# Patient Record
Sex: FEMALE | Race: WHITE | NOT HISPANIC OR LATINO | Employment: PART TIME | URBAN - NONMETROPOLITAN AREA
[De-identification: names, ages, dates, MRNs, and addresses within clinical notes are randomized per-mention and may not be internally consistent; named-entity substitution may affect disease eponyms.]

---

## 2017-01-09 DIAGNOSIS — F32.9 MDD (MAJOR DEPRESSIVE DISORDER): ICD-10-CM

## 2017-01-09 DIAGNOSIS — R10.32 LLQ ABDOMINAL PAIN: ICD-10-CM

## 2017-01-09 DIAGNOSIS — F41.1 GAD (GENERALIZED ANXIETY DISORDER): ICD-10-CM

## 2017-01-09 DIAGNOSIS — G47.00 INSOMNIA: Primary | ICD-10-CM

## 2017-01-10 RX ORDER — CITALOPRAM HYDROBROMIDE 40 MG/1
TABLET ORAL
Qty: 30 TABLET | Refills: 3 | Status: SHIPPED | OUTPATIENT
Start: 2017-01-10 | End: 2017-05-17

## 2017-01-10 RX ORDER — GABAPENTIN 100 MG/1
CAPSULE ORAL
Qty: 90 CAPSULE | Refills: 3 | Status: SHIPPED | OUTPATIENT
Start: 2017-01-10 | End: 2017-02-23

## 2017-01-10 RX ORDER — TRAZODONE HYDROCHLORIDE 50 MG/1
TABLET, FILM COATED ORAL
Qty: 90 TABLET | Refills: 0 | Status: SHIPPED | OUTPATIENT
Start: 2017-01-10 | End: 2017-03-27

## 2017-01-10 NOTE — TELEPHONE ENCOUNTER
Trazadone 50mg        Last Written Prescription Date: 12-7-2016  Last Fill Quantity: 90; # refills: 0  Last Office Visit with [facility], [facility] or [facility] prescribing provider:  11-   Next 5 appointments (look out 90 days)     Feb 08, 2017  9:30 AM   (Arrive by 9:15 AM)   SHORT with Sudhakar Cuellar MD   [facility] ([facility])    [address] [place] [address]   [phone]                   Last PHQ-9 score on record=   PHQ-9 SCORE 12/22/2015   Total Score -   Total Score 13       AST       13   8/12/2016  ALT       17   8/12/2016      Gabapentin 100mg cap      Last Written Prescription Date: 12-  Last Quantity: 90, # refills: 0  Last Office Visit with [facility], [facility] or [facility] prescribing provider: 11-   Next 5 appointments (look out 90 days)     Feb 08, 2017  9:30 AM   (Arrive by 9:15 AM)   SHORT with Sudhakar Cuellar MD   [facility] ([facility])    [address] [place] [address]   [phone]                   CREATININE   Date Value Ref Range Status   03/21/2015 0.74 0.52 - 1.04 mg/dL Final     AST       13   8/12/2016  ALT       17   8/12/2016  BP Readings from Last 3 Encounters:   11/10/16 122/76   09/25/16 114/76   08/12/16 126/72       Citalopram HBR 40mg tab     Last Written Prescription Date: 12-7-2016  Last Fill Quantity: 30, # refills: 0  Last Office Visit with [facility] primary care provider:  11-   Next 5 appointments (look out 90 days)     Feb 08, 2017  9:30 AM   (Arrive by 9:15 AM)   SHORT with Sudhakar Cuellar MD   [facility] ([facility])    [address] [place] [address]   [phone]                   Last PHQ-9 score on record=   PHQ-9 SCORE 12/22/2015   Total Score -   Total Score 13

## 2017-02-06 DIAGNOSIS — R10.2 PELVIC PAIN IN FEMALE: Primary | ICD-10-CM

## 2017-02-07 RX ORDER — MENTHOL 5.8 MG/1
LOZENGE ORAL
Qty: 30 CAPSULE | Refills: 9 | Status: SHIPPED | OUTPATIENT
Start: 2017-02-07 | End: 2018-02-06

## 2017-02-08 ENCOUNTER — OFFICE VISIT (OUTPATIENT)
Dept: FAMILY MEDICINE | Facility: OTHER | Age: 37
End: 2017-02-08
Attending: FAMILY MEDICINE
Payer: COMMERCIAL

## 2017-02-08 VITALS
TEMPERATURE: 96.8 F | DIASTOLIC BLOOD PRESSURE: 62 MMHG | BODY MASS INDEX: 29.62 KG/M2 | WEIGHT: 200 LBS | HEIGHT: 69 IN | SYSTOLIC BLOOD PRESSURE: 100 MMHG | HEART RATE: 78 BPM

## 2017-02-08 DIAGNOSIS — J20.8 ACUTE BRONCHITIS DUE TO OTHER SPECIFIED ORGANISMS: ICD-10-CM

## 2017-02-08 DIAGNOSIS — F98.8 ATTENTION DEFICIT DISORDER: Primary | ICD-10-CM

## 2017-02-08 PROCEDURE — 99213 OFFICE O/P EST LOW 20 MIN: CPT | Performed by: FAMILY MEDICINE

## 2017-02-08 PROCEDURE — 99212 OFFICE O/P EST SF 10 MIN: CPT

## 2017-02-08 RX ORDER — METHYLPHENIDATE HYDROCHLORIDE 54 MG/1
54 TABLET ORAL EVERY MORNING
Qty: 30 TABLET | Refills: 0 | Status: SHIPPED | OUTPATIENT
Start: 2017-03-08 | End: 2017-05-11

## 2017-02-08 RX ORDER — AZITHROMYCIN 250 MG/1
TABLET, FILM COATED ORAL
Qty: 6 TABLET | Refills: 0 | Status: SHIPPED | OUTPATIENT
Start: 2017-02-08 | End: 2017-02-17

## 2017-02-08 RX ORDER — METHYLPHENIDATE HYDROCHLORIDE 54 MG/1
54 TABLET ORAL EVERY MORNING
Qty: 30 TABLET | Refills: 0 | Status: SHIPPED | OUTPATIENT
Start: 2017-04-08 | End: 2017-05-11

## 2017-02-08 RX ORDER — METHYLPHENIDATE HYDROCHLORIDE 54 MG/1
54 TABLET ORAL DAILY
Qty: 30 TABLET | Refills: 0 | Status: SHIPPED | OUTPATIENT
Start: 2017-02-08 | End: 2017-05-11

## 2017-02-08 ASSESSMENT — PAIN SCALES - GENERAL: PAINLEVEL: NO PAIN (0)

## 2017-02-08 ASSESSMENT — ANXIETY QUESTIONNAIRES
GAD7 TOTAL SCORE: 16
2. NOT BEING ABLE TO STOP OR CONTROL WORRYING: NEARLY EVERY DAY
IF YOU CHECKED OFF ANY PROBLEMS ON THIS QUESTIONNAIRE, HOW DIFFICULT HAVE THESE PROBLEMS MADE IT FOR YOU TO DO YOUR WORK, TAKE CARE OF THINGS AT HOME, OR GET ALONG WITH OTHER PEOPLE: SOMEWHAT DIFFICULT
7. FEELING AFRAID AS IF SOMETHING AWFUL MIGHT HAPPEN: MORE THAN HALF THE DAYS
5. BEING SO RESTLESS THAT IT IS HARD TO SIT STILL: SEVERAL DAYS
1. FEELING NERVOUS, ANXIOUS, OR ON EDGE: MORE THAN HALF THE DAYS
3. WORRYING TOO MUCH ABOUT DIFFERENT THINGS: NEARLY EVERY DAY
6. BECOMING EASILY ANNOYED OR IRRITABLE: NEARLY EVERY DAY

## 2017-02-08 ASSESSMENT — PATIENT HEALTH QUESTIONNAIRE - PHQ9: 5. POOR APPETITE OR OVEREATING: MORE THAN HALF THE DAYS

## 2017-02-08 NOTE — PATIENT INSTRUCTIONS
Bronchitis, Antibiotic Treatment (Adult)    Bronchitis is an infection of the air passages (bronchial tubes) in your lungs. It often occurs when you have a cold. This illness is contagious during the first few days and is spread through the air by coughing and sneezing, or by direct contact (touching the sick person and then touching your own eyes, nose, or mouth).  Symptoms of bronchitis include cough with mucus (phlegm) and low-grade fever. Bronchitis usually lasts 7 to 14 days. Mild cases can be treated with simple home remedies. More severe infection is treated with an antibiotic.  Home care  Follow these guidelines when caring for yourself at home:    If your symptoms are severe, rest at home for the first 2 to 3 days. When you go back to your usual activities, don't let yourself get too tired.    Do not smoke. Also avoid being exposed to secondhand smoke.    You may use over-the-counter medicines to control fever or pain, unless another medicine was prescribed. (Note: If you have chronic liver or kidney disease or have ever had a stomach ulcer or gastrointestinal bleeding, talk with your healthcare provider before using these medicines. Also talk to your provider if you are taking medicine to prevent blood clots.) Aspirin should never be given to anyone younger than 18 years of age who is ill with a viral infection or fever. It may cause severe liver or brain damage.    Your appetite may be poor, so a light diet is fine. Avoid dehydration by drinking 6 to 8 glasses of fluids per day (such as water, soft drinks, sports drinks, juices, tea, or soup). Extra fluids will help loosen secretions in the nose and lungs.    Over-the-counter cough, cold, and sore-throat medicines will not shorten the length of the illness, but they may be helpful to reduce symptoms. (Note: Do not use decongestants if you have high blood pressure.)    Finish all antibiotic medicine. Do this even if you are feeling better after only a  few days.  Follow-up care  Follow up with your healthcare provider, or as advised. If you had an X-ray or ECG (electrocardiogram), a specialist will review it. You will be notified of any new findings that may affect your care.  Note: If you are age 65 or older, or if you have a chronic lung disease or condition that affects your immune system, or you smoke, talk to your healthcare provider about having pneumococcal vaccinations and a yearly influenza vaccination (flu shot).  When to seek medical advice  Call your healthcare provider right away if any of these occur:    Fever of 100.4 F (38 C) or higher    Coughing up increased amounts of colored sputum    Weakness, drowsiness, headache, facial pain, ear pain, or a stiff neck   Call 911, or get immediate medical care  Contact emergency services right away if any of these occur.    Coughing up blood    Worsening weakness, drowsiness, headache, or stiff neck    Trouble breathing, wheezing, or pain with breathing    3587-5691 [employer]. [address] [place] [address], [place] [address]. All rights reserved. This information is not intended as a substitute for professional medical care. Always follow your healthcare professional's instructions.

## 2017-02-08 NOTE — MR AVS SNAPSHOT
After Visit Summary   2/8/2017    Cristiana Myles    MRN: [id number]           Patient Information     Date Of Birth          1980        Visit Information        Provider Department      2/8/2017 9:30 AM Sudhakar Cuellar MD [facility]        Today's Diagnoses     Attention deficit disorder    -  1     Acute bronchitis due to other specified organisms           Care Instructions      Bronchitis, Antibiotic Treatment (Adult)    Bronchitis is an infection of the air passages (bronchial tubes) in your lungs. It often occurs when you have a cold. This illness is contagious during the first few days and is spread through the air by coughing and sneezing, or by direct contact (touching the sick person and then touching your own eyes, nose, or mouth).  Symptoms of bronchitis include cough with mucus (phlegm) and low-grade fever. Bronchitis usually lasts 7 to 14 days. Mild cases can be treated with simple home remedies. More severe infection is treated with an antibiotic.  Home care  Follow these guidelines when caring for yourself at home:    If your symptoms are severe, rest at home for the first 2 to 3 days. When you go back to your usual activities, don't let yourself get too tired.    Do not smoke. Also avoid being exposed to secondhand smoke.    You may use over-the-counter medicines to control fever or pain, unless another medicine was prescribed. (Note: If you have chronic liver or kidney disease or have ever had a stomach ulcer or gastrointestinal bleeding, talk with your healthcare provider before using these medicines. Also talk to your provider if you are taking medicine to prevent blood clots.) Aspirin should never be given to anyone younger than 18 years of age who is ill with a viral infection or fever. It may cause severe liver or brain damage.    Your appetite may be poor, so a light diet is fine. Avoid dehydration by drinking 6 to 8 glasses of fluids per day (such as  water, soft drinks, sports drinks, juices, tea, or soup). Extra fluids will help loosen secretions in the nose and lungs.    Over-the-counter cough, cold, and sore-throat medicines will not shorten the length of the illness, but they may be helpful to reduce symptoms. (Note: Do not use decongestants if you have high blood pressure.)    Finish all antibiotic medicine. Do this even if you are feeling better after only a few days.  Follow-up care  Follow up with your healthcare provider, or as advised. If you had an X-ray or ECG (electrocardiogram), a specialist will review it. You will be notified of any new findings that may affect your care.  Note: If you are age 65 or older, or if you have a chronic lung disease or condition that affects your immune system, or you smoke, talk to your healthcare provider about having pneumococcal vaccinations and a yearly influenza vaccination (flu shot).  When to seek medical advice  Call your healthcare provider right away if any of these occur:    Fever of 100.4 F (38 C) or higher    Coughing up increased amounts of colored sputum    Weakness, drowsiness, headache, facial pain, ear pain, or a stiff neck   Call 911, or get immediate medical care  Contact emergency services right away if any of these occur.    Coughing up blood    Worsening weakness, drowsiness, headache, or stiff neck    Trouble breathing, wheezing, or pain with breathing    [id number] [employer]. [address] [place] [address]. All rights reserved. This information is not intended as a substitute for professional medical care. Always follow your healthcare professional's instructions.              Follow-ups after your visit        Who to contact     If you have questions or need follow up information about today's clinic visit or your schedule please contact [facility] directly at [phone].  Normal or non-critical lab and imaging results will be communicated to you by  "MyChart, letter or phone within 4 business days after the clinic has received the results. If you do not hear from us within 7 days, please contact the clinic through [employer] or phone. If you have a critical or abnormal lab result, we will notify you by phone as soon as possible.  Submit refill requests through [employer] or call your pharmacy and they will forward the refill request to us. Please allow 3 business days for your refill to be completed.          Additional Information About Your Visit        [employer] Information     [employer] gives you secure access to your electronic health record. If you see a primary care provider, you can also send messages to your care team and make appointments. If you have questions, please call your primary care clinic.  If you do not have a primary care provider, please call [phone] and they will assist you.        Care EveryWhere ID     This is your Care EveryWhere ID. This could be used by other organizations to access your [facility] medical records  [id number]        Your Vitals Were     Pulse Temperature Height BMI (Body Mass Index)          78 96.8  F (36  C) 5' 9\" (1.753 m) 29.52 kg/m2         Blood Pressure from Last 3 Encounters:   02/08/17 100/62   11/10/16 122/76   09/25/16 114/76    Weight from Last 3 Encounters:   02/08/17 200 lb (90.719 kg)   11/10/16 200 lb (90.719 kg)   08/12/16 198 lb (89.812 kg)              Today, you had the following     No orders found for display         Today's Medication Changes          These changes are accurate as of: 2/8/17  9:48 AM.  If you have any questions, ask your nurse or doctor.               Start taking these medicines.        Dose/Directions    azithromycin 250 MG tablet   Commonly known as:  ZITHROMAX   Used for:  Acute bronchitis due to other specified organisms   Started by:  Sudhakar Cuellar MD        As directed   Quantity:  6 tablet   Refills:  0         These medicines have changed or have updated prescriptions.  "       Dose/Directions    * methylphenidate ER 54 MG CR tablet   Commonly known as:  CONCERTA   This may have changed:  Another medication with the same name was added. Make sure you understand how and when to take each.   Used for:  Attention deficit disorder   Changed by:  Sudhakar Cuellar MD        Dose:  54 mg   Take 1 tablet (54 mg) by mouth daily   Quantity:  30 tablet   Refills:  0       * methylphenidate ER 54 MG CR tablet   Commonly known as:  CONCERTA   This may have changed:  Another medication with the same name was added. Make sure you understand how and when to take each.   Used for:  Attention deficit disorder   Changed by:  Sudhakar Cuellar MD        Dose:  54 mg   Start taking on:  3/8/2017   Take 1 tablet (54 mg) by mouth every morning   Quantity:  30 tablet   Refills:  0       * methylphenidate ER 54 MG CR tablet   Commonly known as:  CONCERTA   This may have changed:  You were already taking a medication with the same name, and this prescription was added. Make sure you understand how and when to take each.   Used for:  Attention deficit disorder   Changed by:  Sudhakar Cuellar MD        Dose:  54 mg   Start taking on:  4/8/2017   Take 1 tablet (54 mg) by mouth every morning   Quantity:  30 tablet   Refills:  0       * Notice:  This list has 3 medication(s) that are the same as other medications prescribed for you. Read the directions carefully, and ask your doctor or other care provider to review them with you.         Where to get your medicines      These medications were sent to [facility] - [address]  [address] [place] [address]     Phone:  [phone]    - azithromycin 250 MG tablet      Some of these will need a paper prescription and others can be bought over the counter.  Ask your nurse if you have questions.     Bring a paper prescription for each of these medications    - methylphenidate ER 54 MG CR tablet  - methylphenidate ER 54 MG CR  tablet  - methylphenidate ER 54 MG CR tablet             Primary Care Provider Office Phone # Fax #    Sudhakar Cuellar [phone]       [address] [place]  [place] [address]        Thank you!     Thank you for choosing [facility]  for your care. Our goal is always to provide you with excellent care. Hearing back from our patients is one way we can continue to improve our services. Please take a few minutes to complete the written survey that you may receive in the mail after your visit with us. Thank you!             Your Updated Medication List - Protect others around you: Learn how to safely use, store and throw away your medicines at www.disposemymeds.org.          This list is accurate as of: 2/8/17  9:48 AM.  Always use your most recent med list.                   Brand Name Dispense Instructions for use    albuterol 108 (90 BASE) MCG/ACT Inhaler    albuterol    1 Inhaler    Inhale 2 puffs into the lungs every 4 hours as needed for shortness of breath / dyspnea       azithromycin 250 MG tablet    ZITHROMAX    6 tablet    As directed       citalopram 40 MG tablet    celeXA    30 tablet    TAKE 1 TABLET BY MOUTH DAILY       gabapentin 100 MG capsule    NEURONTIN    90 capsule    TAKE 1 CAPSULE BY MOUTH 3 TIMES DAILY       hydrOXYzine 25 MG capsule    VISTARIL    60 capsule    TAKE 1 OR 2 CAPSULES BY MOUTH EVERY 4-6 HOURS AS NEEDED FOR ANXIETY       ibuprofen 800 MG tablet    ADVIL/MOTRIN    40 tablet    Take 1 tablet (800 mg) by mouth every 8 hours as needed for pain       * methylphenidate ER 54 MG CR tablet    CONCERTA    30 tablet    Take 1 tablet (54 mg) by mouth daily       * methylphenidate ER 54 MG CR tablet   Start taking on:  3/8/2017    CONCERTA    30 tablet    Take 1 tablet (54 mg) by mouth every morning       * methylphenidate ER 54 MG CR tablet   Start taking on:  4/8/2017    CONCERTA    30 tablet    Take 1 tablet (54 mg) by mouth every morning        omeprazole 40 MG capsule    priLOSEC    30 capsule    TAKE 1 CAPSULE BY MOUTH DAILY. TAKE 30 TO 60 MINUTES BEFORE A MEAL       psyllium 58.6 % Powd    METAMUCIL     Take 7 teaspoonful by mouth daily       SM STOOL SOFTENER 100 MG capsule   Generic drug:  docusate sodium     30 capsule    TAKE 1 CAPSULE BY MOUTH DAILY       traZODone 50 MG tablet    DESYREL    90 tablet    TAKE 1/2 TO 3 TABLETS BY MOUTH AT BEDTIME AS NEEDED FOR INSOMNIA       TYLENOL PO          * Notice:  This list has 3 medication(s) that are the same as other medications prescribed for you. Read the directions carefully, and ask your doctor or other care provider to review them with you.

## 2017-02-08 NOTE — PROGRESS NOTES
"  SUBJECTIVE:                                                    Cristiana Myles is a 36 year old female who presents to clinic today for the following health issues:      Medication Followup of ADHD    Taking Medication as prescribed: yes    Side Effects:  None    Medication Helping Symptoms:  yes       RESPIRATORY SYMPTOMS      Duration: couple weeks    Description  nasal congestion, rhinorrhea, cough and hoarse voice    Severity: moderate    Accompanying signs and symptoms: None    History (predisposing factors):  tobacco abuse    Precipitating or alleviating factors: None    Therapies tried and outcome:  rest and fluids    Hs gone into chest. Some Productive cough    No fevers    Not getting any better/        Problem list and histories reviewed & adjusted, as indicated.  Additional history:     Problem list, Medication list, Allergies, and Medical/Social/Surgical histories reviewed in [facility] and updated as appropriate.    ROS:  C: NEGATIVE for fever, chills, change in weight  CV: NEGATIVE for chest pain, palpitations or peripheral edema    OBJECTIVE:                                                    /62 mmHg  Pulse 78  Temp(Src) 96.8  F (36  C)  Ht 5' 9\" (1.753 m)  Wt 200 lb (90.719 kg)  BMI 29.52 kg/m2  Body mass index is 29.52 kg/(m^2).   GENERAL: healthy, alert, well nourished, well hydrated, no distress  HENT: ear canals- normal; TMs- normal; Nose- normal; Mouth- no ulcers, no lesions  NECK: no tenderness, no adenopathy, no asymmetry, no masses, no stiffness; thyroid- normal to palpation  RESP: lungs not clear to auscultation - no rales, diffuse  rhonchi, no wheezes  PSYCH: Alert and oriented times 3; speech- coherent , normal rate and volume; able to articulate logical thoughts, able to abstract reason, no tangential thoughts, no hallucinations or delusions, affect- normal         ASSESSMENT/PLAN:                                                    (F98.8) Attention deficit disorder  (primary " encounter diagnosis)  Comment: stable on meds   Plan: methylphenidate ER (CONCERTA) 54 MG CR tablet,         methylphenidate ER (CONCERTA) 54 MG CR tablet,         methylphenidate ER (CONCERTA) 54 MG CR tablet  Continue current medications and behavioral changes.         Symptomatic treatment was discussed along when patient should call and/or come back into the clinic or go to ER/Urgent care. All questions answered.     (J20.8) Acute bronchitis due to other specified organisms  Comment: Will treat - needs to quit tobacco.   Plan: azithromycin (ZITHROMAX) 250 MG tablet        Symptomatic treatment was discussed along when patient should call and/or come back into the clinic or go to ER/Urgent care. All questions answered.   Symptomatic treatment was discussed along what is available for OTC medications for symptomatic relief.         See Patient Instructions    Sudhakar Cuellar MD  [facility]

## 2017-02-08 NOTE — NURSING NOTE
"Chief Complaint   Patient presents with     A.D.H.D       Initial /62 mmHg  Pulse 78  Temp(Src) 96.8  F (36  C)  Ht 5' 9\" (1.753 m)  Wt 200 lb (90.719 kg)  BMI 29.52 kg/m2 Estimated body mass index is 29.52 kg/(m^2) as calculated from the following:    Height as of this encounter: 5' 9\" (1.753 m).    Weight as of this encounter: 200 lb (90.719 kg).  Medication Reconciliation: complete     Grabiel Cantor      "

## 2017-02-09 ASSESSMENT — ANXIETY QUESTIONNAIRES: GAD7 TOTAL SCORE: 16

## 2017-02-09 ASSESSMENT — PATIENT HEALTH QUESTIONNAIRE - PHQ9: SUM OF ALL RESPONSES TO PHQ QUESTIONS 1-9: 12

## 2017-02-17 ENCOUNTER — HOSPITAL ENCOUNTER (EMERGENCY)
Facility: HOSPITAL | Age: 37
Discharge: HOME | End: 2017-02-17
Attending: NURSE PRACTITIONER | Admitting: NURSE PRACTITIONER
Payer: COMMERCIAL

## 2017-02-17 VITALS
HEART RATE: 79 BPM | OXYGEN SATURATION: 98 % | TEMPERATURE: 98.4 F | RESPIRATION RATE: 20 BRPM | SYSTOLIC BLOOD PRESSURE: 124 MMHG | DIASTOLIC BLOOD PRESSURE: 80 MMHG

## 2017-02-17 DIAGNOSIS — J20.9 ACUTE BRONCHITIS TREATED WITH ANTIBIOTICS IN THE PAST 60 DAYS: ICD-10-CM

## 2017-02-17 DIAGNOSIS — J01.01 ACUTE RECURRENT MAXILLARY SINUSITIS: ICD-10-CM

## 2017-02-17 LAB
BASOPHILS # BLD AUTO: 0.1 10E9/L (ref 0–0.2)
BASOPHILS NFR BLD AUTO: 0.8 %
DIFFERENTIAL METHOD BLD: NORMAL
EOSINOPHIL # BLD AUTO: 0.1 10E9/L (ref 0–0.7)
EOSINOPHIL NFR BLD AUTO: 1.9 %
ERYTHROCYTE [DISTWIDTH] IN BLOOD BY AUTOMATED COUNT: 12.8 % (ref 10–15)
HCT VFR BLD AUTO: 37.9 % (ref 35–47)
HGB BLD-MCNC: 13.4 G/DL (ref 11.7–15.7)
IMM GRANULOCYTES # BLD: 0 10E9/L (ref 0–0.4)
IMM GRANULOCYTES NFR BLD: 0.1 %
LYMPHOCYTES # BLD AUTO: 2.9 10E9/L (ref 0.8–5.3)
LYMPHOCYTES NFR BLD AUTO: 38.8 %
MCH RBC QN AUTO: 29.6 PG (ref 26.5–33)
MCHC RBC AUTO-ENTMCNC: 35.4 G/DL (ref 31.5–36.5)
MCV RBC AUTO: 84 FL (ref 78–100)
MONOCYTES # BLD AUTO: 0.5 10E9/L (ref 0–1.3)
MONOCYTES NFR BLD AUTO: 7.2 %
NEUTROPHILS # BLD AUTO: 3.8 10E9/L (ref 1.6–8.3)
NEUTROPHILS NFR BLD AUTO: 51.2 %
NRBC # BLD AUTO: 0 10*3/UL
NRBC BLD AUTO-RTO: 0 /100
PLATELET # BLD AUTO: 318 10E9/L (ref 150–450)
RBC # BLD AUTO: 4.53 10E12/L (ref 3.8–5.2)
WBC # BLD AUTO: 7.5 10E9/L (ref 4–11)

## 2017-02-17 PROCEDURE — 99213 OFFICE O/P EST LOW 20 MIN: CPT | Mod: 25

## 2017-02-17 PROCEDURE — 71020 ZZHC CHEST TWO VIEWS, FRONT/LAT: CPT | Mod: TC

## 2017-02-17 PROCEDURE — 36415 COLL VENOUS BLD VENIPUNCTURE: CPT | Performed by: NURSE PRACTITIONER

## 2017-02-17 PROCEDURE — 85025 COMPLETE CBC W/AUTO DIFF WBC: CPT | Performed by: NURSE PRACTITIONER

## 2017-02-17 PROCEDURE — 99213 OFFICE O/P EST LOW 20 MIN: CPT | Performed by: NURSE PRACTITIONER

## 2017-02-17 RX ORDER — BUDESONIDE AND FORMOTEROL FUMARATE DIHYDRATE 80; 4.5 UG/1; UG/1
2 AEROSOL RESPIRATORY (INHALATION) 2 TIMES DAILY
Qty: 1 INHALER | Refills: 0 | Status: SHIPPED | OUTPATIENT
Start: 2017-02-17 | End: 2017-03-06

## 2017-02-17 ASSESSMENT — ENCOUNTER SYMPTOMS
CHEST TIGHTNESS: 1
SHORTNESS OF BREATH: 1
DIARRHEA: 0
ABDOMINAL PAIN: 0
SORE THROAT: 1
MYALGIAS: 0
BACK PAIN: 1
FEVER: 0
NAUSEA: 1
CHILLS: 0
COUGH: 1
FATIGUE: 1
SINUS PRESSURE: 1
ARTHRALGIAS: 0
VOMITING: 0
APPETITE CHANGE: 1

## 2017-02-17 NOTE — ED AVS SNAPSHOT
[facility]    [address] [address]    Phone:  [phone]                                       Cristiana Myles   MRN: [id number]    Department:  [facility]   Date of Visit:  2/17/2017           Patient Information     Date Of Birth          1980        Your diagnoses for this visit were:     Acute recurrent maxillary sinusitis     Acute bronchitis treated with antibiotics in the past 60 days        You were seen by Billie Covington NP.      Follow-up Information     Follow up with [facility].    Specialty:  EMERGENCY MEDICINE    Why:  As needed, If symptoms worsen, or concerns develop    Contact information:    [address] [address] [phone]    Additional information:    From [facility]: Take [address]. Turn left at [address]. Turn left at the first stoplight on [address]. At the first stop sign, take a right onto [address]. Take a left into the parking lot and continue through until you reach the North enterance of the building.       From [place]: Take [address]. Take the [address] ramp towards [place]. Turn left onto [address]. Take a slight right onto [address] [facility]. Turn left at the first stoplight on [address]. At the first stop sign, take a right onto [address]. Take a left into the parking lot and continue through until you reach the North enterance of the building.       From [place]: Take [address]. Take a right at [address]. At the first stop sign, take a right onto [address]. Take a left into the parking lot and continue through until you reach the North enterance of the building.         Follow up with Sudhakar Cuellar MD.    Specialty:  Family Practice    Why:  As needed, if symptoms do not improve    Contact information:    [facility]  [address] [facility] [address]  [phone]           Discharge Instructions         Acute Bronchitis  Your healthcare provider has told you that you have acute bronchitis. Bronchitis is infection or inflammation of the bronchial tubes (airways in the lungs). Normally, air moves easily in and out of the airways. Bronchitis narrows the airways, making it harder for air to flow in and out of the lungs. This causes symptoms such as shortness of breath, coughing, and wheezing. Bronchitis can be  acute  or  chronic.  Acute means the condition comes on quickly and goes away in a short time. Chronic means a condition lasts a long time and often comes back. Read on to learn more about acute bronchitis.    What causes acute bronchitis?  Acute bronchitis almost always starts as a viral respiratory infection, such as a cold or the flu. Certain factors make it more likely for a cold or flu to turn into bronchitis. These include being very young or very old or having a heart or lung problem. Cigarette smoking also makes bronchitis more likely.  When bronchitis develops, the airways become swollen. The airways may also become infected with bacteria. This is known as a secondary infection.  Diagnosing acute bronchitis  Your healthcare provider will examine you and ask about your symptoms and health history. You may also have a sputum culture to test the fluid in your lungs. Chest X-rays may be done to look for infection in the lungs.  Treating acute bronchitis  Bronchitis usually clears up as the cold or flu goes away. You can help feel better faster by doing the following:    Take medicine as directed. You may be told to take ibuprofen or other over-the-counter medicines. These help relieve inflammation in your bronchial tubes. Your doctor may prescribe an inhaler to help open up the bronchial tubes. Most of the time, acute bronchitis is caused by a viral infection. Antibiotics are usually not prescribed for viral infections.    Drink plenty of fluids, such as water, juice, or  warm soup. Fluids loosen mucus so that you can cough it up. This helps you breathe more easily. Fluids also prevent dehydration.    Make sure you get plenty of rest.    Do not smoke. Do not allow anyone else to smoke in your home.  Recovery and follow-up  Follow up with your doctor as you are told. You will likely feel better in a week or two. But a dry cough can linger beyond that time. Let your doctor know if you still have symptoms (other than a dry cough) after 2 weeks. If you re prone to getting bronchial infections, let your doctor know. And take steps to protect yourself from future infections. These steps include stopping smoking and avoiding tobacco smoke, washing your hands often, and getting a yearly flu shot.  When to call the doctor  Call the doctor if you have any of the following:    Fever of 100.4 F (38.0 C) higher    Symptoms that get worse, or new symptoms    Trouble breathing    Symptoms that don t start to improve within a week, or within 3 days of taking antibiotics     7268-1244 [employer]. [address] [place] [address]. All rights reserved. This information is not intended as a substitute for professional medical care. Always follow your healthcare professional's instructions.          Discharge References/Attachments     SINUSITIS (ANTIBIOTIC TREATMENT) (ENGLISH)      Future Appointments        Provider Department Dept Phone Center    5/4/2017 9:45 AM Sudhakar Cuellar MD [facility] [phone] [place]         Review of your medicines      START taking        Dose / Directions Last dose taken    amoxicillin-clavulanate 875-125 MG per tablet   Commonly known as:  AUGMENTIN   Dose:  1 tablet   Quantity:  20 tablet        Take 1 tablet by mouth 2 times daily for 10 days   Refills:  0        budesonide-formoterol 80-4.5 MCG/ACT Inhaler   Commonly known as:  SYMBICORT   Dose:  2 puff   Quantity:  1 Inhaler        Inhale 2 puffs into the lungs 2 times  daily for 14 days   Refills:  0          Our records show that you are taking the medicines listed below. If these are incorrect, please call your family doctor or clinic.        Dose / Directions Last dose taken    albuterol 108 (90 BASE) MCG/ACT Inhaler   Commonly known as:  albuterol   Dose:  2 puff   Quantity:  1 Inhaler        Inhale 2 puffs into the lungs every 4 hours as needed for shortness of breath / dyspnea   Refills:  3        citalopram 40 MG tablet   Commonly known as:  celeXA   Quantity:  30 tablet        TAKE 1 TABLET BY MOUTH DAILY   Refills:  3        gabapentin 100 MG capsule   Commonly known as:  NEURONTIN   Quantity:  90 capsule        TAKE 1 CAPSULE BY MOUTH 3 TIMES DAILY   Refills:  3        hydrOXYzine 25 MG capsule   Commonly known as:  VISTARIL   Quantity:  60 capsule        TAKE 1 OR 2 CAPSULES BY MOUTH EVERY 4-6 HOURS AS NEEDED FOR ANXIETY   Refills:  3        ibuprofen 800 MG tablet   Commonly known as:  ADVIL/MOTRIN   Dose:  800 mg   Quantity:  40 tablet        Take 1 tablet (800 mg) by mouth every 8 hours as needed for pain   Refills:  1        * methylphenidate ER 54 MG CR tablet   Commonly known as:  CONCERTA   Dose:  54 mg   Quantity:  30 tablet        Take 1 tablet (54 mg) by mouth daily   Refills:  0        * methylphenidate ER 54 MG CR tablet   Commonly known as:  CONCERTA   Dose:  54 mg   Quantity:  30 tablet   Start taking on:  3/8/2017        Take 1 tablet (54 mg) by mouth every morning   Refills:  0        * methylphenidate ER 54 MG CR tablet   Commonly known as:  CONCERTA   Dose:  54 mg   Quantity:  30 tablet   Start taking on:  4/8/2017        Take 1 tablet (54 mg) by mouth every morning   Refills:  0        omeprazole 40 MG capsule   Commonly known as:  priLOSEC   Quantity:  30 capsule        TAKE 1 CAPSULE BY MOUTH DAILY. TAKE 30 TO 60 MINUTES BEFORE A MEAL   Refills:  5        psyllium 58.6 % Powd   Commonly known as:  METAMUCIL   Dose:  7 teaspoonful        Take 7  teaspoonful by mouth daily   Refills:  0        SM STOOL SOFTENER 100 MG capsule   Quantity:  30 capsule   Generic drug:  docusate sodium        TAKE 1 CAPSULE BY MOUTH DAILY   Refills:  9        traZODone 50 MG tablet   Commonly known as:  DESYREL   Quantity:  90 tablet        TAKE 1/2 TO 3 TABLETS BY MOUTH AT BEDTIME AS NEEDED FOR INSOMNIA   Refills:  0        TYLENOL PO        Refills:  0        * Notice:  This list has 3 medication(s) that are the same as other medications prescribed for you. Read the directions carefully, and ask your doctor or other care provider to review them with you.            Prescriptions were sent or printed at these locations (2 Prescriptions)                   [facility] - [place], [place] - [address]   [address], [place] [address]    Telephone:  [phone]   Fax:  [phone]   Hours:                  E-Prescribed (2 of 2)         amoxicillin-clavulanate (AUGMENTIN) 875-125 MG per tablet               budesonide-formoterol (SYMBICORT) 80-4.5 MCG/ACT Inhaler                Procedures and tests performed during your visit     CBC with platelets differential    Chest XR,  PA & LAT      Orders Needing Specimen Collection     None      Pending Results     Date and Time Order Name Status Description    2/17/2017 1921 Chest XR,  PA & LAT In process             Pending Culture Results     No orders found from 2/15/2017 to 2/18/2017.            Thank you for choosing [facility]       Thank you for choosing [facility] for your care. Our goal is always to provide you with excellent care. Hearing back from our patients is one way we can continue to improve our services. Please take a few minutes to complete the written survey that you may receive in the mail after you visit with us. Thank you!        [employer] Information     [employer] gives you secure access to your electronic health record. If you see a primary care provider, you can also send messages to your care team and make  appointments. If you have questions, please call your primary care clinic.  If you do not have a primary care provider, please call [phone] and they will assist you.        Care EveryWhere ID     This is your Care EveryWhere ID. This could be used by other organizations to access your [facility] medical records  [id number]        After Visit Summary       This is your record. Keep this with you and show to your community pharmacist(s) and doctor(s) at your next visit.

## 2017-02-17 NOTE — ED AVS SNAPSHOT
[facility]    [address]    [place] [address]    Phone:  [phone]                                       Cristiana Myles   MRN: [id number]    Department:  [facility]   Date of Visit:  2/17/2017           After Visit Summary Signature Page     I have received my discharge instructions, and my questions have been answered. I have discussed any challenges I see with this plan with the nurse or doctor.    ..........................................................................................................................................  Patient/Patient Representative Signature      ..........................................................................................................................................  Patient Representative Print Name and Relationship to Patient    ..................................................               ................................................  Date                                            Time    ..........................................................................................................................................  Reviewed by Signature/Title    ...................................................              ..............................................  Date                                                            Time

## 2017-02-18 NOTE — ED PROVIDER NOTES
History     Chief Complaint   Patient presents with     Cough     was dx'd with bronchitis 3 weeks ago. sx never completely resolved.     HPI  Cristiana Myles is a 36 year old female who presents with a CC of cough, sputum production, congestion, facial pain and pressure, PND, chest tightness and intermittent pain with deep breath.  No fevers or chills.  She was seen by Dr Cuellar on 2/8/17 and treated with Zpack.  She completed, did notice some mild improvement but notes that it is now worsening again.   She has been taking nyquil and dayquil with some relief.  She denies history of asthma or lung disease, no cardiac disease.      I have reviewed the Medications, Allergies, Past Medical and Surgical History, and Social History in the Epic system.    Review of Systems   Constitutional: Positive for appetite change and fatigue. Negative for chills and fever.   HENT: Positive for congestion, postnasal drip, sinus pressure and sore throat. Negative for ear pain.    Respiratory: Positive for cough, chest tightness and shortness of breath.    Gastrointestinal: Positive for nausea. Negative for abdominal pain, diarrhea and vomiting.   Musculoskeletal: Positive for back pain (flair of chronic back aches). Negative for arthralgias and myalgias.       Physical Exam   BP: 124/80  Pulse: 79  Temp: 98.4  F (36.9  C)  Resp: 20  SpO2: 98 %    Physical Exam   Constitutional: She is oriented to person, place, and time. She appears well-developed and well-nourished.   HENT:   Head: Normocephalic and atraumatic.   Right Ear: Tympanic membrane, external ear and ear canal normal.   Nose: Mucosal edema and rhinorrhea (clear) present. Right sinus exhibits maxillary sinus tenderness. Left sinus exhibits maxillary sinus tenderness.   Mouth/Throat: Uvula is midline and oropharynx is clear and moist.   Left ear cerumen occluded, unable to see TM, no drainage noted in canal   Eyes: Conjunctivae are normal.   Neck: Normal range of motion. Neck  supple.   Cardiovascular: Normal rate and regular rhythm.    Pulmonary/Chest: Effort normal and breath sounds normal. She exhibits tenderness (right anterior ribs with palpation).   Musculoskeletal: Normal range of motion.   Neurological: She is alert and oriented to person, place, and time.   Skin: Skin is warm and dry.   Psychiatric: She has a normal mood and affect. Her behavior is normal.   Nursing note and vitals reviewed.      ED Course     ED Course     Procedures    Results for orders placed or performed during the hospital encounter of 02/17/17   Chest XR,  PA & LAT    Narrative    TWO VIEWS OF CHEST    CLINICAL HISTORY:  A 36-year-old female with history of shortness of  breath and chest wall pain.    COMPARISON:  Today's study is compared to a prior examination which is  dated September 25, 2016.    FINDINGS:  Cardiac silhouette and pulmonary vasculature are within  normal limits.  The lungs are clear on both projections.  Bony  structures demonstrate no evidence of acute abnormality.  There is  mild biapical pleural thickening that is similar in appearance  compared to the prior study.    IMPRESSION:  NO EVIDENCE OF ACUTE OR ACTIVE DISEASE.  NO ACUTE  ABNORMALITY.  Exam Date: Feb 17, 2017 07:52:00 PM  Author: JEMIMA ZURITA  This report is preliminary and transcribed     CBC with platelets differential   Result Value Ref Range    WBC 7.5 4.0 - 11.0 10e9/L    RBC Count 4.53 3.8 - 5.2 10e12/L    Hemoglobin 13.4 11.7 - 15.7 g/dL    Hematocrit 37.9 35.0 - 47.0 %    MCV 84 78 - 100 fl    MCH 29.6 26.5 - 33.0 pg    MCHC 35.4 31.5 - 36.5 g/dL    RDW 12.8 10.0 - 15.0 %    Platelet Count 318 150 - 450 10e9/L    Diff Method Automated Method     % Neutrophils 51.2 %    % Lymphocytes 38.8 %    % Monocytes 7.2 %    % Eosinophils 1.9 %    % Basophils 0.8 %    % Immature Granulocytes 0.1 %    Nucleated RBCs 0 0 /100    Absolute Neutrophil 3.8 1.6 - 8.3 10e9/L    Absolute Lymphocytes 2.9 0.8 - 5.3 10e9/L    Absolute  Monocytes 0.5 0.0 - 1.3 10e9/L    Absolute Eosinophils 0.1 0.0 - 0.7 10e9/L    Absolute Basophils 0.1 0.0 - 0.2 10e9/L    Abs Immature Granulocytes 0.0 0 - 0.4 10e9/L    Absolute Nucleated RBC 0.0        Assessments & Plan (with Medical Decision Making)     I have reviewed the nursing notes.    I have reviewed the findings, diagnosis, plan and need for follow up with the patient.  ASSESSMENT / PLAN:  (J01.01) Acute recurrent maxillary sinusitis  Comment: symptomatic, continued worsening of symptoms  Plan:  Patient verbally educated and given appropriate education sheets for each of their diagnoses and has no questions.   Take OTC motrin or tylenol as directed on the bottle as needed.   Cool mist humidifier at bedside    May try safely elevating HOB or sleeping in recliner   Take OTC cold medicine as directed on bottle   Take prescription medications as directed.   Increase fluids, rest, wash hands often.   Return to ED/UC if symptoms worsen or concerns develop: shortness of breath,     chest pain, unable to control fever < 103 with medications, persistent vomiting, signs/symptoms of dehydration.   If symptoms persist follow up with PCP for re-evaluation.      (J20.9) Acute bronchitis treated with antibiotics in the past 60 days  Comment: symptomatic, continued worsening of symptoms despite treatment  Plan:  As above   Will try Symbicort, concern for possible underlying reactive airway/asthma as symptoms are returning every 2-3 months despite recurrent abx treatment   Albuterol as needed for rescue inhaler, patient has at home   Call Monday to schedule follow up with Dr Cuellar for 7-10 days, sooner if symptoms worsen   Patient verbally educated and given appropriate education sheets for their diagnoses and has no questions.   Take medications as directed.    Return to ED/UC if symptoms increase or concerns develop: red flag symptoms as discussed and per discharge instructions   Follow up with your Primary Care  provider if symptoms do not improve    Discharge Medication List as of 2/17/2017  8:31 PM      START taking these medications    Details   amoxicillin-clavulanate (AUGMENTIN) 875-125 MG per tablet Take 1 tablet by mouth 2 times daily for 10 days, Disp-20 tablet, R-0, E-Prescribe      budesonide-formoterol (SYMBICORT) 80-4.5 MCG/ACT Inhaler Inhale 2 puffs into the lungs 2 times daily for 14 days, Disp-1 Inhaler, R-0, E-Prescribe             Final diagnoses:   Acute recurrent maxillary sinusitis   Acute bronchitis treated with antibiotics in the past 60 days       2/17/2017   [facility]     Billie Covington NP  02/17/17 2097

## 2017-02-18 NOTE — DISCHARGE INSTRUCTIONS
Acute Bronchitis  Your healthcare provider has told you that you have acute bronchitis. Bronchitis is infection or inflammation of the bronchial tubes (airways in the lungs). Normally, air moves easily in and out of the airways. Bronchitis narrows the airways, making it harder for air to flow in and out of the lungs. This causes symptoms such as shortness of breath, coughing, and wheezing. Bronchitis can be  acute  or  chronic.  Acute means the condition comes on quickly and goes away in a short time. Chronic means a condition lasts a long time and often comes back. Read on to learn more about acute bronchitis.    What causes acute bronchitis?  Acute bronchitis almost always starts as a viral respiratory infection, such as a cold or the flu. Certain factors make it more likely for a cold or flu to turn into bronchitis. These include being very young or very old or having a heart or lung problem. Cigarette smoking also makes bronchitis more likely.  When bronchitis develops, the airways become swollen. The airways may also become infected with bacteria. This is known as a secondary infection.  Diagnosing acute bronchitis  Your healthcare provider will examine you and ask about your symptoms and health history. You may also have a sputum culture to test the fluid in your lungs. Chest X-rays may be done to look for infection in the lungs.  Treating acute bronchitis  Bronchitis usually clears up as the cold or flu goes away. You can help feel better faster by doing the following:    Take medicine as directed. You may be told to take ibuprofen or other over-the-counter medicines. These help relieve inflammation in your bronchial tubes. Your doctor may prescribe an inhaler to help open up the bronchial tubes. Most of the time, acute bronchitis is caused by a viral infection. Antibiotics are usually not prescribed for viral infections.    Drink plenty of fluids, such as water, juice, or warm soup. Fluids loosen mucus so  that you can cough it up. This helps you breathe more easily. Fluids also prevent dehydration.    Make sure you get plenty of rest.    Do not smoke. Do not allow anyone else to smoke in your home.  Recovery and follow-up  Follow up with your doctor as you are told. You will likely feel better in a week or two. But a dry cough can linger beyond that time. Let your doctor know if you still have symptoms (other than a dry cough) after 2 weeks. If you re prone to getting bronchial infections, let your doctor know. And take steps to protect yourself from future infections. These steps include stopping smoking and avoiding tobacco smoke, washing your hands often, and getting a yearly flu shot.  When to call the doctor  Call the doctor if you have any of the following:    Fever of 100.4 F (38.0 C) higher    Symptoms that get worse, or new symptoms    Trouble breathing    Symptoms that don t start to improve within a week, or within 3 days of taking antibiotics     [id number] [employer]. [address] [place] [address], [place] [address]. All rights reserved. This information is not intended as a substitute for professional medical care. Always follow your healthcare professional's instructions.

## 2017-02-18 NOTE — ED NOTES
Onset of cough and chest feels heavy.  Cough with yellow/ green drainage.  No sure about fevers.  Little bit of back pain.  Ear pain off and on.

## 2017-03-06 ENCOUNTER — HOSPITAL ENCOUNTER (EMERGENCY)
Facility: HOSPITAL | Age: 37
Discharge: HOME | End: 2017-03-06
Attending: NURSE PRACTITIONER | Admitting: NURSE PRACTITIONER
Payer: COMMERCIAL

## 2017-03-06 VITALS
TEMPERATURE: 97 F | SYSTOLIC BLOOD PRESSURE: 137 MMHG | DIASTOLIC BLOOD PRESSURE: 95 MMHG | OXYGEN SATURATION: 100 % | RESPIRATION RATE: 16 BRPM

## 2017-03-06 DIAGNOSIS — H04.123 DRY EYES: ICD-10-CM

## 2017-03-06 DIAGNOSIS — H18.892 CORNEAL IRRITATION OF LEFT EYE: ICD-10-CM

## 2017-03-06 PROCEDURE — 25000132 ZZH RX MED GY IP 250 OP 250 PS 637: Performed by: NURSE PRACTITIONER

## 2017-03-06 PROCEDURE — 99213 OFFICE O/P EST LOW 20 MIN: CPT | Performed by: NURSE PRACTITIONER

## 2017-03-06 PROCEDURE — 99213 OFFICE O/P EST LOW 20 MIN: CPT

## 2017-03-06 RX ORDER — TETRACAINE HYDROCHLORIDE 5 MG/ML
1-2 SOLUTION OPHTHALMIC ONCE
Status: COMPLETED | OUTPATIENT
Start: 2017-03-06 | End: 2017-03-06

## 2017-03-06 RX ORDER — TETRACAINE HYDROCHLORIDE 5 MG/ML
2 SOLUTION OPHTHALMIC ONCE
Status: DISCONTINUED | OUTPATIENT
Start: 2017-03-06 | End: 2017-03-06 | Stop reason: HOSPADM

## 2017-03-06 RX ORDER — ERYTHROMYCIN 5 MG/G
1 OINTMENT OPHTHALMIC AT BEDTIME
Qty: 1 TUBE | Refills: 0 | Status: SHIPPED | OUTPATIENT
Start: 2017-03-06 | End: 2017-05-11

## 2017-03-06 RX ADMIN — TETRACAINE HYDROCHLORIDE 2 DROP: 5 SOLUTION OPHTHALMIC at 18:23

## 2017-03-06 ASSESSMENT — ENCOUNTER SYMPTOMS
ADENOPATHY: 0
CONSTITUTIONAL NEGATIVE: 1
GASTROINTESTINAL NEGATIVE: 1
CARDIOVASCULAR NEGATIVE: 1
MUSCULOSKELETAL NEGATIVE: 1
EYE DISCHARGE: 0
EYE ITCHING: 0
EYE REDNESS: 0
RESPIRATORY NEGATIVE: 1

## 2017-03-06 ASSESSMENT — VISUAL ACUITY
OS: 20/40
OD: 20/20

## 2017-03-06 NOTE — ED AVS SNAPSHOT
[facility]    [address]    [facility] [address]    Phone:  [phone]                                       Cristiana Myles   MRN: [id number]    Department:  [facility]   Date of Visit:  3/6/2017           Patient Information     Date Of Birth          1980        Your diagnoses for this visit were:     Dry eyes     Corneal irritation of left eye        You were seen by Maria Elena Carrington NP.      Follow-up Information     Follow up with Max Berry MD In 1 day.    Specialty:  Ophthalmology    Why:  If symptoms worsen    Contact information:    [facility]  [address]  [facility] [address] [phone]          Follow up with [facility].    Specialty:  EMERGENCY MEDICINE    Why:  As needed, If symptoms worsen    Contact information:    [address] [address] [phone]    Additional information:    From [place] Area: Take [address]. Turn left at [address]. Turn left at the first stoplight on [address]. At the first stop sign, take a right onto [address]. Take a left into the parking lot and continue through until you reach the North enterance of the building.       From [place]: Take [address]. Take the [address] ramp towards [place]. Turn left onto [address]. Take a slight right onto [address]. Turn left at the first stoplight on [address]. At the first stop sign, take a right onto [address]. Take a left into the parking lot and continue through until you reach the North enterance of the building.       From [place]: Take [address]. Take a right at [address]. At the first stop sign, take a right onto [address]. Take a left into the parking lot and continue through until you reach the North enterance of the building.         Follow up with Sudhakar Cuellar MD.    Specialty:  Family Practice    Why:  If symptoms  worsen, As needed    Contact information:    [facility]  [address] [place] [address]  [phone]          Discharge Instructions         Treating Dry Eyes    Artificial tears are the most common treatment for dry eyes. If they don t relieve your symptoms, your eye doctor may put in plugs. Or you may have surgery to stop the draining and increase the tear film.  Artificial tears  Artificial tears, or lubricating eye drops, replace your natural lubricating tears. You can buy most lubricating eye drops without a prescription. And you can use them as often as needed. Lubricating eye drops are not the same as eye drops used to relieve redness or itching. Check with your eye doctor or pharmacist to be sure you buy the right drops.  Some lubricating eye drops have chemicals called preservatives. This makes them last longer. Your eyes may be sensitive to these drops. Or you may need to use them often. If so, you may want to buy lubricating eye drops made without preservatives. Your eye doctor may also suggest using a lubricating eye ointment at night.  Medicine  Your doctor may prescribe medicine such as cyclosporine to treat your eye condition. It can help increase your eyes' ability to make tears.  Plugs    Closing the puncta with plugs can help keep the tear film on your eye. The plug acts like a stopper in a sink. It allows only a small amount of tears to drain out of your eye. Your eye doctor may first try short-term (temporary) plugs that dissolve in a few days. If these help, he or she may then put in long-term plugs. Your eyes will be numbed with drops when the plugs are inserted. You shouldn t feel any pain. And you shouldn t feel the plugs once they re in.   Surgery  If artificial tears or plugs don t relieve your dry eyes, surgery may be an option. Your eye doctor may do minor outpatient surgery to narrow or block the openings to the drainage canals. If your dry eyes are caused by eyelid  problems, your eye doctor may recommend other kinds of surgery.    6021-0290 [employer]. [address] [place] [address], [place] [address]. All rights reserved. This information is not intended as a substitute for professional medical care. Always follow your healthcare professional's instructions.          Discharge References/Attachments     [employer] (ENGLISH)      Future Appointments        Provider Department Dept Phone Center    5/4/2017 9:45 AM Sudhakar Cuellar MD [facility] [phone] [facility]         Review of your medicines      START taking        Dose / Directions Last dose taken    erythromycin ophthalmic ointment   Commonly known as:  ROMYCIN   Dose:  1 Application   Quantity:  1 Tube        Place 1 Application Into the left eye At Bedtime   Refills:  0          Our records show that you are taking the medicines listed below. If these are incorrect, please call your family doctor or clinic.        Dose / Directions Last dose taken    albuterol 108 (90 BASE) MCG/ACT Inhaler   Commonly known as:  albuterol   Dose:  2 puff   Quantity:  1 Inhaler        Inhale 2 puffs into the lungs every 4 hours as needed for shortness of breath / dyspnea   Refills:  3        citalopram 40 MG tablet   Commonly known as:  celeXA   Quantity:  30 tablet        TAKE 1 TABLET BY MOUTH DAILY   Refills:  3        gabapentin 100 MG capsule   Commonly known as:  NEURONTIN   Quantity:  90 capsule        TAKE 1 CAPSULE BY MOUTH 3 TIMES DAILY   Refills:  0        hydrOXYzine 25 MG capsule   Commonly known as:  VISTARIL   Quantity:  60 capsule        TAKE 1 OR 2 CAPSULES BY MOUTH EVERY 4-6 HOURS AS NEEDED FOR ANXIETY   Refills:  3        ibuprofen 800 MG tablet   Commonly known as:  ADVIL/MOTRIN   Dose:  800 mg   Quantity:  40 tablet        Take 1 tablet (800 mg) by mouth every 8 hours as needed for pain   Refills:  1        * methylphenidate ER 54 MG CR tablet   Commonly known as:  CONCERTA   Dose:   54 mg   Quantity:  30 tablet        Take 1 tablet (54 mg) by mouth daily   Refills:  0        * methylphenidate ER 54 MG CR tablet   Commonly known as:  CONCERTA   Dose:  54 mg   Quantity:  30 tablet   Start taking on:  3/8/2017        Take 1 tablet (54 mg) by mouth every morning   Refills:  0        * methylphenidate ER 54 MG CR tablet   Commonly known as:  CONCERTA   Dose:  54 mg   Quantity:  30 tablet   Start taking on:  4/8/2017        Take 1 tablet (54 mg) by mouth every morning   Refills:  0        omeprazole 40 MG capsule   Commonly known as:  priLOSEC   Quantity:  30 capsule        TAKE 1 CAPSULE BY MOUTH DAILY. TAKE 30 TO 60 MINUTES BEFORE A MEAL   Refills:  5        psyllium 58.6 % Powd   Commonly known as:  METAMUCIL   Dose:  7 teaspoonful        Take 7 teaspoonful by mouth daily   Refills:  0        SM STOOL SOFTENER 100 MG capsule   Quantity:  30 capsule   Generic drug:  docusate sodium        TAKE 1 CAPSULE BY MOUTH DAILY   Refills:  9        traZODone 50 MG tablet   Commonly known as:  DESYREL   Quantity:  90 tablet        TAKE 1/2 TO 3 TABLETS BY MOUTH AT BEDTIME AS NEEDED FOR INSOMNIA   Refills:  0        TYLENOL PO        Refills:  0        * Notice:  This list has 3 medication(s) that are the same as other medications prescribed for you. Read the directions carefully, and ask your doctor or other care provider to review them with you.            Prescriptions were sent or printed at these locations (1 Prescription)                   [facility] [address] [place] [address], [place] [address] [address] [facility] [address]   [address] [facility] [address], [facility] [address]    Telephone:  [phone]   Fax:  [phone]   Hours:                  E-Prescribed (1 of 1)         erythromycin (ROMYCIN) ophthalmic ointment                Orders Needing Specimen Collection     None      Pending Results     No orders found from 3/4/2017 to 3/7/2017.            Pending Culture Results     No orders found from 3/4/2017 to 3/7/2017.            Thank you for  choosing [facility]       Thank you for choosing [facility] for your care. Our goal is always to provide you with excellent care. Hearing back from our patients is one way we can continue to improve our services. Please take a few minutes to complete the written survey that you may receive in the mail after you visit with us. Thank you!        [employer] Information     [employer] gives you secure access to your electronic health record. If you see a primary care provider, you can also send messages to your care team and make appointments. If you have questions, please call your primary care clinic.  If you do not have a primary care provider, please call [phone] and they will assist you.        Care EveryWhere ID     This is your Care EveryWhere ID. This could be used by other organizations to access your [facility] medical records  [id number]        After Visit Summary       This is your record. Keep this with you and show to your community pharmacist(s) and doctor(s) at your next visit.

## 2017-03-06 NOTE — ED AVS SNAPSHOT
[facility]    [address]    [place] [address]    Phone:  [phone]                                       Cristiana Myles   MRN: [id number]    Department:  [facility]   Date of Visit:  3/6/2017           After Visit Summary Signature Page     I have received my discharge instructions, and my questions have been answered. I have discussed any challenges I see with this plan with the nurse or doctor.    ..........................................................................................................................................  Patient/Patient Representative Signature      ..........................................................................................................................................  Patient Representative Print Name and Relationship to Patient    ..................................................               ................................................  Date                                            Time    ..........................................................................................................................................  Reviewed by Signature/Title    ...................................................              ..............................................  Date                                                            Time

## 2017-03-07 NOTE — ED PROVIDER NOTES
History     Chief Complaint   Patient presents with     Eye Problem     left eye redness, itching started yesterday, feel like something in eye painful, intermittent blurred vision     The history is provided by the patient and the spouse. No  was used.     Cristiana Myles is a 36 year old female who presents with a few hours of eye irritation and intermittent visual change.  Her eye feel irritated and possible FB.  She has been well otherwise    I have reviewed the Medications, Allergies, Past Medical and Surgical History, and Social History in the Epic system.    Review of Systems   Constitutional: Negative.    HENT: Negative.    Eyes: Positive for visual disturbance. Negative for discharge, redness and itching.        Gritty and scratchy feeling as though there may be a FB.  No known time when that would have occurred. She does not wear contacts and has some dry reddened patch on her eyelid that is typical for her and comes and goes and sometime on the other eyelid as well   Respiratory: Negative.    Cardiovascular: Negative.    Gastrointestinal: Negative.    Genitourinary: Negative.    Musculoskeletal: Negative.    Hematological: Negative for adenopathy.       Physical Exam   BP: 137/95  Heart Rate: 88  Temp: 97  F (36.1  C)  Resp: 16  SpO2: 100 %  Physical Exam   Constitutional: She is oriented to person, place, and time. She appears well-developed and well-nourished. No distress.   HENT:   Head: Normocephalic and atraumatic.   Mouth/Throat: Oropharynx is clear and moist.   Eyes: Pupils are equal, round, and reactive to light.    pterygium noted bilaterally. Scleras are clear conjunctiva non injected, there is no drainage, she has a small patch of erythematous scaly irritation on the left eyelid along the midline.  No FB noted .  See procedure note for fluorescein staining below.  Possible corneal abrasion   Neck: Normal range of motion.   Cardiovascular: Normal rate.    Pulmonary/Chest:  Effort normal.   Musculoskeletal: Normal range of motion.   Neurological: She is alert and oriented to person, place, and time.   Skin: Skin is warm and dry. No rash noted. She is not diaphoretic.   Nursing note and vitals reviewed.      ED Course     ED Course     Procedures            No FB was seen  Tetracaine was applied to left eye and fluorescein stained , examined  With the black light , there is slightly increased up take in the midline bilaterally on either side of the iris  Labs Ordered and Resulted from Time of ED Arrival Up to the Time of Departure from the ED - No data to display    Assessments & Plan (with Medical Decision Making)     I have reviewed the nursing notes.    I have reviewed the findings, diagnosis, plan and need for follow up with the patient.    Pathophysiology, possible etiology and treatment with potential outcomes, risks, benefits, and alternatives discussed to the best of my ability    Erythromycin opthalmic ointment at HS until clear for 48 hours.  If sx have not improved by the am  F/u with opthamology  Pt verbalizes understanding and agreement with plan.  Follow up for worsening symptoms    Discharge Medication List as of 3/6/2017  6:34 PM      START taking these medications    Details   erythromycin (ROMYCIN) ophthalmic ointment Place 1 Application Into the left eye At BedtimeDisp-1 Tube, [id number]             Final diagnoses:   Dry eyes   Corneal irritation of left eye       3/6/2017   [facility]     Maria Elena Carrington NP  03/06/17 3217

## 2017-03-07 NOTE — ED NOTES
Pt presents with itches, burning with sensation that there is something in it and watering to left eye since yesterday afternoon.

## 2017-03-07 NOTE — DISCHARGE INSTRUCTIONS
Treating Dry Eyes    Artificial tears are the most common treatment for dry eyes. If they don t relieve your symptoms, your eye doctor may put in plugs. Or you may have surgery to stop the draining and increase the tear film.  Artificial tears  Artificial tears, or lubricating eye drops, replace your natural lubricating tears. You can buy most lubricating eye drops without a prescription. And you can use them as often as needed. Lubricating eye drops are not the same as eye drops used to relieve redness or itching. Check with your eye doctor or pharmacist to be sure you buy the right drops.  Some lubricating eye drops have chemicals called preservatives. This makes them last longer. Your eyes may be sensitive to these drops. Or you may need to use them often. If so, you may want to buy lubricating eye drops made without preservatives. Your eye doctor may also suggest using a lubricating eye ointment at night.  Medicine  Your doctor may prescribe medicine such as cyclosporine to treat your eye condition. It can help increase your eyes' ability to make tears.  Plugs    Closing the puncta with plugs can help keep the tear film on your eye. The plug acts like a stopper in a sink. It allows only a small amount of tears to drain out of your eye. Your eye doctor may first try short-term (temporary) plugs that dissolve in a few days. If these help, he or she may then put in long-term plugs. Your eyes will be numbed with drops when the plugs are inserted. You shouldn t feel any pain. And you shouldn t feel the plugs once they re in.   Surgery  If artificial tears or plugs don t relieve your dry eyes, surgery may be an option. Your eye doctor may do minor outpatient surgery to narrow or block the openings to the drainage canals. If your dry eyes are caused by eyelid problems, your eye doctor may recommend other kinds of surgery.    3141-7420 [employer]. [address] [place] [address], [place] [address]. All rights  reserved. This information is not intended as a substitute for professional medical care. Always follow your healthcare professional's instructions.

## 2017-03-27 DIAGNOSIS — G47.00 INSOMNIA: ICD-10-CM

## 2017-03-27 RX ORDER — TRAZODONE HYDROCHLORIDE 50 MG/1
TABLET, FILM COATED ORAL
Qty: 90 TABLET | Refills: 0 | Status: SHIPPED | OUTPATIENT
Start: 2017-03-27 | End: 2017-04-26

## 2017-04-15 ENCOUNTER — HOSPITAL ENCOUNTER (EMERGENCY)
Facility: HOSPITAL | Age: 37
Discharge: HOME | End: 2017-04-15
Attending: PHYSICIAN ASSISTANT | Admitting: PHYSICIAN ASSISTANT
Payer: COMMERCIAL

## 2017-04-15 VITALS
RESPIRATION RATE: 16 BRPM | SYSTOLIC BLOOD PRESSURE: 116 MMHG | DIASTOLIC BLOOD PRESSURE: 72 MMHG | TEMPERATURE: 96.8 F | OXYGEN SATURATION: 97 %

## 2017-04-15 DIAGNOSIS — H60.542 DERMATITIS OF LEFT EAR CANAL: ICD-10-CM

## 2017-04-15 DIAGNOSIS — J20.9 ACUTE BRONCHITIS, UNSPECIFIED ORGANISM: ICD-10-CM

## 2017-04-15 DIAGNOSIS — H61.22 IMPACTED CERUMEN OF LEFT EAR: ICD-10-CM

## 2017-04-15 LAB
FLUAV+FLUBV AG SPEC QL: NEGATIVE
FLUAV+FLUBV AG SPEC QL: NORMAL
SPECIMEN SOURCE: NORMAL

## 2017-04-15 PROCEDURE — 99213 OFFICE O/P EST LOW 20 MIN: CPT

## 2017-04-15 PROCEDURE — 99214 OFFICE O/P EST MOD 30 MIN: CPT | Performed by: PHYSICIAN ASSISTANT

## 2017-04-15 PROCEDURE — 87804 INFLUENZA ASSAY W/OPTIC: CPT | Performed by: FAMILY MEDICINE

## 2017-04-15 RX ORDER — OFLOXACIN 3 MG/ML
10 SOLUTION AURICULAR (OTIC) DAILY
Qty: 4 ML | Refills: 0 | Status: SHIPPED | OUTPATIENT
Start: 2017-04-15 | End: 2017-04-22

## 2017-04-15 RX ORDER — PREDNISONE 20 MG/1
TABLET ORAL
Qty: 10 TABLET | Refills: 0 | Status: SHIPPED | OUTPATIENT
Start: 2017-04-15 | End: 2017-04-21

## 2017-04-15 RX ORDER — BENZONATATE 200 MG/1
200 CAPSULE ORAL 3 TIMES DAILY PRN
Qty: 21 CAPSULE | Refills: 0 | Status: SHIPPED | OUTPATIENT
Start: 2017-04-15 | End: 2017-05-11

## 2017-04-15 ASSESSMENT — ENCOUNTER SYMPTOMS
SINUS PRESSURE: 0
COUGH: 1
NEUROLOGICAL NEGATIVE: 1
CARDIOVASCULAR NEGATIVE: 1
FEVER: 1
EYES NEGATIVE: 1
CHILLS: 0
PSYCHIATRIC NEGATIVE: 1
SORE THROAT: 0

## 2017-04-15 NOTE — ED AVS SNAPSHOT
[facility]    [address]    [place] [address]    Phone:  [phone]                                       Cristiana Myles   MRN: [id number]    Department:  [facility]   Date of Visit:  4/15/2017           After Visit Summary Signature Page     I have received my discharge instructions, and my questions have been answered. I have discussed any challenges I see with this plan with the nurse or doctor.    ..........................................................................................................................................  Patient/Patient Representative Signature      ..........................................................................................................................................  Patient Representative Print Name and Relationship to Patient    ..................................................               ................................................  Date                                            Time    ..........................................................................................................................................  Reviewed by Signature/Title    ...................................................              ..............................................  Date                                                            Time

## 2017-04-15 NOTE — DISCHARGE INSTRUCTIONS
1. Drops once daily for left ear    2. For cough:  - Albuterol opens the small tubes in the lungs to ensure they stay open. Take as directed for 5-7 days.   - 5 days Prednisone to help with tightness and cough.  - Tessalon as directed to help suppress cough.     - Deep intentional breaths and coughing helps to ensure fresh air to lung bases, so suppress cough only to sleep or if it is causing significant discomfort.   - Plenty of fluids/broth as water is the best expectorant.  - REST to help support immune function.    * Recheck with primary care early next week with poor improvement. To emergency department with worsening.

## 2017-04-15 NOTE — ED PROVIDER NOTES
History     Chief Complaint   Patient presents with     Cough     Fever     Otalgia     The history is provided by the patient. No  was used.     Cristiana Myles is a 36 year old female who presents with 3rd episode bronchitis/sinusitis since February. Pt reports cough, left ear pain, facial/nasal congestion and chest wall pain with coughing. She reports cough is hacking and associated with wheezing. She has albuterol and this helps some, but cough is still intense at night. She does have seasonal allergies and notes eyes are mostly affected by this. She has not taken anything today for symptoms and afebrile.     I have reviewed the Medications, Allergies, Past Medical and Surgical History, and Social History in the Epic system.    Review of Systems   Constitutional: Positive for fever. Negative for chills.   HENT: Positive for ear pain and postnasal drip. Negative for congestion, sinus pressure and sore throat.    Eyes: Negative.    Respiratory: Positive for cough.    Cardiovascular: Negative.    Skin: Negative.    Neurological: Negative.    Psychiatric/Behavioral: Negative.        Physical Exam   BP: 116/72  Heart Rate: 86  Temp: 96.8  F (36  C)  Resp: 16  SpO2: 97 %  Physical Exam   Constitutional: She is oriented to person, place, and time. She appears well-developed and well-nourished. No distress.   HENT:   Head: Normocephalic and atraumatic.   Right Ear: There is swelling (dry, mildly erythematous distal canal with profuse impacted cerumen occluding TM).   Left Ear: External ear normal.   Nose: Mucosal edema present.   Mouth/Throat: No oropharyngeal exudate.   Eyes: Conjunctivae are normal.   Cardiovascular: Normal rate and normal heart sounds.    Pulmonary/Chest: Effort normal. She has wheezes (expiratory, intermittent). She has no rales.   Neurological: She is alert and oriented to person, place, and time.   Skin: Skin is warm and dry.   Psychiatric: She has a normal mood and affect.    Nursing note and vitals reviewed.      ED Course     ED Course     Procedures    Labs Ordered and Resulted from Time of ED Arrival Up to the Time of Departure from the ED   INFLUENZA A/B ANTIGEN       Assessments & Plan (with Medical Decision Making)     I have reviewed the nursing notes.    I have reviewed the findings, diagnosis, plan and need for follow up with the patient.    New Prescriptions    BENZONATATE (TESSALON) 200 MG CAPSULE    Take 1 capsule (200 mg) by mouth 3 times daily as needed for cough    OFLOXACIN (FLOXIN) 0.3 % OTIC SOLUTION    Place 10 drops Into the left ear daily for 7 days    PREDNISONE (DELTASONE) 20 MG TABLET    Take two tablets (= 40mg) each day for 5 (five) days       Final diagnoses:   Impacted cerumen of left ear   Acute bronchitis, unspecified organism   Dermatitis of left ear canal   Will treat left ear as above. Pt will start prednisone and use her albuterol for current bronchitis as she is 5-6 days into symptoms and afebrile. SHe is to f/u with PCP early next week with poor progression and will seek attention here sooner with worsening despite treatment. Patient verbally educated and given appropriate education sheets for each of the diagnoses and has no questions.    Eddie Amado PA-C   4/15/2017   12:10 PM    4/15/2017   [facility]     Eddie Amado PA  04/15/17 1210       Eddie Amado PA  04/15/17 1212

## 2017-04-15 NOTE — ED AVS SNAPSHOT
[facility]    [address] [address]    Phone:  [phone]                                       Cristiana Myles   MRN: [id number]    Department:  [facility]   Date of Visit:  4/15/2017           Patient Information     Date Of Birth          1980        Your diagnoses for this visit were:     Impacted cerumen of left ear     Acute bronchitis, unspecified organism     Dermatitis of left ear canal        You were seen by Eddie Amado PA.      Follow-up Information     Follow up with Sudhakar Cuellar MD.    Specialty:  Family Practice    Why:  early next week, for sure within 2 weeks, to recheck lungs/allergies(?)    Contact information:    [facility]  [address] [facility] [address]  [phone]          Discharge Instructions       1. Drops once daily for left ear    2. For cough:  - Albuterol opens the small tubes in the lungs to ensure they stay open. Take as directed for 5-7 days.   - 5 days Prednisone to help with tightness and cough.  - Tessalon as directed to help suppress cough.     - Deep intentional breaths and coughing helps to ensure fresh air to lung bases, so suppress cough only to sleep or if it is causing significant discomfort.   - Plenty of fluids/broth as water is the best expectorant.  - REST to help support immune function.    * Recheck with primary care early next week with poor improvement. To emergency department with worsening.      Discharge References/Attachments     IMPACTED EARWAX (ENGLISH)    BRONCHITIS, ANTIOBIOTIC TREATMENT (ADULT) (ENGLISH)      Future Appointments        Provider Department Dept Phone Center    5/4/2017 9:45 AM Sudhakar Cuellar MD [facility] [phone] Idalia Cheung         Review of your medicines      START taking        Dose / Directions Last dose taken    benzonatate 200 MG capsule   Commonly known as:  TESSALON   Dose:  200 mg   Quantity:  21 capsule        Take 1 capsule  (200 mg) by mouth 3 times daily as needed for cough   Refills:  0        ofloxacin 0.3 % otic solution   Commonly known as:  FLOXIN   Dose:  10 drop   Quantity:  4 mL        Place 10 drops Into the left ear daily for 7 days   Refills:  0        predniSONE 20 MG tablet   Commonly known as:  DELTASONE   Quantity:  10 tablet        Take two tablets (= 40mg) each day for 5 (five) days   Refills:  0          Our records show that you are taking the medicines listed below. If these are incorrect, please call your family doctor or clinic.        Dose / Directions Last dose taken    albuterol 108 (90 BASE) MCG/ACT Inhaler   Commonly known as:  albuterol   Dose:  2 puff   Quantity:  1 Inhaler        Inhale 2 puffs into the lungs every 4 hours as needed for shortness of breath / dyspnea   Refills:  3        citalopram 40 MG tablet   Commonly known as:  celeXA   Quantity:  30 tablet        TAKE 1 TABLET BY MOUTH DAILY   Refills:  3        erythromycin ophthalmic ointment   Commonly known as:  ROMYCIN   Dose:  1 Application   Quantity:  1 Tube        Place 1 Application Into the left eye At Bedtime   Refills:  0        gabapentin 100 MG capsule   Commonly known as:  NEURONTIN   Quantity:  90 capsule        TAKE 1 CAPSULE BY MOUTH 3 TIMES DAILY   Refills:  0        hydrOXYzine 25 MG capsule   Commonly known as:  VISTARIL   Quantity:  60 capsule        TAKE 1 OR 2 CAPSULES BY MOUTH EVERY 4-6 HOURS AS NEEDED FOR ANXIETY   Refills:  3        ibuprofen 800 MG tablet   Commonly known as:  ADVIL/MOTRIN   Dose:  800 mg   Quantity:  40 tablet        Take 1 tablet (800 mg) by mouth every 8 hours as needed for pain   Refills:  1        * methylphenidate ER 54 MG CR tablet   Commonly known as:  CONCERTA   Dose:  54 mg   Quantity:  30 tablet        Take 1 tablet (54 mg) by mouth daily   Refills:  0        * methylphenidate ER 54 MG CR tablet   Commonly known as:  CONCERTA   Dose:  54 mg   Quantity:  30 tablet        Take 1 tablet (54 mg) by  mouth every morning   Refills:  0        * methylphenidate ER 54 MG CR tablet   Commonly known as:  CONCERTA   Dose:  54 mg   Quantity:  30 tablet        Take 1 tablet (54 mg) by mouth every morning   Refills:  0        omeprazole 40 MG capsule   Commonly known as:  priLOSEC   Quantity:  30 capsule        TAKE 1 CAPSULE BY MOUTH DAILY. TAKE 30 TO 60 MINUTES BEFORE A MEAL   Refills:  5        psyllium 58.6 % Powd   Commonly known as:  METAMUCIL   Dose:  7 teaspoonful        Take 7 teaspoonful by mouth daily   Refills:  0        SM STOOL SOFTENER 100 MG capsule   Quantity:  30 capsule   Generic drug:  docusate sodium        TAKE 1 CAPSULE BY MOUTH DAILY   Refills:  9        traZODone 50 MG tablet   Commonly known as:  DESYREL   Quantity:  90 tablet        TAKE 1/2 TO 3 TABLETS BY MOUTH AT BEDTIME AS NEEDED FOR INSOMNIA   Refills:  0        TYLENOL PO        Refills:  0        * Notice:  This list has 3 medication(s) that are the same as other medications prescribed for you. Read the directions carefully, and ask your doctor or other care provider to review them with you.            Prescriptions were sent or printed at these locations (3 Prescriptions)                   [facility]  [address]   [address] [facility] [place] [address]    Telephone:  [phone]   Fax:  [phone]   Hours:                  E-Prescribed (3 of 3)         predniSONE (DELTASONE) 20 MG tablet               ofloxacin (FLOXIN) 0.3 % otic solution               benzonatate (TESSALON) 200 MG capsule                Procedures and tests performed during your visit     Influenza A/B antigen      Orders Needing Specimen Collection     None      Pending Results     No orders found from 4/13/2017 to 4/16/2017.            Pending Culture Results     No orders found from 4/13/2017 to 4/16/2017.            Thank you for choosing [facility]       Thank you for choosing [facility] for your care. Our goal is always to provide you  with excellent care. Hearing back from our patients is one way we can continue to improve our services. Please take a few minutes to complete the written survey that you may receive in the mail after you visit with us. Thank you!        [employer] Information     [employer] gives you secure access to your electronic health record. If you see a primary care provider, you can also send messages to your care team and make appointments. If you have questions, please call your primary care clinic.  If you do not have a primary care provider, please call [phone] and they will assist you.        Care EveryWhere ID     This is your Care EveryWhere ID. This could be used by other organizations to access your [facility] medical records  [id number]        After Visit Summary       This is your record. Keep this with you and show to your community pharmacist(s) and doctor(s) at your next visit.

## 2017-04-15 NOTE — ED NOTES
Pt presents with fullness and pain left ear, left chest heaviness, cough, fever off and on in the evenings X 1 week.

## 2017-04-21 ENCOUNTER — HOSPITAL ENCOUNTER (EMERGENCY)
Facility: HOSPITAL | Age: 37
Discharge: HOME | End: 2017-04-21
Attending: NURSE PRACTITIONER | Admitting: NURSE PRACTITIONER
Payer: COMMERCIAL

## 2017-04-21 VITALS
SYSTOLIC BLOOD PRESSURE: 138 MMHG | TEMPERATURE: 98.1 F | WEIGHT: 210 LBS | RESPIRATION RATE: 18 BRPM | OXYGEN SATURATION: 100 % | DIASTOLIC BLOOD PRESSURE: 72 MMHG | BODY MASS INDEX: 31.01 KG/M2 | HEART RATE: 100 BPM

## 2017-04-21 DIAGNOSIS — H60.502 ACUTE OTITIS EXTERNA OF LEFT EAR, UNSPECIFIED TYPE: ICD-10-CM

## 2017-04-21 DIAGNOSIS — H69.92 DYSFUNCTION OF EUSTACHIAN TUBE, LEFT: ICD-10-CM

## 2017-04-21 PROCEDURE — 99213 OFFICE O/P EST LOW 20 MIN: CPT

## 2017-04-21 PROCEDURE — 99213 OFFICE O/P EST LOW 20 MIN: CPT | Performed by: NURSE PRACTITIONER

## 2017-04-21 RX ORDER — AMOXICILLIN 500 MG/1
500 CAPSULE ORAL 3 TIMES DAILY
Qty: 21 CAPSULE | Refills: 0 | Status: SHIPPED | OUTPATIENT
Start: 2017-04-21 | End: 2017-04-28

## 2017-04-21 RX ORDER — NEOMYCIN SULFATE, POLYMYXIN B SULFATE AND HYDROCORTISONE 10; 3.5; 1 MG/ML; MG/ML; [USP'U]/ML
4 SUSPENSION/ DROPS AURICULAR (OTIC) 4 TIMES DAILY
Qty: 10 ML | Refills: 0 | Status: SHIPPED | OUTPATIENT
Start: 2017-04-21 | End: 2017-05-11

## 2017-04-21 RX ORDER — FLUTICASONE PROPIONATE 50 MCG
1 SPRAY, SUSPENSION (ML) NASAL DAILY
Qty: 1 BOTTLE | Refills: 0 | Status: SHIPPED | OUTPATIENT
Start: 2017-04-21 | End: 2017-05-11

## 2017-04-21 RX ORDER — CIPROFLOXACIN AND DEXAMETHASONE 3; 1 MG/ML; MG/ML
4 SUSPENSION/ DROPS AURICULAR (OTIC) 2 TIMES DAILY
Qty: 1 BOTTLE | Refills: 0 | Status: SHIPPED | OUTPATIENT
Start: 2017-04-21 | End: 2017-04-21

## 2017-04-21 ASSESSMENT — ENCOUNTER SYMPTOMS
SINUS PRESSURE: 1
SORE THROAT: 0
FEVER: 0
COUGH: 0

## 2017-04-21 NOTE — ED AVS SNAPSHOT
[facility]    [address]    [place] [address]    Phone:  [phone]                                       Cristiana Myles   MRN: [id number]    Department:  [facility]   Date of Visit:  4/21/2017           After Visit Summary Signature Page     I have received my discharge instructions, and my questions have been answered. I have discussed any challenges I see with this plan with the nurse or doctor.    ..........................................................................................................................................  Patient/Patient Representative Signature      ..........................................................................................................................................  Patient Representative Print Name and Relationship to Patient    ..................................................               ................................................  Date                                            Time    ..........................................................................................................................................  Reviewed by Signature/Title    ...................................................              ..............................................  Date                                                            Time

## 2017-04-21 NOTE — ED AVS SNAPSHOT
[facility]    [address]    [place] [address]    Phone:  [phone]                                       Cristiana Myles   MRN: [id number]    Department:  [facility]   Date of Visit:  4/21/2017           Patient Information     Date Of Birth          1980        Your diagnoses for this visit were:     Acute otitis externa of left ear, unspecified type     Dysfunction of eustachian tube, left        You were seen by Natalya Parr NP.      Follow-up Information     Follow up with Sudhakar Cuellar MD In 3 days.    Specialty:  Family Practice    Why:  for re-evaluation    Contact information:    [place] [address] [facility] [place] [address]  [phone]          Discharge Instructions         See Dr. Cuellar on Monday at 0930 for re-evaluation    External Ear Infection (Adult)    External otitis (also called  swimmer s ear ) is an infection in the ear canal. It is often caused by bacteria or fungus. It can occur a few days after water gets trapped in the ear canal (from swimming or bathing). It can also occur after cleaning too deeply in the ear canal with a cotton swab or other object. Sometimes, hair care products get into the ear canal and cause this problem.  Symptoms can include pain, fever, itching, redness, drainage, or swelling of the ear canal. Temporary hearing loss may also occur.  Home care    Do not try to clean the ear canal. This can push pus and bacteria deeper into the canal.    Use prescribed ear drops as directed. These help reduce swelling and fight the infection. If an ear wick was placed in the ear canal, apply drops right onto the end of the wick. The wick will draw the medication into the ear canal even if it is swollen closed.    A cotton ball may be loosely placed in the outer ear to absorb any drainage.    You may use acetaminophen or ibuprofen to control pain, unless another medication was prescribed. Note: If you have  chronic liver or kidney disease or ever had a stomach ulcer or GI bleeding, talk to your health care provider before taking any of these medications.    Do not allow water to get into your ear when bathing. Also, avoid swimming until the infection has cleared.  Prevention    Keep your ears dry. This helps lower the risk of infection. Dry your ears with a towel or hair dryer after getting wet. Also, use ear plugs when swimming.    Do not stick any objects in the ear to remove wax.    If you feel water trapped in your ear, use ear drops right away. You can get these drops over the counter at most drugstores.  They work by removing water from the ear canal.  Follow-up care  Follow up with your health care provider in one week, or as advised.   When to seek medical advice  Call your health care provider right away if any of these occur:    Ear pain becomes worse or doesn t improve after 3 days of treatment    Redness or swelling of the outer ear occurs or gets worse    Headache    Painful or stiff neck    Drowsiness or confusion    Fever of 100.4 F (38 C) or higher, or as directed by your health care provider    Seiz[employer]    5930-2582 [employer]. [address] [place] [address]. All rights reserved. This information is not intended as a substitute for professional medical care. Always follow your healthcare professional's instructions.          Future Appointments        Provider Department Dept Phone Center    4/24/2017 9:45 AM Sudhakar Cuellar MD [facility] [phone] Idalia Cheung    5/4/2017 9:45 AM Sudhakar Cuellar MD [facility] [phone] Idalia Cheung         Review of your medicines      START taking        Dose / Directions Last dose taken    amoxicillin 500 MG capsule   Commonly known as:  AMOXIL   Dose:  500 mg   Quantity:  21 capsule        Take 1 capsule (500 mg) by mouth 3 times daily for 7 days   Refills:  0        fluticasone 50 MCG/ACT spray    Commonly known as:  FLONASE   Dose:  1 spray   Quantity:  1 Bottle        Spray 1 spray into both nostrils daily   Refills:  0        neomycin-polymyxin-hydrocortisone 3.5-55877-7 otic suspension   Commonly known as:  CORTISPORIN   Dose:  4 drop   Quantity:  10 mL        Place 4 drops in ear(s) 4 times daily   Refills:  0          Our records show that you are taking the medicines listed below. If these are incorrect, please call your family doctor or clinic.        Dose / Directions Last dose taken    albuterol 108 (90 BASE) MCG/ACT Inhaler   Commonly known as:  albuterol   Dose:  2 puff   Quantity:  1 Inhaler        Inhale 2 puffs into the lungs every 4 hours as needed for shortness of breath / dyspnea   Refills:  3        benzonatate 200 MG capsule   Commonly known as:  TESSALON   Dose:  200 mg   Quantity:  21 capsule        Take 1 capsule (200 mg) by mouth 3 times daily as needed for cough   Refills:  0        citalopram 40 MG tablet   Commonly known as:  celeXA   Quantity:  30 tablet        TAKE 1 TABLET BY MOUTH DAILY   Refills:  3        erythromycin ophthalmic ointment   Commonly known as:  ROMYCIN   Dose:  1 Application   Quantity:  1 Tube        Place 1 Application Into the left eye At Bedtime   Refills:  0        gabapentin 100 MG capsule   Commonly known as:  NEURONTIN   Quantity:  90 capsule        TAKE 1 CAPSULE BY MOUTH 3 TIMES DAILY   Refills:  0        hydrOXYzine 25 MG capsule   Commonly known as:  VISTARIL   Quantity:  60 capsule        TAKE 1 OR 2 CAPSULES BY MOUTH EVERY 4-6 HOURS AS NEEDED FOR ANXIETY   Refills:  3        ibuprofen 800 MG tablet   Commonly known as:  ADVIL/MOTRIN   Dose:  800 mg   Quantity:  40 tablet        Take 1 tablet (800 mg) by mouth every 8 hours as needed for pain   Refills:  1        * methylphenidate ER 54 MG CR tablet   Commonly known as:  CONCERTA   Dose:  54 mg   Quantity:  30 tablet        Take 1 tablet (54 mg) by mouth daily   Refills:  0        *  methylphenidate ER 54 MG CR tablet   Commonly known as:  CONCERTA   Dose:  54 mg   Quantity:  30 tablet        Take 1 tablet (54 mg) by mouth every morning   Refills:  0        * methylphenidate ER 54 MG CR tablet   Commonly known as:  CONCERTA   Dose:  54 mg   Quantity:  30 tablet        Take 1 tablet (54 mg) by mouth every morning   Refills:  0        ofloxacin 0.3 % otic solution   Commonly known as:  FLOXIN   Dose:  10 drop   Quantity:  4 mL        Place 10 drops Into the left ear daily for 7 days   Refills:  0        omeprazole 40 MG capsule   Commonly known as:  priLOSEC   Quantity:  30 capsule        TAKE 1 CAPSULE BY MOUTH DAILY. TAKE 30 TO 60 MINUTES BEFORE A MEAL   Refills:  5        psyllium 58.6 % Powd   Commonly known as:  METAMUCIL   Dose:  7 teaspoonful        Take 7 teaspoonful by mouth daily   Refills:  0        SM STOOL SOFTENER 100 MG capsule   Quantity:  30 capsule   Generic drug:  docusate sodium        TAKE 1 CAPSULE BY MOUTH DAILY   Refills:  9        traZODone 50 MG tablet   Commonly known as:  DESYREL   Quantity:  90 tablet        TAKE 1/2 TO 3 TABLETS BY MOUTH AT BEDTIME AS NEEDED FOR INSOMNIA   Refills:  0        TYLENOL PO        Refills:  0        * Notice:  This list has 3 medication(s) that are the same as other medications prescribed for you. Read the directions carefully, and ask your doctor or other care provider to review them with you.            Prescriptions were sent or printed at these locations (3 Prescriptions)                   [facility]  [address]   [address] [place] [address]    Telephone:  [phone]   Fax:  [phone]   Hours:                  E-Prescribed (3 of 3)         amoxicillin (AMOXIL) 500 MG capsule               fluticasone (FLONASE) 50 MCG/ACT spray               neomycin-polymyxin-hydrocortisone (CORTISPORIN) 3.5-91000-6 otic suspension                Orders Needing Specimen Collection     None      Pending  Results     No orders found from 4/19/2017 to 4/22/2017.            Pending Culture Results     No orders found from 4/19/2017 to 4/22/2017.            Thank you for choosing [facility]       Thank you for choosing [facility] for your care. Our goal is always to provide you with excellent care. Hearing back from our patients is one way we can continue to improve our services. Please take a few minutes to complete the written survey that you may receive in the mail after you visit with us. Thank you!        [employer] Information     [employer] gives you secure access to your electronic health record. If you see a primary care provider, you can also send messages to your care team and make appointments. If you have questions, please call your primary care clinic.  If you do not have a primary care provider, please call [phone] and they will assist you.        Care EveryWhere ID     This is your Care EveryWhere ID. This could be used by other organizations to access your [facility] medical records  [id number]        After Visit Summary       This is your record. Keep this with you and show to your community pharmacist(s) and doctor(s) at your next visit.

## 2017-04-21 NOTE — ED NOTES
"Left ear fullness, c/o head \"stuffiness\" also. States was seen on Saturday and ear is not getting any better.   "

## 2017-04-21 NOTE — DISCHARGE INSTRUCTIONS
See Dr. Cuellar on Monday at 0930 for re-evaluation    External Ear Infection (Adult)    External otitis (also called  swimmer s ear ) is an infection in the ear canal. It is often caused by bacteria or fungus. It can occur a few days after water gets trapped in the ear canal (from swimming or bathing). It can also occur after cleaning too deeply in the ear canal with a cotton swab or other object. Sometimes, hair care products get into the ear canal and cause this problem.  Symptoms can include pain, fever, itching, redness, drainage, or swelling of the ear canal. Temporary hearing loss may also occur.  Home care    Do not try to clean the ear canal. This can push pus and bacteria deeper into the canal.    Use prescribed ear drops as directed. These help reduce swelling and fight the infection. If an ear wick was placed in the ear canal, apply drops right onto the end of the wick. The wick will draw the medication into the ear canal even if it is swollen closed.    A cotton ball may be loosely placed in the outer ear to absorb any drainage.    You may use acetaminophen or ibuprofen to control pain, unless another medication was prescribed. Note: If you have chronic liver or kidney disease or ever had a stomach ulcer or GI bleeding, talk to your health care provider before taking any of these medications.    Do not allow water to get into your ear when bathing. Also, avoid swimming until the infection has cleared.  Prevention    Keep your ears dry. This helps lower the risk of infection. Dry your ears with a towel or hair dryer after getting wet. Also, use ear plugs when swimming.    Do not stick any objects in the ear to remove wax.    If you feel water trapped in your ear, use ear drops right away. You can get these drops over the counter at most drugstores.  They work by removing water from the ear canal.  Follow-up care  Follow up with your health care provider in one week, or as advised.   When to seek  medical advice  Call your health care provider right away if any of these occur:    Ear pain becomes worse or doesn t improve after 3 days of treatment    Redness or swelling of the outer ear occurs or gets worse    Headache    Painful or stiff neck    Drowsiness or confusion    Fever of 100.4 F (38 C) or higher, or as directed by your health care provider    Seizure    7996-3436 [employer]. [address] [place] [address]. All rights reserved. This information is not intended as a substitute for professional medical care. Always follow your healthcare professional's instructions.

## 2017-04-21 NOTE — ED PROVIDER NOTES
"  History     Chief Complaint   Patient presents with     Ear Fullness     left ear     The history is provided by the patient. No  was used.     Cristiana Myles is a 36 year old female who is increasingly anxious d/t her L ear symptoms. States she has Left ear fullness, head \"stuffiness\" and decreased hearing. States was seen on Saturday and ear is not getting any better.  Using ear drops and took the prednisone with no improvement.     I have reviewed the Medications, Allergies, Past Medical and Surgical History, and Social History in the Epic system.    Review of Systems   Constitutional: Negative for fever.   HENT: Positive for ear discharge, ear pain, hearing loss and sinus pressure. Negative for sore throat.    Respiratory: Negative for cough.        Physical Exam   BP: 138/72  Pulse: 100  Temp: 98.1  F (36.7  C)  Resp: 18  Weight: 95.3 kg (210 lb)  SpO2: 100 %  Physical Exam   Constitutional: She appears well-developed and well-nourished. No distress.   HENT:   Head: Normocephalic and atraumatic.   Right Ear: Tympanic membrane and external ear normal.   Left Ear: There is drainage (Dark brown and yellow wax, bloody drainage in canals as well), swelling and tenderness. Decreased hearing is noted.   Nose: Nose normal.   Unable to visualized L TM.    Eyes: Lids are normal.   Neck: Normal range of motion. Neck supple.   Skin: She is not diaphoretic.   Nursing note and vitals reviewed.      ED Course     ED Course     Cerumen impaction removal  Performed by: ANNIA STEPHENS  Authorized by: ANNIA STEPHENS   Consent: Verbal consent obtained.  Risks and benefits: risks, benefits and alternatives were discussed  Consent given by: patient  Patient identity confirmed: verbally with patient and arm band  Body area: ear  Patient cooperative: yes  Localization method: ENT speculum  Removal mechanism: curette  Objects recovered: Small amounts of dark wax  Post-procedure assessment: Residual " wax remained.  Comments: Unable to remove wax fully to visualized TM.       Assessments & Plan (with Medical Decision Making)     I have reviewed the nursing notes.  I have reviewed the findings, diagnosis, plan and need for follow up with the patient.  Has been using Cipro drops and oral prednisone with no improvement of symptoms.   She has been using q-tips to the ear, which she reports likely caused the bleeding in her ear.   I did not want to have the ear flushed d/t the bloody discharge.   Her symptoms are fairly bothersome for her, she appears to have quiet of bit of anxiety and frustration with this.    With her sinus congestion, fullness in her ear and pain she is having, will treat with oral abx and topically along with flonase.  Set her up to see PCP on Monday for re-evaluation.     Discharge Medication List as of 4/21/2017 11:44 AM      START taking these medications    Details   amoxicillin (AMOXIL) 500 MG capsule Take 1 capsule (500 mg) by mouth 3 times daily for 7 days, Disp-21 capsule, R-0, E-Prescribe      fluticasone (FLONASE) 50 MCG/ACT spray Spray 1 spray into both nostrils daily, Disp-1 Bottle, R-0, E-Prescribe      neomycin-polymyxin-hydrocortisone (CORTISPORIN) 3.5-80024-9 otic suspension Place 4 drops in ear(s) 4 times daily, Disp-10 mL, R-0, E-Prescribe             Final diagnoses:   Acute otitis externa of left ear, unspecified type   Dysfunction of eustachian tube, left       4/21/2017   [facility]     Natalya Parr NP  04/21/17 [id number]

## 2017-04-24 ENCOUNTER — OFFICE VISIT (OUTPATIENT)
Dept: FAMILY MEDICINE | Facility: OTHER | Age: 37
End: 2017-04-24
Attending: FAMILY MEDICINE
Payer: COMMERCIAL

## 2017-04-24 VITALS
RESPIRATION RATE: 17 BRPM | SYSTOLIC BLOOD PRESSURE: 121 MMHG | BODY MASS INDEX: 29.24 KG/M2 | WEIGHT: 198 LBS | TEMPERATURE: 97.1 F | DIASTOLIC BLOOD PRESSURE: 70 MMHG | OXYGEN SATURATION: 100 % | HEART RATE: 78 BPM

## 2017-04-24 DIAGNOSIS — H61.22 IMPACTED CERUMEN OF LEFT EAR: Primary | ICD-10-CM

## 2017-04-24 DIAGNOSIS — R19.7 DIARRHEA, UNSPECIFIED TYPE: ICD-10-CM

## 2017-04-24 PROCEDURE — 99213 OFFICE O/P EST LOW 20 MIN: CPT | Performed by: FAMILY MEDICINE

## 2017-04-24 PROCEDURE — 99212 OFFICE O/P EST SF 10 MIN: CPT

## 2017-04-24 ASSESSMENT — PAIN SCALES - GENERAL: PAINLEVEL: NO PAIN (0)

## 2017-04-24 NOTE — NURSING NOTE
"Chief Complaint   Patient presents with     ER F/U       Initial /70  Pulse 78  Temp 97.1  F (36.2  C)  Resp 17  Wt 198 lb (89.8 kg)  SpO2 100%  BMI 29.24 kg/m2 Estimated body mass index is 29.24 kg/(m^2) as calculated from the following:    Height as of 2/8/17: 5' 9\" (1.753 m).    Weight as of this encounter: 198 lb (89.8 kg).  Medication Reconciliation: complete  "

## 2017-04-24 NOTE — MR AVS SNAPSHOT
After Visit Summary   4/24/2017    Cristiana Myles    MRN: [id number]           Patient Information     Date Of Birth          1980        Visit Information        Provider Department      4/24/2017 9:45 AM Sudhakar Cuellar MD [facility]        Today's Diagnoses     Impacted cerumen of left ear    -  1    Diarrhea, unspecified type          Care Instructions    Call if diarrhea not better or resolved in next 3-4 days          Follow-ups after your visit        Your next 10 appointments already scheduled     May 04, 2017  9:45 AM CDT   (Arrive by 9:30 AM)   SHORT with MD [facility] ([facility])    [address]  [facility] [address]   [phone]              Who to contact     If you have questions or need follow up information about today's clinic visit or your schedule please contact [facility] directly at [phone].  Normal or non-critical lab and imaging results will be communicated to you by MyChart, letter or phone within 4 business days after the clinic has received the results. If you do not hear from us within 7 days, please contact the clinic through [employer] or phone. If you have a critical or abnormal lab result, we will notify you by phone as soon as possible.  Submit refill requests through [employer] or call your pharmacy and they will forward the refill request to us. Please allow 3 business days for your refill to be completed.          Additional Information About Your Visit        MyChart Information     ShareThe gives you secure access to your electronic health record. If you see a primary care provider, you can also send messages to your care team and make appointments. If you have questions, please call your primary care clinic.  If you do not have a primary care provider, please call [phone] and they will assist you.        Care EveryWhere ID     This is your Care EveryWhere ID. This could be used by other  organizations to access your [place] medical records  [id number]        Your Vitals Were     Pulse Temperature Respirations Pulse Oximetry BMI (Body Mass Index)       78 97.1  F (36.2  C) 17 100% 29.24 kg/m2        Blood Pressure from Last 3 Encounters:   04/24/17 121/70   04/21/17 138/72   04/15/17 116/72    Weight from Last 3 Encounters:   04/24/17 198 lb (89.8 kg)   04/21/17 210 lb (95.3 kg)   02/08/17 200 lb (90.7 kg)              Today, you had the following     No orders found for display       Primary Care Provider Office Phone # Fax #    Sudhakar Cuellar [phone]       [facility] [address] [facility] [place] [address]        Thank you!     Thank you for choosing [facility]  for your care. Our goal is always to provide you with excellent care. Hearing back from our patients is one way we can continue to improve our services. Please take a few minutes to complete the written survey that you may receive in the mail after your visit with us. Thank you!             Your Updated Medication List - Protect others around you: Learn how to safely use, store and throw away your medicines at www.disposemymeds.org.          This list is accurate as of: 4/24/17 11:36 AM.  Always use your most recent med list.                   Brand Name Dispense Instructions for use    albuterol 108 (90 BASE) MCG/ACT Inhaler    albuterol    1 Inhaler    Inhale 2 puffs into the lungs every 4 hours as needed for shortness of breath / dyspnea       amoxicillin 500 MG capsule    AMOXIL    21 capsule    Take 1 capsule (500 mg) by mouth 3 times daily for 7 days       benzonatate 200 MG capsule    TESSALON    21 capsule    Take 1 capsule (200 mg) by mouth 3 times daily as needed for cough       citalopram 40 MG tablet    celeXA    30 tablet    TAKE 1 TABLET BY MOUTH DAILY       erythromycin ophthalmic ointment    ROMYCIN    1 Tube    Place 1 Application Into the left eye At Bedtime       fluticasone  50 MCG/ACT spray    FLONASE    1 Bottle    Spray 1 spray into both nostrils daily       gabapentin 100 MG capsule    NEURONTIN    90 capsule    TAKE 1 CAPSULE BY MOUTH 3 TIMES DAILY       hydrOXYzine 25 MG capsule    VISTARIL    60 capsule    TAKE 1 OR 2 CAPSULES BY MOUTH EVERY 4-6 HOURS AS NEEDED FOR ANXIETY       ibuprofen 800 MG tablet    ADVIL/MOTRIN    40 tablet    Take 1 tablet (800 mg) by mouth every 8 hours as needed for pain       * methylphenidate ER 54 MG CR tablet    CONCERTA    30 tablet    Take 1 tablet (54 mg) by mouth daily       * methylphenidate ER 54 MG CR tablet    CONCERTA    30 tablet    Take 1 tablet (54 mg) by mouth every morning       * methylphenidate ER 54 MG CR tablet    CONCERTA    30 tablet    Take 1 tablet (54 mg) by mouth every morning       neomycin-polymyxin-hydrocortisone 3.5-76796-1 otic suspension    CORTISPORIN    10 mL    Place 4 drops in ear(s) 4 times daily       omeprazole 40 MG capsule    priLOSEC    30 capsule    TAKE 1 CAPSULE BY MOUTH DAILY. TAKE 30 TO 60 MINUTES BEFORE A MEAL       psyllium 58.6 % Powd    METAMUCIL     Take 7 teaspoonful by mouth daily       SM STOOL SOFTENER 100 MG capsule   Generic drug:  docusate sodium     30 capsule    TAKE 1 CAPSULE BY MOUTH DAILY       traZODone 50 MG tablet    DESYREL    90 tablet    TAKE 1/2 TO 3 TABLETS BY MOUTH AT BEDTIME AS NEEDED FOR INSOMNIA       TYLENOL PO          * Notice:  This list has 3 medication(s) that are the same as other medications prescribed for you. Read the directions carefully, and ask your doctor or other care provider to review them with you.

## 2017-04-24 NOTE — NURSING NOTE
Cerumenosis is noted.  Wax is removed by syringing and manual debridement. Instructions for home care to prevent wax buildup are given.  .Magali Carrington LPN

## 2017-04-24 NOTE — PROGRESS NOTES
SUBJECTIVE:                                                    Cristiana Myles is a 36 year old female who presents to clinic today for the following health issues:        ED/UC Followup:    Facility:  [facility]  Date of visit: 4/21/17  Reason for visit: ear infection   Current Status: left ear still unable to hear, has diarrhea from antibiotics   On amoxil  Diarrhea - 7-8 /day   Diarrhea started after 21st.   Yellow - no mucus or blood    Ear has no pain has decrease hearing- has large amt of wax per pt  Pt is using drops-           Problem list and histories reviewed & adjusted, as indicated.  Additional history: as documented        ROS:  C: NEGATIVE for fever, chills, change in weight  R: NEGATIVE for significant cough or SOB    OBJECTIVE:                                                    /70  Pulse 78  Temp 97.1  F (36.2  C)  Resp 17  Wt 198 lb (89.8 kg)  SpO2 100%  BMI 29.24 kg/m2  Body mass index is 29.24 kg/(m^2).   GENERAL: healthy, alert, well nourished, well hydrated, no distress  HENT: ear canals- normal; TMs- normal on right and left full of wax - cleening done with good results and normal TM except mild irritation Nose- normal; Mouth- no ulcers, no lesions  NECK: no tenderness, no adenopathy, no asymmetry, no masses, no stiffness; thyroid- normal to palpation  RESP: lungs clear to auscultation - no rales, no rhonchi, no wheezes  CV: regular rates and rhythm, normal S1 S2, no S3 or S4 and no murmur, no click or rub -  ABDOMEN: soft, mild tenderness, no  hepatosplenomegaly, no masses, normal bowel sounds         ASSESSMENT/PLAN:                                                      (H61.22) Impacted cerumen of left ear  (primary encounter diagnosis)  Comment: Ear wash done with good results   Plan: Continue 2 more days of drops then stop due to some irritation.  STOP amoxil      (R19.7) Diarrhea, unspecified type  Comment: ?? Due to amoxil- discussed possible c.diff  Plan: Stop amoxil - push  probiotic. If diarrhea not better in 3-4 days - call and will test for c.diff. Symptomatic treatment was discussed along when patient should call and/or come back into the clinic or go to ER/Urgent care. All questions answered.       See Patient Instructions    Sudhakar Cuellar MD  [facility]

## 2017-04-26 DIAGNOSIS — G47.00 INSOMNIA: ICD-10-CM

## 2017-04-27 RX ORDER — TRAZODONE HYDROCHLORIDE 50 MG/1
TABLET, FILM COATED ORAL
Qty: 90 TABLET | Refills: 1 | Status: SHIPPED | OUTPATIENT
Start: 2017-04-27 | End: 2017-07-05

## 2017-04-27 NOTE — TELEPHONE ENCOUNTER
trazadone      Last Written Prescription Date: 3/27/17  Last Fill Quantity: 90,  # refills: 0   Last Office Visit with [facility], [facility] or [facility] prescribing provider: 4/24/17                                         Next 5 appointments (look out 90 days)     May 04, 2017  9:45 AM CDT   (Arrive by 9:30 AM)   SHORT with Sudhakar Cuellar MD   [facility] ([facility])    [facility] [facility] [place] [address]   [phone]

## 2017-05-11 ENCOUNTER — TELEPHONE (OUTPATIENT)
Dept: FAMILY MEDICINE | Facility: OTHER | Age: 37
End: 2017-05-11

## 2017-05-11 ENCOUNTER — OFFICE VISIT (OUTPATIENT)
Dept: FAMILY MEDICINE | Facility: OTHER | Age: 37
End: 2017-05-11
Attending: FAMILY MEDICINE
Payer: COMMERCIAL

## 2017-05-11 VITALS
DIASTOLIC BLOOD PRESSURE: 72 MMHG | HEIGHT: 69 IN | BODY MASS INDEX: 29.18 KG/M2 | SYSTOLIC BLOOD PRESSURE: 110 MMHG | TEMPERATURE: 97.3 F | HEART RATE: 64 BPM | WEIGHT: 197 LBS

## 2017-05-11 DIAGNOSIS — F98.8 ATTENTION DEFICIT DISORDER: ICD-10-CM

## 2017-05-11 DIAGNOSIS — J06.9 VIRAL UPPER RESPIRATORY TRACT INFECTION: Primary | ICD-10-CM

## 2017-05-11 DIAGNOSIS — J45.20 REACTIVE AIRWAY DISEASE WITH WHEEZING, MILD INTERMITTENT, UNCOMPLICATED: ICD-10-CM

## 2017-05-11 DIAGNOSIS — R19.7 DIARRHEA, UNSPECIFIED TYPE: ICD-10-CM

## 2017-05-11 DIAGNOSIS — J98.9 REACTIVE AIRWAY DISEASE THAT IS NOT ASTHMA: ICD-10-CM

## 2017-05-11 PROCEDURE — 99212 OFFICE O/P EST SF 10 MIN: CPT

## 2017-05-11 PROCEDURE — 99214 OFFICE O/P EST MOD 30 MIN: CPT | Performed by: FAMILY MEDICINE

## 2017-05-11 RX ORDER — METHYLPHENIDATE HYDROCHLORIDE 54 MG/1
54 TABLET ORAL DAILY
Qty: 30 TABLET | Refills: 0 | Status: SHIPPED | OUTPATIENT
Start: 2017-05-11 | End: 2017-08-02

## 2017-05-11 RX ORDER — METHYLPHENIDATE HYDROCHLORIDE 54 MG/1
54 TABLET ORAL EVERY MORNING
Qty: 30 TABLET | Refills: 0 | Status: SHIPPED | OUTPATIENT
Start: 2017-06-11 | End: 2017-08-02

## 2017-05-11 RX ORDER — METHYLPHENIDATE HYDROCHLORIDE 54 MG/1
54 TABLET ORAL EVERY MORNING
Qty: 30 TABLET | Refills: 0 | Status: SHIPPED | OUTPATIENT
Start: 2017-07-11 | End: 2017-08-02

## 2017-05-11 ASSESSMENT — ANXIETY QUESTIONNAIRES
7. FEELING AFRAID AS IF SOMETHING AWFUL MIGHT HAPPEN: NEARLY EVERY DAY
6. BECOMING EASILY ANNOYED OR IRRITABLE: NEARLY EVERY DAY
GAD7 TOTAL SCORE: 17
5. BEING SO RESTLESS THAT IT IS HARD TO SIT STILL: SEVERAL DAYS
1. FEELING NERVOUS, ANXIOUS, OR ON EDGE: SEVERAL DAYS
2. NOT BEING ABLE TO STOP OR CONTROL WORRYING: NEARLY EVERY DAY
3. WORRYING TOO MUCH ABOUT DIFFERENT THINGS: NEARLY EVERY DAY
IF YOU CHECKED OFF ANY PROBLEMS ON THIS QUESTIONNAIRE, HOW DIFFICULT HAVE THESE PROBLEMS MADE IT FOR YOU TO DO YOUR WORK, TAKE CARE OF THINGS AT HOME, OR GET ALONG WITH OTHER PEOPLE: EXTREMELY DIFFICULT

## 2017-05-11 ASSESSMENT — PAIN SCALES - GENERAL: PAINLEVEL: NO PAIN (0)

## 2017-05-11 ASSESSMENT — PATIENT HEALTH QUESTIONNAIRE - PHQ9: 5. POOR APPETITE OR OVEREATING: NEARLY EVERY DAY

## 2017-05-11 NOTE — PATIENT INSTRUCTIONS
Uncertain Causes of Diarrhea (Adult)    Diarrhea is when stools are loose and watery. This can be caused by:    Viral infections    Bacterial infections    Food poisoning    Parasites    Irritable bowel syndrome (IBS)    Inflammatory bowel diseases such as ulcerative colitis, Crohn's disease, and celiac disease    Food intolerance, such as to lactose, the sugar found in milk and milk products    Reaction to medicines like antibiotics, laxatives, cancer drugs, and antacids  Along with diarrhea, you may also have:    Abdominal pain and cramping    Nausea and vomiting    Loss of bowel control    Fever and chills    Bloody stools  In some cases, antibiotics may help to treat diarrhea. You may have a stool sample test. This is done to see what is causing your diarrhea, and if antibiotics will help treat it. The results of a stool sample test may take up to 2 days. The healthcare provider may not give you antibiotics until he or she has the stool test results.  Diarrhea can cause dehydration. This is the loss of too much water and other fluids from the body. When this occurs, body fluid must be replaced. This can be done with oral rehydration solutions. Oral rehydration solutions are available at drugstores and grocery stores without a prescription.  Home care  Follow all instructions given by your healthcare provider. Rest at home for the next 24 hours, or until you feel better. Avoid caffeine, tobacco, and alcohol. These can make diarrhea, cramping, and pain worse.  If taking medicines:    Don t take over-the-counter diarrhea or nausea medicines unless your healthcare provider tells you to.    You may use acetaminophen or NSAID medicines like ibuprofen or naproxen to reduce pain and fever. Don t use these if you have chronic liver or kidney disease, or ever had a stomach ulcer or gastrointestinal bleeding. Don't use NSAID medicines if you are already taking one for another condition (like arthritis) or are on daily  aspirin therapy (such as for heart disease or after a stroke). Talk with your healthcare provider first.    If antibiotics were prescribed, be sure you take them until they are finished. Don t stop taking them even when you feel better. Antibiotics must be taken as a full course.  To prevent the spread of illness:    Remember that washing with soap and water and using alcohol-based  is the best way to prevent the spread of infection.    Clean the toilet after each use.    Wash your hands before eating.    Wash your hands before and after preparing food. Keep in mind that people with diarrhea or vomiting should not prepare food for others.    Wash your hands after using cutting boards, countertops, and knives that have been in contact with raw foods.    Wash and then peel fruits and vegetables.    Keep uncooked meats away from cooked and ready-to-eat foods.    Use a food thermometer when cooking. Cook poultry to at least 165 F (74 C). Cook ground meat (beef, veal, pork, lamb) to at least 160 F (71 C). Cook fresh beef, veal, lamb, and pork to at least 145 F (63 C).    Don t eat raw or undercooked eggs (poached or kajal side up), poultry, meat, or unpasteurized milk and juices.  Food and drinks  The main goal while treating vomiting or diarrhea is to prevent dehydration. This is done by taking small amounts of liquids often.    Keep in mind that liquids are more important than food right now.    Drink only small amounts of liquids at a time.    Don t force yourself to eat, especially if you are having cramping, vomiting, or diarrhea. Don t eat large amounts at a time, even if you are hungry.    If you eat, avoid fatty, greasy, spicy, or fried foods.    Don t eat dairy foods or drink milk if you have diarrhea. These can make diarrhea worse.  During the first 24 hours you can try:    Oral rehydration solutions. Do not use sports drinks. They have too much sugar and not enough electrolytes.    Soft drinks without  caffeine    Ginger ale    Water (plain or flavored)    Decaf tea or coffee    Clear broth, consommé, or bouillon    Gelatin, popsicles, or frozen fruit juice bars  The second 24 hours, if you are feeling better, you can add:    Hot cereal, plain toast, bread, rolls, or crackers    Plain noodles, rice, mashed potatoes, chicken noodle soup, or rice soup    Unsweetened canned fruit (no pineapple)    Bananas  As you recover:    Limit fat intake to less than 15 grams per day. Don t eat margarine, butter, oils, mayonnaise, sauces, gravies, fried foods, peanut butter, meat, poultry, or fish.    Limit fiber. Don t eat raw or cooked vegetables, fresh fruits except bananas, or bran cereals.    Limit caffeine and chocolate.    Limit dairy.    Don t use spices or seasonings except salt.    Go back to your normal diet over time, as you feel better and your symptoms improve.    If the symptoms come back, go back to a simple diet or clear liquids.  Follow-up care  Follow up with your healthcare provider, or as advised. If a stool sample was taken or cultures were done, call the healthcare provider for the results as instructed.  Call 911  Call 911 if you have any of these symptoms:    Trouble breathing    Confusion    Extreme drowsiness or trouble walking    Loss of consciousness    Rapid heart rate    Chest pain    Stiff neck    Seizure  When to seek medical advice  Call your healthcare provider right away if any of these occur:    Abdominal pain that gets worse    Constant lower right abdominal pain    Continued vomiting and inability to keep liquids down    Diarrhea more than 5 times a day    Blood in vomit or stool    Dark urine or no urine for 8 hours, dry mouth and tongue, tiredness, weakness, or dizziness    Drowsiness    New rash    You don t get better in 2 to 3 days    Fever of 100.4 F (38 C) or higher that doesn t get lower with medicine    2424-5898 [employer]. [address] [place] [address], [place] [address].  All rights reserved. This information is not intended as a substitute for professional medical care. Always follow your healthcare professional's instructions.        Treating Diarrhea  Diarrhea occurs when you have loose, watery, or frequent bowel movements. It is a common problem with many causes. Most cases of diarrhea clear up on their own. But certain cases may need treatment. Be sure to see your health care provider if your symptoms do not improve within a few days.    Getting Relief  Treatment of diarrhea depends on its cause. Diarrhea caused by bacterial or parasite infection is often treated with antibiotics. Diarrhea caused by other factors, such as a stomach virus, often improves with simple home treatment. The tips below may also help relieve your symptoms.    Drink plenty of fluids. This helps prevent too much fluid loss (dehydration). Water, clear soups, and electrolyte solutions are good choices. Avoid alcohol, coffee, tea, and milk. These can make symptoms worse.    Suck on ice chips if drinking makes you queasy.    Return to your normal diet slowly. You may want to eat bland foods at first, such as rice and toast. Also, you may need to avoid certain foods for a while, such as dairy products. These can make symptoms worse. Ask your health care provider if there are any other foods you should avoid.    If you were prescribed antibiotics, take them as directed.    Do not take anti-diarrhea medications without asking your health care provider first.  Call Your Health Care Provider If You Have:    Fever of 102 F (38.0 C) or higher    Severe pain    Worsening diarrhea or diarrhea for more than 2 days    Bloody vomit or stool    Signs of dehydration (dizziness, dry mouth and tongue, rapid pulse, dark urine)    7953-0606 [employer]. [address] [place] [address], [place] [address]. All rights reserved. This information is not intended as a substitute for professional medical care. Always follow your  healthcare professional's instructions.

## 2017-05-11 NOTE — PROGRESS NOTES
SUBJECTIVE:                                                    Cristiana Myles is a 37 year old female who presents to clinic today for the following health issues:      Medication Followup of Methylphenidate    Taking Medication as prescribed: yes    Side Effects:  None    Medication Helping Symptoms:  yes     RESPIRATORY SYMPTOMS      Duration: 3 days    Description  cough, wheezing, chills and  Chronic diarrhea    Severity: mild    Accompanying signs and symptoms: None    History (predisposing factors):  tobacco abuse    Precipitating or alleviating factors: None    Therapies tried and outcome:  rest and fluids    No fever     Wet to dry cough     Some nasal congestion      Diarrhea      Duration:  Weeks- since Amoxil given on 4/21    Description:       Consistency of stool: watery and loose       Blood in stool: no        Number of loose stools past 24 hours: 6-7    Intensity:  moderate    Accompanying signs and symptoms:       Fever: no        Nausea/vomitting: YES- nausea       Abdominal pain: YES       Weight loss: no     History (recent antibiotics or travel/ill contacts/med changes/testing done): antibiotics    Precipitating or alleviating factors: None    Therapies tried and outcome: metamucil    Has had several rounds of abx - some better since off abx and eating yogurt.     No blood or dark tarry stools    H/o constipation with pelvic floor dsfxn- never got f/u done on that.              Problem list and histories reviewed & adjusted, as indicated.  Additional history: as documented    Labs reviewed in EPIC    Reviewed and updated as needed this visit by clinical staff  Tobacco  Allergies  Meds  Med Hx  Surg Hx  Fam Hx  Soc Hx      Reviewed and updated as needed this visit by Provider         ROS:  C: NEGATIVE for fever, chills, change in weight  CV: NEGATIVE for chest pain, palpitations or peripheral edema    OBJECTIVE:                                                    /72  Pulse 64  Temp  "97.3  F (36.3  C)  Ht 5' 9\" (1.753 m)  Wt 197 lb (89.4 kg)  BMI 29.09 kg/m2  Body mass index is 29.09 kg/(m^2).   GENERAL: healthy, alert, well nourished, well hydrated, no distress  HENT: ear canals- normal; TMs- normal; Nose- normal; Mouth- no ulcers, no lesions  NECK: no tenderness, no adenopathy, no asymmetry, no masses, no stiffness; thyroid- normal to palpation  RESP: lungs clear to auscultation - no rales, no rhonchi, no wheezes  CV: regular rates and rhythm, normal S1 S2, no S3 or S4 and no murmur, no click or rub -  ABDOMEN: soft, no tenderness, no  hepatosplenomegaly, no masses, normal bowel sounds  PSYCH: Alert and oriented times 3; speech- coherent , normal rate and volume; able to articulate logical thoughts, able to abstract reason, no tangential thoughts, no hallucinations or delusions, affect- normal         ASSESSMENT/PLAN:                                                    (J06.9,  B97.89) Viral upper respiratory tract infection  (primary encounter diagnosis)  Comment: mild sx   Plan: STOP tobacco. Use MDI prn. Symptomatic treatment was discussed along what is available for OTC medications for symptomatic relief.     (F98.8) Attention deficit disorder  Comment: stable doing well. Has been on for years.  Plan: methylphenidate ER (CONCERTA) 54 MG CR tablet,         methylphenidate ER (CONCERTA) 54 MG CR tablet,         methylphenidate ER (CONCERTA) 54 MG CR tablet        3 month RF done.     (R19.7) Diarrhea, unspecified type  Comment: worrisome for C.Diff. Labs ordered and will go from there.   Plan: Clostridium difficile toxin B PCR        If positive- treat.  If negative. Add probiotic pills and may be some lomotil  No diary.  Symptomatic treatment was discussed along when patient should call and/or come back into the clinic or go to ER/Urgent care. All questions answered.         See Patient Instructions    Sudhakar Cuellar MD  [facility] [place]  "

## 2017-05-11 NOTE — NURSING NOTE
"Chief Complaint   Patient presents with     A.D.H.D       Initial /72  Pulse 64  Temp 97.3  F (36.3  C)  Ht 5' 9\" (1.753 m)  Wt 197 lb (89.4 kg)  BMI 29.09 kg/m2 Estimated body mass index is 29.09 kg/(m^2) as calculated from the following:    Height as of this encounter: 5' 9\" (1.753 m).    Weight as of this encounter: 197 lb (89.4 kg).  Medication Reconciliation: complete     Grabiel Cantor      "

## 2017-05-11 NOTE — MR AVS SNAPSHOT
After Visit Summary   5/11/2017    Cristiana Myles    MRN: [id number]           Patient Information     Date Of Birth          1980        Visit Information        Provider Department      5/11/2017 9:45 AM Sudhakar Cuellar MD [facility]        Today's Diagnoses     Viral upper respiratory tract infection    -  1    Attention deficit disorder        Diarrhea, unspecified type          Care Instructions      Uncertain Causes of Diarrhea (Adult)    Diarrhea is when stools are loose and watery. This can be caused by:    Viral infections    Bacterial infections    Food poisoning    Parasites    Irritable bowel syndrome (IBS)    Inflammatory bowel diseases such as ulcerative colitis, Crohn's disease, and celiac disease    Food intolerance, such as to lactose, the sugar found in milk and milk products    Reaction to medicines like antibiotics, laxatives, cancer drugs, and antacids  Along with diarrhea, you may also have:    Abdominal pain and cramping    Nausea and vomiting    Loss of bowel control    Fever and chills    Bloody stools  In some cases, antibiotics may help to treat diarrhea. You may have a stool sample test. This is done to see what is causing your diarrhea, and if antibiotics will help treat it. The results of a stool sample test may take up to 2 days. The healthcare provider may not give you antibiotics until he or she has the stool test results.  Diarrhea can cause dehydration. This is the loss of too much water and other fluids from the body. When this occurs, body fluid must be replaced. This can be done with oral rehydration solutions. Oral rehydration solutions are available at drugstores and grocery stores without a prescription.  Home care  Follow all instructions given by your healthcare provider. Rest at home for the next 24 hours, or until you feel better. Avoid caffeine, tobacco, and alcohol. These can make diarrhea, cramping, and pain worse.  If taking  medicines:    Don t take over-the-counter diarrhea or nausea medicines unless your healthcare provider tells you to.    You may use acetaminophen or NSAID medicines like ibuprofen or naproxen to reduce pain and fever. Don t use these if you have chronic liver or kidney disease, or ever had a stomach ulcer or gastrointestinal bleeding. Don't use NSAID medicines if you are already taking one for another condition (like arthritis) or are on daily aspirin therapy (such as for heart disease or after a stroke). Talk with your healthcare provider first.    If antibiotics were prescribed, be sure you take them until they are finished. Don t stop taking them even when you feel better. Antibiotics must be taken as a full course.  To prevent the spread of illness:    Remember that washing with soap and water and using alcohol-based  is the best way to prevent the spread of infection.    Clean the toilet after each use.    Wash your hands before eating.    Wash your hands before and after preparing food. Keep in mind that people with diarrhea or vomiting should not prepare food for others.    Wash your hands after using cutting boards, countertops, and knives that have been in contact with raw foods.    Wash and then peel fruits and vegetables.    Keep uncooked meats away from cooked and ready-to-eat foods.    Use a food thermometer when cooking. Cook poultry to at least 165 F (74 C). Cook ground meat (beef, veal, pork, lamb) to at least 160 F (71 C). Cook fresh beef, veal, lamb, and pork to at least 145 F (63 C).    Don t eat raw or undercooked eggs (poached or kajal side up), poultry, meat, or unpasteurized milk and juices.  Food and drinks  The main goal while treating vomiting or diarrhea is to prevent dehydration. This is done by taking small amounts of liquids often.    Keep in mind that liquids are more important than food right now.    Drink only small amounts of liquids at a time.    Don t force yourself to  eat, especially if you are having cramping, vomiting, or diarrhea. Don t eat large amounts at a time, even if you are hungry.    If you eat, avoid fatty, greasy, spicy, or fried foods.    Don t eat dairy foods or drink milk if you have diarrhea. These can make diarrhea worse.  During the first 24 hours you can try:    Oral rehydration solutions. Do not use sports drinks. They have too much sugar and not enough electrolytes.    Soft drinks without caffeine    Ginger ale    Water (plain or flavored)    Decaf tea or coffee    Clear broth, consommé, or bouillon    Gelatin, popsicles, or frozen fruit juice bars  The second 24 hours, if you are feeling better, you can add:    Hot cereal, plain toast, bread, rolls, or crackers    Plain noodles, rice, mashed potatoes, chicken noodle soup, or rice soup    Unsweetened canned fruit (no pineapple)    Bananas  As you recover:    Limit fat intake to less than 15 grams per day. Don t eat margarine, butter, oils, mayonnaise, sauces, gravies, fried foods, peanut butter, meat, poultry, or fish.    Limit fiber. Don t eat raw or cooked vegetables, fresh fruits except bananas, or bran cereals.    Limit caffeine and chocolate.    Limit dairy.    Don t use spices or seasonings except salt.    Go back to your normal diet over time, as you feel better and your symptoms improve.    If the symptoms come back, go back to a simple diet or clear liquids.  Follow-up care  Follow up with your healthcare provider, or as advised. If a stool sample was taken or cultures were done, call the healthcare provider for the results as instructed.  Call 911  Call 911 if you have any of these symptoms:    Trouble breathing    Confusion    Extreme drowsiness or trouble walking    Loss of consciousness    Rapid heart rate    Chest pain    Stiff neck    Seizure  When to seek medical advice  Call your healthcare provider right away if any of these occur:    Abdominal pain that gets worse    Constant lower right  abdominal pain    Continued vomiting and inability to keep liquids down    Diarrhea more than 5 times a day    Blood in vomit or stool    Dark urine or no urine for 8 hours, dry mouth and tongue, tiredness, weakness, or dizziness    Drowsiness    New rash    You don t get better in 2 to 3 days    Fever of 100.4 F (38 C) or higher that doesn t get lower with medicine    8900-7718 [employer]. [address] [place] [address], [place] [address]. All rights reserved. This information is not intended as a substitute for professional medical care. Always follow your healthcare professional's instructions.        Treating Diarrhea  Diarrhea occurs when you have loose, watery, or frequent bowel movements. It is a common problem with many causes. Most cases of diarrhea clear up on their own. But certain cases may need treatment. Be sure to see your health care provider if your symptoms do not improve within a few days.    Getting Relief  Treatment of diarrhea depends on its cause. Diarrhea caused by bacterial or parasite infection is often treated with antibiotics. Diarrhea caused by other factors, such as a stomach virus, often improves with simple home treatment. The tips below may also help relieve your symptoms.    Drink plenty of fluids. This helps prevent too much fluid loss (dehydration). Water, clear soups, and electrolyte solutions are good choices. Avoid alcohol, coffee, tea, and milk. These can make symptoms worse.    Suck on ice chips if drinking makes you queasy.    Return to your normal diet slowly. You may want to eat bland foods at first, such as rice and toast. Also, you may need to avoid certain foods for a while, such as dairy products. These can make symptoms worse. Ask your health care provider if there are any other foods you should avoid.    If you were prescribed antibiotics, take them as directed.    Do not take anti-diarrhea medications without asking your health care provider first.  Call  Your Health Care Provider If You Have:    Fever of 102 F (38.0 C) or higher    Severe pain    Worsening diarrhea or diarrhea for more than 2 days    Bloody vomit or stool    Signs of dehydration (dizziness, dry mouth and tongue, rapid pulse, dark urine)    9167-6403 [employer]. [address] [place] [address]. All rights reserved. This information is not intended as a substitute for professional medical care. Always follow your healthcare professional's instructions.              Follow-ups after your visit        Future tests that were ordered for you today     Open Future Orders        Priority Expected Expires Ordered    Clostridium difficile toxin B PCR Routine  5/26/2017 5/11/2017            Who to contact     If you have questions or need follow up information about today's clinic visit or your schedule please contact [facility] directly at [phone].  Normal or non-critical lab and imaging results will be communicated to you by [facility], letter or phone within 4 business days after the clinic has received the results. If you do not hear from us within 7 days, please contact the clinic through [facility] [employer] [facility] or phone. If you have a critical or abnormal lab result, we will notify you by phone as soon as possible.  Submit refill requests through [employer] or call your pharmacy and they will forward the refill request to us. Please allow 3 business days for your refill to be completed.          Additional Information About Your Visit        [facility] Information     [employer] gives you secure access to your electronic health record. If you see a primary care provider, you can also send messages to your care team and make appointments. If you have questions, please call your primary care clinic.  If you do not have a primary care provider, please call [phone] and they will assist you.        Care EveryWhere ID     This is your Care EveryWhere ID. This could be used by other  "organizations to access your [facility] medical records  [id number]        Your Vitals Were     Pulse Temperature Height BMI (Body Mass Index)          64 97.3  F (36.3  C) 5' 9\" (1.753 m) 29.09 kg/m2         Blood Pressure from Last 3 Encounters:   05/11/17 110/72   04/24/17 121/70   04/21/17 138/72    Weight from Last 3 Encounters:   05/11/17 197 lb (89.4 kg)   04/24/17 198 lb (89.8 kg)   04/21/17 210 lb (95.3 kg)                 Today's Medication Changes          These changes are accurate as of: 5/11/17 10:24 AM.  If you have any questions, ask your nurse or doctor.               Stop taking these medicines if you haven't already. Please contact your care team if you have questions.     benzonatate 200 MG capsule   Commonly known as:  TESSALON   Stopped by:  Sudhakar Cuellar MD           erythromycin ophthalmic ointment   Commonly known as:  ROMYCIN   Stopped by:  Sudhakar Cuellar MD           fluticasone 50 MCG/ACT spray   Commonly known as:  FLONASE   Stopped by:  Sudhakar Cuellar MD           gabapentin 100 MG capsule   Commonly known as:  NEURONTIN   Stopped by:  Sudhakar Cuellar MD           neomycin-polymyxin-hydrocortisone 3.5-14948-2 otic suspension   Commonly known as:  CORTISPORIN   Stopped by:  Sudhakar Cuellar MD           psyllium 58.6 % Powd   Commonly known as:  METAMUCIL   Stopped by:  Sudhakar Cuellar MD                Where to get your medicines      Some of these will need a paper prescription and others can be bought over the counter.  Ask your nurse if you have questions.     Bring a paper prescription for each of these medications     methylphenidate ER 54 MG CR tablet    methylphenidate ER 54 MG CR tablet    methylphenidate ER 54 MG CR tablet                Primary Care Provider Office Phone # Fax #    Sudhakar Cuellar [phone]       [facility] [address] [place] [address]        Thank you!     Thank you for choosing [facility]  for your " care. Our goal is always to provide you with excellent care. Hearing back from our patients is one way we can continue to improve our services. Please take a few minutes to complete the written survey that you may receive in the mail after your visit with us. Thank you!             Your Updated Medication List - Protect others around you: Learn how to safely use, store and throw away your medicines at www.disposemymeds.org.          This list is accurate as of: 5/11/17 10:24 AM.  Always use your most recent med list.                   Brand Name Dispense Instructions for use    albuterol 108 (90 BASE) MCG/ACT Inhaler    albuterol    1 Inhaler    Inhale 2 puffs into the lungs every 4 hours as needed for shortness of breath / dyspnea       citalopram 40 MG tablet    celeXA    30 tablet    TAKE 1 TABLET BY MOUTH DAILY       hydrOXYzine 25 MG capsule    VISTARIL    60 capsule    TAKE 1 OR 2 CAPSULES BY MOUTH EVERY 4-6 HOURS AS NEEDED FOR ANXIETY       ibuprofen 800 MG tablet    ADVIL/MOTRIN    40 tablet    Take 1 tablet (800 mg) by mouth every 8 hours as needed for pain       * methylphenidate ER 54 MG CR tablet    CONCERTA    30 tablet    Take 1 tablet (54 mg) by mouth daily       * methylphenidate ER 54 MG CR tablet   Start taking on:  6/11/2017    CONCERTA    30 tablet    Take 1 tablet (54 mg) by mouth every morning       * methylphenidate ER 54 MG CR tablet   Start taking on:  7/11/2017    CONCERTA    30 tablet    Take 1 tablet (54 mg) by mouth every morning       omeprazole 40 MG capsule    priLOSEC    30 capsule    TAKE 1 CAPSULE BY MOUTH DAILY. TAKE 30 TO 60 MINUTES BEFORE A MEAL       SM STOOL SOFTENER 100 MG capsule   Generic drug:  docusate sodium     30 capsule    TAKE 1 CAPSULE BY MOUTH DAILY       traZODone 50 MG tablet    DESYREL    90 tablet    TAKE 1/2 TO 3 TABLETS BY MOUTH AT BEDTIME AS NEEDED FOR INSOMNIA       TYLENOL PO          * Notice:  This list has 3 medication(s) that are the same as other  medications prescribed for you. Read the directions carefully, and ask your doctor or other care provider to review them with you.

## 2017-05-12 RX ORDER — ALBUTEROL SULFATE 90 UG/1
AEROSOL, METERED RESPIRATORY (INHALATION)
Qty: 18 G | Refills: 3 | Status: SHIPPED | OUTPATIENT
Start: 2017-05-12 | End: 2018-03-25

## 2017-05-12 ASSESSMENT — PATIENT HEALTH QUESTIONNAIRE - PHQ9: SUM OF ALL RESPONSES TO PHQ QUESTIONS 1-9: 14

## 2017-05-12 ASSESSMENT — ANXIETY QUESTIONNAIRES: GAD7 TOTAL SCORE: 17

## 2017-05-17 DIAGNOSIS — F32.9 MDD (MAJOR DEPRESSIVE DISORDER): ICD-10-CM

## 2017-05-17 DIAGNOSIS — R10.32 LLQ ABDOMINAL PAIN: ICD-10-CM

## 2017-05-17 DIAGNOSIS — F41.1 GAD (GENERALIZED ANXIETY DISORDER): ICD-10-CM

## 2017-05-17 DIAGNOSIS — K21.9 GASTROESOPHAGEAL REFLUX DISEASE WITHOUT ESOPHAGITIS: ICD-10-CM

## 2017-05-17 RX ORDER — OMEPRAZOLE 40 MG/1
CAPSULE, DELAYED RELEASE ORAL
Qty: 30 CAPSULE | Refills: 11 | Status: SHIPPED | OUTPATIENT
Start: 2017-05-17 | End: 2017-11-08

## 2017-05-17 RX ORDER — CITALOPRAM HYDROBROMIDE 40 MG/1
TABLET ORAL
Qty: 30 TABLET | Refills: 5 | Status: SHIPPED | OUTPATIENT
Start: 2017-05-17 | End: 2017-11-22

## 2017-05-17 RX ORDER — GABAPENTIN 100 MG/1
CAPSULE ORAL
Qty: 90 CAPSULE | Refills: 0 | Status: SHIPPED | OUTPATIENT
Start: 2017-05-17 | End: 2017-07-24

## 2017-05-24 ENCOUNTER — HOSPITAL ENCOUNTER (EMERGENCY)
Facility: HOSPITAL | Age: 37
Discharge: HOME | End: 2017-05-24
Attending: NURSE PRACTITIONER | Admitting: NURSE PRACTITIONER
Payer: COMMERCIAL

## 2017-05-24 VITALS
SYSTOLIC BLOOD PRESSURE: 119 MMHG | HEART RATE: 70 BPM | OXYGEN SATURATION: 97 % | DIASTOLIC BLOOD PRESSURE: 75 MMHG | RESPIRATION RATE: 16 BRPM | TEMPERATURE: 98.5 F

## 2017-05-24 DIAGNOSIS — J03.90 TONSILLITIS: ICD-10-CM

## 2017-05-24 LAB
DEPRECATED S PYO AG THROAT QL EIA: NORMAL
MICRO REPORT STATUS: NORMAL
SPECIMEN SOURCE: NORMAL

## 2017-05-24 PROCEDURE — 87880 STREP A ASSAY W/OPTIC: CPT | Performed by: FAMILY MEDICINE

## 2017-05-24 PROCEDURE — 99213 OFFICE O/P EST LOW 20 MIN: CPT

## 2017-05-24 PROCEDURE — 87081 CULTURE SCREEN ONLY: CPT | Performed by: FAMILY MEDICINE

## 2017-05-24 PROCEDURE — 99213 OFFICE O/P EST LOW 20 MIN: CPT | Performed by: NURSE PRACTITIONER

## 2017-05-24 RX ORDER — CEFDINIR 300 MG/1
300 CAPSULE ORAL 2 TIMES DAILY
Qty: 20 CAPSULE | Refills: 0 | Status: SHIPPED | OUTPATIENT
Start: 2017-05-24 | End: 2017-06-29

## 2017-05-24 ASSESSMENT — ENCOUNTER SYMPTOMS
PSYCHIATRIC NEGATIVE: 1
CHILLS: 0
SORE THROAT: 1
DIARRHEA: 0
FEVER: 0
TROUBLE SWALLOWING: 0
VOMITING: 1
RHINORRHEA: 1
ABDOMINAL PAIN: 0
STRIDOR: 0
NAUSEA: 0
ACTIVITY CHANGE: 0
SHORTNESS OF BREATH: 0
COUGH: 1
SINUS PRESSURE: 1
WHEEZING: 0
DYSURIA: 0
APPETITE CHANGE: 1

## 2017-05-24 NOTE — ED AVS SNAPSHOT
[facility]    [address]    [place] [address]    Phone:  [phone]                                       Cristiana Myles   MRN: [id number]    Department:  [facility]   Date of Visit:  5/24/2017           After Visit Summary Signature Page     I have received my discharge instructions, and my questions have been answered. I have discussed any challenges I see with this plan with the nurse or doctor.    ..........................................................................................................................................  Patient/Patient Representative Signature      ..........................................................................................................................................  Patient Representative Print Name and Relationship to Patient    ..................................................               ................................................  Date                                            Time    ..........................................................................................................................................  Reviewed by Signature/Title    ...................................................              ..............................................  Date                                                            Time

## 2017-05-24 NOTE — ED AVS SNAPSHOT
[facility]    [address]    [place] [address]    Phone:  [phone]                                       Cristiana Myles   MRN: [id number]    Department:  [facility]   Date of Visit:  5/24/2017           Patient Information     Date Of Birth          1980        Your diagnoses for this visit were:     Tonsillitis        You were seen by Ivette Polk NP.      Follow-up Information     Follow up with Sudhakar Cuellar MD.    Specialty:  Family Practice    Why:  As needed, If symptoms worsen    Contact information:     [facility]  [address]  [place] [address] [phone]          Follow up with [facility].    Specialty:  EMERGENCY MEDICINE    Why:  As needed, If symptoms worsen    Contact information:    [address]  [place] [address] [phone]    Additional information:    From [facility]: Take [address]. Turn left at [address]. Turn left at the first stoplight on [address]. At the first stop sign, take a right onto [address]. Take a left into the parking lot and continue through until you reach the North enterance of the building.       From [place]: Take [address]. Take the [address] ramp towards [place]. Turn left onto [address]. Take a slight right onto [address]. Turn left at the first stoplight on [address]. At the first stop sign, take a right onto [address]. Take a left into the parking lot and continue through until you reach the North enterance of the building.       From [place]: Take [address]. Take a right at [address]. At the first stop sign, take a right onto [address]. Take a left into the parking lot and continue through until you reach the North enterance of the building.         Discharge Instructions       Take antibiotics as ordered.   Eat a yogurt a day while taking antibiotics.   Take  tylenol and or ibuprofen for pain or fever.   Gargle salt water.   Increase fluid intake.   Use Flonase 1 spray to each nostril daily.   Take Claritin daily to help with sinuses. Follow directions on package.   Follow up with PCP with any increase in symptoms or concerns.   Return to urgent care or emergency department with any increase in symptoms or concerns.     Future Appointments        Provider Department Dept Phone Center    8/2/2017 9:45 AM Sudhakar Cuellar MD [facility] [phone] [facility]         Review of your medicines      START taking        Dose / Directions Last dose taken    cefdinir 300 MG capsule   Commonly known as:  OMNICEF   Dose:  300 mg   Quantity:  20 capsule        Take 1 capsule (300 mg) by mouth 2 times daily   Refills:  0          Our records show that you are taking the medicines listed below. If these are incorrect, please call your family doctor or clinic.        Dose / Directions Last dose taken    citalopram 40 MG tablet   Commonly known as:  celeXA   Quantity:  30 tablet        TAKE 1 TABLET BY MOUTH DAILY   Refills:  5        gabapentin 100 MG capsule   Commonly known as:  NEURONTIN   Quantity:  90 capsule        TAKE 1 CAPSULE BY MOUTH 3 TIMES DAILY   Refills:  0        hydrOXYzine 25 MG capsule   Commonly known as:  VISTARIL   Quantity:  60 capsule        TAKE 1 OR 2 CAPSULES BY MOUTH EVERY 4-6 HOURS AS NEEDED FOR ANXIETY   Refills:  3        ibuprofen 800 MG tablet   Commonly known as:  ADVIL/MOTRIN   Dose:  800 mg   Quantity:  40 tablet        Take 1 tablet (800 mg) by mouth every 8 hours as needed for pain   Refills:  1        * methylphenidate ER 54 MG CR tablet   Commonly known as:  CONCERTA   Dose:  54 mg   Quantity:  30 tablet        Take 1 tablet (54 mg) by mouth daily   Refills:  0        * methylphenidate ER 54 MG CR tablet   Commonly known as:  CONCERTA   Dose:  54 mg   Quantity:  30 tablet   Start taking on:  6/11/2017        Take 1 tablet (54  mg) by mouth every morning   Refills:  0        * methylphenidate ER 54 MG CR tablet   Commonly known as:  CONCERTA   Dose:  54 mg   Quantity:  30 tablet   Start taking on:  7/11/2017        Take 1 tablet (54 mg) by mouth every morning   Refills:  0        omeprazole 40 MG capsule   Commonly known as:  priLOSEC   Quantity:  30 capsule        TAKE 1 CAPSULE BY MOUTH DAILY. TAKE 30 TO 60 MINUTES BEFORE A MEAL   Refills:  11        SM STOOL SOFTENER 100 MG capsule   Quantity:  30 capsule   Generic drug:  docusate sodium        TAKE 1 CAPSULE BY MOUTH DAILY   Refills:  9        traZODone 50 MG tablet   Commonly known as:  DESYREL   Quantity:  90 tablet        TAKE 1/2 TO 3 TABLETS BY MOUTH AT BEDTIME AS NEEDED FOR INSOMNIA   Refills:  1        TYLENOL PO        Refills:  0        VENTOLIN  (90 BASE) MCG/ACT Inhaler   Quantity:  18 g   Generic drug:  albuterol        INHALE 2 PUFFS INTO THE LUNGS EVERY 4 HOURS AS NEEDED FOR SHORTNESS OF BREATH/DYSPNEA   Refills:  3        * Notice:  This list has 3 medication(s) that are the same as other medications prescribed for you. Read the directions carefully, and ask your doctor or other care provider to review them with you.            Prescriptions were sent or printed at these locations (1 Prescription)                   [facility] [address] [place] [address] - [address] AT [facility] [address]   [address] [address]    Telephone:  [phone]   Fax:  [phone]   Hours:                  E-Prescribed (1 of 1)         cefdinir (OMNICEF) 300 MG capsule                Procedures and tests performed during your visit     Beta strep group A culture    Rapid strep screen      Orders Needing Specimen Collection     None      Pending Results     Date and Time Order Name Status Description    5/24/2017 1820 Beta strep group A culture In process             Pending Culture Results     Date and Time Order Name Status Description    5/24/2017 1820 Beta  strep group A culture In process             Thank you for choosing [facility]       Thank you for choosing [facility] for your care. Our goal is always to provide you with excellent care. Hearing back from our patients is one way we can continue to improve our services. Please take a few minutes to complete the written survey that you may receive in the mail after you visit with us. Thank you!        [employer] Information     [employer] gives you secure access to your electronic health record. If you see a primary care provider, you can also send messages to your care team and make appointments. If you have questions, please call your primary care clinic.  If you do not have a primary care provider, please call [phone] and they will assist you.        Care EveryWhere ID     This is your Care EveryWhere ID. This could be used by other organizations to access your [facility] medical records  [id number]        After Visit Summary       This is your record. Keep this with you and show to your community pharmacist(s) and doctor(s) at your next visit.

## 2017-05-24 NOTE — ED PROVIDER NOTES
History     Chief Complaint   Patient presents with     URI     The history is provided by the patient. No  was used.     Cristiana Myles is a 37 year old female who presents with a non productive cough, sore throat, nasal congestion, and sinus pressure that started 4 days ago. She's taken Dayquil, Nyquil, and benadryl. Denies fever and chills. Positive for night sweats. Decreased appetite. Bowel and bladder are working well. No antibiotic use in the past 30 days.     I have reviewed the Medications, Allergies, Past Medical and Surgical History, and Social History in the Epic system.    Review of Systems   Constitutional: Positive for appetite change. Negative for activity change, chills and fever.   HENT: Positive for congestion, ear pain, postnasal drip, rhinorrhea, sinus pressure and sore throat. Negative for ear discharge and trouble swallowing.    Respiratory: Positive for cough. Negative for shortness of breath, wheezing and stridor.    Cardiovascular: Negative for chest pain.   Gastrointestinal: Positive for vomiting. Negative for abdominal pain, diarrhea and nausea.        Vomited while trying to inhale cigarette.    Genitourinary: Negative for dysuria.   Skin: Negative for rash.   Psychiatric/Behavioral: Negative.        Physical Exam   BP: 119/75  Pulse: 70  Temp: 98.5  F (36.9  C)  Resp: 16  SpO2: 97 %  Physical Exam   Constitutional: She is oriented to person, place, and time. She appears well-developed and well-nourished. No distress.   HENT:   Head: Normocephalic.   Right Ear: External ear normal.   Left Ear: External ear normal.   Mouth/Throat: No oropharyngeal exudate.   Oropharynx with erythema without exudate. Tonsils enlarged.    Neck: Normal range of motion. Neck supple.   Cardiovascular: Normal rate, regular rhythm, normal heart sounds and intact distal pulses.    No murmur heard.  Pulmonary/Chest: Effort normal. No respiratory distress. She has no wheezes. She has no rales.    Abdominal: Soft. She exhibits no distension. There is no tenderness. There is no rebound and no guarding.   Musculoskeletal: Normal range of motion.   Lymphadenopathy:     She has cervical adenopathy.   Neurological: She is alert and oriented to person, place, and time.   Skin: Skin is warm and dry. No rash noted. She is not diaphoretic.   Psychiatric: She has a normal mood and affect. Her behavior is normal.   Nursing note and vitals reviewed.      ED Course     ED Course     Procedures    Labs Ordered and Resulted from Time of ED Arrival Up to the Time of Departure from the ED   RAPID STREP SCREEN     Results for orders placed or performed during the hospital encounter of 05/24/17   Rapid strep screen   Result Value Ref Range    Specimen Description Throat     Rapid Strep A Screen       NEGATIVE: No Group A streptococcal antigen detected by immunoassay, await   culture report.  Internal QC OK      Micro Report Status FINAL 05/24/2017    Beta strep group A culture   Result Value Ref Range    Specimen Description Throat     Culture Micro No beta hemolytic Streptococcus Group A isolated     Micro Report Status FINAL 05/26/2017          Assessments & Plan (with Medical Decision Making)     Discussed plan of care. She verbalized understanding. All questions answered.     I have reviewed the nursing notes.    I have reviewed the findings, diagnosis, plan and need for follow up with the patient.  Discharged in stable condition.     Discharge Medication List as of 5/24/2017  7:14 PM      START taking these medications    Details   cefdinir (OMNICEF) 300 MG capsule Take 1 capsule (300 mg) by mouth 2 times daily, Disp-20 capsule, R-0, E-Prescribe             Final diagnoses:   Tonsillitis     Take antibiotics as ordered.   Eat a yogurt a day while taking antibiotics.   Take tylenol and or ibuprofen for pain or fever.   Gargle salt water.   Increase fluid intake.   Use Flonase 1 spray to each nostril daily.   Take Claritin  daily to help with sinuses. Follow directions on package.   Follow up with PCP with any increase in symptoms or concerns.   Return to urgent care or emergency department with any increase in symptoms or concerns.     LIZANDRO Aranda  5/24/2017  6:43 PM  [facility]       Ivette Polk NP  05/28/17 6828

## 2017-05-24 NOTE — ED NOTES
Patient presents with sinus infection X 4 days.  C/o headache and cough and sinus drainage.  Drainage is greenish in color.  NON productive cough.

## 2017-05-25 NOTE — DISCHARGE INSTRUCTIONS
Take antibiotics as ordered.   Eat a yogurt a day while taking antibiotics.   Take tylenol and or ibuprofen for pain or fever.   Gargle salt water.   Increase fluid intake.   Use Flonase 1 spray to each nostril daily.   Take Claritin daily to help with sinuses. Follow directions on package.   Follow up with PCP with any increase in symptoms or concerns.   Return to urgent care or emergency department with any increase in symptoms or concerns.

## 2017-05-26 LAB
BACTERIA SPEC CULT: NORMAL
MICRO REPORT STATUS: NORMAL
SPECIMEN SOURCE: NORMAL

## 2017-06-29 ENCOUNTER — HOSPITAL ENCOUNTER (EMERGENCY)
Facility: HOSPITAL | Age: 37
Discharge: HOME | End: 2017-06-29
Attending: NURSE PRACTITIONER | Admitting: NURSE PRACTITIONER
Payer: COMMERCIAL

## 2017-06-29 VITALS
DIASTOLIC BLOOD PRESSURE: 97 MMHG | TEMPERATURE: 98.3 F | HEART RATE: 88 BPM | SYSTOLIC BLOOD PRESSURE: 131 MMHG | RESPIRATION RATE: 18 BRPM | OXYGEN SATURATION: 94 %

## 2017-06-29 DIAGNOSIS — H66.002 ACUTE SUPPURATIVE OTITIS MEDIA OF LEFT EAR WITHOUT SPONTANEOUS RUPTURE OF TYMPANIC MEMBRANE, RECURRENCE NOT SPECIFIED: ICD-10-CM

## 2017-06-29 PROCEDURE — 99213 OFFICE O/P EST LOW 20 MIN: CPT | Performed by: NURSE PRACTITIONER

## 2017-06-29 PROCEDURE — 99213 OFFICE O/P EST LOW 20 MIN: CPT

## 2017-06-29 RX ORDER — AZITHROMYCIN 250 MG/1
TABLET, FILM COATED ORAL
Qty: 6 TABLET | Refills: 0 | Status: SHIPPED | OUTPATIENT
Start: 2017-06-29 | End: 2017-07-04

## 2017-06-29 ASSESSMENT — ENCOUNTER SYMPTOMS
CHILLS: 1
COUGH: 1
DIARRHEA: 0
VOMITING: 0
NAUSEA: 0
ABDOMINAL PAIN: 0
APPETITE CHANGE: 0
FATIGUE: 1
FEVER: 0

## 2017-06-29 NOTE — ED AVS SNAPSHOT
[facility]    [address]    [place] [address]    Phone:  [phone]                                       Cristiana Myles   MRN: [id number]    Department:  [facility]   Date of Visit:  6/29/2017           After Visit Summary Signature Page     I have received my discharge instructions, and my questions have been answered. I have discussed any challenges I see with this plan with the nurse or doctor.    ..........................................................................................................................................  Patient/Patient Representative Signature      ..........................................................................................................................................  Patient Representative Print Name and Relationship to Patient    ..................................................               ................................................  Date                                            Time    ..........................................................................................................................................  Reviewed by Signature/Title    ...................................................              ..............................................  Date                                                            Time

## 2017-06-29 NOTE — ED AVS SNAPSHOT
[facility]    [address] [address]    Phone:  [phone]                                       Cristiana Myles   MRN: [id number]    Department:  [facility]   Date of Visit:  6/29/2017           Patient Information     Date Of Birth          1980        Your diagnoses for this visit were:     Acute suppurative otitis media of left ear without spontaneous rupture of tympanic membrane, recurrence not specified        You were seen by Billie Covington NP.      Follow-up Information     Follow up with [facility].    Specialty:  EMERGENCY MEDICINE    Why:  As needed, If symptoms worsen, or concerns develop    Contact information:    [address] [address] [phone]    Additional information:    From [facility]: Take [address]. Turn left at [address]. Turn left at the first stoplight on [address] [place] [address]. At the first stop sign, take a right onto [address]. Take a left into the parking lot and continue through until you reach the North enterance of the building.       From [place]: Take [address]. Take the [address] ramp towards [place]. Turn left onto [address]. Take a slight right onto [address]. Turn left at the first stoplight on [address]. At the first stop sign, take a right onto [address]. Take a left into the parking lot and continue through until you reach the North enterance of the building.       From [place]: Take [address]. Take a right at [address]. At the first stop sign, take a right onto [address]. Take a left into the parking lot and continue through until you reach the North enterance of the building.         Follow up with Sudhakar Cuellar MD.    Specialty:  Family Practice    Why:  As needed, if symptoms do not improve    Contact information:     [facility]  [address] [facility]  [address]  [phone]          Discharge Instructions         Middle Ear Infection (Adult)  You have an infection of the middle ear, the space behind the eardrum. This is also called acute otitis media (AOM). Sometimes it is caused by the common cold. This is because congestion can block the internal passage (eustachian tube) that drains fluid from the middle ear. When the middle ear fills with fluid, bacteria can grow there and cause an infection. Oral antibiotics are used to treat this illness, not ear drops. Symptoms usually start to improve within 1 to 2 days of treatment.    Home care  The following are general care guidelines:    Finish all of the antibiotic medicine given, even though you may feel better after the first few days.    You may use over-the-counter medicine, such as acetaminophen or ibuprofen, to control pain and fever, unless something else was prescribed. If you have chronic liver or kidney disease or have ever had a stomach ulcer or gastrointestinal bleeding, talk with your healthcare provider before using these medicines. Do not give aspirin to anyone under 18 years of age who has a fever. It may cause severe illness or death.  Follow-up care  Follow up with your healthcare provider, or as advised, in 2 weeks if all symptoms have not gotten better, or if hearing doesn't go back to normal within 1 month.  When to seek medical advice  Call your healthcare provider right away if any of these occur:    Ear pain gets worse or does not improve after 3 days of treatment    Unusual drowsiness or confusion    Neck pain, stiff neck, or headache    Fluid or blood draining from the ear canal    Fever of 100.4 F (38 C) or as advised     Seizure  Date Last Reviewed: 6/1/2016 2000-2017 [employer]. [address] [place] [address], [place] [address]. All rights reserved. This information is not intended as a substitute for professional medical care. Always follow your healthcare professional's  instructions.          Future Appointments        Provider Department Dept Phone Center    8/2/2017 9:45 AM Sudhakar Cuellar MD [facility] [phone] [facility]         Review of your medicines      START taking        Dose / Directions Last dose taken    azithromycin 250 MG tablet   Commonly known as:  ZITHROMAX Z-PAULO   Quantity:  6 tablet        Two tablets on the first day, then one tablet daily for the next 4 days   Refills:  0          Our records show that you are taking the medicines listed below. If these are incorrect, please call your family doctor or clinic.        Dose / Directions Last dose taken    citalopram 40 MG tablet   Commonly known as:  celeXA   Quantity:  30 tablet        TAKE 1 TABLET BY MOUTH DAILY   Refills:  5        gabapentin 100 MG capsule   Commonly known as:  NEURONTIN   Quantity:  90 capsule        TAKE 1 CAPSULE BY MOUTH 3 TIMES DAILY   Refills:  0        hydrOXYzine 25 MG capsule   Commonly known as:  VISTARIL   Quantity:  60 capsule        TAKE 1 OR 2 CAPSULES BY MOUTH EVERY 4-6 HOURS AS NEEDED FOR ANXIETY   Refills:  3        ibuprofen 800 MG tablet   Commonly known as:  ADVIL/MOTRIN   Dose:  800 mg   Quantity:  40 tablet        Take 1 tablet (800 mg) by mouth every 8 hours as needed for pain   Refills:  1        * methylphenidate ER 54 MG CR tablet   Commonly known as:  CONCERTA   Dose:  54 mg   Quantity:  30 tablet        Take 1 tablet (54 mg) by mouth daily   Refills:  0        * methylphenidate ER 54 MG CR tablet   Commonly known as:  CONCERTA   Dose:  54 mg   Quantity:  30 tablet        Take 1 tablet (54 mg) by mouth every morning   Refills:  0        * methylphenidate ER 54 MG CR tablet   Commonly known as:  CONCERTA   Dose:  54 mg   Quantity:  30 tablet   Start taking on:  7/11/2017        Take 1 tablet (54 mg) by mouth every morning   Refills:  0        omeprazole 40 MG capsule   Commonly known as:  priLOSEC   Quantity:  30 capsule        TAKE 1 CAPSULE  BY MOUTH DAILY. TAKE 30 TO 60 MINUTES BEFORE A MEAL   Refills:  11        SM STOOL SOFTENER 100 MG capsule   Quantity:  30 capsule   Generic drug:  docusate sodium        TAKE 1 CAPSULE BY MOUTH DAILY   Refills:  9        traZODone 50 MG tablet   Commonly known as:  DESYREL   Quantity:  90 tablet        TAKE 1/2 TO 3 TABLETS BY MOUTH AT BEDTIME AS NEEDED FOR INSOMNIA   Refills:  1        TYLENOL PO        Refills:  0        VENTOLIN  (90 BASE) MCG/ACT Inhaler   Quantity:  18 g   Generic drug:  albuterol        INHALE 2 PUFFS INTO THE LUNGS EVERY 4 HOURS AS NEEDED FOR SHORTNESS OF BREATH/DYSPNEA   Refills:  3        * Notice:  This list has 3 medication(s) that are the same as other medications prescribed for you. Read the directions carefully, and ask your doctor or other care provider to review them with you.            Prescriptions were sent or printed at these locations (1 Prescription)                   [facility] [place] - [address]   [address] [place] [address]    Telephone:  [phone]   Fax:  [phone]   Hours:                  E-Prescribed (1 of 1)         azithromycin (ZITHROMAX Z-PAULO) 250 MG tablet                Orders Needing Specimen Collection     None      Pending Results     No orders found from 6/27/2017 to 6/30/2017.            Pending Culture Results     No orders found from 6/27/2017 to 6/30/2017.            Thank you for choosing [facility]       Thank you for choosing [facility] for your care. Our goal is always to provide you with excellent care. Hearing back from our patients is one way we can continue to improve our services. Please take a few minutes to complete the written survey that you may receive in the mail after you visit with us. Thank you!        [facility] Information     [facility] gives you secure access to your electronic health record. If you see a primary care provider, you can also send messages to your care team and make appointments. If  you have questions, please call your primary care clinic.  If you do not have a primary care provider, please call [phone] and they will assist you.        Care EveryWhere ID     This is your Care EveryWhere ID. This could be used by other organizations to access your [place] medical records  [id number]        Equal Access to Services     CALE BURTON : Nicholas Isidro, juvenal hua, can childress. So [facility] [phone].    ATENCIÓN: Si habla español, tiene a singh disposición servicios gratuitos de asistencia lingüística. Llame al [phone].    We comply with applicable federal civil rights laws and [place] laws. We do not discriminate on the basis of race, color, national origin, age, disability sex, sexual orientation or gender identity.            After Visit Summary       This is your record. Keep this with you and show to your community pharmacist(s) and doctor(s) at your next visit.

## 2017-06-30 NOTE — ED NOTES
Ambulated into . C/O symptoms have started a couple of weeks. She has been coughing to the point that she is throwing up at night. Her right ear also has pain. Pain-4 out of 10. Medications taken prior to visit, Ibuprofen and inhaler at 1700

## 2017-06-30 NOTE — ED PROVIDER NOTES
"  History     Chief Complaint   Patient presents with     URI     \"for the last 3 weeks\"      The history is provided by the patient. No  was used.     Cristiana Myles is a 37 year old female who presents tonight with a CC of cough, post tussive vomiting, chest congestion, right ear congestion x 3 weeks.  She has been taking OTC cough medicine with some improvement.  She has been using her inhaler mild improvement.  She reports shortness of breath with exertion.  No chest pain.     I have reviewed the Medications, Allergies, Past Medical and Surgical History, and Social History in the Epic system.      Review of Systems   Constitutional: Positive for chills and fatigue. Negative for appetite change and fever.   HENT: Positive for congestion and ear pain (right ear).    Respiratory: Positive for cough.    Gastrointestinal: Negative for abdominal pain, diarrhea, nausea and vomiting.       Physical Exam   BP: 131/97  Pulse: 88  Temp: 98.3  F (36.8  C)  Resp: 18  SpO2: 94 %    Physical Exam   Constitutional: She is oriented to person, place, and time. She appears well-developed and well-nourished. She is cooperative.  Non-toxic appearance. She does not appear ill.   HENT:   Head: Normocephalic and atraumatic.   Right Ear: Tympanic membrane is erythematous (bulging, loss of bony landmarks).   Left Ear: Tympanic membrane, external ear and ear canal normal.   Mouth/Throat: Uvula is midline and oropharynx is clear and moist.   Eyes: Conjunctivae are normal.   Neck: Normal range of motion. Neck supple.   Cardiovascular: Normal rate and regular rhythm.    Pulmonary/Chest: Effort normal and breath sounds normal.   Abdominal: Soft. Bowel sounds are normal.   Musculoskeletal: Normal range of motion.   Neurological: She is alert and oriented to person, place, and time.   Skin: Skin is warm and dry.   Psychiatric: She has a normal mood and affect. Her behavior is normal.   Nursing note reviewed.      ED Course "     ED Course     Procedures    Assessments & Plan (with Medical Decision Making)     I have reviewed the nursing notes.    I have reviewed the findings, diagnosis, plan and need for follow up with the patient.  ASSESSMENT / PLAN:  (H66.002) Acute suppurative otitis media of left ear without spontaneous rupture of tympanic membrane, recurrence not specified  Comment: symptomatic  Plan:  Zithromax as prescribed   Return to ED/UC with fever not controlled < 102-103 with the use of ibuprofen     and tylenol, shortness of breath, persistent vomiting (unable to keep anything down)   Follow up with PCP if symptoms do not improve in 3-4 days of treatment   Patient verbally educated and given appropriate education sheets for each of their diagnoses and has no questions.   Take OTC motrin or tylenol as directed on the bottle as needed.   Increase fluids, rest, wash hands often.    Discharge Medication List as of 6/29/2017 10:30 PM      START taking these medications    Details   azithromycin (ZITHROMAX Z-PAULO) 250 MG tablet Two tablets on the first day, then one tablet daily for the next 4 days, Disp-6 tablet, R-0, E-Prescribe             Final diagnoses:   Acute suppurative otitis media of left ear without spontaneous rupture of tympanic membrane, recurrence not specified       6/29/2017   [facility]     Billie Covington NP  06/30/17 1043

## 2017-06-30 NOTE — DISCHARGE INSTRUCTIONS
Middle Ear Infection (Adult)  You have an infection of the middle ear, the space behind the eardrum. This is also called acute otitis media (AOM). Sometimes it is caused by the common cold. This is because congestion can block the internal passage (eustachian tube) that drains fluid from the middle ear. When the middle ear fills with fluid, bacteria can grow there and cause an infection. Oral antibiotics are used to treat this illness, not ear drops. Symptoms usually start to improve within 1 to 2 days of treatment.    Home care  The following are general care guidelines:    Finish all of the antibiotic medicine given, even though you may feel better after the first few days.    You may use over-the-counter medicine, such as acetaminophen or ibuprofen, to control pain and fever, unless something else was prescribed. If you have chronic liver or kidney disease or have ever had a stomach ulcer or gastrointestinal bleeding, talk with your healthcare provider before using these medicines. Do not give aspirin to anyone under 18 years of age who has a fever. It may cause severe illness or death.  Follow-up care  Follow up with your healthcare provider, or as advised, in 2 weeks if all symptoms have not gotten better, or if hearing doesn't go back to normal within 1 month.  When to seek medical advice  Call your healthcare provider right away if any of these occur:    Ear pain gets worse or does not improve after 3 days of treatment    Unusual drowsiness or confusion    Neck pain, stiff neck, or headache    Fluid or blood draining from the ear canal    Fever of 100.4 F (38 C) or as advised     Seizure  Date Last Reviewed: 6/1/2016 2000-2017 [employer]. [address] [place] [address], [place] [address]. All rights reserved. This information is not intended as a substitute for professional medical care. Always follow your healthcare professional's instructions.

## 2017-07-05 DIAGNOSIS — G47.00 INSOMNIA: ICD-10-CM

## 2017-07-06 RX ORDER — TRAZODONE HYDROCHLORIDE 50 MG/1
TABLET, FILM COATED ORAL
Qty: 90 TABLET | Refills: 9 | Status: SHIPPED | OUTPATIENT
Start: 2017-07-06 | End: 2018-04-12

## 2017-07-15 ENCOUNTER — HOSPITAL ENCOUNTER (EMERGENCY)
Facility: HOSPITAL | Age: 37
Discharge: HOME | End: 2017-07-15
Attending: NURSE PRACTITIONER | Admitting: NURSE PRACTITIONER
Payer: COMMERCIAL

## 2017-07-15 VITALS
RESPIRATION RATE: 18 BRPM | DIASTOLIC BLOOD PRESSURE: 81 MMHG | OXYGEN SATURATION: 98 % | SYSTOLIC BLOOD PRESSURE: 127 MMHG | HEART RATE: 81 BPM | TEMPERATURE: 99.2 F

## 2017-07-15 DIAGNOSIS — D22.9 NEVUS: ICD-10-CM

## 2017-07-15 DIAGNOSIS — R20.9 SKIN SENSATION DISTURBANCE: ICD-10-CM

## 2017-07-15 PROCEDURE — 99211 OFF/OP EST MAY X REQ PHY/QHP: CPT

## 2017-07-15 PROCEDURE — 99212 OFFICE O/P EST SF 10 MIN: CPT | Performed by: NURSE PRACTITIONER

## 2017-07-15 ASSESSMENT — ENCOUNTER SYMPTOMS: FEVER: 0

## 2017-07-15 NOTE — ED AVS SNAPSHOT
[facility]    [address]    [place] [address]    Phone:  [phone]                                       Cristiana Myles   MRN: [id number]    Department[facility]  [facility]   Date of Visit:  7/15/2017           After Visit Summary Signature Page     I have received my discharge instructions, and my questions have been answered. I have discussed any challenges I see with this plan with the nurse or doctor.    ..........................................................................................................................................  Patient/Patient Representative Signature      ..........................................................................................................................................  Patient Representative Print Name and Relationship to Patient    ..................................................               ................................................  Date                                            Time    ..........................................................................................................................................  Reviewed by Signature/Title    ...................................................              ..............................................  Date                                                            Time

## 2017-07-15 NOTE — ED NOTES
Ambulated into UC with her fiance. C/O Left ear is red and swollen. There is an area on the bead of the inside of the ear appears to have a tiny black spot. Pain rating 5 out of 10. No medications prior to coming.

## 2017-07-15 NOTE — DISCHARGE INSTRUCTIONS
Avoid touching the area as this will cause more irritation.   Call for an appointment to see PCP on Friday, if area is still irritation follow up.   Cancel appointment if sx resolve.         Monitoring Moles   Moles, also called nevi, are small, colored (pigmented) marks on the skin. They have no known purpose. Many moles appear before age 30, but they also increase frequently as people age. Moles most often are not cancer (benign) and are harmless.    What are moles?  Moles are a type of pigmented james. Freckles are another type of pigmented james. They are often sprinkled across the bridge of the nose, the cheeks, and the arms. Moles can appear on any part of the body. There are many types, sizes, and shapes of moles. Most moles are solid brown. In most cases they are flat or dome-shaped, smooth, and have well-defined edges.  Most moles are benign and don t require treatment. You can have moles removed if you don t like the way they look or feel.   Checking your moles  You can check many of your moles each month. You can do this right after you shower and before you get dressed. Check your body from head to toe. Then, make a list of your moles. If you find any new moles or changes in your moles, call your healthcare provider. To check your moles, you ll need:    A full-length mirror    A stool or chair to sit on while you check your feet  If you have a lot of moles, take digital photos of them. Make sure to take photos both up close and from a distance. These can help you see if any moles change over time.  When to seek medical treatment    See your regular doctor if your moles hurt, ooze, bleed, thicken, become crusty.

## 2017-07-15 NOTE — ED AVS SNAPSHOT
[facility]    [address] [place] [address]    [place] [address]    Phone:  [phone]                                       Cristiana Myles   MRN: [id number]    Department:  [facility]   Date of Visit:  7/15/2017           Patient Information     Date Of Birth          1980        Your diagnoses for this visit were:     Skin sensation disturbance     Nevus        You were seen by Natalya Parr NP.      Follow-up Information     Follow up with James Cuellar MD In 1 week.    Specialty:  Family Practice    Why:  for re-evaluation if needed    Contact information:    [facility]  [address] [place] [address] [phone]          Discharge Instructions       Avoid touching the area as this will cause more irritation.   Call for an appointment to see PCP on Friday, if area is still irritation follow up.   Cancel appointment if sx resolve.         Monitoring Moles   Moles, also called nevi, are small, colored (pigmented) marks on the skin. They have no known purpose. Many moles appear before age 30, but they also increase frequently as people age. Moles most often are not cancer (benign) and are harmless.    What are moles?  Moles are a type of pigmented james. Freckles are another type of pigmented james. They are often sprinkled across the bridge of the nose, the cheeks, and the arms. Moles can appear on any part of the body. There are many types, sizes, and shapes of moles. Most moles are solid brown. In most cases they are flat or dome-shaped, smooth, and have well-defined edges.  Most moles are benign and don t require treatment. You can have moles removed if you don t like the way they look or feel.   Checking your moles  You can check many of your moles each month. You can do this right after you shower and before you get dressed. Check your body from head to toe. Then, make a list of your moles. If you find any new moles or changes in your moles, call your  healthcare provider. To check your moles, you ll need:    A full-length mirror    A stool or chair to sit on while you check your feet  If you have a lot of moles, take digital photos of them. Make sure to take photos both up close and from a distance. These can help you see if any moles change over time.  When to seek medical treatment    See your regular doctor if your moles hurt, ooze, bleed, thicken, become crusty.               Future Appointments        Provider Department Dept Phone Center    8/2/2017 9:45 AM Sudhakar Cuellar MD [facility] [phone] [place]         Review of your medicines      Our records show that you are taking the medicines listed below. If these are incorrect, please call your family doctor or clinic.        Dose / Directions Last dose taken    citalopram 40 MG tablet   Commonly known as:  celeXA   Quantity:  30 tablet        TAKE 1 TABLET BY MOUTH DAILY   Refills:  5        gabapentin 100 MG capsule   Commonly known as:  NEURONTIN   Quantity:  90 capsule        TAKE 1 CAPSULE BY MOUTH 3 TIMES DAILY   Refills:  0        hydrOXYzine 25 MG capsule   Commonly known as:  VISTARIL   Quantity:  60 capsule        TAKE 1 OR 2 CAPSULES BY MOUTH EVERY 4-6 HOURS AS NEEDED FOR ANXIETY   Refills:  3        ibuprofen 800 MG tablet   Commonly known as:  ADVIL/MOTRIN   Dose:  800 mg   Quantity:  40 tablet        Take 1 tablet (800 mg) by mouth every 8 hours as needed for pain   Refills:  1        * methylphenidate ER 54 MG CR tablet   Commonly known as:  CONCERTA   Dose:  54 mg   Quantity:  30 tablet        Take 1 tablet (54 mg) by mouth daily   Refills:  0        * methylphenidate ER 54 MG CR tablet   Commonly known as:  CONCERTA   Dose:  54 mg   Quantity:  30 tablet        Take 1 tablet (54 mg) by mouth every morning   Refills:  0        * methylphenidate ER 54 MG CR tablet   Commonly known as:  CONCERTA   Dose:  54 mg   Quantity:  30 tablet        Take 1 tablet (54 mg) by mouth  every morning   Refills:  0        omeprazole 40 MG capsule   Commonly known as:  priLOSEC   Quantity:  30 capsule        TAKE 1 CAPSULE BY MOUTH DAILY. TAKE 30 TO 60 MINUTES BEFORE A MEAL   Refills:  11        SM STOOL SOFTENER 100 MG capsule   Quantity:  30 capsule   Generic drug:  docusate sodium        TAKE 1 CAPSULE BY MOUTH DAILY   Refills:  9        traZODone 50 MG tablet   Commonly known as:  DESYREL   Quantity:  90 tablet        TAKE 1/2 TO 3 TABLETS BY MOUTH AT BEDTIME AS NEEDED FOR INSOMNIA   Refills:  9        TYLENOL PO        Refills:  0        VENTOLIN  (90 BASE) MCG/ACT Inhaler   Quantity:  18 g   Generic drug:  albuterol        INHALE 2 PUFFS INTO THE LUNGS EVERY 4 HOURS AS NEEDED FOR SHORTNESS OF BREATH/DYSPNEA   Refills:  3        * Notice:  This list has 3 medication(s) that are the same as other medications prescribed for you. Read the directions carefully, and ask your doctor or other care provider to review them with you.            Orders Needing Specimen Collection     None      Pending Results     No orders found from 7/13/2017 to 7/16/2017.            Pending Culture Results     No orders found from 7/13/2017 to 7/16/2017.            Thank you for choosing [facility]       Thank you for choosing [facility] for your care. Our goal is always to provide you with excellent care. Hearing back from our patients is one way we can continue to improve our services. Please take a few minutes to complete the written survey that you may receive in the mail after you visit with us. Thank you!        [employer] Information     [employer] gives you secure access to your electronic health record. If you see a primary care provider, you can also send messages to your care team and make appointments. If you have questions, please call your primary care clinic.  If you do not have a primary care provider, please call [phone] and they will assist you.        Care EveryWhere ID     This is your Care  EveryWhere ID. This could be used by other organizations to access your [facility] medical records  [id number]        Equal Access to Services     CALE BURTON : Nicholas Isidro, juvenal hua, radha cardenas, can padron. So [facility] [phone].    ATENCIÓN: Si habla español, tiene a singh disposición servicios gratuitos de asistencia lingüística. Llame al [phone].    We comply with applicable federal civil rights laws and [place] laws. We do not discriminate on the basis of race, color, national origin, age, disability sex, sexual orientation or gender identity.            After Visit Summary       This is your record. Keep this with you and show to your community pharmacist(s) and doctor(s) at your next visit.

## 2017-07-15 NOTE — ED PROVIDER NOTES
History     Chief Complaint   Patient presents with     Otalgia     Left ear, external reddened, painful area X 1 day     The history is provided by the patient and the spouse. No  was used.     Cristiana Myles is a 37 year old female who presents with concerns of a painful area to her left ear that has gotten worse this afternoon. Has a mole on her ear that feels like it's on fire. Hasn't done anything to the ear, no injury. No topicals at home. Concerned because she has a mole in the area.     I have reviewed the Medications, Allergies, Past Medical and Surgical History, and Social History in the Epic system.    Allergies:   Allergies   Allergen Reactions     Amoxicillin Diarrhea     Bee Venom      Bee venom (honey bee)       Latex      Sulfa Drugs      Sulfa (sulfonamide antibiotics)            No current facility-administered medications on file prior to encounter.   Current Outpatient Prescriptions on File Prior to Encounter:  traZODone (DESYREL) 50 MG tablet TAKE 1/2 TO 3 TABLETS BY MOUTH AT BEDTIME AS NEEDED FOR INSOMNIA   omeprazole (PRILOSEC) 40 MG capsule TAKE 1 CAPSULE BY MOUTH DAILY. TAKE 30 TO 60 MINUTES BEFORE A MEAL   citalopram (CELEXA) 40 MG tablet TAKE 1 TABLET BY MOUTH DAILY   gabapentin (NEURONTIN) 100 MG capsule TAKE 1 CAPSULE BY MOUTH 3 TIMES DAILY   VENTOLIN  (90 BASE) MCG/ACT Inhaler INHALE 2 PUFFS INTO THE LUNGS EVERY 4 HOURS AS NEEDED FOR SHORTNESS OF BREATH/DYSPNEA   methylphenidate ER (CONCERTA) 54 MG CR tablet Take 1 tablet (54 mg) by mouth daily   methylphenidate ER (CONCERTA) 54 MG CR tablet Take 1 tablet (54 mg) by mouth every morning   methylphenidate ER (CONCERTA) 54 MG CR tablet Take 1 tablet (54 mg) by mouth every morning   SM STOOL SOFTENER 100 MG capsule TAKE 1 CAPSULE BY MOUTH DAILY   ibuprofen (ADVIL,MOTRIN) 800 MG tablet Take 1 tablet (800 mg) by mouth every 8 hours as needed for pain   Acetaminophen (TYLENOL PO)    hydrOXYzine (VISTARIL) 25 MG  "capsule TAKE 1 OR 2 CAPSULES BY MOUTH EVERY 4-6 HOURS AS NEEDED FOR ANXIETY       Patient Active Problem List   Diagnosis     Generalized anxiety disorder     Attention deficit disorder     MDD (major depressive disorder)     FH: breast cancer     Tobacco abuse, in remission     Pelvic pain in female     ACP (advance care planning)       Past Surgical History:   Procedure Laterality Date     ENDOSCOPY UPPER, COLONOSCOPY, COMBINED N/A 9/4/2015    Procedure: COMBINED ENDOSCOPY UPPER, COLONOSCOPY;  Surgeon: Griffin Barrientos DO;  Location: [facility]     LAPAROSCOPIC CYSTECTOMY OVARIAN (BENIGN) Right 1/27/2016    Procedure: LAPAROSCOPIC CYSTECTOMY OVARIAN (BENIGN);  Surgeon: Kuldip España MD;  Location: [facility]     LAPAROSCOPIC SALPINGO-OOPHORECTOMY Left 1/27/2016    Procedure: LAPAROSCOPIC SALPINGO-OOPHORECTOMY;  Surgeon: Kuldip España MD;  Location: [facility]     LAPAROSCOPY DIAGNOSTIC (GYN) Left 1/27/2016    Procedure: LAPAROSCOPY DIAGNOSTIC (GYN);  Surgeon: Kuldip España MD;  Location: [facility]     TUBAL LIGATION  2006     wisdom teeth         Social History   Substance Use Topics     Smoking status: Former Smoker     Packs/day: 1.00     Years: 13.00     Types: Cigarettes     Quit date: 1/1/2015     Smokeless tobacco: Never Used     Alcohol use 0.0 oz/week      Comment: frequency: rarely       Most Recent Immunizations   Administered Date(s) Administered     DTAP (<7y) 06/11/1993     Influenza (IIV3) 09/26/2009     MMR 04/30/1990     TD (ADULT, 7+) 06/11/1993     TDAP Vaccine (Boostrix) 05/19/2015       BMI: Estimated body mass index is 29.09 kg/(m^2) as calculated from the following:    Height as of 5/11/17: 1.753 m (5' 9\").    Weight as of 5/11/17: 89.4 kg (197 lb).      Review of Systems   Constitutional: Negative for fever.   HENT: Positive for ear pain (Left tragus pain).        Physical Exam   BP: 127/81  Pulse: 81  Temp: 99.2  F (37.3  C)  Resp: 18  SpO2: 98 %  Physical Exam   Constitutional: She appears " well-developed and well-nourished. No distress.   HENT:   Head: Normocephalic and atraumatic.   Right Ear: External ear normal.   Left Ear: Hearing, tympanic membrane and ear canal normal. No foreign bodies. Tympanic membrane is not bulging.   Ears:    Nose: Nose normal.   Noted area on left ear is more swollen, no erythema, no edema, no drainage. Non-tender to touch.   Eyes: Conjunctivae are normal. Right eye exhibits no discharge. Left eye exhibits no discharge. No scleral icterus.   Neck: Normal range of motion.   Skin: She is not diaphoretic.   Nursing note and vitals reviewed.      ED Course     ED Course     Procedures          Labs Ordered and Resulted from Time of ED Arrival Up to the Time of Departure from the ED - No data to display    Assessments & Plan (with Medical Decision Making)     I have reviewed the nursing notes.  I have reviewed the findings, diagnosis, plan and need for follow up with the patient.  Advised patient:   Avoid touching the area as this will cause more irritation.   Call for an appointment to see PCP on Friday, if area is still irritation follow up.   Cancel appointment if sx resolve.       Final diagnoses:   Skin sensation disturbance   Nevus       7/15/2017   [facility]     Natalya Parr NP  07/15/17 [id number]

## 2017-07-24 DIAGNOSIS — R10.32 LLQ ABDOMINAL PAIN: ICD-10-CM

## 2017-07-27 RX ORDER — GABAPENTIN 100 MG/1
CAPSULE ORAL
Qty: 90 CAPSULE | Refills: 3 | Status: SHIPPED | OUTPATIENT
Start: 2017-07-27 | End: 2017-12-04

## 2017-08-02 ENCOUNTER — OFFICE VISIT (OUTPATIENT)
Dept: FAMILY MEDICINE | Facility: OTHER | Age: 37
End: 2017-08-02
Attending: FAMILY MEDICINE
Payer: COMMERCIAL

## 2017-08-02 VITALS
HEIGHT: 69 IN | HEART RATE: 64 BPM | BODY MASS INDEX: 29.47 KG/M2 | SYSTOLIC BLOOD PRESSURE: 110 MMHG | TEMPERATURE: 97.9 F | DIASTOLIC BLOOD PRESSURE: 70 MMHG | WEIGHT: 199 LBS

## 2017-08-02 DIAGNOSIS — F33.41 RECURRENT MAJOR DEPRESSIVE DISORDER, IN PARTIAL REMISSION (H): ICD-10-CM

## 2017-08-02 DIAGNOSIS — F90.0 ATTENTION DEFICIT HYPERACTIVITY DISORDER (ADHD), PREDOMINANTLY INATTENTIVE TYPE: ICD-10-CM

## 2017-08-02 DIAGNOSIS — K58.2 IRRITABLE BOWEL SYNDROME WITH BOTH CONSTIPATION AND DIARRHEA: ICD-10-CM

## 2017-08-02 DIAGNOSIS — L72.3 SEBACEOUS CYST: Primary | ICD-10-CM

## 2017-08-02 DIAGNOSIS — Z63.8 STRESS DUE TO FAMILY TENSION: ICD-10-CM

## 2017-08-02 DIAGNOSIS — L55.9 SUNBURN: ICD-10-CM

## 2017-08-02 PROCEDURE — 99000 SPECIMEN HANDLING OFFICE-LAB: CPT | Mod: ZL | Performed by: FAMILY MEDICINE

## 2017-08-02 PROCEDURE — 80307 DRUG TEST PRSMV CHEM ANLYZR: CPT | Mod: ZL | Performed by: FAMILY MEDICINE

## 2017-08-02 PROCEDURE — 99212 OFFICE O/P EST SF 10 MIN: CPT

## 2017-08-02 PROCEDURE — 99214 OFFICE O/P EST MOD 30 MIN: CPT | Performed by: FAMILY MEDICINE

## 2017-08-02 RX ORDER — METHYLPHENIDATE HYDROCHLORIDE 54 MG/1
54 TABLET ORAL EVERY MORNING
Qty: 30 TABLET | Refills: 0 | Status: SHIPPED | OUTPATIENT
Start: 2017-09-09 | End: 2017-11-08

## 2017-08-02 RX ORDER — METHYLPHENIDATE HYDROCHLORIDE 54 MG/1
54 TABLET ORAL DAILY
Qty: 30 TABLET | Refills: 0 | Status: SHIPPED | OUTPATIENT
Start: 2017-08-09 | End: 2017-11-08

## 2017-08-02 RX ORDER — METHYLPHENIDATE HYDROCHLORIDE 54 MG/1
54 TABLET ORAL EVERY MORNING
Qty: 30 TABLET | Refills: 0 | Status: SHIPPED | OUTPATIENT
Start: 2017-10-09 | End: 2017-11-08

## 2017-08-02 SDOH — SOCIAL STABILITY - SOCIAL INSECURITY: OTHER SPECIFIED PROBLEMS RELATED TO PRIMARY SUPPORT GROUP: Z63.8

## 2017-08-02 ASSESSMENT — ANXIETY QUESTIONNAIRES
7. FEELING AFRAID AS IF SOMETHING AWFUL MIGHT HAPPEN: SEVERAL DAYS
3. WORRYING TOO MUCH ABOUT DIFFERENT THINGS: MORE THAN HALF THE DAYS
IF YOU CHECKED OFF ANY PROBLEMS ON THIS QUESTIONNAIRE, HOW DIFFICULT HAVE THESE PROBLEMS MADE IT FOR YOU TO DO YOUR WORK, TAKE CARE OF THINGS AT HOME, OR GET ALONG WITH OTHER PEOPLE: VERY DIFFICULT
GAD7 TOTAL SCORE: 14
1. FEELING NERVOUS, ANXIOUS, OR ON EDGE: MORE THAN HALF THE DAYS
6. BECOMING EASILY ANNOYED OR IRRITABLE: NEARLY EVERY DAY
4. TROUBLE RELAXING: MORE THAN HALF THE DAYS
5. BEING SO RESTLESS THAT IT IS HARD TO SIT STILL: MORE THAN HALF THE DAYS
2. NOT BEING ABLE TO STOP OR CONTROL WORRYING: MORE THAN HALF THE DAYS

## 2017-08-02 ASSESSMENT — PAIN SCALES - GENERAL: PAINLEVEL: MILD PAIN (2)

## 2017-08-02 NOTE — PROGRESS NOTES
SUBJECTIVE:                                                    Cristiana Myles is a 37 year old female who presents to clinic today for the following health issues:      Medication Followup of concerta    Taking Medication as prescribed: yes    Side Effects:  None    Medication Helping Symptoms:  Yes    Took last dose yest.  morning      Abdominal Pain      Duration: couple months    Description (location/character/radiation): right sided abdominal pain       Associated flank pain: None    Intensity:  mild, moderate    Accompanying signs and symptoms: abdominal cramping       Fever/Chills: YES       Gas/Bloating: YES       Nausea/vomitting: YES       Diarrhea: YES       Dysuria or Hematuria: no     History (previous similar pain/trauma/previous testing): same pain she had when had to have left ovary removed    Precipitating or alleviating factors:       Pain worse with eating/BM/urination: no       Pain relieved by BM: no     Therapies tried and outcome: None    LMP:  7/16/17    No fever  Some chills    Has had EGD/Colonoscopy    Most likely IBS with predominant diarrhea    Has tried metamucil when on constipation side      Abnormal Mood Symptoms      Duration: month or more    Description:  Depression: YES  Anxiety: YES  Panic attacks: no      Accompanying signs and symptoms: see PHQ-9 and DAFNE scores    History (similar episodes/previous evaluation): fatigue, lack of sex drive, feels trapped, smothered-  20 Yo lazy son and pregnant 18 yO GF living with them     Precipitating or alleviating factors: son and gf living with them    Therapies tried and outcome: Celexa (Citalopram)      PHQ-9 SCORE 2/8/2017 5/11/2017 8/2/2017   Total Score - - -   Total Score 12 14 19     DAFNE-7 SCORE 2/8/2017 5/11/2017 8/2/2017   Total Score 16 17 14            Rash      Duration: July 4    Description  Location: right shoulder  Itching: moderate    Intensity:  moderate    Accompanying signs and symptoms: bad sunburn     History  "(similar episodes/previous evaluation): no sun block    Precipitating or alleviating factors:  New exposures:  None  Recent travel: no      Therapies tried and outcome: none            lesion      Duration: 1 month    Description (location/character/radiation): left ear    Intensity:  mild, moderate    Accompanying signs and symptoms: was a mole    History (similar episodes/previous evaluation): ED    Precipitating or alleviating factors: None    Therapies tried and outcome: popped and green puss came out    Came on suddenly     Still there squeeze it some                          Problem list and histories reviewed & adjusted, as indicated.  Additional history: as documented    Labs reviewed in EPIC    Reviewed and updated as needed this visit by clinical staffTobacco  Allergies  Meds  Med Hx  Surg Hx  Fam Hx  Soc Hx      Reviewed and updated as needed this visit by Provider         ROS:  C: NEGATIVE for fever, chills, change in weight  E/M: NEGATIVE for ear, mouth and throat problems  R: NEGATIVE for significant cough or SOB  CV: NEGATIVE for chest pain, palpitations or peripheral edema    OBJECTIVE:                                                    /70  Pulse 64  Temp 97.9  F (36.6  C)  Ht 5' 8.5\" (1.74 m)  Wt 199 lb (90.3 kg)  BMI 29.82 kg/m2  Body mass index is 29.82 kg/(m^2).   GENERAL: healthy, alert, well nourished, well hydrated, no distress  HENT: ear canals- normal; TMs- normal; Nose- normal; Mouth- no ulcers, no lesions small inclusion cyst of 3-4 mm on preauricular flap  On left   NECK: no tenderness, no adenopathy, no asymmetry, no masses, no stiffness; thyroid- normal to palpation  RESP: lungs clear to auscultation - no rales, no rhonchi, no wheezes  CV: regular rates and rhythm, normal S1 S2, no S3 or S4 and no murmur, no click or rub -  ABDOMEN: soft, no tenderness, no  hepatosplenomegaly, no masses, normal bowel sounds  MS: extremities- no gross deformities noted, no edema  SKIN: " no suspicious lesions, no rashes- no moles or lesions on area burnt - still sensitive to touch   PSYCH: Alert and oriented times 3; speech- coherent , normal rate and volume; able to articulate logical thoughts, able to abstract reason, no tangential thoughts, no hallucinations or delusions, affect- stressed          ASSESSMENT/PLAN:                                                    (L72.3) Sebaceous cyst  (primary encounter diagnosis)  Comment: left ear   Plan: hands off /heat /time.  If not better - ?? Remove     (F90.0) Attention deficit hyperactivity disorder (ADHD), predominantly inattentive type  Comment: stable   Plan: methylphenidate ER (CONCERTA) 54 MG CR tablet,         methylphenidate ER (CONCERTA) 54 MG CR tablet,         methylphenidate ER (CONCERTA) 54 MG CR tablet,         Pain Drug Scr UR W Rptd Meds        UDS and 3 month RF given     (L55.9) Sunburn  Comment: improving   Plan: Skin still sensative.  Symptomatic treatment was discussed along when patient should call and/or come back into the clinic or go to ER/Urgent care. All questions answered.       (K58.2) Irritable bowel syndrome with both constipation and diarrhea  Comment: discussed   Plan: Symptomatic treatment was discussed along when patient should call and/or come back into the clinic or go to ER/Urgent care. All questions answered.   Daily metamucil     (Z63.8) Stress due to family tension  Comment: long discussion on helpful hints what to do--  Has to draw line in sand// contract// son needs to work on independency vs living off parents   Plan: No meds-- needs to have tough love. SO very supportive.     (F33.41) Recurrent major depressive disorder, in partial remission (H)  Comment: discussed.   Plan: no med change- Continue current medications and behavioral changes.         See Patient Instructions    Sudhakar Cuellar MD  [facility]

## 2017-08-02 NOTE — PATIENT INSTRUCTIONS
Irritable Bowel Syndrome    Irritable bowel syndrome (IBS) is a disorder of the intestines. It is not a disease, but a group of symptoms caused by changes in the way the intestines work. It is fairly common, but the cause is not well understood.  Symptoms of IBS include:    Abdominal pain, discomfort, and cramping    Diarrhea    Constipation or dry, hard stools    Mucous stool    Bloating    Feeling of incomplete bowel movements  It usually results in one of 3 patterns of symptoms:    Chronic abdominal pain and constipation    Recurring episodes of diarrhea, with or without pain    Alternating diarrhea and constipation  Home care  The goal of treatment is to control and relieve your symptoms, so you can lead a full and active life. There is no cure for IBS. But it can be managed.  Diet  Your diet did not cause your IBS, but it can affect it. No one diet works for everyone. Finding the best foods for you may take trial and error. Keep a food log to help find what foods made your symptoms worse. Below are some tips that may help you.    Eat more slowly. Eat smaller amounts at a time, but more often. Remember, you can always eat more, but cannot eat less once you ve eaten too much.    High-fiber foods are complicated. While they may help relieve constipation, they can make your bloating, cramping, gas, and diarrhea worse.    Eat less sugar.    Try cutting out dairy products. Sometimes this helps.    Try cutting out foods that are high in fat and fatty meats.    You can control bloating or passing excess gas. Be careful with  gassy  vegetables and fruits like beans, cabbage, broccoli, and cauliflower.    Be careful with carbonated beverages and fruit juices. They can make your bloating and diarrhea worse.    Caffeine, alcohol, and stimulants can make symptoms worse. These include coffee, tea, sodas, energy drinks, and chocolate.  Lifestyle    Look for factors that seem to worsen your symptoms. These include stress and  emotions.     Although stress does not cause IBS, it may trigger flare-ups. Counseling can help you learn to handle stress. So can self-help measures like exercise, yoga, and meditation.    Depression can occur along with IBS. Your healthcare provider may prescribe antidepressant medicine. This may help with diarrhea, constipation, and cramping, as well as with symptoms of depression.    Smoking doesn't cause IBS, but can make the symptoms worse.  Medicines  Your healthcare provider may prescribe medicines. Take them as directed. For acute flare-ups of your illness, your provider may give you prescription medicines.    Check with your healthcare provider before taking any medicines for diarrhea.    Avoid anti-inflammatory medicines like ibuprofen or naproxen.    Consider nutritional supplements. This is especially true if your diarrhea is prolonged, or you aren't eating or are losing weight  Follow-up care  Follow up with your healthcare provider, or as advised. If a stool sample was taken or cultures were done, you will be told if your treatment needs to change. You can call as directed for the results.  When to seek medical advice  Call your healthcare provider right away if any of these occur:    Abdominal pain gets worse    Constant abdominal pain moves to the right-lower abdomen    You can't keep liquids down because of vomiting    You have severe diarrhea    You have blood (red or black color) or mucus in your stool    You feel very weak or dizzy, faint, or have extreme thirst    You have a fever of 100.4 F (38.0 C) or higher, or as directed by your healthcare provider  Date Last Reviewed: 8/31/2015 2000-2017 [employer]. [address] [place] [address], [place] [address]. All rights reserved. This information is not intended as a substitute for professional medical care. Always follow your healthcare professional's instructions.        Diet and Lifestyle Tips for Irritable Bowel Syndrome (IBS)    Your  healthcare professional may suggest some lifestyle changes to help control your IBS. Changing your diet and managing stress are two of the most important. Follow your healthcare provider s instructions and try some of the suggestions below.  Change your diet  Your diet may be an important cause of IBS symptoms. You may want to try the following:    Pay attention to what foods bother you, and avoid them. For example, dairy products are hard for some people to digest.    Drink 6 to 8 glasses of water a day.    Avoid caffeine and tobacco. These are muscle stimulants and can affect the working of your digestive tract.    Avoid alcohol, which can irritate your digestive tract and make your symptoms worse.    Eat more fiber if constipation is a problem. Fiber makes the stool softer and easier to pass through the colon.  Reduce stress  If stress or anxiety makes your IBS symptoms worse, learning how to manage stress may help you feel better. Try these tips:    Identify the causes of stress in your life.    Learn new ways to cope with them.    Regular exercise is a great way to relieve stress. It can also help ease constipation.  Date Last Reviewed: 7/1/2016 2000-2017 [employer]. [address] [place] [address], [place] [address]. All rights reserved. This information is not intended as a substitute for professional medical care. Always follow your healthcare professional's instructions.

## 2017-08-02 NOTE — NURSING NOTE
"Chief Complaint   Patient presents with     A.D.H.D       Initial /70  Pulse 64  Temp 97.9  F (36.6  C)  Ht 5' 8.5\" (1.74 m)  Wt 199 lb (90.3 kg)  BMI 29.82 kg/m2 Estimated body mass index is 29.82 kg/(m^2) as calculated from the following:    Height as of this encounter: 5' 8.5\" (1.74 m).    Weight as of this encounter: 199 lb (90.3 kg).  Medication Reconciliation: complete     Grabiel Cantor      "

## 2017-08-02 NOTE — MR AVS SNAPSHOT
After Visit Summary   8/2/2017    Cristiana Myles    MRN: [id number]           Patient Information     Date Of Birth          1980        Visit Information        Provider Department      8/2/2017 9:45 AM Sudhakar Cuellar MD [facility] [place]        Today's Diagnoses     Sebaceous cyst    -  1    Attention deficit hyperactivity disorder (ADHD), predominantly inattentive type        Sunburn        Irritable bowel syndrome with both constipation and diarrhea        Stress due to family tension        Recurrent major depressive disorder, in partial remission (H)          Care Instructions      Irritable Bowel Syndrome    Irritable bowel syndrome (IBS) is a disorder of the intestines. It is not a disease, but a group of symptoms caused by changes in the way the intestines work. It is fairly common, but the cause is not well understood.  Symptoms of IBS include:    Abdominal pain, discomfort, and cramping    Diarrhea    Constipation or dry, hard stools    Mucous stool    Bloating    Feeling of incomplete bowel movements  It usually results in one of 3 patterns of symptoms:    Chronic abdominal pain and constipation    Recurring episodes of diarrhea, with or without pain    Alternating diarrhea and constipation  Home care  The goal of treatment is to control and relieve your symptoms, so you can lead a full and active life. There is no cure for IBS. But it can be managed.  Diet  Your diet did not cause your IBS, but it can affect it. No one diet works for everyone. Finding the best foods for you may take trial and error. Keep a food log to help find what foods made your symptoms worse. Below are some tips that may help you.    Eat more slowly. Eat smaller amounts at a time, but more often. Remember, you can always eat more, but cannot eat less once you ve eaten too much.    High-fiber foods are complicated. While they may help relieve constipation, they can make your bloating, cramping, gas, and  diarrhea worse.    Eat less sugar.    Try cutting out dairy products. Sometimes this helps.    Try cutting out foods that are high in fat and fatty meats.    You can control bloating or passing excess gas. Be careful with  gassy  vegetables and fruits like beans, cabbage, broccoli, and cauliflower.    Be careful with carbonated beverages and fruit juices. They can make your bloating and diarrhea worse.    Caffeine, alcohol, and stimulants can make symptoms worse. These include coffee, tea, sodas, energy drinks, and chocolate.  Lifestyle    Look for factors that seem to worsen your symptoms. These include stress and emotions.     Although stress does not cause IBS, it may trigger flare-ups. Counseling can help you learn to handle stress. So can self-help measures like exercise, yoga, and meditation.    Depression can occur along with IBS. Your healthcare provider may prescribe antidepressant medicine. This may help with diarrhea, constipation, and cramping, as well as with symptoms of depression.    Smoking doesn't cause IBS, but can make the symptoms worse.  Medicines  Your healthcare provider may prescribe medicines. Take them as directed. For acute flare-ups of your illness, your provider may give you prescription medicines.    Check with your healthcare provider before taking any medicines for diarrhea.    Avoid anti-inflammatory medicines like ibuprofen or naproxen.    Consider nutritional supplements. This is especially true if your diarrhea is prolonged, or you aren't eating or are losing weight  Follow-up care  Follow up with your healthcare provider, or as advised. If a stool sample was taken or cultures were done, you will be told if your treatment needs to change. You can call as directed for the results.  When to seek medical advice  Call your healthcare provider right away if any of these occur:    Abdominal pain gets worse    Constant abdominal pain moves to the right-lower abdomen    You can't keep  liquids down because of vomiting    You have severe diarrhea    You have blood (red or black color) or mucus in your stool    You feel very weak or dizzy, faint, or have extreme thirst    You have a fever of 100.4 F (38.0 C) or higher, or as directed by your healthcare provider  Date Last Reviewed: 8/31/2015 2000-2017 [employer]. [address] [place] [address]. All rights reserved. This information is not intended as a substitute for professional medical care. Always follow your healthcare professional's instructions.        Diet and Lifestyle Tips for Irritable Bowel Syndrome (IBS)    Your healthcare professional may suggest some lifestyle changes to help control your IBS. Changing your diet and managing stress are two of the most important. Follow your healthcare provider s instructions and try some of the suggestions below.  Change your diet  Your diet may be an important cause of IBS symptoms. You may want to try the following:    Pay attention to what foods bother you, and avoid them. For example, dairy products are hard for some people to digest.    Drink 6 to 8 glasses of water a day.    Avoid caffeine and tobacco. These are muscle stimulants and can affect the working of your digestive tract.    Avoid alcohol, which can irritate your digestive tract and make your symptoms worse.    Eat more fiber if constipation is a problem. Fiber makes the stool softer and easier to pass through the colon.  Reduce stress  If stress or anxiety makes your IBS symptoms worse, learning how to manage stress may help you feel better. Try these tips:    Identify the causes of stress in your life.    Learn new ways to cope with them.    Regular exercise is a great way to relieve stress. It can also help ease constipation.  Date Last Reviewed: 7/1/2016 2000-2017 [employer]. [address] [place] [address]. All rights reserved. This information is not intended as a substitute  "for professional medical care. Always follow your healthcare professional's instructions.                Follow-ups after your visit        Your next 10 appointments already scheduled     Nov 08, 2017  9:45 AM CST   (Arrive by 9:30 AM)   SHORT with Sudhakar Cuellar MD   [facility] [place] ([facility] )    [place] [address]   [phone]              Who to contact     If you have questions or need follow up information about today's clinic visit or your schedule please contact [facility] [place] directly at [phone].  Normal or non-critical lab and imaging results will be communicated to you by Vuduhart, letter or phone within 4 business days after the clinic has received the results. If you do not hear from us within 7 days, please contact the clinic through Vuduhart or phone. If you have a critical or abnormal lab result, we will notify you by phone as soon as possible.  Submit refill requests through EPS or call your pharmacy and they will forward the refill request to us. Please allow 3 business days for your refill to be completed.          Additional Information About Your Visit        MyChart Information     EPS gives you secure access to your electronic health record. If you see a primary care provider, you can also send messages to your care team and make appointments. If you have questions, please call your primary care clinic.  If you do not have a primary care provider, please call [phone] and they will assist you.        Care EveryWhere ID     This is your Care EveryWhere ID. This could be used by other organizations to access your [facility] medical records  [id number]        Your Vitals Were     Pulse Temperature Height BMI (Body Mass Index)          64 97.9  F (36.6  C) 5' 8.5\" (1.74 m) 29.82 kg/m2         Blood Pressure from Last 3 Encounters:   08/02/17 110/70   07/15/17 127/81   06/29/17 131/97    Weight from Last 3 Encounters: "   08/02/17 199 lb (90.3 kg)   05/11/17 197 lb (89.4 kg)   04/24/17 198 lb (89.8 kg)              We Performed the Following     Pain Drug Scr UR W Rptd Meds          Where to get your medicines      Some of these will need a paper prescription and others can be bought over the counter.  Ask your nurse if you have questions.     Bring a paper prescription for each of these medications     methylphenidate ER 54 MG CR tablet    methylphenidate ER 54 MG CR tablet    methylphenidate ER 54 MG CR tablet          Primary Care Provider Office Phone # Fax #    Sudhakar Cuellar [phone]       [facility] [facility] [address]        Equal Access to Services     ROSE BURTON : Nicholas Isidro, waphilipp hua, qaybta kaalmada theresa, can padron. So [facility] [phone].    ATENCIÓN: Si habla español, tiene a singh disposición servicios gratuitos de asistencia lingüística. Llame al [phone].    We comply with applicable federal civil rights laws and [place] laws. We do not discriminate on the basis of race, color, national origin, age, disability sex, sexual orientation or gender identity.            Thank you!     Thank you for choosing [facility]  for your care. Our goal is always to provide you with excellent care. Hearing back from our patients is one way we can continue to improve our services. Please take a few minutes to complete the written survey that you may receive in the mail after your visit with us. Thank you!             Your Updated Medication List - Protect others around you: Learn how to safely use, store and throw away your medicines at www.disposemymeds.org.          This list is accurate as of: 8/2/17 12:16 PM.  Always use your most recent med list.                   Brand Name Dispense Instructions for use Diagnosis    citalopram 40 MG tablet    celeXA    30 tablet    TAKE 1 TABLET BY MOUTH DAILY    DAFNE  (generalized anxiety disorder), MDD (major depressive disorder)       gabapentin 100 MG capsule    NEURONTIN    90 capsule    TAKE 1 CAPSULE BY MOUTH 3 TIMES DAILY    LLQ abdominal pain       hydrOXYzine 25 MG capsule    VISTARIL    60 capsule    TAKE 1 OR 2 CAPSULES BY MOUTH EVERY 4-6 HOURS AS NEEDED FOR ANXIETY    Anxiety, Attention deficit disorder       ibuprofen 800 MG tablet    ADVIL/MOTRIN    40 tablet    Take 1 tablet (800 mg) by mouth every 8 hours as needed for pain    Pelvic pain in female       * methylphenidate ER 54 MG CR tablet   Start taking on:  8/9/2017    CONCERTA    30 tablet    Take 1 tablet (54 mg) by mouth daily    Attention deficit hyperactivity disorder (ADHD), predominantly inattentive type       * methylphenidate ER 54 MG CR tablet   Start taking on:  9/9/2017    CONCERTA    30 tablet    Take 1 tablet (54 mg) by mouth every morning    Attention deficit hyperactivity disorder (ADHD), predominantly inattentive type       * methylphenidate ER 54 MG CR tablet   Start taking on:  10/9/2017    CONCERTA    30 tablet    Take 1 tablet (54 mg) by mouth every morning    Attention deficit hyperactivity disorder (ADHD), predominantly inattentive type       omeprazole 40 MG capsule    priLOSEC    30 capsule    TAKE 1 CAPSULE BY MOUTH DAILY. TAKE 30 TO 60 MINUTES BEFORE A MEAL    Gastroesophageal reflux disease without esophagitis       SM STOOL SOFTENER 100 MG capsule   Generic drug:  docusate sodium     30 capsule    TAKE 1 CAPSULE BY MOUTH DAILY    Pelvic pain in female       traZODone 50 MG tablet    DESYREL    90 tablet    TAKE 1/2 TO 3 TABLETS BY MOUTH AT BEDTIME AS NEEDED FOR INSOMNIA    Insomnia       TYLENOL PO           VENTOLIN  (90 BASE) MCG/ACT Inhaler   Generic drug:  albuterol     18 g    INHALE 2 PUFFS INTO THE LUNGS EVERY 4 HOURS AS NEEDED FOR SHORTNESS OF BREATH/DYSPNEA    Attention deficit disorder, Reactive airway disease with wheezing, mild intermittent, uncomplicated       *  Notice:  This list has 3 medication(s) that are the same as other medications prescribed for you. Read the directions carefully, and ask your doctor or other care provider to review them with you.

## 2017-08-03 ASSESSMENT — PATIENT HEALTH QUESTIONNAIRE - PHQ9: SUM OF ALL RESPONSES TO PHQ QUESTIONS 1-9: 19

## 2017-08-03 ASSESSMENT — ANXIETY QUESTIONNAIRES: GAD7 TOTAL SCORE: 14

## 2017-08-08 LAB — PAIN DRUG SCR UR W RPTD MEDS: NORMAL

## 2017-09-26 ENCOUNTER — HOSPITAL ENCOUNTER (EMERGENCY)
Facility: HOSPITAL | Age: 37
Discharge: HOME | End: 2017-09-26
Attending: NURSE PRACTITIONER | Admitting: NURSE PRACTITIONER
Payer: COMMERCIAL

## 2017-09-26 VITALS
DIASTOLIC BLOOD PRESSURE: 90 MMHG | TEMPERATURE: 98.2 F | RESPIRATION RATE: 20 BRPM | HEART RATE: 88 BPM | SYSTOLIC BLOOD PRESSURE: 131 MMHG | OXYGEN SATURATION: 99 %

## 2017-09-26 DIAGNOSIS — J20.9 ACUTE BRONCHITIS, UNSPECIFIED ORGANISM: ICD-10-CM

## 2017-09-26 PROCEDURE — 99214 OFFICE O/P EST MOD 30 MIN: CPT | Performed by: NURSE PRACTITIONER

## 2017-09-26 PROCEDURE — 99212 OFFICE O/P EST SF 10 MIN: CPT

## 2017-09-26 RX ORDER — AZITHROMYCIN 250 MG/1
TABLET, FILM COATED ORAL
Qty: 6 TABLET | Refills: 0 | Status: SHIPPED | OUTPATIENT
Start: 2017-09-26 | End: 2017-10-01

## 2017-09-26 ASSESSMENT — ENCOUNTER SYMPTOMS
ACTIVITY CHANGE: 0
CHILLS: 1
SINUS PRESSURE: 0
APPETITE CHANGE: 0
CHEST TIGHTNESS: 0
SORE THROAT: 0
COUGH: 1
DIARRHEA: 0
VOMITING: 0
SHORTNESS OF BREATH: 1
DYSURIA: 0
RHINORRHEA: 1
FACIAL SWELLING: 0
FEVER: 0
NAUSEA: 0
WHEEZING: 0
SINUS PAIN: 0
TROUBLE SWALLOWING: 0
ABDOMINAL PAIN: 0
PSYCHIATRIC NEGATIVE: 1
STRIDOR: 0

## 2017-09-26 NOTE — DISCHARGE INSTRUCTIONS
Take antibiotics as ordered.   Eat a yogurt daily while taking antibiotics.   Take tylenol and or ibuprofen for pain or fever.   Continue Albuterol inhaler.   Try to stop smoking.   Follow up with PCP with any increase in symptoms or concerns.   Return to urgent care or emergency department with any increase in symptoms or concerns.

## 2017-09-26 NOTE — ED PROVIDER NOTES
History     Chief Complaint   Patient presents with     URI     Otalgia     left ear     The history is provided by the patient. No  was used.     Cristiana Myles is a 37 year old female who presents with a productive cough and left ear pressure. Cough started 1 month ago and left ear pressure started 3 days ago. She's taken Dayquil, Nyquil, and used her Albuterol inhaler with mild effectiveness. Denies fever. Positive for chills and night sweats. Drinking well. Decreased appetite. Bowel and bladder are working well. No antibiotic use in the past 30 days. Tobacco user of 0.5 pack every 2 days.     I have reviewed the Medications, Allergies, Past Medical and Surgical History, and Social History in the Epic system.      Review of Systems   Constitutional: Positive for chills. Negative for activity change, appetite change and fever.   HENT: Positive for congestion, ear pain, postnasal drip and rhinorrhea. Negative for facial swelling, hearing loss, sinus pain, sinus pressure, sore throat and trouble swallowing.    Respiratory: Positive for cough and shortness of breath. Negative for chest tightness, wheezing and stridor.         SOB with cough.    Cardiovascular: Negative for chest pain.   Gastrointestinal: Negative for abdominal pain, diarrhea, nausea and vomiting.   Genitourinary: Negative for dysuria.   Skin: Negative for rash.   Psychiatric/Behavioral: Negative.        Physical Exam   BP: 131/90  Pulse: 88  Temp: 98.2  F (36.8  C)  Resp: 20  SpO2: 99 %  Physical Exam   Constitutional: She is oriented to person, place, and time. She appears well-developed and well-nourished. No distress.   HENT:   Head: Normocephalic.   Right Ear: External ear normal.   Left Ear: External ear normal.   Mouth/Throat: Oropharynx is clear and moist. No oropharyngeal exudate.   Neck: Normal range of motion. Neck supple.   Cardiovascular: Normal rate, regular rhythm and normal heart sounds.    No murmur  heard.  Pulmonary/Chest: Effort normal. No respiratory distress. She has no wheezes. She has rales.   Abdominal: Soft. She exhibits no distension.   Musculoskeletal: Normal range of motion.   Lymphadenopathy:     She has no cervical adenopathy.   Neurological: She is alert and oriented to person, place, and time.   Skin: Skin is warm and dry. No rash noted. She is not diaphoretic.   Psychiatric: She has a normal mood and affect. Her behavior is normal.   Nursing note and vitals reviewed.      ED Course     ED Course     Procedures      Assessments & Plan (with Medical Decision Making)     Discussed plan of care. She verbalized understanding. All questions answered.     I have reviewed the nursing notes.    I have reviewed the findings, diagnosis, plan and need for follow up with the patient.  Discharged in stable condition.     New Prescriptions    AZITHROMYCIN (ZITHROMAX Z-PAULO) 250 MG TABLET    Two tablets on the first day, then one tablet daily for the next 4 days       Final diagnoses:   Acute bronchitis, unspecified organism     Take antibiotics as ordered.   Eat a yogurt daily while taking antibiotics.   Take tylenol and or ibuprofen for pain or fever.   Take Claritin or Zyrtec daily. Over the counter. Follow directions on package.   Continue Albuterol inhaler.   Try to stop smoking.   Follow up with PCP with any increase in symptoms or concerns.   Return to urgent care or emergency department with any increase in symptoms or concerns.     LIZANDRO Aranda  9/26/2017  10:03 AM  [facility]       Ivette Polk NP  09/26/17 1128

## 2017-09-26 NOTE — ED AVS SNAPSHOT
[facility]    [address]    [place] [address]    Phone:  [phone]                                       Cristiana Myles   MRN: [id number]    Department:  [facility]   Date of Visit:  9/26/2017           After Visit Summary Signature Page     I have received my discharge instructions, and my questions have been answered. I have discussed any challenges I see with this plan with the nurse or doctor.    ..........................................................................................................................................  Patient/Patient Representative Signature      ..........................................................................................................................................  Patient Representative Print Name and Relationship to Patient    ..................................................               ................................................  Date                                            Time    ..........................................................................................................................................  Reviewed by Signature/Title    ...................................................              ..............................................  Date                                                            Time

## 2017-09-26 NOTE — ED AVS SNAPSHOT
[facility]    [address]    [place] [address]    Phone:  [phone]                                       Cristiana Myles   MRN: [id number]    Department:  [facility]   Date of Visit:  9/26/2017           Patient Information     Date Of Birth          1980        Your diagnoses for this visit were:     Acute bronchitis, unspecified organism        You were seen by Ivette Polk NP.      Follow-up Information     Follow up with Sudhakar Cuellar MD.    Specialty:  Family Practice    Why:  As needed, If symptoms worsen    Contact information:    [facility]  [address]  [place] [address] [phone]          Follow up with [facility].    Specialty:  EMERGENCY MEDICINE    Why:  As needed, If symptoms worsen    Contact information:    [address]  [place] [address] [phone]    Additional information:    From [place]: Take [address]. Turn left at [address]. Turn left at the first stoplight on [address]. At the first stop sign, take a right onto [address]. Take a left into the parking lot and continue through until you reach the North enterance of the building.       From [place]: Take [address]. Take the [address] ramp towards [place]. Turn left onto [address]. Take a slight right onto [address]. Turn left at the first stoplight on [address]. At the first stop sign, take a right onto [address]. Take a left into the parking lot and continue through until you reach the North enterance of the building.       From [place]: Take [address]. Take a right at [address]. At the first stop sign, take a right onto [address]. Take a left into the parking lot and continue through until you reach the North enterance of the building.         Discharge Instructions       Take antibiotics as ordered.   Eat a yogurt daily while  taking antibiotics.   Take tylenol and or ibuprofen for pain or fever.   Continue Albuterol inhaler.   Try to stop smoking.   Follow up with PCP with any increase in symptoms or concerns.   Return to urgent care or emergency department with any increase in symptoms or concerns.     Discharge References/Attachments     BRONCHITIS, ANTIOBIOTIC TREATMENT (ADULT) (ENGLISH)      Future Appointments        Provider Department Dept Phone Center    11/8/2017 9:40 AM Sudhakar Cuellar MD [facility] [phone] [facility]         Review of your medicines      START taking        Dose / Directions Last dose taken    azithromycin 250 MG tablet   Commonly known as:  ZITHROMAX Z-PAULO   Quantity:  6 tablet        Two tablets on the first day, then one tablet daily for the next 4 days   Refills:  0          Our records show that you are taking the medicines listed below. If these are incorrect, please call your family doctor or clinic.        Dose / Directions Last dose taken    citalopram 40 MG tablet   Commonly known as:  celeXA   Quantity:  30 tablet        TAKE 1 TABLET BY MOUTH DAILY   Refills:  5        gabapentin 100 MG capsule   Commonly known as:  NEURONTIN   Quantity:  90 capsule        TAKE 1 CAPSULE BY MOUTH 3 TIMES DAILY   Refills:  3        hydrOXYzine 25 MG capsule   Commonly known as:  VISTARIL   Quantity:  60 capsule        TAKE 1 OR 2 CAPSULES BY MOUTH EVERY 4-6 HOURS AS NEEDED FOR ANXIETY   Refills:  3        ibuprofen 800 MG tablet   Commonly known as:  ADVIL/MOTRIN   Dose:  800 mg   Quantity:  40 tablet        Take 1 tablet (800 mg) by mouth every 8 hours as needed for pain   Refills:  1        * methylphenidate ER 54 MG CR tablet   Commonly known as:  CONCERTA   Dose:  54 mg   Quantity:  30 tablet        Take 1 tablet (54 mg) by mouth daily   Refills:  0        * methylphenidate ER 54 MG CR tablet   Commonly known as:  CONCERTA   Dose:  54 mg   Quantity:  30 tablet        Take 1 tablet (54 mg)  by mouth every morning   Refills:  0        * methylphenidate ER 54 MG CR tablet   Commonly known as:  CONCERTA   Dose:  54 mg   Quantity:  30 tablet   Start taking on:  10/9/2017        Take 1 tablet (54 mg) by mouth every morning   Refills:  0        omeprazole 40 MG capsule   Commonly known as:  priLOSEC   Quantity:  30 capsule        TAKE 1 CAPSULE BY MOUTH DAILY. TAKE 30 TO 60 MINUTES BEFORE A MEAL   Refills:  11        SM STOOL SOFTENER 100 MG capsule   Quantity:  30 capsule   Generic drug:  docusate sodium        TAKE 1 CAPSULE BY MOUTH DAILY   Refills:  9        traZODone 50 MG tablet   Commonly known as:  DESYREL   Quantity:  90 tablet        TAKE 1/2 TO 3 TABLETS BY MOUTH AT BEDTIME AS NEEDED FOR INSOMNIA   Refills:  9        TYLENOL PO        Refills:  0        VENTOLIN  (90 BASE) MCG/ACT Inhaler   Quantity:  18 g   Generic drug:  albuterol        INHALE 2 PUFFS INTO THE LUNGS EVERY 4 HOURS AS NEEDED FOR SHORTNESS OF BREATH/DYSPNEA   Refills:  3        * Notice:  This list has 3 medication(s) that are the same as other medications prescribed for you. Read the directions carefully, and ask your doctor or other care provider to review them with you.            Prescriptions were sent or printed at these locations (1 Prescription)                   [facility] [place] [facility] - [address]   [address] [place] [address]    Telephone:  [phone]   Fax:  [phone]   Hours:                  E-Prescribed (1 of 1)         azithromycin (ZITHROMAX Z-PAULO) 250 MG tablet                Orders Needing Specimen Collection     None      Pending Results     No orders found from 9/24/2017 to 9/27/2017.            Pending Culture Results     No orders found from 9/24/2017 to 9/27/2017.            Thank you for choosing [facility]       Thank you for choosing [facility] for your care. Our goal is always to provide you with excellent care. Hearing back from our patients is one way we can continue  to improve our services. Please take a few minutes to complete the written survey that you may receive in the mail after you visit with us. Thank you!        [employer] Information     [employer] gives you secure access to your electronic health record. If you see a primary care provider, you can also send messages to your care team and make appointments. If you have questions, please call your primary care clinic.  If you do not have a primary care provider, please call [phone] and they will assist you.        Care EveryWhere ID     This is your Care EveryWhere ID. This could be used by other organizations to access your [facility] medical records  [id number]        Equal Access to Services     CALE BURTON : Nicholas Isidro, juvenal hua, radha cardenas, can padron. So [facility] [phone].    ATENCIÓN: Si habla español, tiene a singh disposición servicios gratuitos de asistencia lingüística. Jareth al [phone].    We comply with applicable federal civil rights laws and [place] laws. We do not discriminate on the basis of race, color, national origin, age, disability sex, sexual orientation or gender identity.            After Visit Summary       This is your record. Keep this with you and show to your community pharmacist(s) and doctor(s) at your next visit.

## 2017-09-26 NOTE — ED NOTES
Patient presents with cold sx X 1 month.  Chest heaviness X 2 weeks, LT ear plugged X 3/4 days, cough X 1 month.  Patient does use inhaler.

## 2017-11-08 ENCOUNTER — OFFICE VISIT (OUTPATIENT)
Dept: FAMILY MEDICINE | Facility: OTHER | Age: 37
End: 2017-11-08
Attending: FAMILY MEDICINE
Payer: COMMERCIAL

## 2017-11-08 VITALS
DIASTOLIC BLOOD PRESSURE: 62 MMHG | SYSTOLIC BLOOD PRESSURE: 104 MMHG | WEIGHT: 194 LBS | BODY MASS INDEX: 28.73 KG/M2 | TEMPERATURE: 96.6 F | HEIGHT: 69 IN | HEART RATE: 66 BPM

## 2017-11-08 DIAGNOSIS — K21.9 GASTROESOPHAGEAL REFLUX DISEASE WITHOUT ESOPHAGITIS: Primary | ICD-10-CM

## 2017-11-08 DIAGNOSIS — Z71.6 ENCOUNTER FOR TOBACCO USE CESSATION COUNSELING: ICD-10-CM

## 2017-11-08 DIAGNOSIS — F90.0 ATTENTION DEFICIT HYPERACTIVITY DISORDER (ADHD), PREDOMINANTLY INATTENTIVE TYPE: ICD-10-CM

## 2017-11-08 PROCEDURE — 99213 OFFICE O/P EST LOW 20 MIN: CPT | Performed by: FAMILY MEDICINE

## 2017-11-08 PROCEDURE — 99212 OFFICE O/P EST SF 10 MIN: CPT

## 2017-11-08 RX ORDER — METHYLPHENIDATE HYDROCHLORIDE 54 MG/1
54 TABLET ORAL EVERY MORNING
Qty: 30 TABLET | Refills: 0 | Status: SHIPPED | OUTPATIENT
Start: 2017-12-08 | End: 2017-12-06

## 2017-11-08 RX ORDER — METHYLPHENIDATE HYDROCHLORIDE 54 MG/1
54 TABLET ORAL EVERY MORNING
Qty: 30 TABLET | Refills: 0 | Status: SHIPPED | OUTPATIENT
Start: 2018-01-08 | End: 2018-02-02

## 2017-11-08 RX ORDER — NICOTINE 21 MG/24HR
1 PATCH, TRANSDERMAL 24 HOURS TRANSDERMAL EVERY 24 HOURS
Qty: 30 PATCH | Refills: 0 | Status: SHIPPED | OUTPATIENT
Start: 2017-11-08 | End: 2018-04-19

## 2017-11-08 RX ORDER — METHYLPHENIDATE HYDROCHLORIDE 54 MG/1
54 TABLET ORAL DAILY
Qty: 30 TABLET | Refills: 0 | Status: SHIPPED | OUTPATIENT
Start: 2017-11-08 | End: 2018-02-02

## 2017-11-08 ASSESSMENT — ANXIETY QUESTIONNAIRES
4. TROUBLE RELAXING: MORE THAN HALF THE DAYS
7. FEELING AFRAID AS IF SOMETHING AWFUL MIGHT HAPPEN: SEVERAL DAYS
GAD7 TOTAL SCORE: 14
3. WORRYING TOO MUCH ABOUT DIFFERENT THINGS: MORE THAN HALF THE DAYS
IF YOU CHECKED OFF ANY PROBLEMS ON THIS QUESTIONNAIRE, HOW DIFFICULT HAVE THESE PROBLEMS MADE IT FOR YOU TO DO YOUR WORK, TAKE CARE OF THINGS AT HOME, OR GET ALONG WITH OTHER PEOPLE: SOMEWHAT DIFFICULT
5. BEING SO RESTLESS THAT IT IS HARD TO SIT STILL: MORE THAN HALF THE DAYS
2. NOT BEING ABLE TO STOP OR CONTROL WORRYING: MORE THAN HALF THE DAYS
1. FEELING NERVOUS, ANXIOUS, OR ON EDGE: MORE THAN HALF THE DAYS
6. BECOMING EASILY ANNOYED OR IRRITABLE: NEARLY EVERY DAY

## 2017-11-08 ASSESSMENT — PAIN SCALES - GENERAL: PAINLEVEL: NO PAIN (0)

## 2017-11-08 ASSESSMENT — PATIENT HEALTH QUESTIONNAIRE - PHQ9: SUM OF ALL RESPONSES TO PHQ QUESTIONS 1-9: 16

## 2017-11-08 NOTE — PATIENT INSTRUCTIONS
Lifestyle Changes for Controlling GERD  When you have GERD, stomach acid feels as if it s backing up toward your mouth. Whether or not you take medicine to control your GERD, your symptoms can often be improved with lifestyle changes. Talk to your healthcare provider about the following suggestions. These suggestions may help you get relief from your symptoms.      Raise your head  Reflux is more likely to strike when you re lying down flat, because stomach fluid can flow backward more easily. Raising the head of your bed 4 to 6 inches can help. To do this:    Slide blocks or books under the legs at the head of your bed. Or, place a wedge under the mattress. Many [employer] can make a suitable wedge for you. The wedge should run from your waist to the top of your head.    Don t just prop your head on several pillows. This increases pressure on your stomach. It can make GERD worse.  Watch your eating habits  Certain foods may increase the acid in your stomach or relax the lower esophageal sphincter. This makes GERD more likely. It s best to avoid the following if they cause you symptoms:    Coffee, tea, and carbonated drinks (with and without caffeine)    Fatty, fried, or spicy food    Mint, chocolate, onions, and tomatoes    Peppermint    Any other foods that seem to irritate your stomach or cause you pain  Relieve the pressure  Tips include the following:    Eat smaller meals, even if you have to eat more often.    Don t lie down right after you eat. Wait a few hours for your stomach to empty.    Avoid tight belts and tight-fitting clothes.    Lose excess weight.  Tobacco and alcohol  Avoid smoking tobacco and drinking alcohol. They can make GERD symptoms worse.  Date Last Reviewed: 7/1/2016 2000-2017 [employer]. [address] [place] [address]. All rights reserved. This information is not intended as a substitute for professional medical care. Always follow your healthcare  professional's instructions.        Discharge Instructions for Gastroesophageal Reflux Disease (GERD)  Gastroesophageal reflux disease (GERD) is a backflow of acid from the stomach into the swallowing tube (esophagus).  Home care  These home care steps can help you manage GERD:    Maintain a healthy weight. Get help to lose any extra pounds.    Avoid lying down after meals.    Avoid eating late at night.    Elevate the head of your bed by 6 inches. You can do this by placing wooden blocks or bed risers under the head of your bed.    Avoid wearing tight-fitting clothes.    Avoid foods that might irritate your stomach, such as the following:    Alcohol    Fat    Chocolate    Caffeine    Spearmint or peppermint    Talk to your healthcare provider if you are taking any of the following medicines. These medicines can make GERD symptoms worse:    Calcium channel blockers    Theophylline    Anticholinergic medicines, such as oxybutynin and benzatropine    Begin an exercise program. Ask your healthcare provider how to get started. You can benefit from simple activities, such as walking or gardening.    Break the smoking habit. Enroll in a stop-smoking program to improve your chances of success.    Limit alcohol intake to no more than 2 drinks a day.    Take your medicines exactly as directed. Don t skip doses.    Avoid over-the-counter nonsteroidal anti-inflammatory medicines, such as aspirin and ibuprofen, unless recommended by your healthcare provider for certain conditions.     If possible, avoid nitrates (heart medicines, such as nitroglycerin and isosorbide dinitrate ).  Follow-up care  Make a follow-up appointment as directed by our staff.     When to call the healthcare provider  Call your healthcare provider immediately if you have any of the following:    Trouble swallowing    Pain when swallowing    Feeling of food caught in your chest or throat    Pain in the neck, chest, or back    Heartburn that causes you to  vomit    Vomiting blood    Black or tarry stools (from digested blood)    More saliva (watering of the mouth) than usual    Weight loss of more than 3% to 5% of your total body weight in a month    Hoarseness or sore throat that won t go away    Choking, coughing, or wheezing   Date Last Reviewed: 7/1/2016 2000-2017 [employer]. [address] [place] [address]. All rights reserved. This information is not intended as a substitute for professional medical care. Always follow your healthcare professional's instructions.

## 2017-11-08 NOTE — MR AVS SNAPSHOT
After Visit Summary   11/8/2017    Cristiana Myles    MRN: [id number]           Patient Information     Date Of Birth          1980        Visit Information        Provider Department      11/8/2017 9:40 AM Sudhakar Cuelalr MD [facility]        Today's Diagnoses     Gastroesophageal reflux disease without esophagitis    -  1    Attention deficit hyperactivity disorder (ADHD), predominantly inattentive type          Care Instructions      Lifestyle Changes for Controlling GERD  When you have GERD, stomach acid feels as if it s backing up toward your mouth. Whether or not you take medicine to control your GERD, your symptoms can often be improved with lifestyle changes. Talk to your healthcare provider about the following suggestions. These suggestions may help you get relief from your symptoms.      Raise your head  Reflux is more likely to strike when you re lying down flat, because stomach fluid can flow backward more easily. Raising the head of your bed 4 to 6 inches can help. To do this:    Slide blocks or books under the legs at the head of your bed. Or, place a wedge under the mattress. Many [employer] can make a suitable wedge for you. The wedge should run from your waist to the top of your head.    Don t just prop your head on several pillows. This increases pressure on your stomach. It can make GERD worse.  Watch your eating habits  Certain foods may increase the acid in your stomach or relax the lower esophageal sphincter. This makes GERD more likely. It s best to avoid the following if they cause you symptoms:    Coffee, tea, and carbonated drinks (with and without caffeine)    Fatty, fried, or spicy food    Mint, chocolate, onions, and tomatoes    Peppermint    Any other foods that seem to irritate your stomach or cause you pain  Relieve the pressure  Tips include the following:    Eat smaller meals, even if you have to eat more often.    Don t lie down right after you eat.  Wait a few hours for your stomach to empty.    Avoid tight belts and tight-fitting clothes.    Lose excess weight.  Tobacco and alcohol  Avoid smoking tobacco and drinking alcohol. They can make GERD symptoms worse.  Date Last Reviewed: 7/1/2016 2000-2017 [employer]. [address] [place] [address]. All rights reserved. This information is not intended as a substitute for professional medical care. Always follow your healthcare professional's instructions.        Discharge Instructions for Gastroesophageal Reflux Disease (GERD)  Gastroesophageal reflux disease (GERD) is a backflow of acid from the stomach into the swallowing tube (esophagus).  Home care  These home care steps can help you manage GERD:    Maintain a healthy weight. Get help to lose any extra pounds.    Avoid lying down after meals.    Avoid eating late at night.    Elevate the head of your bed by 6 inches. You can do this by placing wooden blocks or bed risers under the head of your bed.    Avoid wearing tight-fitting clothes.    Avoid foods that might irritate your stomach, such as the following:    Alcohol    Fat    Chocolate    Caffeine    Spearmint or peppermint    Talk to your healthcare provider if you are taking any of the following medicines. These medicines can make GERD symptoms worse:    Calcium channel blockers    Theophylline    Anticholinergic medicines, such as oxybutynin and benzatropine    Begin an exercise program. Ask your healthcare provider how to get started. You can benefit from simple activities, such as walking or gardening.    Break the smoking habit. Enroll in a stop-smoking program to improve your chances of success.    Limit alcohol intake to no more than 2 drinks a day.    Take your medicines exactly as directed. Don t skip doses.    Avoid over-the-counter nonsteroidal anti-inflammatory medicines, such as aspirin and ibuprofen, unless recommended by your healthcare provider for certain  conditions.     If possible, avoid nitrates (heart medicines, such as nitroglycerin and isosorbide dinitrate ).  Follow-up care  Make a follow-up appointment as directed by our staff.     When to call the healthcare provider  Call your healthcare provider immediately if you have any of the following:    Trouble swallowing    Pain when swallowing    Feeling of food caught in your chest or throat    Pain in the neck, chest, or back    Heartburn that causes you to vomit    Vomiting blood    Black or tarry stools (from digested blood)    More saliva (watering of the mouth) than usual    Weight loss of more than 3% to 5% of your total body weight in a month    Hoarseness or sore throat that won t go away    Choking, coughing, or wheezing   Date Last Reviewed: 7/1/2016 2000-2017 [employer]. [address] [place] [address]. All rights reserved. This information is not intended as a substitute for professional medical care. Always follow your healthcare professional's instructions.                Follow-ups after your visit        Who to contact     If you have questions or need follow up information about today's clinic visit or your schedule please contact [facility] directly at [phone].  Normal or non-critical lab and imaging results will be communicated to you by [employer], letter or phone within 4 business days after the clinic has received the results. If you do not hear from us within 7 days, please contact the clinic through [employer] or phone. If you have a critical or abnormal lab result, we will notify you by phone as soon as possible.  Submit refill requests through [employer] or call your pharmacy and they will forward the refill request to us. Please allow 3 business days for your refill to be completed.          Additional Information About Your Visit        [employer] Information     [employer] gives you secure access to your electronic health record. If you see a primary  "care provider, you can also send messages to your care team and make appointments. If you have questions, please call your primary care clinic.  If you do not have a primary care provider, please call [phone] and they will assist you.        Care EveryWhere ID     This is your Care EveryWhere ID. This could be used by other organizations to access your [facility] medical records  [id number]        Your Vitals Were     Pulse Temperature Height BMI (Body Mass Index)          66 96.6  F (35.9  C) 5' 8.5\" (1.74 m) 29.07 kg/m2         Blood Pressure from Last 3 Encounters:   11/08/17 104/62   09/26/17 131/90   08/02/17 110/70    Weight from Last 3 Encounters:   11/08/17 194 lb (88 kg)   08/02/17 199 lb (90.3 kg)   05/11/17 197 lb (89.4 kg)              Today, you had the following     No orders found for display         Today's Medication Changes          These changes are accurate as of: 11/8/17 10:24 AM.  If you have any questions, ask your nurse or doctor.               Start taking these medicines.        Dose/Directions    ranitidine 150 MG tablet   Commonly known as:  ZANTAC   Used for:  Gastroesophageal reflux disease without esophagitis   Started by:  Sudhakar Cuellar MD        Dose:  150 mg   Take 1 tablet (150 mg) by mouth 2 times daily   Quantity:  60 tablet   Refills:  11         Stop taking these medicines if you haven't already. Please contact your care team if you have questions.     omeprazole 40 MG capsule   Commonly known as:  priLOSEC   Stopped by:  Sudhakar Cuellar MD                Where to get your medicines      These medications were sent to [facility] - [place] [facility] - [address]  [address] [address]     Phone:  [phone]     ranitidine 150 MG tablet         Some of these will need a paper prescription and others can be bought over the counter.  Ask your nurse if you have questions.     Bring a paper prescription for each of these medications     " methylphenidate ER 54 MG CR tablet    methylphenidate ER 54 MG CR tablet    methylphenidate ER 54 MG CR tablet                Primary Care Provider Office Phone # Fax #    Sudhakar Cuellar [phone]       [facility] [address] [facility] [address] [address]        Equal Access to Services     [facility] JOSEPHINE : Hadii aad ku hadasho Soomaali, waaxda luqadaha, qaybta kaalmada adeegyada, waxay marquisein hayanjanan adeedwin gonzalezharshiljoanie padron. So [facility] [phone].    ATENCIÓN: Si habla español, tiene a singh disposición servicios gratuitos de asistencia lingüística. Jareth al [phone].    We comply with applicable federal civil rights laws and [place] laws. We do not discriminate on the basis of race, color, national origin, age, disability, sex, sexual orientation, or gender identity.            Thank you!     Thank you for choosing [facility]  for your care. Our goal is always to provide you with excellent care. Hearing back from our patients is one way we can continue to improve our services. Please take a few minutes to complete the written survey that you may receive in the mail after your visit with us. Thank you!             Your Updated Medication List - Protect others around you: Learn how to safely use, store and throw away your medicines at www.disposemymeds.org.          This list is accurate as of: 11/8/17 10:24 AM.  Always use your most recent med list.                   Brand Name Dispense Instructions for use Diagnosis    citalopram 40 MG tablet    celeXA    30 tablet    TAKE 1 TABLET BY MOUTH DAILY    ADFNE (generalized anxiety disorder), MDD (major depressive disorder)       gabapentin 100 MG capsule    NEURONTIN    90 capsule    TAKE 1 CAPSULE BY MOUTH 3 TIMES DAILY    LLQ abdominal pain       hydrOXYzine 25 MG capsule    VISTARIL    60 capsule    TAKE 1 OR 2 CAPSULES BY MOUTH EVERY 4-6 HOURS AS NEEDED FOR ANXIETY    Anxiety, Attention deficit disorder       ibuprofen 800 MG  tablet    ADVIL/MOTRIN    40 tablet    Take 1 tablet (800 mg) by mouth every 8 hours as needed for pain    Pelvic pain in female       * methylphenidate ER 54 MG CR tablet    CONCERTA    30 tablet    Take 1 tablet (54 mg) by mouth daily    Attention deficit hyperactivity disorder (ADHD), predominantly inattentive type       * methylphenidate ER 54 MG CR tablet   Start taking on:  12/8/2017    CONCERTA    30 tablet    Take 1 tablet (54 mg) by mouth every morning    Attention deficit hyperactivity disorder (ADHD), predominantly inattentive type       * methylphenidate ER 54 MG CR tablet   Start taking on:  1/8/2018    CONCERTA    30 tablet    Take 1 tablet (54 mg) by mouth every morning    Attention deficit hyperactivity disorder (ADHD), predominantly inattentive type       ranitidine 150 MG tablet    ZANTAC    60 tablet    Take 1 tablet (150 mg) by mouth 2 times daily    Gastroesophageal reflux disease without esophagitis       SM STOOL SOFTENER 100 MG capsule   Generic drug:  docusate sodium     30 capsule    TAKE 1 CAPSULE BY MOUTH DAILY    Pelvic pain in female       traZODone 50 MG tablet    DESYREL    90 tablet    TAKE 1/2 TO 3 TABLETS BY MOUTH AT BEDTIME AS NEEDED FOR INSOMNIA    Insomnia       TYLENOL PO           VENTOLIN  (90 BASE) MCG/ACT Inhaler   Generic drug:  albuterol     18 g    INHALE 2 PUFFS INTO THE LUNGS EVERY 4 HOURS AS NEEDED FOR SHORTNESS OF BREATH/DYSPNEA    Attention deficit disorder, Reactive airway disease with wheezing, mild intermittent, uncomplicated       * Notice:  This list has 3 medication(s) that are the same as other medications prescribed for you. Read the directions carefully, and ask your doctor or other care provider to review them with you.

## 2017-11-08 NOTE — NURSING NOTE
"Chief Complaint   Patient presents with     A.D.H.D     Gastrophageal Reflux       Initial /62  Pulse 66  Temp 96.6  F (35.9  C)  Ht 5' 8.5\" (1.74 m)  Wt 194 lb (88 kg)  BMI 29.07 kg/m2 Estimated body mass index is 29.07 kg/(m^2) as calculated from the following:    Height as of this encounter: 5' 8.5\" (1.74 m).    Weight as of this encounter: 194 lb (88 kg).  Medication Reconciliation: complete     Grabiel Cantor      "

## 2017-11-09 ASSESSMENT — ANXIETY QUESTIONNAIRES: GAD7 TOTAL SCORE: 14

## 2017-11-20 ENCOUNTER — HOSPITAL ENCOUNTER (EMERGENCY)
Facility: HOSPITAL | Age: 37
Discharge: HOME | End: 2017-11-20
Attending: PHYSICIAN ASSISTANT | Admitting: PHYSICIAN ASSISTANT
Payer: COMMERCIAL

## 2017-11-20 VITALS
HEIGHT: 69 IN | TEMPERATURE: 98.1 F | DIASTOLIC BLOOD PRESSURE: 60 MMHG | WEIGHT: 194 LBS | SYSTOLIC BLOOD PRESSURE: 111 MMHG | RESPIRATION RATE: 18 BRPM | OXYGEN SATURATION: 99 % | BODY MASS INDEX: 28.73 KG/M2

## 2017-11-20 DIAGNOSIS — R42 DIZZINESS: ICD-10-CM

## 2017-11-20 PROCEDURE — 99284 EMERGENCY DEPT VISIT MOD MDM: CPT | Performed by: PHYSICIAN ASSISTANT

## 2017-11-20 PROCEDURE — 25000125 ZZHC RX 250: Performed by: PHYSICIAN ASSISTANT

## 2017-11-20 PROCEDURE — 99283 EMERGENCY DEPT VISIT LOW MDM: CPT

## 2017-11-20 PROCEDURE — 25000132 ZZH RX MED GY IP 250 OP 250 PS 637: Performed by: PHYSICIAN ASSISTANT

## 2017-11-20 RX ORDER — ONDANSETRON 4 MG/1
4 TABLET, ORALLY DISINTEGRATING ORAL ONCE
Status: COMPLETED | OUTPATIENT
Start: 2017-11-20 | End: 2017-11-20

## 2017-11-20 RX ORDER — NICOTINE 21 MG/24HR
1 PATCH, TRANSDERMAL 24 HOURS TRANSDERMAL EVERY 24 HOURS
COMMUNITY
End: 2018-04-19

## 2017-11-20 RX ORDER — SCOLOPAMINE TRANSDERMAL SYSTEM 1 MG/1
1 PATCH, EXTENDED RELEASE TRANSDERMAL ONCE
Status: COMPLETED | OUTPATIENT
Start: 2017-11-20 | End: 2017-11-20

## 2017-11-20 RX ORDER — SCOLOPAMINE TRANSDERMAL SYSTEM 1 MG/1
1 PATCH, EXTENDED RELEASE TRANSDERMAL
Qty: 10 PATCH | Refills: 0 | Status: SHIPPED | OUTPATIENT
Start: 2017-11-20 | End: 2017-12-06

## 2017-11-20 RX ORDER — DIAZEPAM 2 MG
2 TABLET ORAL ONCE
Status: COMPLETED | OUTPATIENT
Start: 2017-11-20 | End: 2017-11-20

## 2017-11-20 RX ORDER — DIAZEPAM 2 MG
2 TABLET ORAL EVERY 12 HOURS PRN
Qty: 20 TABLET | Refills: 0 | Status: SHIPPED | OUTPATIENT
Start: 2017-11-20 | End: 2017-12-06

## 2017-11-20 RX ADMIN — ONDANSETRON 4 MG: 4 TABLET, ORALLY DISINTEGRATING ORAL at 14:11

## 2017-11-20 RX ADMIN — SCOPALAMINE 1 PATCH: 1 PATCH, EXTENDED RELEASE TRANSDERMAL at 14:11

## 2017-11-20 RX ADMIN — DIAZEPAM 2 MG: 2 TABLET ORAL at 14:09

## 2017-11-20 ASSESSMENT — ENCOUNTER SYMPTOMS
CHILLS: 0
SHORTNESS OF BREATH: 0
ABDOMINAL PAIN: 0
PHOTOPHOBIA: 0
VOMITING: 0
WEAKNESS: 0
LIGHT-HEADEDNESS: 0
EYE REDNESS: 0
NAUSEA: 1
HEADACHES: 0
FEVER: 0
PALPITATIONS: 0
DIZZINESS: 1
CHEST TIGHTNESS: 0

## 2017-11-20 NOTE — ED AVS SNAPSHOT
[facility]    [address] [address]    Phone:  [phone]                                       Cristiana Myles   MRN: [id number]    Department:  [facility]   Date of Visit:  11/20/2017           Patient Information     Date Of Birth          1980        Your diagnoses for this visit were:     Dizziness        You were seen by Matthew Guallpa PA-C.      Follow-up Information     Follow up with Sudhakar Cuellar MD.    Specialty:  Family Practice    Why:  As needed    Contact information:    [facility]  [address]  [facility] [address]  [phone]          Follow up with [facility].    Specialty:  EMERGENCY MEDICINE    Why:  If symptoms worsen    Contact information:    [address] [address] [phone]    Additional information:    From [facility]: Take [address]. Turn left at [address]. Turn left at the first stoplight on [address]. At the first stop sign, take a right onto [address]. Take a left into the parking lot and continue through until you reach the North enterance of the building.       From [place]: Take [address]. Take the [address] ramp towards [place]. Turn left onto [address]. Take a slight right onto [address]. Turn left at the first stoplight on [address]. At the first stop sign, take a right onto [address]. Take a left into the parking lot and continue through until you reach the North enterance of the building.       From [place]: Take [address]. Take a right at [address]. At the first stop sign, take a right onto [address]. Take a left into the parking lot and continue through until you reach the North enterance of the building.         Discharge Instructions         Managing Dizziness (Vertigo) with Medicines    Although medicines can't cure your problem, they can help control  symptoms. Your doctor may prescribe medicines for a few weeks and then taper them off. Always take your medicine as prescribed. Never share your medicine with others.  Contact your healthcare provider right away if you have side effects from your medicines.   How medicines can help    Treat infection or inflammation. If you have an infection caused by bacteria, your doctor can prescribe antibiotics.    Limit conflicting balance signals. These medicines are often in pill form.    Ease nausea. Suppositories, pills, or shots can reduce vomiting.    Reduce pressure in the canals. Diuretics can be used to treat Meniere's disease. These medicines help your body get rid of extra fluid.    Ease other symptoms. Other medicines can help ease depression and anxiety caused by living with dizziness or fainting.  Date Last Reviewed: 11/1/2016 2000-2017 [employer]. [address] [place] [address], [place] [address]. All rights reserved. This information is not intended as a substitute for professional medical care. Always follow your healthcare professional's instructions.      Rest and stay hydrated.     Follow-up in the clinic.    Return here for any other concerns or questions.     Future Appointments        Provider Department Dept Phone Center    2/2/2018 9:45 AM Sudhakar Cuellar MD [facility] [phone] [facility]         Review of your medicines      START taking        Dose / Directions Last dose taken    diazepam 2 MG tablet   Commonly known as:  VALIUM   Dose:  2 mg   Quantity:  20 tablet        Take 1 tablet (2 mg) by mouth every 12 hours as needed for other (dizziness)   Refills:  0        scopolamine 72 hr patch   Commonly known as:  TRANSDERM   Dose:  1 patch   Quantity:  10 patch        Place 1 patch onto the skin every 72 hours   Refills:  0          Our records show that you are taking the medicines listed below. If these are incorrect, please call your family doctor or clinic.         Dose / Directions Last dose taken    citalopram 40 MG tablet   Commonly known as:  celeXA   Quantity:  30 tablet        TAKE 1 TABLET BY MOUTH DAILY   Refills:  5        gabapentin 100 MG capsule   Commonly known as:  NEURONTIN   Quantity:  90 capsule        TAKE 1 CAPSULE BY MOUTH 3 TIMES DAILY   Refills:  3        hydrOXYzine 25 MG capsule   Commonly known as:  VISTARIL   Quantity:  60 capsule        TAKE 1 OR 2 CAPSULES BY MOUTH EVERY 4-6 HOURS AS NEEDED FOR ANXIETY   Refills:  3        ibuprofen 800 MG tablet   Commonly known as:  ADVIL/MOTRIN   Dose:  800 mg   Quantity:  40 tablet        Take 1 tablet (800 mg) by mouth every 8 hours as needed for pain   Refills:  1        * methylphenidate ER 54 MG CR tablet   Commonly known as:  CONCERTA   Dose:  54 mg   Quantity:  30 tablet        Take 1 tablet (54 mg) by mouth daily   Refills:  0        * methylphenidate ER 54 MG CR tablet   Commonly known as:  CONCERTA   Dose:  54 mg   Quantity:  30 tablet   Start taking on:  12/8/2017        Take 1 tablet (54 mg) by mouth every morning   Refills:  0        * methylphenidate ER 54 MG CR tablet   Commonly known as:  CONCERTA   Dose:  54 mg   Quantity:  30 tablet   Start taking on:  1/8/2018        Take 1 tablet (54 mg) by mouth every morning   Refills:  0        * nicotine 21 MG/24HR 24 hr patch   Commonly known as:  NICODERM CQ   Dose:  1 patch        Place 1 patch onto the skin every 24 hours   Refills:  0        * nicotine 14 MG/24HR 24 hr patch   Commonly known as:  NICODERM CQ   Dose:  1 patch   Quantity:  30 patch        Place 1 patch onto the skin every 24 hours   Refills:  0        * nicotine 7 MG/24HR 24 hr patch   Commonly known as:  NICODERM CQ   Dose:  1 patch   Quantity:  30 patch        Place 1 patch onto the skin every 24 hours   Refills:  0        ranitidine 150 MG tablet   Commonly known as:  ZANTAC   Dose:  150 mg   Quantity:  60 tablet        Take 1 tablet (150 mg) by mouth 2 times daily   Refills:  11         SM STOOL SOFTENER 100 MG capsule   Quantity:  30 capsule   Generic drug:  docusate sodium        TAKE 1 CAPSULE BY MOUTH DAILY   Refills:  9        traZODone 50 MG tablet   Commonly known as:  DESYREL   Quantity:  90 tablet        TAKE 1/2 TO 3 TABLETS BY MOUTH AT BEDTIME AS NEEDED FOR INSOMNIA   Refills:  9        TYLENOL PO        Refills:  0        VENTOLIN  (90 BASE) MCG/ACT Inhaler   Quantity:  18 g   Generic drug:  albuterol        INHALE 2 PUFFS INTO THE LUNGS EVERY 4 HOURS AS NEEDED FOR SHORTNESS OF BREATH/DYSPNEA   Refills:  3        * Notice:  This list has 6 medication(s) that are the same as other medications prescribed for you. Read the directions carefully, and ask your doctor or other care provider to review them with you.            Prescriptions were sent or printed at these locations (2 Prescriptions)                   [facility] - [place] - [address]   [address] [address]    Telephone:  [phone]   Fax:  [phone]   Hours:                  E-Prescribed (1 of 2)         scopolamine (TRANSDERM) 72 hr patch                 Printed at Department/Unit printer (1 of 2)         diazepam (VALIUM) 2 MG tablet                Orders Needing Specimen Collection     None      Pending Results     No orders found from 11/18/2017 to 11/21/2017.            Pending Culture Results     No orders found from 11/18/2017 to 11/21/2017.            Thank you for choosing [facility]       Thank you for choosing [facility] for your care. Our goal is always to provide you with excellent care. Hearing back from our patients is one way we can continue to improve our services. Please take a few minutes to complete the written survey that you may receive in the mail after you visit with us. Thank you!        [employer] Information     [employer] gives you secure access to your electronic health record. If you see a primary care provider, you can also send messages to your care team  and make appointments. If you have questions, please call your primary care clinic.  If you do not have a primary care provider, please call [phone] and they will assist you.        Care EveryWhere ID     This is your Care EveryWhere ID. This could be used by other organizations to access your [place] medical records  [id number]        Equal Access to Services     CALE BURTON : Nicholas Isidro, juvneal hua, can childress. So [facility] [phone].    ATENCIÓN: Si habla español, tiene a singh disposición servicios gratuitos de asistencia lingüística. Llame al [phone].    We comply with applicable federal civil rights laws and [place] laws. We do not discriminate on the basis of race, color, national origin, age, disability, sex, sexual orientation, or gender identity.            After Visit Summary       This is your record. Keep this with you and show to your community pharmacist(s) and doctor(s) at your next visit.

## 2017-11-20 NOTE — DISCHARGE INSTRUCTIONS
Managing Dizziness (Vertigo) with Medicines    Although medicines can't cure your problem, they can help control symptoms. Your doctor may prescribe medicines for a few weeks and then taper them off. Always take your medicine as prescribed. Never share your medicine with others.  Contact your healthcare provider right away if you have side effects from your medicines.   How medicines can help    Treat infection or inflammation. If you have an infection caused by bacteria, your doctor can prescribe antibiotics.    Limit conflicting balance signals. These medicines are often in pill form.    Ease nausea. Suppositories, pills, or shots can reduce vomiting.    Reduce pressure in the canals. Diuretics can be used to treat Meniere's disease. These medicines help your body get rid of extra fluid.    Ease other symptoms. Other medicines can help ease depression and anxiety caused by living with dizziness or fainting.  Date Last Reviewed: 11/1/2016 2000-2017 [employer]. [address] [place] [address], [place] [address]. All rights reserved. This information is not intended as a substitute for professional medical care. Always follow your healthcare professional's instructions.      Rest and stay hydrated.     Follow-up in the clinic.    Return here for any other concerns or questions.

## 2017-11-20 NOTE — ED AVS SNAPSHOT
[facility]    [address]    [place] [address]    Phone:  [phone]                                       Cristiana Myles   MRN: [id number]    Department:  [facility]   Date of Visit:  11/20/2017           After Visit Summary Signature Page     I have received my discharge instructions, and my questions have been answered. I have discussed any challenges I see with this plan with the nurse or doctor.    ..........................................................................................................................................  Patient/Patient Representative Signature      ..........................................................................................................................................  Patient Representative Print Name and Relationship to Patient    ..................................................               ................................................  Date                                            Time    ..........................................................................................................................................  Reviewed by Signature/Title    ...................................................              ..............................................  Date                                                            Time

## 2017-11-20 NOTE — ED PROVIDER NOTES
History     Chief Complaint   Patient presents with     Dizziness     with nausea, sudden onset this am     The history is provided by the patient.     Cristiana Myles is a 37 year old female who presented to the ED for evaluation of dizziness and left ear discomfort.  Dizziness was present on waking up this morning.  No headache.  No falls.  No blood thinners.  Dizziness is only present when moving her head.  Nausea without vomiting.  Similar symptoms in the past.     Problem List:    Patient Active Problem List    Diagnosis Date Noted     Irritable bowel syndrome with both constipation and diarrhea 08/02/2017     Priority: Medium     Recurrent major depressive disorder, in partial remission (H) 08/02/2017     Priority: Medium     ACP (advance care planning) 03/02/2016     Priority: Medium     Advance Care Planning 3/2/2016: Receipt of ACP document:  Received: Health Care Directive which was witnessed or notarized on 1-25-16.  Document previously scanned on 2-5-16.  Validation form completed and sent to be scanned.  Code Status needs to be updated to reflect choices in most recent ACP document.  Confirmed/documented designated decision maker(s).  Added by Rachel Barber RN Advance Care Planning Liaison with [employer]             Pelvic pain in female 01/14/2016     Priority: Medium     FH: breast cancer      Priority: Medium     Tobacco abuse, in remission      Priority: Medium     Generalized anxiety disorder      Priority: Medium     Diagnosis updated by automated process. Provider to review and confirm.       Attention deficit disorder 05/31/2005     Priority: Medium     Problem list name updated by automated process. 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     FH: breast cancer      DAFNE (generalised anxiety disorder)      Irritable  bowel syndrome with both constipation and diarrhea 8/2/2017     MDD (major depressive disorder)      Migraine, unspecified, without mention of intractable migraine without mention of status migrainosus 3/13/2000     Tobacco abuse, in remission      Unspecified sinusitis (chronic) 3/8/2001       Past Surgical History:    Past Surgical History:   Procedure Laterality Date     ENDOSCOPY UPPER, COLONOSCOPY, COMBINED N/A 9/4/2015    Procedure: COMBINED ENDOSCOPY UPPER, COLONOSCOPY;  Surgeon: Griffin Barrientos DO;  Location: [facility]     LAPAROSCOPIC CYSTECTOMY OVARIAN (BENIGN) Right 1/27/2016    Procedure: LAPAROSCOPIC CYSTECTOMY OVARIAN (BENIGN);  Surgeon: Kuldip España MD;  Location: [facility]     LAPAROSCOPIC SALPINGO-OOPHORECTOMY Left 1/27/2016    Procedure: LAPAROSCOPIC SALPINGO-OOPHORECTOMY;  Surgeon: Kuldip España MD;  Location: [facility]     LAPAROSCOPY DIAGNOSTIC (GYN) Left 1/27/2016    Procedure: LAPAROSCOPY DIAGNOSTIC (GYN);  Surgeon: Kuldip España MD;  Location: [facility]     TUBAL LIGATION  2006     wisdom teeth         Family History:    Family History   Problem Relation Age of Onset     Breast Cancer Maternal Grandmother      Hypertension Maternal Grandfather      Hyperlipidemia Maternal Grandfather      DIABETES Paternal Grandmother      Asthma No family hx of        Social History:  Marital Status:   [4]  Social History   Substance Use Topics     Smoking status: Former Smoker     Packs/day: 0.50     Years: 13.00     Types: Cigarettes     Quit date: 1/1/2015     Smokeless tobacco: Never Used     Alcohol use 0.0 oz/week      Comment: frequency: rarely        Medications:      nicotine (NICODERM CQ) 21 MG/24HR 24 hr patch   scopolamine (TRANSDERM) 72 hr patch   diazepam (VALIUM) 2 MG tablet   methylphenidate ER (CONCERTA) 54 MG CR tablet   [START ON 12/8/2017] methylphenidate ER (CONCERTA) 54 MG CR tablet   [START ON 1/8/2018] methylphenidate ER (CONCERTA) 54 MG CR tablet   ranitidine (ZANTAC) 150 MG tablet  "  gabapentin (NEURONTIN) 100 MG capsule   traZODone (DESYREL) 50 MG tablet   citalopram (CELEXA) 40 MG tablet   VENTOLIN  (90 BASE) MCG/ACT Inhaler   SM STOOL SOFTENER 100 MG capsule   ibuprofen (ADVIL,MOTRIN) 800 MG tablet   Acetaminophen (TYLENOL PO)   nicotine (NICODERM CQ) 14 MG/24HR 24 hr patch   nicotine (NICODERM CQ) 7 MG/24HR 24 hr patch   hydrOXYzine (VISTARIL) 25 MG capsule         Review of Systems   Constitutional: Negative for chills and fever.   HENT: Positive for ear pain. Negative for ear discharge.    Eyes: Negative for photophobia and redness.   Respiratory: Negative for chest tightness and shortness of breath.    Cardiovascular: Negative for chest pain and palpitations.   Gastrointestinal: Positive for nausea. Negative for abdominal pain and vomiting.   Genitourinary: Negative.    Skin: Negative.    Neurological: Positive for dizziness. Negative for weakness, light-headedness and headaches.       Physical Exam   BP: 106/73  Heart Rate: 67  Temp: 97.8  F (36.6  C)  Resp: 20  Height: 175.3 cm (5' 9\")  Weight: 88 kg (194 lb)  SpO2: 100 %      Physical Exam   Constitutional: She is oriented to person, place, and time. She appears well-developed and well-nourished. No distress.   HENT:   Head: Normocephalic and atraumatic.   Right Ear: External ear normal.   Left Ear: External ear normal.   Mouth/Throat: Oropharynx is clear and moist.   TMs are normal    Eyes: Conjunctivae and EOM are normal. Pupils are equal, round, and reactive to light.   Mild horizontal nystagmus.   No rotational or vertical    Cardiovascular: Normal rate and regular rhythm.    Pulmonary/Chest: Effort normal.   Neurological: She is alert and oriented to person, place, and time.   No focal concerns    Skin: Skin is warm and dry.   Psychiatric: She has a normal mood and affect.   Nursing note and vitals reviewed.      ED Course     ED Course     Procedures          Medications   ondansetron (ZOFRAN-ODT) ODT tab 4 mg (4 mg Oral " Given 11/20/17 1411)   diazepam (VALIUM) tablet 2 mg (2 mg Oral Given 11/20/17 1409)   scopolamine (TRANSDERM) 72 hr patch 1 patch (1 patch Transdermal Given 11/20/17 1411)        Critical Care time:  none               Labs Ordered and Resulted from Time of ED Arrival Up to the Time of Departure from the ED - No data to display    Assessments & Plan (with Medical Decision Making)   Above medications.  Recurrent symptoms.  No red flags for central cause.  Feels much better and would like to go home.  This is certainly reasonable.  Return here for ANY other concerns or questions. Follow-up in the clinic.      I have reviewed the nursing notes.    I have reviewed the findings, diagnosis, plan and need for follow up with the patient.       New Prescriptions    DIAZEPAM (VALIUM) 2 MG TABLET    Take 1 tablet (2 mg) by mouth every 12 hours as needed for other (dizziness)    SCOPOLAMINE (TRANSDERM) 72 HR PATCH    Place 1 patch onto the skin every 72 hours       Final diagnoses:   Dizziness       11/20/2017   [facility]     Matthew Guallpa PA-C  11/20/17 [id number]

## 2017-11-20 NOTE — ED NOTES
Pt states having dizziness, feels like room is moving if she moves at all, also c/o left ear problems. Nausea but no emesis.

## 2017-11-20 NOTE — ED NOTES
DC instructions reviewed with patient.  Patient given one printed RX for Valium; patient is aware that she will need to bring this RX into the pharmacy.  No further questions at this time.  Home with sig other.

## 2017-11-20 NOTE — ED NOTES
Pt states she is feeling better after meds, no nausea now, and dizziness is better, son and fiance in room with pt.

## 2017-11-22 DIAGNOSIS — F32.9 MDD (MAJOR DEPRESSIVE DISORDER): ICD-10-CM

## 2017-11-22 DIAGNOSIS — F41.1 GAD (GENERALIZED ANXIETY DISORDER): ICD-10-CM

## 2017-11-22 RX ORDER — CITALOPRAM HYDROBROMIDE 40 MG/1
TABLET ORAL
Qty: 30 TABLET | Refills: 1 | Status: SHIPPED | OUTPATIENT
Start: 2017-11-22 | End: 2018-01-23

## 2017-11-24 ENCOUNTER — OFFICE VISIT (OUTPATIENT)
Dept: FAMILY MEDICINE | Facility: OTHER | Age: 37
End: 2017-11-24
Attending: FAMILY MEDICINE
Payer: COMMERCIAL

## 2017-11-24 VITALS
DIASTOLIC BLOOD PRESSURE: 60 MMHG | OXYGEN SATURATION: 100 % | SYSTOLIC BLOOD PRESSURE: 115 MMHG | WEIGHT: 203 LBS | TEMPERATURE: 97.4 F | BODY MASS INDEX: 30.07 KG/M2 | HEIGHT: 69 IN | HEART RATE: 73 BPM

## 2017-11-24 DIAGNOSIS — J01.01 ACUTE RECURRENT MAXILLARY SINUSITIS: Primary | ICD-10-CM

## 2017-11-24 DIAGNOSIS — R42 VERTIGO: ICD-10-CM

## 2017-11-24 PROCEDURE — 99212 OFFICE O/P EST SF 10 MIN: CPT

## 2017-11-24 PROCEDURE — 99213 OFFICE O/P EST LOW 20 MIN: CPT | Performed by: FAMILY MEDICINE

## 2017-11-24 RX ORDER — CEFPROZIL 500 MG/1
500 TABLET, FILM COATED ORAL 2 TIMES DAILY
Qty: 20 TABLET | Refills: 0 | Status: SHIPPED | OUTPATIENT
Start: 2017-11-24 | End: 2017-12-06

## 2017-11-24 RX ORDER — METHYLPREDNISOLONE 4 MG
TABLET, DOSE PACK ORAL
Qty: 21 TABLET | Refills: 0 | Status: SHIPPED | OUTPATIENT
Start: 2017-11-24 | End: 2017-12-06

## 2017-11-24 ASSESSMENT — PAIN SCALES - GENERAL: PAINLEVEL: MODERATE PAIN (5)

## 2017-11-24 NOTE — MR AVS SNAPSHOT
After Visit Summary   11/24/2017    Cristiana Myles    MRN: [id number]           Patient Information     Date Of Birth          1980        Visit Information        Provider Department      11/24/2017 3:45 PM Sudhakar Cuellar MD [facility]        Today's Diagnoses     Acute recurrent maxillary sinusitis    -  1    Vertigo           Follow-ups after your visit        Your next 10 appointments already scheduled     Feb 02, 2018  9:45 AM CST   (Arrive by 9:30 AM)   SHORT with Sudhakar Cuellar MD   [facility] ([facility] )    [address] [place] [address]   [phone]              Who to contact     If you have questions or need follow up information about today's clinic visit or your schedule please contact [facility] directly at [phone].  Normal or non-critical lab and imaging results will be communicated to you by MyChart, letter or phone within 4 business days after the clinic has received the results. If you do not hear from us within 7 days, please contact the clinic through MyChart or phone. If you have a critical or abnormal lab result, we will notify you by phone as soon as possible.  Submit refill requests through [employer] or call your pharmacy and they will forward the refill request to us. Please allow 3 business days for your refill to be completed.          Additional Information About Your Visit        MyChart Information     [employer] gives you secure access to your electronic health record. If you see a primary care provider, you can also send messages to your care team and make appointments. If you have questions, please call your primary care clinic.  If you do not have a primary care provider, please call [phone] and they will assist you.        Care EveryWhere ID     This is your Care EveryWhere ID. This could be used by other organizations to access your [place] medical records  [id number]        Your  "Vitals Were     Pulse Temperature Height Pulse Oximetry BMI (Body Mass Index)       73 97.4  F (36.3  C) (Tympanic) 5' 9\" (1.753 m) 100% 29.98 kg/m2        Blood Pressure from Last 3 Encounters:   11/24/17 115/60   11/20/17 111/60   11/08/17 104/62    Weight from Last 3 Encounters:   11/24/17 203 lb (92.1 kg)   11/20/17 194 lb (88 kg)   11/08/17 194 lb (88 kg)              Today, you had the following     No orders found for display         Today's Medication Changes          These changes are accurate as of: 11/24/17  4:42 PM.  If you have any questions, ask your nurse or doctor.               Start taking these medicines.        Dose/Directions    cefPROZIL 500 MG tablet   Commonly known as:  CEFZIL   Used for:  Acute recurrent maxillary sinusitis, Vertigo   Started by:  Sudhakar Cuellar MD        Dose:  500 mg   Take 1 tablet (500 mg) by mouth 2 times daily   Quantity:  20 tablet   Refills:  0       methylPREDNISolone 4 MG tablet   Commonly known as:  MEDROL DOSEPAK   Used for:  Acute recurrent maxillary sinusitis, Vertigo   Started by:  Sudhakar Cuellar MD        Follow package instructions   Quantity:  21 tablet   Refills:  0            Where to get your medicines      These medications were sent to [facility]  [address]  [address] [address]     Phone:  [phone]     cefPROZIL 500 MG tablet    methylPREDNISolone 4 MG tablet                Primary Care Provider Office Phone # Fax #    Sudhakar Cuellar [phone]       [facility] [address] [facility] [place] [place] [address]        Equal Access to Services     [facility] : Hadlisa bender Sofavian, waaxda luqadaha, qaybta kaalcan andres. So [facility] [phone].    ATENCIÓN: Si habla español, tiene a singh disposición servicios gratuitos de asistencia lingüística. Llame al [phone].    We comply with applicable federal civil rights laws and " [place] laws. We do not discriminate on the basis of race, color, national origin, age, disability, sex, sexual orientation, or gender identity.            Thank you!     [facility] you for [facility]  [facility]. Our goal is always to provide you with excellent care. Hearing back from our patients is one way we can continue to improve our services. Please take a few minutes to complete the written survey that you may receive in the mail after your visit with us. Thank you!             Your Updated Medication List - Protect others around you: Learn how to safely use, store and throw away your medicines at www.disposemymeds.org.          This list is accurate as of: 11/24/17  4:42 PM.  Always use your most recent med list.                   Brand Name Dispense Instructions for use Diagnosis    cefPROZIL 500 MG tablet    CEFZIL    20 tablet    Take 1 tablet (500 mg) by mouth 2 times daily    Acute recurrent maxillary sinusitis, Vertigo       citalopram 40 MG tablet    celeXA    30 tablet    TAKE 1 TABLET BY MOUTH DAILY    DAFNE (generalized anxiety disorder), MDD (major depressive disorder)       diazepam 2 MG tablet    VALIUM    20 tablet    Take 1 tablet (2 mg) by mouth every 12 hours as needed for other (dizziness)        gabapentin 100 MG capsule    NEURONTIN    90 capsule    TAKE 1 CAPSULE BY MOUTH 3 TIMES DAILY    LLQ abdominal pain       hydrOXYzine 25 MG capsule    VISTARIL    60 capsule    TAKE 1 OR 2 CAPSULES BY MOUTH EVERY 4-6 HOURS AS NEEDED FOR ANXIETY    Anxiety, Attention deficit disorder       ibuprofen 800 MG tablet    ADVIL/MOTRIN    40 tablet    Take 1 tablet (800 mg) by mouth every 8 hours as needed for pain    Pelvic pain in female       * methylphenidate ER 54 MG CR tablet    CONCERTA    30 tablet    Take 1 tablet (54 mg) by mouth daily    Attention deficit hyperactivity disorder (ADHD), predominantly inattentive type       * methylphenidate ER 54 MG CR tablet   Start taking  on:  12/8/2017    CONCERTA    30 tablet    Take 1 tablet (54 mg) by mouth every morning    Attention deficit hyperactivity disorder (ADHD), predominantly inattentive type       * methylphenidate ER 54 MG CR tablet   Start taking on:  1/8/2018    CONCERTA    30 tablet    Take 1 tablet (54 mg) by mouth every morning    Attention deficit hyperactivity disorder (ADHD), predominantly inattentive type       methylPREDNISolone 4 MG tablet    MEDROL DOSEPAK    21 tablet    Follow package instructions    Acute recurrent maxillary sinusitis, Vertigo       * nicotine 21 MG/24HR 24 hr patch    NICODERM CQ     Place 1 patch onto the skin every 24 hours        * nicotine 14 MG/24HR 24 hr patch    NICODERM CQ    30 patch    Place 1 patch onto the skin every 24 hours    Encounter for tobacco use cessation counseling       * nicotine 7 MG/24HR 24 hr patch    NICODERM CQ    30 patch    Place 1 patch onto the skin every 24 hours    Encounter for tobacco use cessation counseling       ranitidine 150 MG tablet    ZANTAC    60 tablet    Take 1 tablet (150 mg) by mouth 2 times daily    Gastroesophageal reflux disease without esophagitis       scopolamine 72 hr patch    TRANSDERM    10 patch    Place 1 patch onto the skin every 72 hours        SM STOOL SOFTENER 100 MG capsule   Generic drug:  docusate sodium     30 capsule    TAKE 1 CAPSULE BY MOUTH DAILY    Pelvic pain in female       traZODone 50 MG tablet    DESYREL    90 tablet    TAKE 1/2 TO 3 TABLETS BY MOUTH AT BEDTIME AS NEEDED FOR INSOMNIA    Insomnia       TYLENOL PO           VENTOLIN  (90 BASE) MCG/ACT Inhaler   Generic drug:  albuterol     18 g    INHALE 2 PUFFS INTO THE LUNGS EVERY 4 HOURS AS NEEDED FOR SHORTNESS OF BREATH/DYSPNEA    Attention deficit disorder, Reactive airway disease with wheezing, mild intermittent, uncomplicated       * Notice:  This list has 6 medication(s) that are the same as other medications prescribed for you. Read the directions carefully,  and ask your doctor or other care provider to review them with you.

## 2017-11-24 NOTE — NURSING NOTE
"Chief Complaint   Patient presents with     Sinus Problem     Otitis Media       Initial /60  Pulse 73  Temp 97.4  F (36.3  C) (Tympanic)  Ht 5' 9\" (1.753 m)  Wt 203 lb (92.1 kg)  SpO2 100%  BMI 29.98 kg/m2 Estimated body mass index is 29.98 kg/(m^2) as calculated from the following:    Height as of this encounter: 5' 9\" (1.753 m).    Weight as of this encounter: 203 lb (92.1 kg).  Medication Reconciliation: complete   Magali Pulliam    "

## 2017-11-24 NOTE — PROGRESS NOTES
"  SUBJECTIVE:   Cristiana Myles is a 37 year old female who presents to clinic today for the following health issues:      ENT Symptoms             Symptoms: cc Present Absent Comment   Fever/Chills   No     Fatigue  yes     Muscle Aches    no    Eye Irritation   No     Sneezing  Yes      Nasal Colt/Drg  yes     Sinus Pressure/Pain  *yes  Bridge of nose-radiates to forehead   Loss of smell  yes     Dental pain   No     Sore Throat  yes     Swollen Glands   No     Ear Pain/Fullness  yes  fullness   Cough  yes  At night   Wheeze   No     Chest Pain   No     Shortness of breath   No     Rash   No     Other  yes  Headache & vertigo      Symptom duration:  5 days ago   Symptom severity:  moderate    Treatments tried:  valium and transcop--but those not covered    Contacts:  none      Seen in ER and notes reviewed   No head trauma   Some sinus sx   nasal congestion and sinus pressure.         Problem list and histories reviewed & adjusted, as indicated.  Additional history: as documented    Labs reviewed in EPIC    Reviewed and updated as needed this visit by clinical staffTobacco  Allergies  Meds  Med Hx  Surg Hx  Fam Hx  Soc Hx      Reviewed and updated as needed this visit by Provider         ROS:  C: NEGATIVE for fever, chills, change in weight  R: NEGATIVE for significant cough or SOB  CV: NEGATIVE for chest pain, palpitations or peripheral edema    OBJECTIVE:                                                    /60  Pulse 73  Temp 97.4  F (36.3  C) (Tympanic)  Ht 5' 9\" (1.753 m)  Wt 203 lb (92.1 kg)  SpO2 100%  BMI 29.98 kg/m2  Body mass index is 29.98 kg/(m^2).   GENERAL: healthy, alert, well nourished, well hydrated, no distress  HENT: ear canals- normal; TMs- normal; Nose- normal; Mouth- no ulcers, no lesions  Very tender maxillary sinuses   NECK: no tenderness, no adenopathy, no asymmetry, no masses, no stiffness; thyroid- normal to palpation  RESP: lungs clear to auscultation - no rales, no " rhonchi, no wheezes         ASSESSMENT/PLAN:                                                        ICD-10-CM    1. Acute recurrent maxillary sinusitis J01.01 cefPROZIL (CEFZIL) 500 MG tablet     methylPREDNISolone (MEDROL DOSEPAK) 4 MG tablet   2. Vertigo R42 cefPROZIL (CEFZIL) 500 MG tablet     methylPREDNISolone (MEDROL DOSEPAK) 4 MG tablet     Will treat with above. Risk and benefits of steroids and abx  was discussed and verbal consent to proceed was given.   Symptomatic treatment was discussed along when patient should call and/or come back into the clinic or go to ER/Urgent care. All questions answered.   Has otc meclizine. Has few valium left         Sudhakar Cuellar MD  [facility]

## 2017-11-26 ENCOUNTER — HEALTH MAINTENANCE LETTER (OUTPATIENT)
Age: 37
End: 2017-11-26

## 2017-12-04 DIAGNOSIS — R10.32 LLQ ABDOMINAL PAIN: ICD-10-CM

## 2017-12-05 ENCOUNTER — TELEPHONE (OUTPATIENT)
Dept: FAMILY MEDICINE | Facility: OTHER | Age: 37
End: 2017-12-05

## 2017-12-05 NOTE — TELEPHONE ENCOUNTER
9:38 AM    Reason for Call: OVERBOOK    Patient is having the following symptoms: dizziness for 10  days.    The patient is requesting an appointment for 12-6-17 with Dr Cuellar.    Was an appointment offered for this call? Yes, patient cannot come in today, as she cannot drive  If yes : Appointment type              Date    Preferred method for responding to this message: Telephone Call  What is your phone number ? [phone]    If we cannot reach you directly, may we leave a detailed response at the number you provided? Yes    Can this message wait until your PCP/provider returns, if unavailable today? YES    Gloria Cantor

## 2017-12-06 ENCOUNTER — OFFICE VISIT (OUTPATIENT)
Dept: FAMILY MEDICINE | Facility: OTHER | Age: 37
End: 2017-12-06
Attending: NURSE PRACTITIONER
Payer: COMMERCIAL

## 2017-12-06 ENCOUNTER — TELEPHONE (OUTPATIENT)
Dept: FAMILY MEDICINE | Facility: OTHER | Age: 37
End: 2017-12-06

## 2017-12-06 VITALS
HEART RATE: 77 BPM | RESPIRATION RATE: 18 BRPM | OXYGEN SATURATION: 98 % | WEIGHT: 199 LBS | SYSTOLIC BLOOD PRESSURE: 110 MMHG | BODY MASS INDEX: 29.47 KG/M2 | HEIGHT: 69 IN | TEMPERATURE: 97.1 F | DIASTOLIC BLOOD PRESSURE: 70 MMHG

## 2017-12-06 DIAGNOSIS — H81.10 BENIGN PAROXYSMAL POSITIONAL VERTIGO, UNSPECIFIED LATERALITY: ICD-10-CM

## 2017-12-06 DIAGNOSIS — R42 DIZZINESS: Primary | ICD-10-CM

## 2017-12-06 PROCEDURE — 99212 OFFICE O/P EST SF 10 MIN: CPT

## 2017-12-06 PROCEDURE — 99213 OFFICE O/P EST LOW 20 MIN: CPT | Performed by: NURSE PRACTITIONER

## 2017-12-06 RX ORDER — GABAPENTIN 100 MG/1
CAPSULE ORAL
Qty: 90 CAPSULE | Refills: 1 | Status: SHIPPED | OUTPATIENT
Start: 2017-12-06 | End: 2018-01-30

## 2017-12-06 ASSESSMENT — PATIENT HEALTH QUESTIONNAIRE - PHQ9: SUM OF ALL RESPONSES TO PHQ QUESTIONS 1-9: 10

## 2017-12-06 ASSESSMENT — ANXIETY QUESTIONNAIRES
3. WORRYING TOO MUCH ABOUT DIFFERENT THINGS: MORE THAN HALF THE DAYS
GAD7 TOTAL SCORE: 15
6. BECOMING EASILY ANNOYED OR IRRITABLE: NEARLY EVERY DAY
7. FEELING AFRAID AS IF SOMETHING AWFUL MIGHT HAPPEN: MORE THAN HALF THE DAYS
4. TROUBLE RELAXING: MORE THAN HALF THE DAYS
IF YOU CHECKED OFF ANY PROBLEMS ON THIS QUESTIONNAIRE, HOW DIFFICULT HAVE THESE PROBLEMS MADE IT FOR YOU TO DO YOUR WORK, TAKE CARE OF THINGS AT HOME, OR GET ALONG WITH OTHER PEOPLE: VERY DIFFICULT
1. FEELING NERVOUS, ANXIOUS, OR ON EDGE: MORE THAN HALF THE DAYS
5. BEING SO RESTLESS THAT IT IS HARD TO SIT STILL: MORE THAN HALF THE DAYS
2. NOT BEING ABLE TO STOP OR CONTROL WORRYING: MORE THAN HALF THE DAYS

## 2017-12-06 ASSESSMENT — PAIN SCALES - GENERAL: PAINLEVEL: NO PAIN (0)

## 2017-12-06 NOTE — PATIENT INSTRUCTIONS
Benign Paroxysmal Positional Vertigo    Benign paroxysmal positional vertigo is a common condition. You feel as if the room is spinning after changing position, moving your head quickly, or even just rolling over in bed.  Vertigo is a false feeling of motion plus disorientation that makes it seem as though the room is spinning. A vertigo attack may cause sudden nausea, vomiting, and heavy sweating. Severe vertigo causes a loss of balance. You may even fall down.  Vertigo is caused by a problem with the inner ear. The inner ear is located behind the middle ear. It is a part of the balance center of the body. It contains small calcium particles within fluid-filled canals (semi-circular canals). These particles can move out of position as a result of aging, head trauma, or disease of the inner ear. Once that happens, moving your head in certain ways may cause the particles to stimulate the inner ear. This creates the feeling of vertigo.  An episode of vertigo may last seconds, minutes, or hours. Once you are over the first episode of vertigo, it may never return. Sometimes symptoms return off and on over several weeks or longer.  Home care  Follow these guidelines when caring for yourself at home:    Rest quietly in bed if your symptoms are severe. Change position slowly. There is usually one position that will feel best. This might be lying on one side or lying on your back with your head slightly raised on pillows.    Do not drive or work with dangerous machinery for one week after symptoms disappear. This is in case symptoms return suddenly.    Take medicine as prescribed to relieve your symptoms. Unless another medicine was prescribed for nausea, vomiting, and vertigo, you may use over-the-counter motion sickness medicine, such as meclizine or dimenhydrinate.  Follow-up care  Follow up with your healthcare provider, or as directed. Tell your provider about any ringing in the ear or hearing loss.  If you had a CT  or MRI scan, a specialist will review it. You will be told of any new findings that may affect your care.  When to seek medical advice  Call your healthcare provider right away if any of these occur:    Vertigo gets worse even after taking prescribed medicine    Repeated vomiting even after taking prescribed medicine    Increased weakness or fainting    Severe headache or unusual drowsiness or confusion    Weakness of an arm or leg or one side of the face    Difficulty walking    Difficulty with speech or vision    Seizure    Trouble hearing    Fever of 100.4 F (38 C) or higher, or as directed by your healthcare provider    Fast heart rate    Chest pain   Date Last Reviewed: 7/10/2015    [id number] [employer]. [address] [place] [address], [place] [address]. All rights reserved. This information is not intended as a substitute for professional medical care. Always follow your healthcare professional's instructions.

## 2017-12-06 NOTE — MR AVS SNAPSHOT
After Visit Summary   12/6/2017    Cristiana Myles    MRN: [id number]           Patient Information     Date Of Birth          1980        Visit Information        Provider Department      12/6/2017 2:00 PM Marianela Bae APRN [facility]        Today's Diagnoses     Dizziness    -  1    Benign paroxysmal positional vertigo, unspecified laterality          Care Instructions      Benign Paroxysmal Positional Vertigo    Benign paroxysmal positional vertigo is a common condition. You feel as if the room is spinning after changing position, moving your head quickly, or even just rolling over in bed.  Vertigo is a false feeling of motion plus disorientation that makes it seem as though the room is spinning. A vertigo attack may cause sudden nausea, vomiting, and heavy sweating. Severe vertigo causes a loss of balance. You may even fall down.  Vertigo is caused by a problem with the inner ear. The inner ear is located behind the middle ear. It is a part of the balance center of the body. It contains small calcium particles within fluid-filled canals (semi-circular canals). These particles can move out of position as a result of aging, head trauma, or disease of the inner ear. Once that happens, moving your head in certain ways may cause the particles to stimulate the inner ear. This creates the feeling of vertigo.  An episode of vertigo may last seconds, minutes, or hours. Once you are over the first episode of vertigo, it may never return. Sometimes symptoms return off and on over several weeks or longer.  Home care  Follow these guidelines when caring for yourself at home:    Rest quietly in bed if your symptoms are severe. Change position slowly. There is usually one position that will feel best. This might be lying on one side or lying on your back with your head slightly raised on pillows.    Do not drive or work with dangerous machinery for one week after symptoms  disappear. This is in case symptoms return suddenly.    Take medicine as prescribed to relieve your symptoms. Unless another medicine was prescribed for nausea, vomiting, and vertigo, you may use over-the-counter motion sickness medicine, such as meclizine or dimenhydrinate.  Follow-up care  Follow up with your healthcare provider, or as directed. Tell your provider about any ringing in the ear or hearing loss.  If you had a CT or MRI scan, a specialist will review it. You will be told of any new findings that may affect your care.  When to seek medical advice  Call your healthcare provider right away if any of these occur:    Vertigo gets worse even after taking prescribed medicine    Repeated vomiting even after taking prescribed medicine    Increased weakness or fainting    Severe headache or unusual drowsiness or confusion    Weakness of an arm or leg or one side of the face    Difficulty walking    Difficulty with speech or vision    Seizure    Trouble hearing    Fever of 100.4 F (38 C) or higher, or as directed by your healthcare provider    Fast heart rate    Chest pain   Date Last Reviewed: 7/10/2015    6660-5030 The [employer] and love and you. [address] [place] [address]. All rights reserved. This information is not intended as a substitute for professional medical care. Always follow your healthcare professional's instructions.                Follow-ups after your visit        Additional Services     PHYSICAL THERAPY REFERRAL       *This therapy referral will be filtered to a centralized scheduling office at [facility] and the patient will receive a call to schedule an appointment at a [place] location most convenient for them. *     [facility] provides Physical Therapy evaluation and treatment and many specialty services across the [place] system.  If requesting a specialty program, please choose from the list below.    If you have not heard from the  "scheduling office within 2 business days, please call [phone] for all locations, with the exception of [place], please call [phone].  Treatment: Evaluation & Treatment  Special Instructions/Modalities: dizziness with head movement down and while turning in bed  Special Programs: none      Please be aware that coverage of these services is subject to the terms and limitations of your health insurance plan.  Call member services at your health plan with any benefit or coverage questions.      **Note to Provider:  If you are referring outside of [place] for the therapy appointment, please list the name of the location in the \"special instructions\" above, print the referral and give to the patient to schedule the appointment.                  Your next 10 appointments already scheduled     Feb 02, 2018  9:45 AM CST   (Arrive by 9:30 AM)   SHORT with Sudhakar Cuellar MD   [facility] [place] ([facility] )    [address]  [place] [address]   [phone]              Who to contact     If you have questions or need follow up information about today's clinic visit or your schedule please contact [facility] directly at [phone].  Normal or non-critical lab and imaging results will be communicated to you by MyChart, letter or phone within 4 business days after the clinic has received the results. If you do not hear from us within 7 days, please contact the clinic through Wipithart or phone. If you have a critical or abnormal lab result, we will notify you by phone as soon as possible.  Submit refill requests through wutabout or call your pharmacy and they will forward the refill request to us. Please allow 3 business days for your refill to be completed.          Additional Information About Your Visit        Wipithart Information     wutabout gives you secure access to your electronic health record. If you see a primary care provider, you can also send messages to your " "care team and make appointments. If you have questions, please call your primary care clinic.  If you do not have a primary care provider, please call [phone] and they will assist you.        Care EveryWhere ID     This is your Care EveryWhere ID. This could be used by other organizations to access your [place] medical records  [id number]        Your Vitals Were     Pulse Temperature Respirations Height Pulse Oximetry BMI (Body Mass Index)    77 97.1  F (36.2  C) (Tympanic) 18 5' 9\" (1.753 m) 98% 29.39 kg/m2       Blood Pressure from Last 3 Encounters:   12/06/17 110/70   11/24/17 115/60   11/20/17 111/60    Weight from Last 3 Encounters:   12/06/17 199 lb (90.3 kg)   11/24/17 203 lb (92.1 kg)   11/20/17 194 lb (88 kg)              We Performed the Following     PHYSICAL THERAPY REFERRAL          Today's Medication Changes          These changes are accurate as of: 12/6/17  2:34 PM.  If you have any questions, ask your nurse or doctor.               Stop taking these medicines if you haven't already. Please contact your care team if you have questions.     diazepam 2 MG tablet   Commonly known as:  VALIUM   Stopped by:  Marianela Bae APRN CNP           methylPREDNISolone 4 MG tablet   Commonly known as:  MEDROL DOSEPAK   Stopped by:  Marianela Bae APRN CNP           scopolamine 72 hr patch   Commonly known as:  TRANSDERM   Stopped by:  Marianela Bae APRN CNP                    Primary Care Provider Office Phone # Fax #    Sudhakar Cuellar [phone]       [facility] [address]  [place] [address]        Equal Access to Services     [facility]: Hadii luis bender Sofavian, waaxda luqadaha, qaybta kaalmada [place], can padron. So [facility] [phone].    ATENCIÓN: Si habla español, tiene a singh disposición servicios gratuitos de asistencia lingüística. Llame al [phone].    We comply with applicable federal " civil rights laws and [place] laws. We do not discriminate on the basis of race, color, national origin, age, disability, sex, sexual orientation, or gender identity.            Thank you!     Thank you for choosing [facility]  for your care. Our goal is always to provide you with excellent care. Hearing back from our patients is one way we can continue to improve our services. Please take a few minutes to complete the written survey that you may receive in the mail after your visit with us. Thank you!             Your Updated Medication List - Protect others around you: Learn how to safely use, store and throw away your medicines at www.disposemymeds.org.          This list is accurate as of: 12/6/17  2:34 PM.  Always use your most recent med list.                   Brand Name Dispense Instructions for use Diagnosis    citalopram 40 MG tablet    celeXA    30 tablet    TAKE 1 TABLET BY MOUTH DAILY    DAFNE (generalized anxiety disorder), MDD (major depressive disorder)       gabapentin 100 MG capsule    NEURONTIN    90 capsule    TAKE 1 CAPSULE BY MOUTH 3 TIMES DAILY    LLQ abdominal pain       hydrOXYzine 25 MG capsule    VISTARIL    60 capsule    TAKE 1 OR 2 CAPSULES BY MOUTH EVERY 4-6 HOURS AS NEEDED FOR ANXIETY    Anxiety, Attention deficit disorder       ibuprofen 800 MG tablet    ADVIL/MOTRIN    40 tablet    Take 1 tablet (800 mg) by mouth every 8 hours as needed for pain    Pelvic pain in female       * methylphenidate ER 54 MG CR tablet    CONCERTA    30 tablet    Take 1 tablet (54 mg) by mouth daily    Attention deficit hyperactivity disorder (ADHD), predominantly inattentive type       * methylphenidate ER 54 MG CR tablet   Start taking on:  1/8/2018    CONCERTA    30 tablet    Take 1 tablet (54 mg) by mouth every morning    Attention deficit hyperactivity disorder (ADHD), predominantly inattentive type       * nicotine 21 MG/24HR 24 hr patch    NICODERM CQ     Place 1 patch onto the skin  every 24 hours        * nicotine 14 MG/24HR 24 hr patch    NICODERM CQ    30 patch    Place 1 patch onto the skin every 24 hours    Encounter for tobacco use cessation counseling       * nicotine 7 MG/24HR 24 hr patch    NICODERM CQ    30 patch    Place 1 patch onto the skin every 24 hours    Encounter for tobacco use cessation counseling       ranitidine 150 MG tablet    ZANTAC    60 tablet    Take 1 tablet (150 mg) by mouth 2 times daily    Gastroesophageal reflux disease without esophagitis       SM STOOL SOFTENER 100 MG capsule   Generic drug:  docusate sodium     30 capsule    TAKE 1 CAPSULE BY MOUTH DAILY    Pelvic pain in female       traZODone 50 MG tablet    DESYREL    90 tablet    TAKE 1/2 TO 3 TABLETS BY MOUTH AT BEDTIME AS NEEDED FOR INSOMNIA    Insomnia       TYLENOL PO           VENTOLIN  (90 BASE) MCG/ACT Inhaler   Generic drug:  albuterol     18 g    INHALE 2 PUFFS INTO THE LUNGS EVERY 4 HOURS AS NEEDED FOR SHORTNESS OF BREATH/DYSPNEA    Attention deficit disorder, Reactive airway disease with wheezing, mild intermittent, uncomplicated       * Notice:  This list has 5 medication(s) that are the same as other medications prescribed for you. Read the directions carefully, and ask your doctor or other care provider to review them with you.

## 2017-12-06 NOTE — TELEPHONE ENCOUNTER
Gabapentin       Last Written Prescription Date: 7/27/2017  Last Fill Quantity: 90,  # refills: 3   Last Office Visit with [facility] P or [facility] prescribing provider: 11/24/2017                                         Next 5 appointments (look out 90 days)     Dec 06, 2017  2:00 PM CST   (Arrive by 1:45 PM)   SHORT with WINNIE Foster CNP   [facility] [place] ([facility] )    [address] [place] [address]   [phone]            Feb 02, 2018  9:45 AM CST   (Arrive by 9:30 AM)   SHORT with Sudhakar Cuellar MD   [facility] [place] ([facility] )    [address] [place] [address]   [phone]

## 2017-12-06 NOTE — NURSING NOTE
"Chief Complaint   Patient presents with     Dizziness       Initial /70 (BP Location: Right arm, Patient Position: Sitting, Cuff Size: Adult Large)  Pulse 77  Temp 97.1  F (36.2  C) (Tympanic)  Resp 18  Ht 5' 9\" (1.753 m)  Wt 199 lb (90.3 kg)  SpO2 98%  BMI 29.39 kg/m2 Estimated body mass index is 29.39 kg/(m^2) as calculated from the following:    Height as of this encounter: 5' 9\" (1.753 m).    Weight as of this encounter: 199 lb (90.3 kg).  Medication Reconciliation: complete   Cookie Peña      "

## 2017-12-06 NOTE — PROGRESS NOTES
SUBJECTIVE:   Cristiana Myles is a 37 year old female who presents to clinic today for the following health issues:      Dizziness      Duration: over one month duration, ER 11-20-17    Description   Feeling faint:  no   Feeling like the surroundings are moving: YES  Loss of consciousness or falls: no     Intensity:  severe    Accompanying signs and symptoms:   Nausea/vomitting: YES  Palpitations: no   Weakness in arms or legs: no   Vision or speech changes: YES- blurry  Ringing in ears (Tinnitus): YES- and they feel full all the time, was better with steroid  Hearing loss related to dizziness: YES- change in volume  Other (fevers/chills/sweating/dyspnea): YES- night sweats    History (similar episodes/head trauma/previous evaluation/recent bleeding): None    Precipitating or alleviating factors (new meds/chemicals): Steroid helped some, no help with antibiotic, some relief with OTC meclizaine  Worse with activity/head movement: YES    Therapies tried and outcome: see above - steroid and antibiotic only helped minimally, insurance did not cover scopolamine patch, so she was not able to get them. She is taking OTC meclizine which helps some. Staying hydrated    Did stop smoking 36 days ago, cut back on the caffeine about 5 to 6 months.             Problem list and histories reviewed & adjusted, as indicated.  Additional history: as documented    Patient Active Problem List   Diagnosis     Generalized anxiety disorder     Attention deficit disorder     FH: breast cancer     Tobacco abuse, in remission     Pelvic pain in female     ACP (advance care planning)     Irritable bowel syndrome with both constipation and diarrhea     Recurrent major depressive disorder, in partial remission (H)     Past Surgical History:   Procedure Laterality Date     ENDOSCOPY UPPER, COLONOSCOPY, COMBINED N/A 9/4/2015    Procedure: COMBINED ENDOSCOPY UPPER, COLONOSCOPY;  Surgeon: Griffin Barrientos DO;  Location: [facility] OR     LAPAROSCOPIC  CYSTECTOMY OVARIAN (BENIGN) Right 1/27/2016    Procedure: LAPAROSCOPIC CYSTECTOMY OVARIAN (BENIGN);  Surgeon: Kuldip España MD;  Location: [facility] OR     LAPAROSCOPIC SALPINGO-OOPHORECTOMY Left 1/27/2016    Procedure: LAPAROSCOPIC SALPINGO-OOPHORECTOMY;  Surgeon: Kuldip España MD;  Location: [facility]     LAPAROSCOPY DIAGNOSTIC (GYN) Left 1/27/2016    Procedure: LAPAROSCOPY DIAGNOSTIC (GYN);  Surgeon: Kuldip España MD;  Location: [facility] OR     TUBAL LIGATION  2006     wisdom teeth         Social History   Substance Use Topics     Smoking status: Former Smoker     Packs/day: 0.50     Years: 13.00     Types: Cigarettes     Quit date: 1/1/2015     Smokeless tobacco: Never Used     Alcohol use 0.0 oz/week      Comment: frequency: rarely     Family History   Problem Relation Age of Onset     Breast Cancer Maternal Grandmother      Hypertension Maternal Grandfather      Hyperlipidemia Maternal Grandfather      DIABETES Paternal Grandmother      Asthma No family hx of          Current Outpatient Prescriptions   Medication Sig Dispense Refill     gabapentin (NEURONTIN) 100 MG capsule TAKE 1 CAPSULE BY MOUTH 3 TIMES DAILY 90 capsule 1     citalopram (CELEXA) 40 MG tablet TAKE 1 TABLET BY MOUTH DAILY 30 tablet 1     nicotine (NICODERM CQ) 21 MG/24HR 24 hr patch Place 1 patch onto the skin every 24 hours       methylphenidate ER (CONCERTA) 54 MG CR tablet Take 1 tablet (54 mg) by mouth daily 30 tablet 0     [START ON 1/8/2018] methylphenidate ER (CONCERTA) 54 MG CR tablet Take 1 tablet (54 mg) by mouth every morning 30 tablet 0     ranitidine (ZANTAC) 150 MG tablet Take 1 tablet (150 mg) by mouth 2 times daily 60 tablet 11     nicotine (NICODERM CQ) 14 MG/24HR 24 hr patch Place 1 patch onto the skin every 24 hours 30 patch 0     nicotine (NICODERM CQ) 7 MG/24HR 24 hr patch Place 1 patch onto the skin every 24 hours 30 patch 0     traZODone (DESYREL) 50 MG tablet TAKE 1/2 TO 3 TABLETS BY MOUTH AT BEDTIME AS NEEDED FOR INSOMNIA 90 tablet  "9     VENTOLIN  (90 BASE) MCG/ACT Inhaler INHALE 2 PUFFS INTO THE LUNGS EVERY 4 HOURS AS NEEDED FOR SHORTNESS OF BREATH/DYSPNEA 18 g 3     SM STOOL SOFTENER 100 MG capsule TAKE 1 CAPSULE BY MOUTH DAILY 30 capsule 9     hydrOXYzine (VISTARIL) 25 MG capsule TAKE 1 OR 2 CAPSULES BY MOUTH EVERY 4-6 HOURS AS NEEDED FOR ANXIETY 60 capsule 3     ibuprofen (ADVIL,MOTRIN) 800 MG tablet Take 1 tablet (800 mg) by mouth every 8 hours as needed for pain 40 tablet 1     Acetaminophen (TYLENOL PO)        [DISCONTINUED] methylphenidate ER (CONCERTA) 54 MG CR tablet Take 1 tablet (54 mg) by mouth every morning (Patient not taking: Reported on 12/6/2017) 30 tablet 0     Allergies   Allergen Reactions     Amoxicillin Diarrhea     Bee Venom      Bee venom (honey bee)       Latex      Sulfa Drugs      Sulfa (sulfonamide antibiotics)            Reviewed and updated as needed this visit by clinical staffTobacco  Allergies  Meds  Med Hx  Surg Hx  Fam Hx  Soc Hx      Reviewed and updated as needed this visit by Provider         ROS:  CONSTITUTIONAL:hot to cold with night sweats   INTEGUMENTARY/SKIN: NEGATIVE for worrisome rashes, moles or lesions  ENT/MOUTH: POSITIVE for ear pressure - decreased while on the prednisone  RESP:NEGATIVE for significant cough or SOB  CV: NEGATIVE for chest pain, palpitations or peripheral edema  NEURO: headaches - decreased while on the prednisone, continues to feel dizzy     OBJECTIVE:     /70 (BP Location: Right arm, Patient Position: Sitting, Cuff Size: Adult Large)  Pulse 77  Temp 97.1  F (36.2  C) (Tympanic)  Resp 18  Ht 5' 9\" (1.753 m)  Wt 199 lb (90.3 kg)  SpO2 98%  BMI 29.39 kg/m2  Body mass index is 29.39 kg/(m^2).   GENERAL: healthy, alert and no distress  HENT: normal cephalic/atraumatic, ear canals and TM's normal, nose and mouth without ulcers or lesions, oropharynx clear, oral mucous membranes moist and sinuses: not tender  RESP: lungs clear to auscultation - no rales, " rhonchi or wheezes  CV: regular rate and rhythm, normal S1 S2, no S3 or S4, no murmur, click or rub, no peripheral edema and peripheral pulses strong  SKIN: no suspicious lesions or rashes  NEURO: Normal strength and tone, sensory exam grossly normal, mentation intact and dizziness noted when moving head in the downward position    Diagnostic Test Results:  none     ASSESSMENT/PLAN:     1. Dizziness  Symptoms have been continuous and have not cleared with treatments so far. With continued dizziness/vertigo physical therapy referral. She was advised to stop Meclizine 24 hours prior to appointment. If symptoms do not improve follow up with PCP - possible may need a referral to ENT. Cristiana agrees with plan.  - PHYSICAL THERAPY REFERRAL    2. Benign paroxysmal positional vertigo, unspecified laterality  As above   - PHYSICAL THERAPY REFERRAL    Congratulations on be smoke free for 36 days. Cristiana continues on the patch at this time.    See Patient Instructions    WINNIE Arroyo [facility] KEN

## 2017-12-06 NOTE — TELEPHONE ENCOUNTER
Patient showed up for her appointment today and was told they would put a note in her chart to advise patient would be late. Patient got here and was turned away. Patient would to be seen ASAP as she is continuing on with her dizziness. Patient wished to not go back to ER. Please call patient at earliest convenience. Patients phone number is [phone]

## 2017-12-07 ASSESSMENT — ANXIETY QUESTIONNAIRES: GAD7 TOTAL SCORE: 15

## 2017-12-19 ENCOUNTER — HOSPITAL ENCOUNTER (OUTPATIENT)
Dept: PHYSICAL THERAPY | Facility: HOSPITAL | Age: 37
Setting detail: THERAPIES SERIES
End: 2017-12-19
Attending: NURSE PRACTITIONER
Payer: COMMERCIAL

## 2017-12-19 PROCEDURE — 97530 THERAPEUTIC ACTIVITIES: CPT | Mod: GP

## 2017-12-19 PROCEDURE — 40000719 ZZHC STATISTIC PT DEPARTMENT NEURO VISIT

## 2017-12-19 PROCEDURE — 97162 PT EVAL MOD COMPLEX 30 MIN: CPT | Mod: GP

## 2017-12-19 PROCEDURE — 97112 NEUROMUSCULAR REEDUCATION: CPT | Mod: GP

## 2017-12-19 NOTE — PROGRESS NOTES
12/19/17 0826   Quick Adds   Quick Adds Vestibular Eval   Type of Visit Initial OP PT Evaluation   General Information   Start of Care Date 12/19/17   Referring Physician Marianela Bass   Orders Evaluate and Treat as Indicated   Order Date 12/06/17   Medical Diagnosis vertigo with unspecified laterality   Onset of illness/injury or Date of Surgery 11/20/17   Surgical/Medical history reviewed Yes   Pertinent history of current vestibular problem (include personal factors and/or comorbidities that impact the POC)  ADHD;Anxiety;Depression;Migraines;Motion sickness;Prior concussion(s)  (has a history 3 concussions in the past)   Pertinent history of current problem (include personal factors and/or comorbidities that impact the POC) States this began a week before she went to Urgent care beginning  with waking up with it. SHe has ahd a steroid and antibiltics in the past. States she did not vomit but was very dizzy. This episode is different as in the past she  was dizzy for long periods of time and this time it comes and goes.   Pertinent Visual History  has double vision if something very close, does not wear glasses   Prior level of function comment has had prior but this is different   Previous/Current Treatment Medication(s)   Improvement after medication Moderate   Patient role/Employment history Homemaker   [facility]   Home/Community Accessibility Comments depth percetion issues but has help   Assistive Devices Comments has handicap bars   Patient/Family Goals Statement to not be dizzy   Fall Risk Screen   Fall screen completed by PT   Have you fallen 2 or more times in the past year? No   Have you fallen and had an injury in the past year? No   Is patient a fall risk? No   Pain   Patient currently in pain Yes   Pain location back of head and ears   Pain rating 3/10   Pain description Pressure   Cognitive Status Examination   Orientation orientation to person, place and time   Level of  Consciousness alert   Follows Commands and Answers Questions 100% of the time   Personal Safety and Judgment intact   Memory intact   Observation   Observation walking slow, decreased head motion   Integumentary   Integumentary No deficits were identified   Posture   Posture Forward head position;Protracted shoulders   Range of Motion (ROM)   ROM Quick Adds no deficits were identified   ROM Comment but tight going to right   Strength   Strength Comments grossly 5/5   Bed Mobility   Bed Mobility Comments slow   Transfer Skills   Transfer Comments slow   Gait   Gait Comments Gait with no devoce but very slow head turns to no head turns   Balance   Balance Comments 5 second left single leg, 10 right    Sensory Examination   Sensory Perception no deficits were identified   Coordination   Coordination no deficits were identified   Muscle Tone   Muscle Tone no deficits were identified   Oculomotor Exam   Smooth Pursuit Normal   Saccades Abnormal   Saccades Comments history of concussion   VOR Normal   VOR Cancellation Normal   Rapid Head Thrust Corrective Saccade L head thrust   Convergence Testing Normal   Infrared Goggle Exam or Frenzel Lense Exam   Vestibular Suppressant in Last 24 Hours? No   Exam completed with Frenzel Lenses   Spontaneous Nystagmus Negative   Gaze Evoked Nystagmus Negative   Head Shake Horizontal Nystagmus Negative   Positional testing Negative   Danilo-Hallpike (right) Negative   Inez-Hallpike (right) comments x2   Danilo-Hallpike (Left) Negative   Danilo-Hallpike (left) comments x2   HSCC Supine Roll Test (Right) Negative   [facility] Supine Roll Test (Right) Comments x2   [facility] Supine Roll Test (Left) Negative   [facility] Supine Roll Test (Left) Comments  x2   Planned Therapy Interventions   Planned Therapy Interventions neuromuscular re-education;balance training   Clinical Impression   Criteria for Skilled Therapeutic Interventions Met yes, treatment indicated   PT Diagnosis decreased balance and head turn  compensation   Influenced by the following impairments multifactorial vertigo with ADHD, migraines, concussion, depression, anxiety as well ear issues.   Functional limitations due to impairments head turns   Clinical Presentation Evolving/Changing   Clinical Presentation Rationale clinical obs   Clinical Decision Making (Complexity) Moderate complexity   Therapy Frequency 1 time/week   Predicted Duration of Therapy Intervention (days/wks) 8 weeks   Risk & Benefits of therapy have been explained Yes   Patient, Family & other staff in agreement with plan of care Yes   Clinical Impression Comments Presenst with multi factorial issues with vertigo and in ner ear as well as decreased posture and neck issues. Balance also decreased.   Education Assessment   Preferred Learning Style Listening;Reading;Demonstration   Barriers to Learning No barriers   Goal 1   Goal Identifier functional   Goal Description Client will be able to roll in bed without vertigo.   Target Date 01/16/18   Goal 2   Goal Identifier Functional   Goal Description Client will be able to turn head in kitchen and with grandchild  with no vertigo.   Target Date 02/16/18   Goal 3   Goal Identifier functional   Goal Description Client will have good balance evidnced by single leg x one minute.   Target Date 02/16/18   Total Evaluation Time   Total Evaluation Time (Minutes) 20

## 2018-01-03 ENCOUNTER — HOSPITAL ENCOUNTER (OUTPATIENT)
Dept: PHYSICAL THERAPY | Facility: HOSPITAL | Age: 38
Setting detail: THERAPIES SERIES
End: 2018-01-03
Attending: NURSE PRACTITIONER
Payer: COMMERCIAL

## 2018-01-03 PROCEDURE — 40000719 ZZHC STATISTIC PT DEPARTMENT NEURO VISIT

## 2018-01-03 PROCEDURE — 97112 NEUROMUSCULAR REEDUCATION: CPT | Mod: GP

## 2018-01-09 DIAGNOSIS — N91.2 AMENORRHEA: Primary | ICD-10-CM

## 2018-01-09 LAB — HCG UR QL: NEGATIVE

## 2018-01-09 PROCEDURE — 81025 URINE PREGNANCY TEST: CPT | Mod: ZL | Performed by: FAMILY MEDICINE

## 2018-01-09 NOTE — PROGRESS NOTES
Urine test negative for pregnancy - No blood pregnancy test needed. If not menses in 2 weeks- repeat test

## 2018-01-12 ENCOUNTER — HOSPITAL ENCOUNTER (OUTPATIENT)
Dept: PHYSICAL THERAPY | Facility: HOSPITAL | Age: 38
Setting detail: THERAPIES SERIES
End: 2018-01-12
Attending: NURSE PRACTITIONER
Payer: COMMERCIAL

## 2018-01-12 PROCEDURE — 40000718 ZZHC STATISTIC PT DEPARTMENT ORTHO VISIT

## 2018-01-12 PROCEDURE — 97112 NEUROMUSCULAR REEDUCATION: CPT | Mod: GP

## 2018-01-17 ENCOUNTER — HOSPITAL ENCOUNTER (OUTPATIENT)
Dept: PHYSICAL THERAPY | Facility: HOSPITAL | Age: 38
Setting detail: THERAPIES SERIES
End: 2018-01-17
Attending: NURSE PRACTITIONER
Payer: COMMERCIAL

## 2018-01-17 PROCEDURE — 40000719 ZZHC STATISTIC PT DEPARTMENT NEURO VISIT

## 2018-01-17 PROCEDURE — 97530 THERAPEUTIC ACTIVITIES: CPT | Mod: GP

## 2018-01-17 PROCEDURE — 97112 NEUROMUSCULAR REEDUCATION: CPT | Mod: GP

## 2018-01-23 DIAGNOSIS — F32.9 MDD (MAJOR DEPRESSIVE DISORDER): ICD-10-CM

## 2018-01-23 DIAGNOSIS — F41.1 GAD (GENERALIZED ANXIETY DISORDER): ICD-10-CM

## 2018-01-23 RX ORDER — CITALOPRAM HYDROBROMIDE 40 MG/1
TABLET ORAL
Qty: 30 TABLET | Refills: 1 | Status: SHIPPED | OUTPATIENT
Start: 2018-01-23 | End: 2018-03-29

## 2018-01-24 ENCOUNTER — APPOINTMENT (OUTPATIENT)
Dept: GENERAL RADIOLOGY | Facility: HOSPITAL | Age: 38
End: 2018-01-24
Attending: PHYSICIAN ASSISTANT
Payer: COMMERCIAL

## 2018-01-24 ENCOUNTER — HOSPITAL ENCOUNTER (EMERGENCY)
Facility: HOSPITAL | Age: 38
Discharge: HOME | End: 2018-01-24
Attending: PHYSICIAN ASSISTANT | Admitting: PHYSICIAN ASSISTANT
Payer: COMMERCIAL

## 2018-01-24 VITALS
OXYGEN SATURATION: 99 % | TEMPERATURE: 98.1 F | RESPIRATION RATE: 20 BRPM | DIASTOLIC BLOOD PRESSURE: 91 MMHG | HEART RATE: 88 BPM | SYSTOLIC BLOOD PRESSURE: 128 MMHG

## 2018-01-24 DIAGNOSIS — M25.562 ACUTE PAIN OF LEFT KNEE: ICD-10-CM

## 2018-01-24 DIAGNOSIS — R29.898 KNEE CLICKING: ICD-10-CM
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
## 2018-01-24 DIAGNOSIS — S50.02XA CONTUSION OF LEFT ELBOW, INITIAL ENCOUNTER: ICD-10-CM

## 2018-01-24 PROCEDURE — 73562 X-RAY EXAM OF KNEE 3: CPT | Mod: TC,LT

## 2018-01-24 PROCEDURE — 99213 OFFICE O/P EST LOW 20 MIN: CPT | Performed by: PHYSICIAN ASSISTANT

## 2018-01-24 PROCEDURE — 73070 X-RAY EXAM OF ELBOW: CPT | Mod: TC,LT

## 2018-01-24 PROCEDURE — 25000128 H RX IP 250 OP 636: Performed by: PHYSICIAN ASSISTANT

## 2018-01-24 PROCEDURE — G0463 HOSPITAL OUTPT CLINIC VISIT: HCPCS | Mod: 25

## 2018-01-24 PROCEDURE — 96372 THER/PROPH/DIAG INJ SC/IM: CPT

## 2018-01-24 RX ORDER — KETOROLAC TROMETHAMINE 30 MG/ML
60 INJECTION, SOLUTION INTRAMUSCULAR; INTRAVENOUS ONCE
Status: COMPLETED | OUTPATIENT
Start: 2018-01-24 | End: 2018-01-24

## 2018-01-24 RX ORDER — TRAMADOL HYDROCHLORIDE 50 MG/1
50-100 TABLET ORAL EVERY 6 HOURS PRN
Qty: 6 TABLET | Refills: 0 | Status: SHIPPED | OUTPATIENT
Start: 2018-01-24 | End: 2018-04-19

## 2018-01-24 RX ADMIN — KETOROLAC TROMETHAMINE 60 MG: 30 INJECTION, SOLUTION INTRAMUSCULAR at 14:39

## 2018-01-24 ASSESSMENT — ENCOUNTER SYMPTOMS
CONSTITUTIONAL NEGATIVE: 1
PSYCHIATRIC NEGATIVE: 1
ARTHRALGIAS: 1
RESPIRATORY NEGATIVE: 1
WOUND: 1
CARDIOVASCULAR NEGATIVE: 1
JOINT SWELLING: 1

## 2018-01-24 NOTE — ED AVS SNAPSHOT
[facility]    [address] [address]    Phone:  [phone]                                       Cristiana Myles   MRN: [id number]    Department:  [facility]   Date of Visit:  1/24/2018           Patient Information     Date Of Birth          1980        Your diagnoses for this visit were:     Fall, initial encounter     Contusion of left elbow, initial encounter     Knee clicking     Acute pain of left knee        You were seen by Eddie Amado PA.      Follow-up Information     Follow up with Sudhakar Cuellar MD.    Specialty:  Family Practice    Why:  3-7 days    Contact information:     [facility]  [address]  [facility] [address] [phone]          Follow up with [facility].    Specialty:  EMERGENCY MEDICINE    Why:  If symptoms worsen    Contact information:    [address] [address] [phone]    Additional information:    From [place] Area: Take [address]. Turn left at [address]. Turn left at the first stoplight on [address]. At the first stop sign, take a right onto [address]. Take a left into the parking lot and continue through until you reach the North enterance of the building.       From [place]: Take [address]. Take the [address] ramp towards [place]. Turn left onto [address]. Take a slight right onto [address]. Turn left at the first stoplight on [address]. At the first stop sign, take a right onto [address]. Take a left into the parking lot and continue through until you reach the North enterance of the building.       From [place]: Take [address]. Take a right at [address]. At the first stop sign, take a right onto [address]. Take a left into the parking lot and continue through until you reach the North enterance of the building.         Discharge Instructions       - Rest, ice,  compression, elevation  - Crutches for up to 5 days depending on swelling improvement.  - Rotate ibuprofen and tylenol every 3-6 hrs for 2-3 days. Ultram for pain >5/10.    - FYI Topicals: Arnica Cream (5$ at [facility]) and/or Capsaicin. (1/4 teaspoon cayenne pepper in a palmful olive oil and rub in 2-3 times daily for 5-7 days for pain)      Discharge References/Attachments     CONTUSION, ELBOW (ENGLISH)    KNEE PAIN AND SWELLING, REDUCING (ENGLISH)      Future Appointments        Provider Department Dept Phone Center    1/26/2018 9:00 AM Nadira Whatley, PT [facility] [phone] [facility]    1/31/2018 9:00 AM Nadira Whatley, PT [facility] [phone] [facility]    2/2/2018 9:45 AM Sudhakar Cuellar MD [facility] [phone] [facility]    2/7/2018 9:00 AM Nadira Whatley, PT [facility] [phone] [facility]    2/14/2018 9:00 AM Nadira Whatley, PT [facility] [phone] [facility]    2/21/2018 10:00 AM Nadira Whatley, PT [facility] [phone] [facility]    2/28/2018 9:00 AM Nadira Whatley, PT [facility] [phone] [facility]         Review of your medicines      START taking        Dose / Directions Last dose taken    traMADol 50 MG tablet   Commonly known as:  ULTRAM   Dose:   mg   Quantity:  6 tablet        Take 1-2 tablets ( mg) by mouth every 6 hours as needed for pain   Refills:  0          Our records show that you are taking the medicines listed below. If these are incorrect, please call your family doctor or clinic.        Dose / Directions Last dose taken    citalopram 40 MG tablet   Commonly known as:  celeXA   Quantity:  30 tablet        TAKE 1 TABLET BY MOUTH DAILY   Refills:  1        gabapentin 100 MG capsule   Commonly known as:  NEURONTIN   Quantity:  90 capsule        TAKE 1 CAPSULE BY MOUTH 3 TIMES DAILY   Refills:  1        hydrOXYzine 25 MG capsule   Commonly  known as:  VISTARIL   Quantity:  60 capsule        TAKE 1 OR 2 CAPSULES BY MOUTH EVERY 4-6 HOURS AS NEEDED FOR ANXIETY   Refills:  3        ibuprofen 800 MG tablet   Commonly known as:  ADVIL/MOTRIN   Dose:  800 mg   Quantity:  40 tablet        Take 1 tablet (800 mg) by mouth every 8 hours as needed for pain   Refills:  1        * methylphenidate ER 54 MG CR tablet   Commonly known as:  CONCERTA   Dose:  54 mg   Quantity:  30 tablet        Take 1 tablet (54 mg) by mouth daily   Refills:  0        * methylphenidate ER 54 MG CR tablet   Commonly known as:  CONCERTA   Dose:  54 mg   Quantity:  30 tablet        Take 1 tablet (54 mg) by mouth every morning   Refills:  0        * nicotine 21 MG/24HR 24 hr patch   Commonly known as:  NICODERM CQ   Dose:  1 patch        Place 1 patch onto the skin every 24 hours   Refills:  0        * nicotine 14 MG/24HR 24 hr patch   Commonly known as:  NICODERM CQ   Dose:  1 patch   Quantity:  30 patch        Place 1 patch onto the skin every 24 hours   Refills:  0        * nicotine 7 MG/24HR 24 hr patch   Commonly known as:  NICODERM CQ   Dose:  1 patch   Quantity:  30 patch        Place 1 patch onto the skin every 24 hours   Refills:  0        ranitidine 150 MG tablet   Commonly known as:  ZANTAC   Dose:  150 mg   Quantity:  60 tablet        Take 1 tablet (150 mg) by mouth 2 times daily   Refills:  11        SM STOOL SOFTENER 100 MG capsule   Quantity:  30 capsule   Generic drug:  docusate sodium        TAKE 1 CAPSULE BY MOUTH DAILY   Refills:  9        traZODone 50 MG tablet   Commonly known as:  DESYREL   Quantity:  90 tablet        TAKE 1/2 TO 3 TABLETS BY MOUTH AT BEDTIME AS NEEDED FOR INSOMNIA   Refills:  9        TYLENOL PO        Refills:  0        VENTOLIN  (90 BASE) MCG/ACT Inhaler   Quantity:  18 g   Generic drug:  albuterol        INHALE 2 PUFFS INTO THE LUNGS EVERY 4 HOURS AS NEEDED FOR SHORTNESS OF BREATH/DYSPNEA   Refills:  3        * Notice:  This list has 5  medication(s) that are the same as other medications prescribed for you. Read the directions carefully, and ask your doctor or other care provider to review them with you.            Prescriptions were sent or printed at these locations (1 Prescription)                   [facility] [place] - [address]   [address] [address]    Telephone:  [phone]   Fax:  [phone]   Hours:                  Printed at Department/Unit printer (1 of 1)         traMADol (ULTRAM) 50 MG tablet                Procedures and tests performed during your visit     Elbow XR,  2 views, left    Knee XR, 3 views, left      Orders Needing Specimen Collection     None      Pending Results     Date and Time Order Name Status Description    1/24/2018 1411 Knee XR, 3 views, left In process     1/24/2018 1411 Elbow XR,  2 views, left In process             Pending Culture Results     No orders found from 1/22/2018 to 1/25/2018.            Thank you for choosing [facility]       Thank you for choosing [facility] for your care. Our goal is always to provide you with excellent care. Hearing back from our patients is one way we can continue to improve our services. Please take a few minutes to complete the written survey that you may receive in the mail after you visit with us. Thank you!        [facility] Information     [facility] gives you secure access to your electronic health record. If you see a primary care provider, you can also send messages to your care team and make appointments. If you have questions, please call your primary care clinic.  If you do not have a primary care provider, please call [phone] and they will assist you.        Care EveryWhere ID     This is your Care EveryWhere ID. This could be used by other organizations to access your [facility] medical records  [id number]        Equal Access to Services     CALE BURTON AH: juvenal Martinez qaybta kaalmada  can cardenas ah. So [facility] [phone].    ATENCIÓN: Si habla español, tiene a singh disposición servicios gratuitos de asistencia lingüística. Llame al [phone].    We comply with applicable federal civil rights laws and [place] laws. We do not discriminate on the basis of race, color, national origin, age, disability, sex, sexual orientation, or gender identity.            After Visit Summary       This is your record. Keep this with you and show to your community pharmacist(s) and doctor(s) at your next visit.

## 2018-01-24 NOTE — ED PROVIDER NOTES
History     Chief Complaint   Patient presents with     Elbow Pain     Knee Injury     The history is provided by the patient. No  was used.     Cristiana Myles is a 37 year old female who presents after fall last night. She fell onto her left knee, then elbow on the ice. She reports pain and swelling of the knee immediately, but she was able to bear weight. Left elbow is sore, but she can flex and extend. Last dose ibuprofen about 3.5 hrs ago and not helping.     Problem List:    Patient Active Problem List    Diagnosis Date Noted     Irritable bowel syndrome with both constipation and diarrhea 08/02/2017     Priority: Medium     Recurrent major depressive disorder, in partial remission (H) 08/02/2017     Priority: Medium     ACP (advance care planning) 03/02/2016     Priority: Medium     Advance Care Planning 3/2/2016: Receipt of ACP document:  Received: Health Care Directive which was witnessed or notarized on 1-25-16.  Document previously scanned on 2-5-16.  Validation form completed and sent to be scanned.  Code Status needs to be updated to reflect choices in most recent ACP document.  Confirmed/documented designated decision maker(s).  Added by Rachel Barber RN Advance Care Planning Liaison with [facility]             Pelvic pain in female 01/14/2016     Priority: Medium     FH: breast cancer      Priority: Medium     Tobacco abuse, in remission      Priority: Medium     Generalized anxiety disorder      Priority: Medium     Diagnosis updated by automated process. Provider to review and confirm.       Attention deficit disorder 05/31/2005     Priority: Medium     Problem list name updated by automated process. 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     FH: breast cancer      DAFNE (generalised anxiety disorder)       Irritable bowel syndrome with both constipation and diarrhea 8/2/2017     MDD (major depressive disorder)      Migraine, unspecified, without mention of intractable migraine without mention of status migrainosus 3/13/2000     Tobacco abuse, in remission      Unspecified sinusitis (chronic) 3/8/2001       Past Surgical History:    Past Surgical History:   Procedure Laterality Date     ENDOSCOPY UPPER, COLONOSCOPY, COMBINED N/A 9/4/2015    Procedure: COMBINED ENDOSCOPY UPPER, COLONOSCOPY;  Surgeon: Griffin Barrientos DO;  Location: [facility]     LAPAROSCOPIC CYSTECTOMY OVARIAN (BENIGN) Right 1/27/2016    Procedure: LAPAROSCOPIC CYSTECTOMY OVARIAN (BENIGN);  Surgeon: Kuldip España MD;  Location: [facility]     LAPAROSCOPIC SALPINGO-OOPHORECTOMY Left 1/27/2016    Procedure: LAPAROSCOPIC SALPINGO-OOPHORECTOMY;  Surgeon: Kuldip España MD;  Location: [facility]     LAPAROSCOPY DIAGNOSTIC (GYN) Left 1/27/2016    Procedure: LAPAROSCOPY DIAGNOSTIC (GYN);  Surgeon: Kuldip España MD;  Location: [facility]     TUBAL LIGATION  2006     wisdom teeth         Family History:    Family History   Problem Relation Age of Onset     Breast Cancer Maternal Grandmother      Hypertension Maternal Grandfather      Hyperlipidemia Maternal Grandfather      DIABETES Paternal Grandmother      Asthma No family hx of        Social History:  Marital Status:   [4]  Social History   Substance Use Topics     Smoking status: Former Smoker     Packs/day: 0.50     Years: 13.00     Types: Cigarettes     Quit date: 1/1/2015     Smokeless tobacco: Never Used     Alcohol use 0.0 oz/week      Comment: frequency: rarely        Medications:      traMADol (ULTRAM) 50 MG tablet   citalopram (CELEXA) 40 MG tablet   gabapentin (NEURONTIN) 100 MG capsule   nicotine (NICODERM CQ) 21 MG/24HR 24 hr patch   methylphenidate ER (CONCERTA) 54 MG CR tablet   methylphenidate ER (CONCERTA) 54 MG CR tablet   ranitidine (ZANTAC) 150 MG tablet   nicotine (NICODERM CQ) 14 MG/24HR 24 hr  patch   nicotine (NICODERM CQ) 7 MG/24HR 24 hr patch   traZODone (DESYREL) 50 MG tablet   VENTOLIN  (90 BASE) MCG/ACT Inhaler   SM STOOL SOFTENER 100 MG capsule   hydrOXYzine (VISTARIL) 25 MG capsule   ibuprofen (ADVIL,MOTRIN) 800 MG tablet   Acetaminophen (TYLENOL PO)         Review of Systems   Constitutional: Negative.    Respiratory: Negative.    Cardiovascular: Negative.    Musculoskeletal: Positive for arthralgias, gait problem and joint swelling.   Skin: Positive for wound.   Neurological: Negative for syncope.   Psychiatric/Behavioral: Negative.        Physical Exam   BP: 128/91  Pulse: 88  Temp: 98.1  F (36.7  C)  Resp: 20  SpO2: 99 %      Physical Exam   Constitutional: She is oriented to person, place, and time. She appears well-developed and well-nourished. No distress.   Eyes: Conjunctivae are normal.   Cardiovascular: Normal rate.    Pulmonary/Chest: Effort normal.   Musculoskeletal:        Left shoulder: Normal.        Left elbow: She exhibits normal range of motion and no swelling. Tenderness found. Olecranon process tenderness noted.        Left wrist: Normal.        Left knee: She exhibits decreased range of motion (pain limits flexion past 90, can fully extend) and swelling. She exhibits no laceration (abrasion as indicated) and normal patellar mobility. Tenderness found. Patellar tendon tenderness noted.        Legs:  Neurological: She is alert and oriented to person, place, and time.   Skin: Skin is warm and dry.   Psychiatric: She has a normal mood and affect.   Nursing note and vitals reviewed.      ED Course     ED Course     Procedures  Left knee cleansed followed by application antibiotic ointment, telfa. ACE applied for compression of left knee and to secure dressing. Pt tolerated.     Assessments & Plan (with Medical Decision Making)     I have reviewed the nursing notes.  I have reviewed the findings, diagnosis, plan and need for follow up with the patient.    Discharge Medication  List as of 1/24/2018  2:59 PM      START taking these medications    Details   traMADol (ULTRAM) 50 MG tablet Take 1-2 tablets ( mg) by mouth every 6 hours as needed for pain, Disp-6 tablet, R-0, Local Print             Final diagnoses:   Fall, initial encounter   Contusion of left elbow, initial encounter   Knee clicking   Acute pain of left knee   No Fx on XR. Due to pain/clicking left knee, will use crutches and ACE for 2-3 days. RICE, ibu/tylenol and ultram for pain >5/10 for 1-2 days. F/U with PCP 7-10 days from injury if ongoing pain/ROM problems, will seek attention sooner with concern/worsening. Patient verbally educated and given appropriate education sheets for each of the diagnoses and has no questions.    Eddie Amado PA-C   1/24/2018   3:24 PM      1/24/2018   [facility]     Eddie Amado PA  01/24/18 1528

## 2018-01-24 NOTE — ED AVS SNAPSHOT
[facility]    [address]    [place] [address]    Phone:  [phone]                                       Cristiana Myles   MRN: [id number]    Department:  [facility]   Date of Visit:  1/24/2018           After Visit Summary Signature Page     I have received my discharge instructions, and my questions have been answered. I have discussed any challenges I see with this plan with the nurse or doctor.    ..........................................................................................................................................  Patient/Patient Representative Signature      ..........................................................................................................................................  Patient Representative Print Name and Relationship to Patient    ..................................................               ................................................  Date                                            Time    ..........................................................................................................................................  Reviewed by Signature/Title    ...................................................              ..............................................  Date                                                            Time

## 2018-01-24 NOTE — DISCHARGE INSTRUCTIONS
- Rest, ice, compression, elevation  - Crutches for up to 5 days depending on swelling improvement.  - Rotate ibuprofen and tylenol every 3-6 hrs for 2-3 days. Ultram for pain >5/10.    - FYI Topicals: Arnica Cream (5$ at [facility]) and/or Capsaicin. (1/4 teaspoon cayenne pepper in a palmful olive oil and rub in 2-3 times daily for 5-7 days for pain)

## 2018-01-26 ENCOUNTER — HOSPITAL ENCOUNTER (OUTPATIENT)
Dept: PHYSICAL THERAPY | Facility: HOSPITAL | Age: 38
Setting detail: THERAPIES SERIES
End: 2018-01-26
Attending: NURSE PRACTITIONER
Payer: COMMERCIAL

## 2018-01-26 PROCEDURE — 97530 THERAPEUTIC ACTIVITIES: CPT | Mod: GP

## 2018-01-26 PROCEDURE — 40000719 ZZHC STATISTIC PT DEPARTMENT NEURO VISIT

## 2018-01-30 DIAGNOSIS — R10.32 LLQ ABDOMINAL PAIN: ICD-10-CM

## 2018-01-30 RX ORDER — GABAPENTIN 100 MG/1
CAPSULE ORAL
Qty: 90 CAPSULE | Refills: 5 | Status: SHIPPED | OUTPATIENT
Start: 2018-01-30 | End: 2018-09-20

## 2018-01-30 NOTE — TELEPHONE ENCOUNTER
Gabapentin      Last Written Prescription Date:  12/6/17  Last Fill Quantity: 90,   # refills: 1  Last Office Visit: 12/6/17  Future Office visit:    Next 5 appointments (look out 90 days)     Feb 02, 2018  9:45 AM CST   (Arrive by 9:30 AM)   SHORT with Sudhakar Cuellar MD   [facility] )    [address] [place] [address]   [phone]                   Routing refill request to provider for review/approval because:  Drug not on the [facility], [facility] or [facility] refill protocol or controlled substance

## 2018-02-02 ENCOUNTER — OFFICE VISIT (OUTPATIENT)
Dept: FAMILY MEDICINE | Facility: OTHER | Age: 38
End: 2018-02-02
Attending: FAMILY MEDICINE
Payer: COMMERCIAL

## 2018-02-02 VITALS
HEART RATE: 80 BPM | BODY MASS INDEX: 29.47 KG/M2 | TEMPERATURE: 98.1 F | OXYGEN SATURATION: 98 % | WEIGHT: 199 LBS | DIASTOLIC BLOOD PRESSURE: 80 MMHG | RESPIRATION RATE: 20 BRPM | SYSTOLIC BLOOD PRESSURE: 124 MMHG | HEIGHT: 69 IN

## 2018-02-02 DIAGNOSIS — S80.01XA CONTUSION OF RIGHT PATELLA, INITIAL ENCOUNTER: Primary | ICD-10-CM

## 2018-02-02 DIAGNOSIS — F90.0 ATTENTION DEFICIT HYPERACTIVITY DISORDER (ADHD), PREDOMINANTLY INATTENTIVE TYPE: ICD-10-CM

## 2018-02-02 PROCEDURE — G0463 HOSPITAL OUTPT CLINIC VISIT: HCPCS

## 2018-02-02 PROCEDURE — 99213 OFFICE O/P EST LOW 20 MIN: CPT | Performed by: FAMILY MEDICINE

## 2018-02-02 RX ORDER — METHYLPHENIDATE HYDROCHLORIDE 54 MG/1
54 TABLET ORAL EVERY MORNING
Qty: 30 TABLET | Refills: 0 | Status: SHIPPED | OUTPATIENT
Start: 2018-03-07 | End: 2018-04-19

## 2018-02-02 RX ORDER — METHYLPHENIDATE HYDROCHLORIDE 54 MG/1
54 TABLET ORAL DAILY
Qty: 30 TABLET | Refills: 0 | Status: SHIPPED | OUTPATIENT
Start: 2018-02-07 | End: 2018-05-02

## 2018-02-02 RX ORDER — METHYLPHENIDATE HYDROCHLORIDE 54 MG/1
54 TABLET ORAL EVERY MORNING
Qty: 30 TABLET | Refills: 0 | Status: SHIPPED | OUTPATIENT
Start: 2018-04-07 | End: 2018-05-02

## 2018-02-02 ASSESSMENT — PAIN SCALES - GENERAL: PAINLEVEL: MODERATE PAIN (4)

## 2018-02-02 NOTE — NURSING NOTE
"Chief Complaint   Patient presents with     A.D.H.D     Knee Pain       Initial /80 (BP Location: Left arm, Patient Position: Chair, Cuff Size: Adult Regular)  Pulse 80  Temp 98.1  F (36.7  C) (Tympanic)  Resp 20  Ht 5' 9\" (1.753 m)  Wt 199 lb (90.3 kg)  SpO2 98%  BMI 29.39 kg/m2 Estimated body mass index is 29.39 kg/(m^2) as calculated from the following:    Height as of this encounter: 5' 9\" (1.753 m).    Weight as of this encounter: 199 lb (90.3 kg).  Medication Reconciliation: hossein Serrano      "

## 2018-02-02 NOTE — PROGRESS NOTES
"  SUBJECTIVE:   Cristiana Myles is a 37 year old female who presents to clinic today for the following health issues:      Medication Followup of Methylphenidate    Taking Medication as prescribed: yes    Side Effects:  None    Medication Helping Symptoms:  Yes    Doing well and has been stable for long time. UDS UTD.        ED/UC Followup:    Facility:  [facility]   Date of visit: 1/24  Reason for visit: Left knee pain due to a fall  Current Status: Rated pain 4/10, constant, sharp pain   PROCEDURE:  XR KNEE LT 3 VW     HISTORY: fell;      COMPARISON:  None.     TECHNIQUE:  3 views of the left knee were obtained.     FINDINGS:  No fracture or dislocation is identified. The joint spaces  are preserved.           IMPRESSION: No acute fracture.       ASHLEY GILMORE MD     Getting better some - but walking and up/down stairs hurts  Taking motrin as needed avg 2 times a day   Fell directly on knee cap    Problem list and histories reviewed & adjusted, as indicated.  Additional history: as documented    Labs reviewed in EPIC    Reviewed and updated as needed this visit by clinical staff       Reviewed and updated as needed this visit by Provider         ROS:  C: NEGATIVE for fever, chills, change in weight  E/M: NEGATIVE for ear, mouth and throat problems  R: NEGATIVE for significant cough or SOB  CV: NEGATIVE for chest pain, palpitations or peripheral edema    OBJECTIVE:                                                    /80 (BP Location: Left arm, Patient Position: Chair, Cuff Size: Adult Regular)  Pulse 80  Temp 98.1  F (36.7  C) (Tympanic)  Resp 20  Ht 5' 9\" (1.753 m)  Wt 199 lb (90.3 kg)  SpO2 98%  BMI 29.39 kg/m2  Body mass index is 29.39 kg/(m^2).   GENERAL: healthy, alert, well nourished, well hydrated, no distress  Left knee - eschars healing well. Some swelling of prepatellar bursa. Good ROM and ligamentl stability   PSYCH: Alert and oriented times 3; speech- coherent , normal rate and volume; " able to articulate logical thoughts, able to abstract reason, no tangential thoughts, no hallucinations or delusions, affect- normal         ASSESSMENT/PLAN:                                                      (S80.01XA) Contusion of right patella, initial encounter  (primary encounter diagnosis)  Comment: will take time to heal No gross abnormality seen  Plan: Symptomatic treatment was discussed along when patient should call and/or come back into the clinic or go to ER/Urgent care. All questions answered.   Discussed in length conservative measures of OTC medications for pain, Ice/Heat, elevation and the concept of R.I.C.E.. Continue behavioral changes and proper body mechanics to allow for healing. Follow up as directed.       (F90.0) Attention deficit hyperactivity disorder (ADHD), predominantly inattentive type  Comment: doing well.   Plan: methylphenidate ER (CONCERTA) 54 MG CR tablet,         methylphenidate ER (CONCERTA) 54 MG CR tablet,         methylphenidate ER (CONCERTA) 54 MG CR tablet        Continue current medications and behavioral changes.   F/u in 3 months           Sudhakar Cuellar MD  [facility]

## 2018-02-02 NOTE — MR AVS SNAPSHOT
After Visit Summary   2/2/2018    Cristiana Myles    MRN: [id number]           Patient Information     Date Of Birth          1980        Visit Information        Provider Department      2/2/2018 9:45 AM Sudhakar Cuellar MD [facility]        Today's Diagnoses     Contusion of right patella, initial encounter    -  1    Attention deficit hyperactivity disorder (ADHD), predominantly inattentive type           Follow-ups after your visit        Your next 10 appointments already scheduled     Feb 07, 2018  9:00 AM CST   Vestibular Treatment with Nadira Odegaard-Lynn, PT   [facility] ([facility] )    [address] [address]   [phone]            Feb 14, 2018  9:00 AM CST   Vestibular Treatment with Nadira Odegaard-Lynn, PT   [facility] ([facility] )    [address] [address]   [phone]            Feb 21, 2018 10:00 AM CST   Vestibular Treatment with Nadira Odegaard-Lynn, PT   [facility] ([facility] )    [address] [address]   [phone]            Feb 28, 2018  9:00 AM CST   Vestibular Treatment with Nadira Odegaard-Lynn, PT   [facility] ([facility] )    [address] [address]   [phone]              Who to contact     If you have questions or need follow up information about today's clinic visit or your schedule please contact [facility] directly at [phone].  Normal or non-critical lab and imaging results will be communicated to you by MyChart, letter or phone within 4 business days after the clinic has received the results. If you do not hear from us within 7 days, please contact the clinic through MyChart or phone. If you have a critical or abnormal lab result, we will notify you by phone as soon as possible.  Submit refill requests through [employer] or call your pharmacy and they will forward the refill  "request to us. Please allow 3 business days for your refill to be completed.          Additional Information About Your Visit        [facility] Information     [facility] gives you secure access to your electronic health record. If you see a primary care provider, you can also send messages to your care team and make appointments. If you have questions, please call your primary care clinic.  If you do not have a primary care provider, please call [phone] and they will assist you.        Care EveryWhere ID     This is your Care EveryWhere ID. This could be used by other organizations to access your [facility] medical records  [id number]        Your Vitals Were     Pulse Temperature Respirations Height Pulse Oximetry BMI (Body Mass Index)    80 98.1  F (36.7  C) (Tympanic) 20 5' 9\" (1.753 m) 98% 29.39 kg/m2       Blood Pressure from Last 3 Encounters:   02/02/18 124/80   01/24/18 128/91   12/06/17 110/70    Weight from Last 3 Encounters:   02/02/18 199 lb (90.3 kg)   12/06/17 199 lb (90.3 kg)   11/24/17 203 lb (92.1 kg)              Today, you had the following     No orders found for display         Today's Medication Changes          These changes are accurate as of 2/2/18 10:24 AM.  If you have any questions, ask your nurse or doctor.               These medicines have changed or have updated prescriptions.        Dose/Directions    * methylphenidate ER 54 MG CR tablet   Commonly known as:  CONCERTA   This may have changed:  Another medication with the same name was added. Make sure you understand how and when to take each.   Used for:  Attention deficit hyperactivity disorder (ADHD), predominantly inattentive type   Changed by:  Sudhakar Cuellar MD        Dose:  54 mg   Start taking on:  2/7/2018   Take 1 tablet (54 mg) by mouth daily   Quantity:  30 tablet   Refills:  0       * methylphenidate ER 54 MG CR tablet   Commonly known as:  CONCERTA   This may have changed:  Another medication with the same name was " added. Make sure you understand how and when to take each.   Used for:  Attention deficit hyperactivity disorder (ADHD), predominantly inattentive type   Changed by:  Sudhakar Cuellar MD        Dose:  54 mg   Start taking on:  3/7/2018   Take 1 tablet (54 mg) by mouth every morning   Quantity:  30 tablet   Refills:  0       * methylphenidate ER 54 MG CR tablet   Commonly known as:  CONCERTA   This may have changed:  You were already taking a medication with the same name, and this prescription was added. Make sure you understand how and when to take each.   Used for:  Attention deficit hyperactivity disorder (ADHD), predominantly inattentive type   Changed by:  Sudhakar Cuellar MD        Dose:  54 mg   Start taking on:  4/7/2018   Take 1 tablet (54 mg) by mouth every morning   Quantity:  30 tablet   Refills:  0       * Notice:  This list has 3 medication(s) that are the same as other medications prescribed for you. Read the directions carefully, and ask your doctor or other care provider to review them with you.         Where to get your medicines      Some of these will need a paper prescription and others can be bought over the counter.  Ask your nurse if you have questions.     Bring a paper prescription for each of these medications     methylphenidate ER 54 MG CR tablet    methylphenidate ER 54 MG CR tablet    methylphenidate ER 54 MG CR tablet                Primary Care Provider Office Phone # Fax #    Sudhakar Cuellar [phone]       [facility] [address]  [facility] [address]        Equal Access to Services     [facility] AH: Hadii luis bender Sofavian, waaxda luqadaha, qaybta kaalmada adeelinada, can vilchis . So [facility] [phone].    ATENCIÓN: Si habla español, tiene a singh disposición servicios gratuitos de asistencia lingüística. Llame al [phone].    We comply with applicable federal civil rights laws and [place] laws. We do not  discriminate on the basis of race, color, national origin, age, disability, sex, sexual orientation, or gender identity.            Thank you!     Thank you for [facility]  for your care. Our goal is always to provide you with excellent care. Hearing back from our patients is one way we can continue to improve our services. Please take a few minutes to complete the written survey that you may receive in the mail after your visit with us. Thank you!             Your Updated Medication List - Protect others around you: Learn how to safely use, store and throw away your medicines at www.disposemymeds.org.          This list is accurate as of 2/2/18 10:24 AM.  Always use your most recent med list.                   Brand Name Dispense Instructions for use Diagnosis    citalopram 40 MG tablet    celeXA    30 tablet    TAKE 1 TABLET BY MOUTH DAILY    DAFNE (generalized anxiety disorder), MDD (major depressive disorder)       gabapentin 100 MG capsule    NEURONTIN    90 capsule    TAKE 1 CAPSULE BY MOUTH 3 TIMES DAILY    LLQ abdominal pain       hydrOXYzine 25 MG capsule    VISTARIL    60 capsule    TAKE 1 OR 2 CAPSULES BY MOUTH EVERY 4-6 HOURS AS NEEDED FOR ANXIETY    Anxiety, Attention deficit disorder       ibuprofen 800 MG tablet    ADVIL/MOTRIN    40 tablet    Take 1 tablet (800 mg) by mouth every 8 hours as needed for pain    Pelvic pain in female       * methylphenidate ER 54 MG CR tablet   Start taking on:  2/7/2018    CONCERTA    30 tablet    Take 1 tablet (54 mg) by mouth daily    Attention deficit hyperactivity disorder (ADHD), predominantly inattentive type       * methylphenidate ER 54 MG CR tablet   Start taking on:  3/7/2018    CONCERTA    30 tablet    Take 1 tablet (54 mg) by mouth every morning    Attention deficit hyperactivity disorder (ADHD), predominantly inattentive type       * methylphenidate ER 54 MG CR tablet   Start taking on:  4/7/2018    CONCERTA    30 tablet    Take 1  tablet (54 mg) by mouth every morning    Attention deficit hyperactivity disorder (ADHD), predominantly inattentive type       * nicotine 21 MG/24HR 24 hr patch    NICODERM CQ     Place 1 patch onto the skin every 24 hours        * nicotine 14 MG/24HR 24 hr patch    NICODERM CQ    30 patch    Place 1 patch onto the skin every 24 hours    Encounter for tobacco use cessation counseling       * nicotine 7 MG/24HR 24 hr patch    NICODERM CQ    30 patch    Place 1 patch onto the skin every 24 hours    Encounter for tobacco use cessation counseling       ranitidine 150 MG tablet    ZANTAC    60 tablet    Take 1 tablet (150 mg) by mouth 2 times daily    Gastroesophageal reflux disease without esophagitis       SM STOOL SOFTENER 100 MG capsule   Generic drug:  docusate sodium     30 capsule    TAKE 1 CAPSULE BY MOUTH DAILY    Pelvic pain in female       traMADol 50 MG tablet    ULTRAM    6 tablet    Take 1-2 tablets ( mg) by mouth every 6 hours as needed for pain        traZODone 50 MG tablet    DESYREL    90 tablet    TAKE 1/2 TO 3 TABLETS BY MOUTH AT BEDTIME AS NEEDED FOR INSOMNIA    Insomnia       TYLENOL PO           VENTOLIN  (90 BASE) MCG/ACT Inhaler   Generic drug:  albuterol     18 g    INHALE 2 PUFFS INTO THE LUNGS EVERY 4 HOURS AS NEEDED FOR SHORTNESS OF BREATH/DYSPNEA    Reactive airway disease that is not asthma       * Notice:  This list has 6 medication(s) that are the same as other medications prescribed for you. Read the directions carefully, and ask your doctor or other care provider to review them with you.

## 2018-02-04 ENCOUNTER — HEALTH MAINTENANCE LETTER (OUTPATIENT)
Age: 38
End: 2018-02-04

## 2018-02-06 DIAGNOSIS — R10.2 PELVIC PAIN IN FEMALE: ICD-10-CM

## 2018-02-06 RX ORDER — DOCUSATE SODIUM 100 MG/1
CAPSULE, LIQUID FILLED ORAL
Qty: 30 CAPSULE | Refills: 11 | Status: SHIPPED | OUTPATIENT
Start: 2018-02-06 | End: 2019-03-04

## 2018-02-07 ENCOUNTER — HOSPITAL ENCOUNTER (OUTPATIENT)
Dept: PHYSICAL THERAPY | Facility: HOSPITAL | Age: 38
Setting detail: THERAPIES SERIES
End: 2018-02-07
Attending: NURSE PRACTITIONER
Payer: COMMERCIAL

## 2018-02-07 PROCEDURE — 97112 NEUROMUSCULAR REEDUCATION: CPT | Mod: GP

## 2018-02-07 PROCEDURE — 40000719 ZZHC STATISTIC PT DEPARTMENT NEURO VISIT

## 2018-02-14 ENCOUNTER — HOSPITAL ENCOUNTER (OUTPATIENT)
Dept: PHYSICAL THERAPY | Facility: HOSPITAL | Age: 38
Setting detail: THERAPIES SERIES
End: 2018-02-14
Attending: NURSE PRACTITIONER
Payer: COMMERCIAL

## 2018-02-14 ENCOUNTER — OFFICE VISIT (OUTPATIENT)
Dept: OBGYN | Facility: OTHER | Age: 38
End: 2018-02-14
Attending: ADVANCED PRACTICE MIDWIFE
Payer: COMMERCIAL

## 2018-02-14 VITALS
HEIGHT: 69 IN | SYSTOLIC BLOOD PRESSURE: 116 MMHG | WEIGHT: 199 LBS | HEART RATE: 77 BPM | BODY MASS INDEX: 29.47 KG/M2 | OXYGEN SATURATION: 100 % | DIASTOLIC BLOOD PRESSURE: 82 MMHG

## 2018-02-14 DIAGNOSIS — Z01.419 WELL WOMAN EXAM WITH ROUTINE GYNECOLOGICAL EXAM: Primary | ICD-10-CM

## 2018-02-14 DIAGNOSIS — Z12.4 SCREENING FOR CERVICAL CANCER: ICD-10-CM

## 2018-02-14 DIAGNOSIS — N93.9 ABNORMAL UTERINE BLEEDING (AUB): ICD-10-CM

## 2018-02-14 LAB
ALBUMIN UR-MCNC: NEGATIVE MG/DL
APPEARANCE UR: CLEAR
BACTERIA #/AREA URNS HPF: ABNORMAL /HPF
BILIRUB UR QL STRIP: NEGATIVE
C TRACH DNA SPEC QL PROBE+SIG AMP: NOT DETECTED
COLOR UR AUTO: ABNORMAL
ERYTHROCYTE [DISTWIDTH] IN BLOOD BY AUTOMATED COUNT: 13.5 % (ref 10–15)
EST. AVERAGE GLUCOSE BLD GHB EST-MCNC: 103 MG/DL
GLUCOSE SERPL-MCNC: 79 MG/DL (ref 70–99)
GLUCOSE UR STRIP-MCNC: NEGATIVE MG/DL
HBA1C MFR BLD: 5.2 % (ref 4.3–6)
HCG UR QL: NEGATIVE
HCT VFR BLD AUTO: 40.6 % (ref 35–47)
HGB BLD-MCNC: 13.8 G/DL (ref 11.7–15.7)
HGB UR QL STRIP: ABNORMAL
KETONES UR STRIP-MCNC: NEGATIVE MG/DL
LEUKOCYTE ESTERASE UR QL STRIP: NEGATIVE
MCH RBC QN AUTO: 28.5 PG (ref 26.5–33)
MCHC RBC AUTO-ENTMCNC: 34 G/DL (ref 31.5–36.5)
MCV RBC AUTO: 84 FL (ref 78–100)
N GONORRHOEA DNA SPEC QL PROBE+SIG AMP: NOT DETECTED
NITRATE UR QL: NEGATIVE
PH UR STRIP: 6.5 PH (ref 4.7–8)
PLATELET # BLD AUTO: 335 10E9/L (ref 150–450)
RBC # BLD AUTO: 4.84 10E12/L (ref 3.8–5.2)
RBC #/AREA URNS AUTO: 2 /HPF (ref 0–2)
SOURCE: ABNORMAL
SP GR UR STRIP: 1.01 (ref 1–1.03)
SPECIMEN SOURCE: NORMAL
SPECIMEN SOURCE: NORMAL
TSH SERPL DL<=0.005 MIU/L-ACNC: 0.93 MU/L (ref 0.4–4)
UROBILINOGEN UR STRIP-MCNC: NORMAL MG/DL (ref 0–2)
WBC # BLD AUTO: 8.3 10E9/L (ref 4–11)
WBC #/AREA URNS AUTO: <1 /HPF (ref 0–2)
WET PREP SPEC: NORMAL

## 2018-02-14 PROCEDURE — 83001 ASSAY OF GONADOTROPIN (FSH): CPT | Mod: ZL | Performed by: ADVANCED PRACTICE MIDWIFE

## 2018-02-14 PROCEDURE — 99385 PREV VISIT NEW AGE 18-39: CPT | Mod: 25 | Performed by: ADVANCED PRACTICE MIDWIFE

## 2018-02-14 PROCEDURE — 83036 HEMOGLOBIN GLYCOSYLATED A1C: CPT | Mod: ZL | Performed by: ADVANCED PRACTICE MIDWIFE

## 2018-02-14 PROCEDURE — 88142 CYTOPATH C/V THIN LAYER: CPT | Performed by: ADVANCED PRACTICE MIDWIFE

## 2018-02-14 PROCEDURE — 88305 TISSUE EXAM BY PATHOLOGIST: CPT | Mod: TC | Performed by: ADVANCED PRACTICE MIDWIFE

## 2018-02-14 PROCEDURE — 82947 ASSAY GLUCOSE BLOOD QUANT: CPT | Mod: ZL | Performed by: ADVANCED PRACTICE MIDWIFE

## 2018-02-14 PROCEDURE — 81001 URINALYSIS AUTO W/SCOPE: CPT | Mod: ZL | Performed by: ADVANCED PRACTICE MIDWIFE

## 2018-02-14 PROCEDURE — 84443 ASSAY THYROID STIM HORMONE: CPT | Mod: ZL | Performed by: ADVANCED PRACTICE MIDWIFE

## 2018-02-14 PROCEDURE — 97112 NEUROMUSCULAR REEDUCATION: CPT | Mod: GP

## 2018-02-14 PROCEDURE — 36415 COLL VENOUS BLD VENIPUNCTURE: CPT | Mod: ZL | Performed by: ADVANCED PRACTICE MIDWIFE

## 2018-02-14 PROCEDURE — 85027 COMPLETE CBC AUTOMATED: CPT | Mod: ZL | Performed by: ADVANCED PRACTICE MIDWIFE

## 2018-02-14 PROCEDURE — 58100 BIOPSY OF UTERUS LINING: CPT | Performed by: ADVANCED PRACTICE MIDWIFE

## 2018-02-14 PROCEDURE — 87210 SMEAR WET MOUNT SALINE/INK: CPT | Mod: ZL | Performed by: ADVANCED PRACTICE MIDWIFE

## 2018-02-14 PROCEDURE — 2894A VOIDCORRECT: CPT | Mod: 25 | Performed by: ADVANCED PRACTICE MIDWIFE

## 2018-02-14 PROCEDURE — 99000 SPECIMEN HANDLING OFFICE-LAB: CPT | Performed by: ADVANCED PRACTICE MIDWIFE

## 2018-02-14 PROCEDURE — 99212 OFFICE O/P EST SF 10 MIN: CPT | Mod: 25 | Performed by: ADVANCED PRACTICE MIDWIFE

## 2018-02-14 PROCEDURE — 81025 URINE PREGNANCY TEST: CPT | Mod: ZL | Performed by: ADVANCED PRACTICE MIDWIFE

## 2018-02-14 PROCEDURE — G0123 SCREEN CERV/VAG THIN LAYER: HCPCS | Mod: ZL | Performed by: ADVANCED PRACTICE MIDWIFE

## 2018-02-14 PROCEDURE — 40000718 ZZHC STATISTIC PT DEPARTMENT ORTHO VISIT

## 2018-02-14 PROCEDURE — 87491 CHLMYD TRACH DNA AMP PROBE: CPT | Mod: ZL | Performed by: ADVANCED PRACTICE MIDWIFE

## 2018-02-14 PROCEDURE — G0463 HOSPITAL OUTPT CLINIC VISIT: HCPCS | Mod: 25

## 2018-02-14 PROCEDURE — 40000788 ZZHCL STATISTIC ESTIMATED AVERAGE GLUCOSE: Mod: ZL | Performed by: ADVANCED PRACTICE MIDWIFE

## 2018-02-14 PROCEDURE — 87591 N.GONORRHOEAE DNA AMP PROB: CPT | Mod: ZL | Performed by: ADVANCED PRACTICE MIDWIFE

## 2018-02-14 PROCEDURE — G0476 HPV COMBO ASSAY CA SCREEN: HCPCS | Mod: ZL | Performed by: ADVANCED PRACTICE MIDWIFE

## 2018-02-14 ASSESSMENT — ANXIETY QUESTIONNAIRES
7. FEELING AFRAID AS IF SOMETHING AWFUL MIGHT HAPPEN: SEVERAL DAYS
3. WORRYING TOO MUCH ABOUT DIFFERENT THINGS: NEARLY EVERY DAY
1. FEELING NERVOUS, ANXIOUS, OR ON EDGE: NEARLY EVERY DAY
4. TROUBLE RELAXING: NEARLY EVERY DAY
IF YOU CHECKED OFF ANY PROBLEMS ON THIS QUESTIONNAIRE, HOW DIFFICULT HAVE THESE PROBLEMS MADE IT FOR YOU TO DO YOUR WORK, TAKE CARE OF THINGS AT HOME, OR GET ALONG WITH OTHER PEOPLE: VERY DIFFICULT
2. NOT BEING ABLE TO STOP OR CONTROL WORRYING: NEARLY EVERY DAY
GAD7 TOTAL SCORE: 18
5. BEING SO RESTLESS THAT IT IS HARD TO SIT STILL: MORE THAN HALF THE DAYS
6. BECOMING EASILY ANNOYED OR IRRITABLE: NEARLY EVERY DAY

## 2018-02-14 ASSESSMENT — PAIN SCALES - GENERAL: PAINLEVEL: NO PAIN (0)

## 2018-02-14 NOTE — PATIENT INSTRUCTIONS
Return to office in one year for annual visit and as needed.    Thank you for allowing Levi ZHOU CNM and our OB team to participate in your care.  If you have a scheduling or an appointment question please contact [facility] Coordinator at their direct line [phone].   ALL nursing questions or concerns can be directed to your OB nurse at: [phone] - Jolly

## 2018-02-14 NOTE — NURSING NOTE
"Chief Complaint   Patient presents with     Gyn Exam       Initial /82 (BP Location: Left arm, Patient Position: Chair, Cuff Size: Adult Regular)  Pulse 77  Ht 5' 9\" (1.753 m)  Wt 199 lb (90.3 kg)  LMP 02/09/2018  SpO2 100%  BMI 29.39 kg/m2 Estimated body mass index is 29.39 kg/(m^2) as calculated from the following:    Height as of this encounter: 5' 9\" (1.753 m).    Weight as of this encounter: 199 lb (90.3 kg).  Medication Reconciliation: hossein Serrano      "

## 2018-02-14 NOTE — PROGRESS NOTES
SHONDA Zendejas is a 37 year old  female who presents for annual exam.   Youngest child 11  Largest Child 8 lb 13.5 oz  GDM n  Hypertension n   x 3  Patient's last menstrual period was 2018.  Menses:  Cycles  45+ days  When did they start 9 How many days bleed  Half to 1 day with 1 heavy with clots pain 8/10 Contraception BTO Abstinence.  Planning pregnancy: no  Concerns in addition to routine health maintenance?  Mood changes, period changes, hot flashes, and night sweats.  GYNECOLOGIC HISTORY:   Surgery: Left ovarian removed  History sexually transmitted infections:  Chlamydia/treated  Safe?  y  STI testing offered?  y  History of abnormal Pap smear: unsure  Problems with sex? (bleeding/pain)n  Family history of: breast cancer: mgm   Uterine cancer: n ovarian cancer: n   colon cancer: n  PROCEDURE:  After informed consent was obtained from the patient, a speculum was placed in the vagina to visualize the cervix.  Tenaculum was applied to the anterior cervical lip. Endometrial biopsy pipelle was passed through the cervical os and tissue obtained.  Uterus sounded to 6 cm.  Tenaculum was removed and sites were hemostatic. There were no complications. The patient tolerated the procedure well with a minimal amount of cramping noted.  Specimen was sent to pathology.    HEALTH MAINTENANCE:  Cholesterol: (No results found for: CHOL History of abnormal lipids: n  Mammo: y . History of abnormal Mammo:n   Regular Self Breast Exams: n Calcium/Vitamin D or Vitamin: y Exercise n    TSH: (  TSH   Date Value Ref Range Status   2015 1.83 0.40 - 4.00 mU/L Final     Comment:     Effective 2014, the reference range for this assay has changed to reflect   new instrumentation/methodology.      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     FH: breast cancer      DAFNE (generalised anxiety disorder)      Irritable bowel syndrome with both constipation and diarrhea 8/2/2017     MDD (major depressive disorder)      Migraine, unspecified, without mention of intractable migraine without mention of status migrainosus 3/13/2000     Tobacco abuse, in remission      Unspecified sinusitis (chronic) 3/8/2001     Past Surgical History:   Procedure Laterality Date     ENDOSCOPY UPPER, COLONOSCOPY, COMBINED N/A 9/4/2015    Procedure: COMBINED ENDOSCOPY UPPER, COLONOSCOPY;  Surgeon: Griffin Barrientos DO;  Location: [facility]     LAPAROSCOPIC CYSTECTOMY OVARIAN (BENIGN) Right 1/27/2016    Procedure: LAPAROSCOPIC CYSTECTOMY OVARIAN (BENIGN);  Surgeon: Kuldip España MD;  Location: [facility]     LAPAROSCOPIC SALPINGO-OOPHORECTOMY Left 1/27/2016    Procedure: LAPAROSCOPIC SALPINGO-OOPHORECTOMY;  Surgeon: Kuldip España MD;  Location: [facility]     LAPAROSCOPY DIAGNOSTIC (GYN) Left 1/27/2016    Procedure: LAPAROSCOPY DIAGNOSTIC (GYN);  Surgeon: Kuldip España MD;  Location: [facility]     TUBAL LIGATION  2006     wisdom teeth       Family History   Problem Relation Age of Onset     Breast Cancer Maternal Grandmother      Hypertension Maternal Grandfather      Hyperlipidemia Maternal Grandfather      DIABETES Paternal Grandmother      Asthma No family hx of      Social History     Social History     Marital status:      Spouse name: N/A     Number of children: N/A     Years of education: N/A     Social History Main Topics     Smoking status: Former Smoker     Packs/day: 0.50     Years: 13.00     Types: Cigarettes     Quit date: 1/1/2015     Smokeless tobacco: Never Used     Alcohol use 0.0 oz/week      Comment: frequency:  rarely     Drug use: No     Sexual activity: Yes     Partners: Male     Other Topics Concern      Service No     Blood Transfusions Yes     OPermits if needed     Caffeine Concern Yes     soda > 32 oz daily     Occupational Exposure No     Hobby Hazards No     Sleep Concern No     Stress Concern Yes     Weight Concern Yes     Special Diet No     Back Care No     Exercise Yes     daily     Seat Belt Yes     Parent/Sibling W/ Cabg, Mi Or Angioplasty Before 65f 55m? No     Social History Narrative       Current Outpatient Prescriptions:      docusate sodium (COLACE) 100 MG capsule, TAKE 1 CAPSULE BY MOUTH DAILY, Disp: 30 capsule, Rfl: 11     methylphenidate ER (CONCERTA) 54 MG CR tablet, Take 1 tablet (54 mg) by mouth daily, Disp: 30 tablet, Rfl: 0     [START ON 3/7/2018] methylphenidate ER (CONCERTA) 54 MG CR tablet, Take 1 tablet (54 mg) by mouth every morning, Disp: 30 tablet, Rfl: 0     [START ON 4/7/2018] methylphenidate ER (CONCERTA) 54 MG CR tablet, Take 1 tablet (54 mg) by mouth every morning, Disp: 30 tablet, Rfl: 0     gabapentin (NEURONTIN) 100 MG capsule, TAKE 1 CAPSULE BY MOUTH 3 TIMES DAILY, Disp: 90 capsule, Rfl: 5     traMADol (ULTRAM) 50 MG tablet, Take 1-2 tablets ( mg) by mouth every 6 hours as needed for pain, Disp: 6 tablet, Rfl: 0     citalopram (CELEXA) 40 MG tablet, TAKE 1 TABLET BY MOUTH DAILY, Disp: 30 tablet, Rfl: 1     nicotine (NICODERM CQ) 21 MG/24HR 24 hr patch, Place 1 patch onto the skin every 24 hours, Disp: , Rfl:      ranitidine (ZANTAC) 150 MG tablet, Take 1 tablet (150 mg) by mouth 2 times daily, Disp: 60 tablet, Rfl: 11     nicotine (NICODERM CQ) 14 MG/24HR 24 hr patch, Place 1 patch onto the skin every 24 hours, Disp: 30 patch, Rfl: 0     nicotine (NICODERM CQ) 7 MG/24HR 24 hr patch, Place 1 patch onto the skin every 24 hours, Disp: 30 patch, Rfl: 0     traZODone (DESYREL) 50 MG tablet, TAKE 1/2 TO 3 TABLETS BY MOUTH AT BEDTIME AS NEEDED FOR INSOMNIA, Disp: 90  "tablet, Rfl: 9     VENTOLIN  (90 BASE) MCG/ACT Inhaler, INHALE 2 PUFFS INTO THE LUNGS EVERY 4 HOURS AS NEEDED FOR SHORTNESS OF BREATH/DYSPNEA, Disp: 18 g, Rfl: 3     hydrOXYzine (VISTARIL) 25 MG capsule, TAKE 1 OR 2 CAPSULES BY MOUTH EVERY 4-6 HOURS AS NEEDED FOR ANXIETY, Disp: 60 capsule, Rfl: 3     ibuprofen (ADVIL,MOTRIN) 800 MG tablet, Take 1 tablet (800 mg) by mouth every 8 hours as needed for pain, Disp: 40 tablet, Rfl: 1     Acetaminophen (TYLENOL PO), , Disp: , Rfl:      Allergies   Allergen Reactions     Amoxicillin Diarrhea     Bee Venom      Bee venom (honey bee)       Latex      Sulfa Drugs      Sulfa (sulfonamide antibiotics)          Past medical, surgical, social and family history were reviewed and updated in EPIC.    ROS:    CONSTITUTIONAL:     NEGATIVE for fever, chills, change in weight  INTEGUMENTARY/SKIN:       NEGATIVE for worrisome rashes, moles or lesions  EYES:    In therapy for blurry vision  ENT/MOUTH: NEGATIVE for ear, mouth and throat problems  RESP:     NEGATIVE for significant cough or SOB  CV:   NEGATIVE for chest pain, palpitations or peripheral edema  GI:     NEGATIVE for nausea, abdominal pain,  or change in bowel habits.  Heartburn  :   NEGATIVE for dysuria, hematuria, vaginal discharge. Reports frequency, nocturia, and stress incontinence.  MUSCULOSKELETAL:     NEGATIVE for significant arthralgias or myalgia  NEURO:      NEGATIVE for weakness, or paresthesias.  Vertigo  ENDOCRINE:      Skin/hair drier.  PSYCHIATRIC:     Changes in mood or affect. Moods \"all over the place\"    EXAM:   /82 (BP Location: Left arm, Patient Position: Chair, Cuff Size: Adult Regular)  Pulse 77  Ht 5' 9\" (1.753 m)  Wt 199 lb (90.3 kg)  LMP 02/09/2018  SpO2 100%  BMI 29.39 kg/m2   BMI: Body mass index is 29.39 kg/(m^2).  Constitutional: healthy, alert and no distress  Head: Normocephalic. No masses, lesions, tenderness or abnormalities  Neck: Neck supple. Trachea midline. No " adenopathy. Thyroid symmetric, normal size.   Cardiovascular: RRR.   Respiratory: lungs clear   Breast: Breasts reveal mild symmetric fibrocystic densities, but there are no dominant, discrete, fixed or suspicious masses found.  Gastrointestinal: Abdomen soft, non-tender, non-distended. No masses, organomegaly.  :  Vulva:  No external lesions, normal female hair distribution, no inguinal adenopathy.    Urethra:  Midline, non-tender, well supported, no discharge  Vagina:  Moist, pink, no abnormal discharge, no lesions  Cervix: clean   Uterus:  Normal size, non-tender, freely mobile  Ovaries:  No masses appreciated  Rectal Exam: deferred  Musculoskeletal: extremities normal  Skin: no suspicious lesions or rashes  Psychiatric: Affect appropriate, cooperative,mentation appears normal.     COUNSELING:   regular exercise  healthy diet/nutrition  Immunizations   contraception  family planning  Folic Acid Counseling   reports that she quit smoking about 3 years ago. Her smoking use included Cigarettes. She has a 6.50 pack-year smoking history. She has never used smokeless tobacco.  Tobacco Cessation Action Plan: ready to quit  Body mass index is 29.39 kg/(m^2).  Weight management plan: eat healthy, start daily exercise  FRAX Risk Assessment    ASSESSMENT:  37 year old female with satisfactory annual exam  Need of a cervical cancer screening  Need breast cancer screening  Irregular Periods > 45 days apart   Abstinence for four months  Hx of left salpingo-oophorectomy and removal of right ovarian cysts  Hot flashes, night sweats, and mood changes  Urinary frequency, nocturia, and stress incontinence  Heartburn  Vertigo  Family hx of diabetes  Declines flu shot with education  Smoker    PLAN:   Pap with High Risk hpv  EMB  Wet prep, GC/CT  Tsh, random blood sugar,hp,hga1c,  FSH  UA with micro reflex to culture  Urine hCG qualitative  Mammogram:  Order for May, 2018  Pt will be 37 yo  Provider breast exam   provider visits  pt at home  PCP providing care for heartburn and vertigo  Offered flu shot  QuitPlan  Return to office: 1 year for annual and as needed    Greater than 15 minutes in addition to well woman visit spent discussing concerns with irregular menstruation and explaining endometrial biopsy procedure    WINNIE Alvarez, CNM

## 2018-02-14 NOTE — MR AVS SNAPSHOT
After Visit Summary   2/14/2018    Cristiana Myles    MRN: [id number]           Patient Information     Date Of Birth          1980        Visit Information        Provider Department      2/14/2018 10:30 AM Levi Mcrae APRN CNM [facility] [place]        Today's Diagnoses     Well woman exam with routine gynecological exam    -  1    Screening for cervical cancer        Abnormal uterine bleeding (AUB)          Care Instructions    Return to office in one year for annual visit and as needed.    Thank you for allowing Levi ZHOU CNM and our OB team to participate in your care.  If you have a scheduling or an appointment question please contact [facility] Unit Coordinator at their direct line [phone].   ALL nursing questions or concerns can be directed to your OB nurse at: [phone] [facility]              Follow-ups after your visit        Additional Services     CALL IT QUITS (QUITPLAN) REFERRAL       [place] [employer] QUITLIN[employer] FAX FORM  Fax form to: 1 [phone]    The clinic will facilitate the referral to the quitline.    Provider Information:  ===============================================================  WINNIE [place] CNM  ID#: [id number] - [facility] [place] [phone] Fax: [phone]   [email or web address]  Payor: BLUE PLUS / Plan: BLUE PLUS [place] / Product Type: HMO /   ===============================================================    The Public Health Service Guideline does not recommend providing over-the-counter nicotine replacement therapy products without physician authorization to patients with the following conditions: pregnancy, uncontrolled high blood pressure, or cardiovascular diseases.     I authorize the [place] Tobacco Quitlines to provide over-the-counter nicotine replacement products for the patient listed below if the patient's health plan benefits cover NRT or if the patient is  eligible for QUITPLAN services.    Patient Consented to:  ===============================================================  - YES - I am ready to quit tobacco and request the above information be given to the quitline so they may contact me.  I understand that one of [place]'s Tobacco Quitlines will inform my provider about my participation.  ===============================================================  Please check the BEST 3-hour call window for them to reach you: 11am - 2pm  May we leave a message?  YES  Language Preference:  English  Phone Number: Home Phone      [phone]  Mobile          [phone]     E-mail Address: [email or web address]    ========================================================================  FOR QUITLINE USE ONLY:  THIS INFORMATION WILL BE PROVIDED BACK TO THE PROVIDER  Contact date: __/ __/__ or ____ Did not reach after three attempts.    Outcome:  __ Enrolled in telephone counseling program  __ Declined  __ Not Reached    Stage of readiness: _______________________  Planned Quit Date: ___/ ___/ ___  Comments:      2011 [facility]   This message funded by [employer] [place], an independent licensee of the Blue [employer] and [employer]. Rev. 11/1/12                  Your next 10 appointments already scheduled     Feb 21, 2018 10:00 AM CST   Vestibular Treatment with [facility] ([facility] )    [address] [place] [address]   [phone]            Feb 28, 2018  9:00 AM CST   Vestibular Treatment with Nadira Whatley PT   [facility] ([facility] )    [address] [place] [address]   [phone]            May 02, 2018  9:45 AM CDT   (Arrive by 9:30 AM)   SHORT with Sudhakar Cuellar MD   [facility] ([facility] )    [address] [facility] [facility] [address]   [phone]              Who to contact     If you have  "questions or need follow up information about today's clinic visit or your schedule please contact [facility] directly at [phone].  Normal or non-critical lab and imaging results will be communicated to you by MyChart, letter or phone within 4 business days after the clinic has received the results. If you do not hear from us within 7 days, please contact the clinic through [employer] or phone. If you have a critical or abnormal lab result, we will notify you by phone as soon as possible.  Submit refill requests through [facility] or call your pharmacy and they will forward the refill request to us. Please allow 3 business days for your refill to be completed.          Additional Information About Your Visit        [employer] Information     [facility] gives you secure access to your electronic health record. If you see a primary care provider, you can also send messages to your care team and make appointments. If you have questions, please call your primary care clinic.  If you do not have a primary care provider, please call [phone] and they will assist you.        Care EveryWhere ID     This is your Care EveryWhere ID. This could be used by other organizations to access your [place] medical records  [id number]        Your Vitals Were     Pulse Height Last Period Pulse Oximetry BMI (Body Mass Index)       77 5' 9\" (1.753 m) 02/09/2018 100% 29.39 kg/m2        Blood Pressure from Last 3 Encounters:   02/14/18 116/82   02/02/18 124/80   01/24/18 128/91    Weight from Last 3 Encounters:   02/14/18 199 lb (90.3 kg)   02/02/18 199 lb (90.3 kg)   12/06/17 199 lb (90.3 kg)              We Performed the Following     A pap thin layer screen with  HPV - recommended age 30 - 65 years (select HPV order below)     CALL IT QUITS (QUITPLAN) REFERRAL     CBC with platelets     ENDOMETRIAL BIOPSY W/O CERVICAL DILATION     Estimated Average Glucose     Follicle stimulating hormone     GC/Chlamydia by PCR - [facility]     " Glucose     HCG qualitative urine     Hemoglobin A1c     HPV High Risk Types DNA Cervical     Surgical pathology exam     TSH     UA with Microscopic reflex to Culture     Wet prep        Primary Care Provider Office Phone # Fax #    Sudhakar Cuellar [phone] [address] [facility] [address]        Equal Access to Services     EARLROSE JOSEPHINE : Hadii luis ku hadasho Soomaali, waaxda luqadaha, qaybta kaalmada adeegyada, waxay ramsey dottieranjeet garrison vanessajoanie padron. So [facility] [phone].    ATENCIÓN: Si habla español, tiene a singh disposición servicios gratuitos de asistencia lingüística. Llame al [phone].    We comply with applicable federal civil rights laws and [place] laws. We do not discriminate on the basis of race, color, national origin, age, disability, sex, sexual orientation, or gender identity.            Thank you!     Thank you for choosing [facility]  for your care. Our goal is always to provide you with excellent care. Hearing back from our patients is one way we can continue to improve our services. Please take a few minutes to complete the written survey that you may receive in the mail after your visit with us. Thank you!             Your Updated Medication List - Protect others around you: Learn how to safely use, store and throw away your medicines at www.disposemymeds.org.          This list is accurate as of 2/14/18  4:53 PM.  Always use your most recent med list.                   Brand Name Dispense Instructions for use Diagnosis    citalopram 40 MG tablet    celeXA    30 tablet    TAKE 1 TABLET BY MOUTH DAILY    DAFNE (generalized anxiety disorder), MDD (major depressive disorder)       docusate sodium 100 MG capsule    COLACE    30 capsule    TAKE 1 CAPSULE BY MOUTH DAILY    Pelvic pain in female       gabapentin 100 MG capsule    NEURONTIN    90 capsule    TAKE 1 CAPSULE BY MOUTH 3 TIMES DAILY    LLQ abdominal pain       hydrOXYzine 25 MG capsule     VISTARIL    60 capsule    TAKE 1 OR 2 CAPSULES BY MOUTH EVERY 4-6 HOURS AS NEEDED FOR ANXIETY    Anxiety, Attention deficit disorder       ibuprofen 800 MG tablet    ADVIL/MOTRIN    40 tablet    Take 1 tablet (800 mg) by mouth every 8 hours as needed for pain    Pelvic pain in female       * methylphenidate ER 54 MG CR tablet    CONCERTA    30 tablet    Take 1 tablet (54 mg) by mouth daily    Attention deficit hyperactivity disorder (ADHD), predominantly inattentive type       * methylphenidate ER 54 MG CR tablet   Start taking on:  3/7/2018    CONCERTA    30 tablet    Take 1 tablet (54 mg) by mouth every morning    Attention deficit hyperactivity disorder (ADHD), predominantly inattentive type       * methylphenidate ER 54 MG CR tablet   Start taking on:  4/7/2018    CONCERTA    30 tablet    Take 1 tablet (54 mg) by mouth every morning    Attention deficit hyperactivity disorder (ADHD), predominantly inattentive type       * nicotine 21 MG/24HR 24 hr patch    NICODERM CQ     Place 1 patch onto the skin every 24 hours        * nicotine 14 MG/24HR 24 hr patch    NICODERM CQ    30 patch    Place 1 patch onto the skin every 24 hours    Encounter for tobacco use cessation counseling       * nicotine 7 MG/24HR 24 hr patch    NICODERM CQ    30 patch    Place 1 patch onto the skin every 24 hours    Encounter for tobacco use cessation counseling       ranitidine 150 MG tablet    ZANTAC    60 tablet    Take 1 tablet (150 mg) by mouth 2 times daily    Gastroesophageal reflux disease without esophagitis       traMADol 50 MG tablet    ULTRAM    6 tablet    Take 1-2 tablets ( mg) by mouth every 6 hours as needed for pain        traZODone 50 MG tablet    DESYREL    90 tablet    TAKE 1/2 TO 3 TABLETS BY MOUTH AT BEDTIME AS NEEDED FOR INSOMNIA    Insomnia       TYLENOL PO           VENTOLIN  (90 BASE) MCG/ACT Inhaler   Generic drug:  albuterol     18 g    INHALE 2 PUFFS INTO THE LUNGS EVERY 4 HOURS AS NEEDED FOR  SHORTNESS OF BREATH/DYSPNEA    Reactive airway disease that is not asthma       * Notice:  This list has 6 medication(s) that are the same as other medications prescribed for you. Read the directions carefully, and ask your doctor or other care provider to review them with you.

## 2018-02-15 LAB — FSH SERPL-ACNC: 5.5 IU/L

## 2018-02-15 ASSESSMENT — PATIENT HEALTH QUESTIONNAIRE - PHQ9: SUM OF ALL RESPONSES TO PHQ QUESTIONS 1-9: 19

## 2018-02-15 ASSESSMENT — ANXIETY QUESTIONNAIRES: GAD7 TOTAL SCORE: 18

## 2018-02-16 LAB — COPATH REPORT: NORMAL

## 2018-02-22 LAB
COPATH REPORT: NORMAL
PAP: NORMAL

## 2018-02-23 LAB
FINAL DIAGNOSIS: NORMAL
HPV HR 12 DNA CVX QL NAA+PROBE: NEGATIVE
HPV16 DNA SPEC QL NAA+PROBE: NEGATIVE
HPV18 DNA SPEC QL NAA+PROBE: NEGATIVE
SPECIMEN DESCRIPTION: NORMAL
SPECIMEN SOURCE CVX/VAG CYTO: NORMAL

## 2018-02-28 ENCOUNTER — HOSPITAL ENCOUNTER (OUTPATIENT)
Dept: PHYSICAL THERAPY | Facility: HOSPITAL | Age: 38
Setting detail: THERAPIES SERIES
End: 2018-02-28
Attending: NURSE PRACTITIONER
Payer: COMMERCIAL

## 2018-02-28 PROCEDURE — 40000719 ZZHC STATISTIC PT DEPARTMENT NEURO VISIT

## 2018-02-28 PROCEDURE — 97112 NEUROMUSCULAR REEDUCATION: CPT | Mod: GP

## 2018-03-07 ENCOUNTER — HOSPITAL ENCOUNTER (OUTPATIENT)
Dept: PHYSICAL THERAPY | Facility: HOSPITAL | Age: 38
Setting detail: THERAPIES SERIES
End: 2018-03-07
Attending: NURSE PRACTITIONER
Payer: COMMERCIAL

## 2018-03-07 PROCEDURE — 40000719 ZZHC STATISTIC PT DEPARTMENT NEURO VISIT

## 2018-03-07 PROCEDURE — 97530 THERAPEUTIC ACTIVITIES: CPT | Mod: GP

## 2018-03-07 NOTE — PROGRESS NOTES
Outpatient Physical Therapy Discharge Note     Patient: Cristiana Myles  : 1980    Beginning/End Dates of Reporting Period:  2018 to 3/7/2018    Referring Provider: Dr. Cuellar    Therapy Diagnosis: decreased balance compensation and vertigo     Client Self Report: states she is better with her sinus but still at times is dizzy but reports moving around well.    Objective Measurements:      Independent HEP  Balance is excellent 56/56 GUILLEN  Gait is functional  Head turns were better but today off as did not take her Concerta today but has been better                                    Outcome Measures (most recent score):      Goals:  Goal Identifier functional   Goal Description Client will be able to roll in bed without vertigo.   Target Date 18   Date Met      Progress:     Goal Identifier Functional   Goal Description Client will be able to turn head in kitchen and with grandchild  with no vertigo.   Target Date     Date Met      Progress:     Goal Identifier functional   Goal Description Client will have good balance evidnced by single leg x one minute.   Target Date     Date Met      Progress:     Goal Identifier     Goal Description     Target Date     Date Met      Progress:     Goal Identifier     Goal Description     Target Date     Date Met      Progress:     Goal Identifier     Goal Description     Target Date     Date Met      Progress:     Goal Identifier     Goal Description     Target Date     Date Met      Progress:     Goal Identifier     Goal Description     Target Date     Date Met      Progress:     Progress Toward Goals:   Progress this reporting period: Excellent with her progress but will need to do these for a lifetime given her comorbidites.    Plan:  Discharge from therapy.    Discharge:    Reason for Discharge: No further expectation of progress.    Equipment Issued:     Discharge Plan: Patient to continue home program.    RECERTIFICATION    Cristiana SUAREZ    1980    Session Number: 9 since start of care.    Reasons for Continuing Treatment: Treatment continued from 2/16/2018 - 3/7/2018 as client was ill and we had a few weeks of no treatment    Frequency/Duration  1 times per week for 3 weeks for a total of 3 visits.    Recertification Period  2/16/2018 - 3/09/2018    Physician Signature:                                                Date:    X_______________________________________________________    Physician Name: Dr. Cuellar    I certify the need for these services furnished under this plan of treatment and while under my care. Physician co-signature of this document indicates review and certification of the therapy plan.  This signature may be written on paper, or electronically signed within EPIC.

## 2018-03-25 ENCOUNTER — HOSPITAL ENCOUNTER (EMERGENCY)
Facility: HOSPITAL | Age: 38
Discharge: HOME | End: 2018-03-25
Attending: NURSE PRACTITIONER | Admitting: NURSE PRACTITIONER
Payer: COMMERCIAL

## 2018-03-25 VITALS
TEMPERATURE: 98.1 F | RESPIRATION RATE: 16 BRPM | BODY MASS INDEX: 31.01 KG/M2 | HEART RATE: 83 BPM | OXYGEN SATURATION: 96 % | SYSTOLIC BLOOD PRESSURE: 126 MMHG | WEIGHT: 210 LBS | DIASTOLIC BLOOD PRESSURE: 70 MMHG

## 2018-03-25 DIAGNOSIS — J20.9 ACUTE BRONCHITIS, UNSPECIFIED ORGANISM: ICD-10-CM

## 2018-03-25 LAB
DEPRECATED S PYO AG THROAT QL EIA: NORMAL
SPECIMEN SOURCE: NORMAL

## 2018-03-25 PROCEDURE — 87880 STREP A ASSAY W/OPTIC: CPT | Performed by: FAMILY MEDICINE

## 2018-03-25 PROCEDURE — 87081 CULTURE SCREEN ONLY: CPT | Performed by: FAMILY MEDICINE

## 2018-03-25 PROCEDURE — 99214 OFFICE O/P EST MOD 30 MIN: CPT | Performed by: NURSE PRACTITIONER

## 2018-03-25 PROCEDURE — G0463 HOSPITAL OUTPT CLINIC VISIT: HCPCS

## 2018-03-25 RX ORDER — BENZONATATE 100 MG/1
100 CAPSULE ORAL 3 TIMES DAILY PRN
Qty: 30 CAPSULE | Refills: 0 | Status: SHIPPED | OUTPATIENT
Start: 2018-03-25 | End: 2018-04-19

## 2018-03-25 RX ORDER — DOXYCYCLINE 100 MG/1
100 CAPSULE ORAL 2 TIMES DAILY
Qty: 20 CAPSULE | Refills: 0 | Status: SHIPPED | OUTPATIENT
Start: 2018-03-25 | End: 2019-02-19

## 2018-03-25 RX ORDER — ALBUTEROL SULFATE 90 UG/1
2 AEROSOL, METERED RESPIRATORY (INHALATION) EVERY 4 HOURS PRN
Qty: 1 INHALER | Refills: 0 | Status: SHIPPED | OUTPATIENT
Start: 2018-03-25 | End: 2018-05-02

## 2018-03-25 ASSESSMENT — ENCOUNTER SYMPTOMS
PSYCHIATRIC NEGATIVE: 1
WEAKNESS: 0
ABDOMINAL PAIN: 0
VOMITING: 0
SHORTNESS OF BREATH: 0
RHINORRHEA: 0
FEVER: 0
FATIGUE: 1
SINUS PRESSURE: 0
TROUBLE SWALLOWING: 0
APPETITE CHANGE: 1
DIARRHEA: 0
SORE THROAT: 1
STRIDOR: 0
NAUSEA: 0
DYSURIA: 0
WHEEZING: 0
CHILLS: 1
ACTIVITY CHANGE: 0
SINUS PAIN: 0
COUGH: 1

## 2018-03-25 NOTE — ED AVS SNAPSHOT
[facility]    [address]    [place] [address]    Phone:  [phone]                                       Cristiana Myles   MRN: [id number]    Department:  [facility]   Date of Visit:  3/25/2018           After Visit Summary Signature Page     I have received my discharge instructions, and my questions have been answered. I have discussed any challenges I see with this plan with the nurse or doctor.    ..........................................................................................................................................  Patient/Patient Representative Signature      ..........................................................................................................................................  Patient Representative Print Name and Relationship to Patient    ..................................................               ................................................  Date                                            Time    ..........................................................................................................................................  Reviewed by Signature/Title    ...................................................              ..............................................  Date                                                            Time

## 2018-03-25 NOTE — DISCHARGE INSTRUCTIONS
Take antibiotics as ordered.   Eat a yogurt daily while taking antibiotics.   Take Tessalon as needed as directed for cough.   Use Albuterol inhaler as needed as directed for cough or wheezing.   Increase fluid intake.   Follow up with PCP with any increase in symptoms or concerns.   Return to urgent care or emergency department with any increase in symptoms or concerns.

## 2018-03-25 NOTE — ED AVS SNAPSHOT
[facility]    [address]    [facility] [address]    Phone:  [phone]                                       Cristiana Myles   MRN: [id number]    Department:  [facility]   Date of Visit:  3/25/2018           Patient Information     Date Of Birth          1980        Your diagnoses for this visit were:     Acute bronchitis, unspecified organism        You were seen by Ivette Polk NP.      Follow-up Information     Follow up with Sudhakar Cuellar MD.    Specialty:  Family Practice    Why:  As needed, If symptoms worsen    Contact information:    [address]  [facility] [address] [phone]          Follow up with [facility].    Specialty:  EMERGENCY MEDICINE    Why:  As needed, If symptoms worsen    Contact information:    [address]  [facility] [address] [phone]    Additional information:    From [facility]: Take [address]. Turn left at [address]. Turn left at the first stoplight on [address]. At the first stop sign, take a right onto [address]. Take a left into the parking lot and continue through until you reach the North enterance of the building.       From [place]: Take [address]. Take the [address] ramp towards [place]. Turn left onto [address]. Take a slight right onto [address]. Turn left at the first stoplight on [address]. At the first stop sign, take a right onto [address]. Take a left into the parking lot and continue through until you reach the North enterance of the building.       From [place]: Take [address]. Take a right at [address]. At the first stop sign, take a right onto [address]. Take a left into the parking lot and continue through until you reach the North enterance of the building.         Discharge Instructions       Take antibiotics as ordered.   Eat a yogurt daily while taking antibiotics.   Take  Tessalon as needed as directed for cough.   Use Albuterol inhaler as needed as directed for cough or wheezing.   Increase fluid intake.   Follow up with PCP with any increase in symptoms or concerns.   Return to urgent care or emergency department with any increase in symptoms or concerns.     Discharge References/Attachments     BRONCHITIS, ANTIBIOTIC TREATMENT (ADULT) (ENGLISH)      Your next 10 appointments already scheduled     May 02, 2018  9:45 AM CDT   (Arrive by 9:30 AM)   SHORT with Sudhakar Cuellar MD   [facility]    [address] [address]   [phone]            Feb 27, 2019 10:00 AM CST   (Arrive by 9:45 AM)   PHYSICAL with WINNIE Hunter CNM   [facility] ([facility] )    [address] [facility] [address]   [phone]                 Review of your medicines      START taking        Dose / Directions Last dose taken    benzonatate 100 MG capsule   Commonly known as:  TESSALON   Dose:  100 mg   Quantity:  30 capsule        Take 1 capsule (100 mg) by mouth 3 times daily as needed for cough   Refills:  0        doxycycline 100 MG capsule   Commonly known as:  VIBRAMYCIN   Dose:  100 mg   Quantity:  20 capsule        Take 1 capsule (100 mg) by mouth 2 times daily for 10 days   Refills:  0          CONTINUE these medicines which may have CHANGED, or have new prescriptions. If we are uncertain of the size of tablets/capsules you have at home, strength may be listed as something that might have changed.        Dose / Directions Last dose taken    albuterol 108 (90 BASE) MCG/ACT Inhaler   Commonly known as:  PROAIR HFA   Dose:  2 puff   What changed:  See the new instructions.   Quantity:  1 Inhaler        Inhale 2 puffs into the lungs every 4 hours as needed for shortness of breath / dyspnea   Refills:  0          Our records show that you are taking the medicines listed below. If these are incorrect,  please call your family doctor or clinic.        Dose / Directions Last dose taken    citalopram 40 MG tablet   Commonly known as:  celeXA   Quantity:  30 tablet        TAKE 1 TABLET BY MOUTH DAILY   Refills:  1        docusate sodium 100 MG capsule   Commonly known as:  COLACE   Quantity:  30 capsule        TAKE 1 CAPSULE BY MOUTH DAILY   Refills:  11        gabapentin 100 MG capsule   Commonly known as:  NEURONTIN   Quantity:  90 capsule        TAKE 1 CAPSULE BY MOUTH 3 TIMES DAILY   Refills:  5        hydrOXYzine 25 MG capsule   Commonly known as:  VISTARIL   Quantity:  60 capsule        TAKE 1 OR 2 CAPSULES BY MOUTH EVERY 4-6 HOURS AS NEEDED FOR ANXIETY   Refills:  3        ibuprofen 800 MG tablet   Commonly known as:  ADVIL/MOTRIN   Dose:  800 mg   Quantity:  40 tablet        Take 1 tablet (800 mg) by mouth every 8 hours as needed for pain   Refills:  1        * methylphenidate ER 54 MG CR tablet   Commonly known as:  CONCERTA   Dose:  54 mg   Quantity:  30 tablet        Take 1 tablet (54 mg) by mouth daily   Refills:  0        * methylphenidate ER 54 MG CR tablet   Commonly known as:  CONCERTA   Dose:  54 mg   Quantity:  30 tablet        Take 1 tablet (54 mg) by mouth every morning   Refills:  0        * methylphenidate ER 54 MG CR tablet   Commonly known as:  CONCERTA   Dose:  54 mg   Quantity:  30 tablet   Start taking on:  4/7/2018        Take 1 tablet (54 mg) by mouth every morning   Refills:  0        * nicotine 21 MG/24HR 24 hr patch   Commonly known as:  NICODERM CQ   Dose:  1 patch        Place 1 patch onto the skin every 24 hours   Refills:  0        * nicotine 14 MG/24HR 24 hr patch   Commonly known as:  NICODERM CQ   Dose:  1 patch   Quantity:  30 patch        Place 1 patch onto the skin every 24 hours   Refills:  0        * nicotine 7 MG/24HR 24 hr patch   Commonly known as:  NICODERM CQ   Dose:  1 patch   Quantity:  30 patch        Place 1 patch onto the skin every 24 hours   Refills:  0         ranitidine 150 MG tablet   Commonly known as:  ZANTAC   Dose:  150 mg   Quantity:  60 tablet        Take 1 tablet (150 mg) by mouth 2 times daily   Refills:  11        traMADol 50 MG tablet   Commonly known as:  ULTRAM   Dose:   mg   Quantity:  6 tablet        Take 1-2 tablets ( mg) by mouth every 6 hours as needed for pain   Refills:  0        traZODone 50 MG tablet   Commonly known as:  DESYREL   Quantity:  90 tablet        TAKE 1/2 TO 3 TABLETS BY MOUTH AT BEDTIME AS NEEDED FOR INSOMNIA   Refills:  9        TYLENOL PO        Refills:  0        * Notice:  This list has 6 medication(s) that are the same as other medications prescribed for you. Read the directions carefully, and ask your doctor or other care provider to review them with you.            Prescriptions were sent or printed at these locations (3 Prescriptions)                   [id number] - [place], [place] - [address] [address]   [address] [facility] [address], [place] [address]    Telephone:  [phone]   Fax:  [phone]   Hours:                  E-Prescribed (3 of 3)         doxycycline (VIBRAMYCIN) 100 MG capsule               benzonatate (TESSALON) 100 MG capsule               albuterol (PROAIR HFA) 108 (90 BASE) MCG/ACT Inhaler                Procedures and tests performed during your visit     Beta strep group A culture    Rapid strep screen      Orders Needing Specimen Collection     None      Pending Results     Date and Time Order Name Status Description    3/25/2018 1518 Beta strep group A culture In process             Pending Culture Results     Date and Time Order Name Status Description    3/25/2018 1518 Beta strep group A culture In process             Thank you for choosing [facility]       Thank you for choosing [facility] for your care. Our goal is always to provide you with excellent care. Hearing back from our patients is one way we can continue to improve our services. Please take a few minutes to complete the written survey that you  may receive in the mail after you visit with us. Thank you!        [employer] Information     [employer] gives you secure access to your electronic health record. If you see a primary care provider, you can also send messages to your care team and make appointments. If you have questions, please call your primary care clinic.  If you do not have a primary care provider, please call [phone] and they will assist you.        Care EveryWhere ID     This is your Care EveryWhere ID. This could be used by other organizations to access your [facility] medical records  [id number]        Equal Access to Services     [facility] : Nicholas Isidro, juvenal hua, radha cardenas, can padron. So [facility] [phone].    ATENCIÓN: Si habla español, tiene a singh disposición servicios gratuitos de asistencia lingüística. Jareth al [phone].    We comply with applicable federal civil rights laws and [place] laws. We do not discriminate on the basis of race, color, national origin, age, disability, sex, sexual orientation, or gender identity.            After Visit Summary       This is your record. Keep this with you and show to your community pharmacist(s) and doctor(s) at your next visit.

## 2018-03-27 LAB
BACTERIA SPEC CULT: NORMAL
SPECIMEN SOURCE: NORMAL

## 2018-03-29 DIAGNOSIS — F41.1 GAD (GENERALIZED ANXIETY DISORDER): ICD-10-CM

## 2018-03-29 DIAGNOSIS — F32.9 MDD (MAJOR DEPRESSIVE DISORDER): ICD-10-CM

## 2018-03-29 RX ORDER — CITALOPRAM HYDROBROMIDE 40 MG/1
TABLET ORAL
Qty: 30 TABLET | Refills: 0 | Status: SHIPPED | OUTPATIENT
Start: 2018-03-29 | End: 2018-04-30

## 2018-04-12 DIAGNOSIS — G47.00 INSOMNIA: ICD-10-CM

## 2018-04-12 RX ORDER — TRAZODONE HYDROCHLORIDE 50 MG/1
TABLET, FILM COATED ORAL
Qty: 90 TABLET | Refills: 2 | Status: SHIPPED | OUTPATIENT
Start: 2018-04-12 | End: 2018-08-15

## 2018-04-12 NOTE — TELEPHONE ENCOUNTER
traZODone (DESYREL) 50 MG tablet    Last Written Prescription Date:  7/6/17  Last Fill Quantity: 90,   # refills: 9  Last Office Visit: 2/2/18  Future Office visit:    Next 5 appointments (look out 90 days)     May 02, 2018  9:45 AM CDT   (Arrive by 9:30 AM)   SHORT with Sudhakar Cuellar MD   [facility] ([facility] )    [address] [place] [address]   [phone]

## 2018-04-19 ENCOUNTER — HOSPITAL ENCOUNTER (EMERGENCY)
Facility: HOSPITAL | Age: 38
Discharge: HOME | End: 2018-04-19
Attending: NURSE PRACTITIONER | Admitting: NURSE PRACTITIONER
Payer: COMMERCIAL

## 2018-04-19 ENCOUNTER — APPOINTMENT (OUTPATIENT)
Dept: GENERAL RADIOLOGY | Facility: HOSPITAL | Age: 38
End: 2018-04-19
Attending: NURSE PRACTITIONER
Payer: COMMERCIAL

## 2018-04-19 VITALS
OXYGEN SATURATION: 100 % | RESPIRATION RATE: 18 BRPM | DIASTOLIC BLOOD PRESSURE: 87 MMHG | TEMPERATURE: 80 F | SYSTOLIC BLOOD PRESSURE: 126 MMHG

## 2018-04-19 DIAGNOSIS — M75.42 IMPINGEMENT SYNDROME OF SHOULDER REGION, LEFT: ICD-10-CM

## 2018-04-19 PROCEDURE — 73030 X-RAY EXAM OF SHOULDER: CPT | Mod: TC,LT

## 2018-04-19 PROCEDURE — 99214 OFFICE O/P EST MOD 30 MIN: CPT | Performed by: NURSE PRACTITIONER

## 2018-04-19 PROCEDURE — G0463 HOSPITAL OUTPT CLINIC VISIT: HCPCS

## 2018-04-19 RX ORDER — PREDNISONE 20 MG/1
TABLET ORAL
Qty: 10 TABLET | Refills: 0 | Status: SHIPPED | OUTPATIENT
Start: 2018-04-19 | End: 2018-05-02

## 2018-04-19 NOTE — ED TRIAGE NOTES
Pt is here with her boyfriend. Saturday she was moving furniture when she got a sharp, burning pain in her left shoulder. Continues to have a burning sensation in the shoulder. Is able to move shoulder, states that she has full ROM but with movement the pain worsens. Has been trying ice and ibuprofen with no relief.

## 2018-04-19 NOTE — ED AVS SNAPSHOT
[facility]    [address] [address]    Phone:  [phone]                                       Cristiana Myles   MRN: [id number]    Department:  [facility]   Date of Visit:  4/19/2018           Patient Information     Date Of Birth          1980        Your diagnoses for this visit were:     Impingement syndrome of shoulder region, left        You were seen by Billie Covington NP.      Follow-up Information     Follow up with [facility].    Specialty:  EMERGENCY MEDICINE    Why:  As needed, If symptoms worsen, or concerns develop    Contact information:    [address] [address] [phone]    Additional information:    From [place]: Take [address]. Turn left at [address]. Turn left at the first stoplight on [address]. At the first stop sign, take a right onto [address]. Take a left into the parking lot and continue through until you reach the North enterance of the building.       From [place]: Take [address]. Take the [address] ramp towards [place]. Turn left onto [address]. Take a slight right onto [address]. Turn left at the first stoplight on [address]. At the first stop sign, take a right onto [address]. Take a left into the parking lot and continue through until you reach the North enterance of the building.       From [place]: Take [address]. Take a right at [address]. At the first stop sign, take a right onto [address]. Take a left into the parking lot and continue through until you reach the North enterance of the building.         Follow up with Sudhakar Cuellar MD.    Specialty:  Family Practice    Why:  As needed, if symptoms do not improve    Contact information:    [address] [facility] [address]  [phone]          Discharge Instructions         Shoulder Impingement Syndrome  The rotator  cuff is a group of muscles and tendons that surround the shoulder joint. These muscles and tendons hold the arm in its joint. They help the shoulder move. The rotator cuff muscles and tendons can become irritated from repeated rubbing against the shoulder bone. This is called shoulder impingement syndrome or rotator cuff tendonitis.     If your case is mild, you may only need to rest the shoulder and then do certain exercises to strengthen the muscles. You can also take anti-inflammatory medicines. Steroid injections into the shoulder can ease inflammation. But you can have only a limited number of these. If the condition gets worse, your shoulder muscles may become thin and weak. This can lead to a rotator cuff tear.  Symptoms of shoulder impingement syndrome may include:    Shoulder pain that gets worse when you raise your arm overhead    Weakness of the shoulder muscles when you use your arm overhead    Popping and clicking when you move your shoulder    Shoulder pain that wakes you up at night, especially when you sleep on the affected shoulder    Sudden pain in your shoulder when you lift or reach  Home care  Follow these tips to take care of yourself at home:    Avoid activities that make your pain worse. These include raising your arms overhead, repeating the same motion over and over, or lifting heavy objects.    Don t hold your arm in one position for a long time. Keep it moving.    Put an ice pack on the sore area for 20 minutes every 1 to 2 hours for the first day. You can make an ice pack by putting ice cubes in a plastic bag. Wrap the bag in a towel before putting it on your shoulder. A frozen bag of peas or something similar can also be used as an ice pack. Use the ice packs 3 to 4 times a day for the next 2 days. Continue using the ice to relieve of pain and swelling as needed.    You may take acetaminophen or ibuprofen to control pain, unless another medicine was prescribed. If prednisone was  prescribed, don t take anti-inflammatory medicines. If you have chronic liver or kidney disease or ever had a stomach ulcer or gastrointestinal bleeding, talk with your doctor before using these medicines.    After your symptoms ease, you may get physical therapy or start a home exercise program. This can strengthen your shoulder muscles and help your range of motion. Talk with your doctor about what is best for your condition.  Follow-up care  Follow up with your healthcare provider, or as advised.  When to seek medical advice  Call your healthcare provider right away if any of these occur:    Shoulder pain that gets worse and wakes you up at night    Your shoulder or arm swells    Numbness, tingling, or pain that travels down the arm to the hand    Loss of shoulder strength    Fever or chills  Date Last Reviewed: 8/1/2016 2000-2017 [employer]. [address] [place] [address]. All rights reserved. This information is not intended as a substitute for professional medical care. Always follow your healthcare professional's instructions.          Your next 10 appointments already scheduled     May 02, 2018  9:45 AM CDT   (Arrive by 9:30 AM)   SHORT with Sudhakar Cuellar MD   [facility] )    [address]  [facility] [address]   [phone]            Feb 27, 2019 10:00 AM CST   (Arrive by 9:45 AM)   [facility]    [address]  [facility] [address]   [phone]                 Review of your medicines      START taking        Dose / Directions Last dose taken    predniSONE 20 MG tablet   Commonly known as:  DELTASONE   Quantity:  10 tablet        Take two tablets (= 40mg) each day for 5 (five) days   Refills:  0        tiZANidine 4 MG tablet   Commonly known as:  ZANAFLEX   Dose:  2-4 mg   Quantity:  20 tablet        Take 0.5-1 tablets (2-4 mg) by mouth  3 times daily as needed for muscle spasms (MUSCLE SPASM)   Refills:  0          Our records show that you are taking the medicines listed below. If these are incorrect, please call your family doctor or clinic.        Dose / Directions Last dose taken    albuterol 108 (90 Base) MCG/ACT Inhaler   Commonly known as:  PROAIR HFA   Dose:  2 puff   Quantity:  1 Inhaler        Inhale 2 puffs into the lungs every 4 hours as needed for shortness of breath / dyspnea   Refills:  0        citalopram 40 MG tablet   Commonly known as:  celeXA   Quantity:  30 tablet        TAKE 1 TABLET BY MOUTH DAILY   Refills:  0        docusate sodium 100 MG capsule   Commonly known as:  COLACE   Quantity:  30 capsule        TAKE 1 CAPSULE BY MOUTH DAILY   Refills:  11        gabapentin 100 MG capsule   Commonly known as:  NEURONTIN   Quantity:  90 capsule        TAKE 1 CAPSULE BY MOUTH 3 TIMES DAILY   Refills:  5        hydrOXYzine 25 MG capsule   Commonly known as:  VISTARIL   Quantity:  60 capsule        TAKE 1 OR 2 CAPSULES BY MOUTH EVERY 4-6 HOURS AS NEEDED FOR ANXIETY   Refills:  3        * methylphenidate ER 54 MG CR tablet   Commonly known as:  CONCERTA   Dose:  54 mg   Quantity:  30 tablet        Take 1 tablet (54 mg) by mouth daily   Refills:  0        * methylphenidate ER 54 MG CR tablet   Commonly known as:  CONCERTA   Dose:  54 mg   Quantity:  30 tablet        Take 1 tablet (54 mg) by mouth every morning   Refills:  0        ranitidine 150 MG tablet   Commonly known as:  ZANTAC   Dose:  150 mg   Quantity:  60 tablet        Take 1 tablet (150 mg) by mouth 2 times daily   Refills:  11        traZODone 50 MG tablet   Commonly known as:  DESYREL   Quantity:  90 tablet        TAKE 1/2 TO 3 TABLETS BY MOUTH AT BEDTIME AS NEEDED FOR INSOMNIA   Refills:  2        TYLENOL PO        Refills:  0        * Notice:  This list has 2 medication(s) that are the same as other medications prescribed for you. Read the directions carefully, and ask  your doctor or other care provider to review them with you.            Prescriptions were sent or printed at these locations (2 Prescriptions)                   [id number] - [place] - [address]   [address] [place] [address]    Telephone:  [phone]   Fax:  [phone]   Hours:                  E-Prescribed (2 of 2)         predniSONE (DELTASONE) 20 MG tablet               tiZANidine (ZANAFLEX) 4 MG tablet                Procedures and tests performed during your visit     XR Shoulder Left G/E 3 Views      Orders Needing Specimen Collection     None      Pending Results     No orders found from 4/17/2018 to 4/20/2018.            Pending Culture Results     No orders found from 4/17/2018 to 4/20/2018.            Thank you for choosing [facility]       Thank you for choosing [facility] for your care. Our goal is always to provide you with excellent care. Hearing back from our patients is one way we can continue to improve our services. Please take a few minutes to complete the written survey that you may receive in the mail after you visit with us. Thank you!        [employer] Information     [employer] gives you secure access to your electronic health record. If you see a primary care provider, you can also send messages to your care team and make appointments. If you have questions, please call your primary care clinic.  If you do not have a primary care provider, please call [phone] and they will assist you.        Care EveryWhere ID     This is your Care EveryWhere ID. This could be used by other organizations to access your [facility] medical records  [id number]        Equal Access to Services     [facility] AH: Hadii luis Isidro, waphilipp hua, qabc lamalkelli cardenas, can padron. So [facility] [phone].    ATENCIÓN: Si habla español, tiene a singh disposición servicios gratuitos de asistencia lingüística. Llame al [phone].    We comply with  applicable federal civil rights laws and [place] laws. We do not discriminate on the basis of race, color, national origin, age, disability, sex, sexual orientation, or gender identity.            After Visit Summary       This is your record. Keep this with you and show to your community pharmacist(s) and doctor(s) at your next visit.

## 2018-04-19 NOTE — ED PROVIDER NOTES
History     Chief Complaint   Patient presents with     Shoulder Pain     was moving furnature on Saturday got pain in left shoulder. still painful     HPI  Cristiana Myles is a 37 year old female who presents today with a CC of right shoulder pain.  She notes she was moving furniture on Saturday and felt a sharp burning sensation.  She has been having constant dull pain with intermittent sharp pain with movement.  She has been icing/heating and taking ibuprofen all with minimal improvement.  She has been sleeping sitting upright so she doesn't lay on it.  She has intermittent tingling sensation in the upper arm, this resolves with rest or position change.      Problem List:    Patient Active Problem List    Diagnosis Date Noted     Well woman exam with routine gynecological exam 02/14/2018     Priority: Medium     Irritable bowel syndrome with both constipation and diarrhea 08/02/2017     Priority: Medium     Recurrent major depressive disorder, in partial remission (H) 08/02/2017     Priority: Medium     ACP (advance care planning) 03/02/2016     Priority: Medium     Advance Care Planning 3/2/2016: Receipt of ACP document:  Received: Health Care Directive which was witnessed or notarized on 1-25-16.  Document previously scanned on 2-5-16.  Validation form completed and sent to be scanned.  Code Status needs to be updated to reflect choices in most recent ACP document.  Confirmed/documented designated decision maker(s).  Added by Rachel Barber RN Advance Care Planning Liaison with [employer]             Pelvic pain in female 01/14/2016     Priority: Medium     FH: breast cancer      Priority: Medium     Tobacco abuse, in remission      Priority: Medium     Generalized anxiety disorder      Priority: Medium     Diagnosis updated by automated process. Provider to review and confirm.       Attention deficit disorder 05/31/2005     Priority: Medium     Problem list name updated by automated process. 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     FH: breast cancer      DAFNE (generalised anxiety disorder)      Irritable bowel syndrome with both constipation and diarrhea 8/2/2017     MDD (major depressive disorder)      Migraine, unspecified, without mention of intractable migraine without mention of status migrainosus 3/13/2000     Tobacco abuse, in remission      Unspecified sinusitis (chronic) 3/8/2001       Past Surgical History:    Past Surgical History:   Procedure Laterality Date     ENDOSCOPY UPPER, COLONOSCOPY, COMBINED N/A 9/4/2015    Procedure: COMBINED ENDOSCOPY UPPER, COLONOSCOPY;  Surgeon: Griffin Barrientos DO;  Location: [facility]     LAPAROSCOPIC CYSTECTOMY OVARIAN (BENIGN) Right 1/27/2016    Procedure: LAPAROSCOPIC CYSTECTOMY OVARIAN (BENIGN);  Surgeon: Kuldip España MD;  Location: [facility]     LAPAROSCOPIC SALPINGO-OOPHORECTOMY Left 1/27/2016    Procedure: LAPAROSCOPIC SALPINGO-OOPHORECTOMY;  Surgeon: Kuldip España MD;  Location: [facility]     LAPAROSCOPY DIAGNOSTIC (GYN) Left 1/27/2016    Procedure: LAPAROSCOPY DIAGNOSTIC (GYN);  Surgeon: Kuldip España MD;  Location: [facility]     TUBAL LIGATION  2006     wisdom teeth         Family History:    Family History   Problem Relation Age of Onset     Breast Cancer Maternal Grandmother      Hypertension Maternal Grandfather      Hyperlipidemia Maternal Grandfather      DIABETES Paternal Grandmother      Asthma No family hx of        Social History:  Marital Status:   [4]  Social History   Substance Use Topics     Smoking status: Current Every Day Smoker     Packs/day: 0.50     Years: 13.00     Types: Cigarettes     Last attempt to quit: 1/1/2015     Smokeless tobacco: Never Used     Alcohol use 0.0 oz/week      Comment: frequency: rarely        Medications:      Acetaminophen (TYLENOL PO)   albuterol  (PROAIR HFA) 108 (90 BASE) MCG/ACT Inhaler   citalopram (CELEXA) 40 MG tablet   docusate sodium (COLACE) 100 MG capsule   gabapentin (NEURONTIN) 100 MG capsule   hydrOXYzine (VISTARIL) 25 MG capsule   methylphenidate ER (CONCERTA) 54 MG CR tablet   methylphenidate ER (CONCERTA) 54 MG CR tablet   predniSONE (DELTASONE) 20 MG tablet   ranitidine (ZANTAC) 150 MG tablet   tiZANidine (ZANAFLEX) 4 MG tablet   traZODone (DESYREL) 50 MG tablet         Review of Systems   Constitutional: Negative.    Musculoskeletal: Positive for arthralgias.       Physical Exam   BP: 126/87  Heart Rate: 91  Temp: (!) 80  F (26.7  C)  Resp: 18  SpO2: 100 %      Physical Exam   Constitutional: She appears well-developed and well-nourished. She is cooperative.   Cardiovascular: Normal rate.    Pulmonary/Chest: Effort normal.   Musculoskeletal:        Left shoulder: She exhibits bony tenderness (posterior) and spasm (subscapular). She exhibits normal range of motion (pain with abduction and adduction), no swelling, no effusion, no deformity, no laceration, normal pulse and normal strength.   Neurological: She is alert.   Nursing note and vitals reviewed.      ED Course     ED Course     Procedures    Results for orders placed or performed during the hospital encounter of 04/19/18   XR Shoulder Left G/E 3 Views    Narrative    Exam: XR SHOULDER LT G/E 3 VW     History:Female, age 37 years, pain;     Comparison:  None    Technique: Four views are submitted.    Findings: Bones are normally mineralized. No evidence of acute or  subacute fracture.  No evidence of dislocation.           Impression    Impression:  1.  No evidence of acute or subacute bony abnormality.    JEMIMA ZURITA MD       Assessments & Plan (with Medical Decision Making)     I have reviewed the nursing notes.    I have reviewed the findings, diagnosis, plan and need for follow up with the patient.  ASSESSMENT / PLAN:  (M75.42) Impingement syndrome of shoulder region,  left  Comment: symptomatic, n/v intact  Plan:  Rest, ice 20 minutes at least 4 times daily for the first 48 hours, then ice and/or heat as needed for symptomatic relief   Elevate affected area as much as possible   Prednisone as prescribed   Zanaflex as prescribed for subscapular muscle spasm   Sling as needed for comfort/support, remove 3-4 times per day for ROM   Follow up with PCP in 1-2 weeks if symptoms are not improving, sooner if symptoms are worsening    Discharge Medication List as of 4/19/2018  7:22 PM      START taking these medications    Details   predniSONE (DELTASONE) 20 MG tablet Take two tablets (= 40mg) each day for 5 (five) days, Disp-10 tablet, R-0, E-Prescribe      tiZANidine (ZANAFLEX) 4 MG tablet Take 0.5-1 tablets (2-4 mg) by mouth 3 times daily as needed for muscle spasms (MUSCLE SPASM), Disp-20 tablet, R-0, E-Prescribe             Final diagnoses:   Impingement syndrome of shoulder region, left       4/19/2018   [facility]     Billie Covington NP  04/20/18 2502

## 2018-04-19 NOTE — ED AVS SNAPSHOT
[facility]    [address]    [place] [address]    Phone:  [phone]                                       Cristiana Myles   MRN: [id number]    Department:  [facility]   Date of Visit:  4/19/2018           After Visit Summary Signature Page     I have received my discharge instructions, and my questions have been answered. I have discussed any challenges I see with this plan with the nurse or doctor.    ..........................................................................................................................................  Patient/Patient Representative Signature      ..........................................................................................................................................  Patient Representative Print Name and Relationship to Patient    ..................................................               ................................................  Date                                            Time    ..........................................................................................................................................  Reviewed by Signature/Title    ...................................................              ..............................................  Date                                                            Time

## 2018-04-20 ASSESSMENT — ENCOUNTER SYMPTOMS
ARTHRALGIAS: 1
CONSTITUTIONAL NEGATIVE: 1

## 2018-04-20 NOTE — DISCHARGE INSTRUCTIONS
Shoulder Impingement Syndrome  The rotator cuff is a group of muscles and tendons that surround the shoulder joint. These muscles and tendons hold the arm in its joint. They help the shoulder move. The rotator cuff muscles and tendons can become irritated from repeated rubbing against the shoulder bone. This is called shoulder impingement syndrome or rotator cuff tendonitis.     If your case is mild, you may only need to rest the shoulder and then do certain exercises to strengthen the muscles. You can also take anti-inflammatory medicines. Steroid injections into the shoulder can ease inflammation. But you can have only a limited number of these. If the condition gets worse, your shoulder muscles may become thin and weak. This can lead to a rotator cuff tear.  Symptoms of shoulder impingement syndrome may include:    Shoulder pain that gets worse when you raise your arm overhead    Weakness of the shoulder muscles when you use your arm overhead    Popping and clicking when you move your shoulder    Shoulder pain that wakes you up at night, especially when you sleep on the affected shoulder    Sudden pain in your shoulder when you lift or reach  Home care  Follow these tips to take care of yourself at home:    Avoid activities that make your pain worse. These include raising your arms overhead, repeating the same motion over and over, or lifting heavy objects.    Don t hold your arm in one position for a long time. Keep it moving.    Put an ice pack on the sore area for 20 minutes every 1 to 2 hours for the first day. You can make an ice pack by putting ice cubes in a plastic bag. Wrap the bag in a towel before putting it on your shoulder. A frozen bag of peas or something similar can also be used as an ice pack. Use the ice packs 3 to 4 times a day for the next 2 days. Continue using the ice to relieve of pain and swelling as needed.    You may take acetaminophen or ibuprofen to control pain, unless another  medicine was prescribed. If prednisone was prescribed, don t take anti-inflammatory medicines. If you have chronic liver or kidney disease or ever had a stomach ulcer or gastrointestinal bleeding, talk with your doctor before using these medicines.    After your symptoms ease, you may get physical therapy or start a home exercise program. This can strengthen your shoulder muscles and help your range of motion. Talk with your doctor about what is best for your condition.  Follow-up care  Follow up with your healthcare provider, or as advised.  When to seek medical advice  Call your healthcare provider right away if any of these occur:    Shoulder pain that gets worse and wakes you up at night    Your shoulder or arm swells    Numbness, tingling, or pain that travels down the arm to the hand    Loss of shoulder strength    Fever or chills  Date Last Reviewed: 8/1/2016 2000-2017 [employer]. [address] [place] [address], [place] [address]. All rights reserved. This information is not intended as a substitute for professional medical care. Always follow your healthcare professional's instructions.

## 2018-04-30 DIAGNOSIS — F41.1 GAD (GENERALIZED ANXIETY DISORDER): ICD-10-CM

## 2018-04-30 DIAGNOSIS — F33.42 MAJOR DEPRESSIVE DISORDER, RECURRENT EPISODE, IN FULL REMISSION (H): Primary | ICD-10-CM

## 2018-04-30 DIAGNOSIS — F32.9 MDD (MAJOR DEPRESSIVE DISORDER): ICD-10-CM

## 2018-04-30 NOTE — TELEPHONE ENCOUNTER
Celexa  Last Written Prescription Date: 3/29/18  Last Fill Quantity: 30 # of Refills: 0  Last Office Visit: 2/2/18

## 2018-05-01 RX ORDER — CITALOPRAM HYDROBROMIDE 40 MG/1
TABLET ORAL
Qty: 30 TABLET | Refills: 11 | Status: SHIPPED | OUTPATIENT
Start: 2018-05-01 | End: 2019-04-29

## 2018-05-02 ENCOUNTER — OFFICE VISIT (OUTPATIENT)
Dept: FAMILY MEDICINE | Facility: OTHER | Age: 38
End: 2018-05-02
Attending: FAMILY MEDICINE
Payer: COMMERCIAL

## 2018-05-02 VITALS
SYSTOLIC BLOOD PRESSURE: 112 MMHG | BODY MASS INDEX: 29.53 KG/M2 | WEIGHT: 200 LBS | TEMPERATURE: 97.2 F | HEART RATE: 89 BPM | DIASTOLIC BLOOD PRESSURE: 72 MMHG | OXYGEN SATURATION: 99 %

## 2018-05-02 DIAGNOSIS — J45.20 MILD INTERMITTENT REACTIVE AIRWAY DISEASE WITH WHEEZING WITHOUT COMPLICATION: Primary | ICD-10-CM

## 2018-05-02 DIAGNOSIS — S46.812D STRAIN OF LEFT TRAPEZIUS MUSCLE, SUBSEQUENT ENCOUNTER: ICD-10-CM

## 2018-05-02 DIAGNOSIS — M54.2 CERVICALGIA: ICD-10-CM

## 2018-05-02 DIAGNOSIS — F90.0 ATTENTION DEFICIT HYPERACTIVITY DISORDER (ADHD), PREDOMINANTLY INATTENTIVE TYPE: ICD-10-CM

## 2018-05-02 PROCEDURE — 99213 OFFICE O/P EST LOW 20 MIN: CPT | Performed by: FAMILY MEDICINE

## 2018-05-02 PROCEDURE — G0463 HOSPITAL OUTPT CLINIC VISIT: HCPCS

## 2018-05-02 RX ORDER — METHYLPHENIDATE HYDROCHLORIDE 54 MG/1
54 TABLET ORAL EVERY MORNING
Qty: 30 TABLET | Refills: 0 | Status: SHIPPED | OUTPATIENT
Start: 2018-06-04 | End: 2018-08-01

## 2018-05-02 RX ORDER — ALBUTEROL SULFATE 90 UG/1
2 AEROSOL, METERED RESPIRATORY (INHALATION) EVERY 4 HOURS PRN
Qty: 1 INHALER | Refills: 3 | Status: SHIPPED | OUTPATIENT
Start: 2018-05-02 | End: 2019-08-05

## 2018-05-02 RX ORDER — METHYLPHENIDATE HYDROCHLORIDE 54 MG/1
54 TABLET ORAL DAILY
Qty: 30 TABLET | Refills: 0 | Status: SHIPPED | OUTPATIENT
Start: 2018-05-05 | End: 2018-08-01

## 2018-05-02 RX ORDER — METHYLPHENIDATE HYDROCHLORIDE 54 MG/1
54 TABLET ORAL EVERY MORNING
Qty: 30 TABLET | Refills: 0 | Status: SHIPPED | OUTPATIENT
Start: 2018-07-03 | End: 2018-08-01

## 2018-05-02 ASSESSMENT — PAIN SCALES - GENERAL: PAINLEVEL: MODERATE PAIN (4)

## 2018-05-02 ASSESSMENT — ASTHMA QUESTIONNAIRES
QUESTION_3 LAST FOUR WEEKS HOW OFTEN DID YOUR ASTHMA SYMPTOMS (WHEEZING, COUGHING, SHORTNESS OF BREATH, CHEST TIGHTNESS OR PAIN) WAKE YOU UP AT NIGHT OR EARLIER THAN USUAL IN THE MORNING: ONCE OR TWICE
QUESTION_4 LAST FOUR WEEKS HOW OFTEN HAVE YOU USED YOUR RESCUE INHALER OR NEBULIZER MEDICATION (SUCH AS ALBUTEROL): TWO OR THREE TIMES PER WEEK
QUESTION_5 LAST FOUR WEEKS HOW WOULD YOU RATE YOUR ASTHMA CONTROL: WELL CONTROLLED
QUESTION_1 LAST FOUR WEEKS HOW MUCH OF THE TIME DID YOUR ASTHMA KEEP YOU FROM GETTING AS MUCH DONE AT WORK, SCHOOL OR AT HOME: SOME OF THE TIME
QUESTION_2 LAST FOUR WEEKS HOW OFTEN HAVE YOU HAD SHORTNESS OF BREATH: ONCE OR TWICE A WEEK
ACT_TOTALSCORE: 18

## 2018-05-02 ASSESSMENT — ANXIETY QUESTIONNAIRES
1. FEELING NERVOUS, ANXIOUS, OR ON EDGE: MORE THAN HALF THE DAYS
IF YOU CHECKED OFF ANY PROBLEMS ON THIS QUESTIONNAIRE, HOW DIFFICULT HAVE THESE PROBLEMS MADE IT FOR YOU TO DO YOUR WORK, TAKE CARE OF THINGS AT HOME, OR GET ALONG WITH OTHER PEOPLE: SOMEWHAT DIFFICULT
3. WORRYING TOO MUCH ABOUT DIFFERENT THINGS: SEVERAL DAYS
GAD7 TOTAL SCORE: 9
7. FEELING AFRAID AS IF SOMETHING AWFUL MIGHT HAPPEN: SEVERAL DAYS
4. TROUBLE RELAXING: SEVERAL DAYS
2. NOT BEING ABLE TO STOP OR CONTROL WORRYING: SEVERAL DAYS
6. BECOMING EASILY ANNOYED OR IRRITABLE: MORE THAN HALF THE DAYS
5. BEING SO RESTLESS THAT IT IS HARD TO SIT STILL: SEVERAL DAYS

## 2018-05-02 NOTE — NURSING NOTE
"Chief Complaint   Patient presents with     A.D.H.D     Shoulder Pain       Initial /72  Pulse 89  Temp 97.2  F (36.2  C) (Tympanic)  Wt 200 lb (90.7 kg)  SpO2 99%  BMI 29.53 kg/m2 Estimated body mass index is 29.53 kg/(m^2) as calculated from the following:    Height as of 2/14/18: 5' 9\" (1.753 m).    Weight as of this encounter: 200 lb (90.7 kg).  Medication Reconciliation: complete     Kathya Johnson    "

## 2018-05-02 NOTE — LETTER
My Depression Action Plan  Name: Cristiana Myles   Date of Birth 1980  Date: 5/2/2018    My doctor: Sudhakar Cuellar   My clinic: [facility] [place]  [place] [address]  [phone]          GREEN    ZONE   Good Control    What it looks like:     Things are going generally well. You have normal up s and down s. You may even feel depressed from time to time, but bad moods usually last less than a day.   What you need to do:  1. Continue to care for yourself (see self care plan)  2. Check your depression survival kit and update it as needed  3. Follow your physician s recommendations including any medication.  4. Do not stop taking medication unless you consult with your physician first.           YELLOW         ZONE Getting Worse    What it looks like:     Depression is starting to interfere with your life.     It may be hard to get out of bed; you may be starting to isolate yourself from others.    Symptoms of depression are starting to last most all day and this has happened for several days.     You may have suicidal thoughts but they are not constant.   What you need to do:     1. Call your care team, your response to treatment will improve if you keep your care team informed of your progress. Yellow periods are signs an adjustment may need to be made.     2. Continue your self-care, even if you have to fake it!    3. Talk to someone in your support network    4. Open up your depression survival kit           RED    ZONE Medical Alert - Get Help    What it looks like:     Depression is seriously interfering with your life.     You may experience these or other symptoms: You can t get out of bed most days, can t work or engage in other necessary activities, you have trouble taking care of basic hygiene, or basic responsibilities, thoughts of suicide or death that will not go away, self-injurious behavior.     What you need to do:  1. Call your care team and request a same-day  appointment. If they are not available (weekends or after hours) call your local crisis line, emergency room or 911.            Depression Self Care Plan / Survival Kit    Self-Care for Depression  Here s the deal. Your body and mind are really not as separate as most people think.  What you do and think affects how you feel and how you feel influences what you do and think. This means if you do things that people who feel good do, it will help you feel better.  Sometimes this is all it takes.  There is also a place for medication and therapy depending on how severe your depression is, so be sure to consult with your medical provider and/ or Behavioral Health Consultant if your symptoms are worsening or not improving.     In order to better manage my stress, I will:    Exercise  Get some form of exercise, every day. This will help reduce pain and release endorphins, the  feel good  chemicals in your brain. This is almost as good as taking antidepressants!  This is not the same as joining a gym and then never going! (they count on that by the way ) It can be as simple as just going for a walk or doing some gardening, anything that will get you moving.      Hygiene   Maintain good hygiene (Get out of bed in the morning, Make your bed, Brush your teeth, Take a shower, and Get dressed like you were going to work, even if you are unemployed).  If your clothes don't fit try to get ones that do.    Diet  I will strive to eat foods that are good for me, drink plenty of water, and avoid excessive sugar, caffeine, alcohol, and other mood-altering substances.  Some foods that are helpful in depression are: complex carbohydrates, B vitamins, flaxseed, fish or fish oil, fresh fruits and vegetables.    Psychotherapy  I agree to participate in Individual Therapy (if recommended).    Medication  If prescribed medications, I agree to take them.  Missing doses can result in serious side effects.  I understand that drinking alcohol,  or other illicit drug use, may cause potential side effects.  I will not stop my medication abruptly without first discussing it with my provider.    Staying Connected With Others  I will stay in touch with my friends, family members, and my primary care provider/team.    Use your imagination  Be creative.  We all have a creative side; it doesn t matter if it s oil painting, sand castles, or mud pies! This will also kick up the endorphins.    Witness Beauty  (AKA stop and smell the roses) Take a look outside, even in mid-winter. Notice colors, textures. Watch the squirrels and birds.     Service to others  Be of service to others.  There is always someone else in need.  By helping others we can  get out of ourselves  and remember the really important things.  This also provides opportunities for practicing all the other parts of the program.    Humor  Laugh and be silly!  Adjust your TV habits for less news and crime-drama and more comedy.    Control your stress  Try breathing deep, massage therapy, biofeedback, and meditation. Find time to relax each day.     My support system    Clinic Contact:  Phone number:    Contact 1:  Phone number:    Contact 2:  Phone number:    [employer]/:  Phone number:    Therapist:  Phone number:    Local crisis center:    Phone number:    Other community support:  Phone number:

## 2018-05-02 NOTE — LETTER
My Asthma Action Plan  Name: Cristiana Myles   YOB: 1980  Date: 5/2/2018   My doctor: Sudhakar Cuellar MD   My clinic: [facility] [place]        My Control Medicine: None  My Rescue Medicine: Albuterol (Proair/Ventolin/Proventil) inhaler 2 puffs every 6 hours as needed   My Asthma Severity: mild persistent  Avoid your asthma triggers: smoke, upper respiratory infections, pollens, strong odors and fumes and exercise or sports               GREEN ZONE   Good Control    I feel good    No cough or wheeze    Can work, sleep and play without asthma symptoms       Take your asthma control medicine every day.     1. If exercise triggers your asthma, take your rescue medication    15 minutes before exercise or sports, and    During exercise if you have asthma symptoms  2. Spacer to use with inhaler: If you have a spacer, make sure to use it with your inhaler             YELLOW ZONE Getting Worse  I have ANY of these:    I do not feel good    Cough or wheeze    Chest feels tight    Wake up at night   1. Keep taking your Green Zone medications  2. Start taking your rescue medicine:    every 20 minutes for up to 1 hour. Then every 4 hours for 24-48 hours.  3. If you stay in the Yellow Zone for more than 12-24 hours, contact your doctor.  4. If you do not return to the Green Zone in 12-24 hours or you get worse, start taking your oral steroid medicine if prescribed by your provider.           RED ZONE Medical Alert - Get Help  I have ANY of these:    I feel awful    Medicine is not helping    Breathing getting harder    Trouble walking or talking    Nose opens wide to breathe       1. Take your rescue medicine NOW  2. If your provider has prescribed an oral steroid medicine, start taking it NOW  3. Call your doctor NOW  4. If you are still in the Red Zone after 20 minutes and you have not reached your doctor:    Take your rescue medicine again and    Call 911 or go to the emergency room right away    See your  regular doctor within 2 weeks of an Emergency Room or Urgent Care visit for follow-up treatment.          Annual Reminders:  Meet with Asthma Educator,  Flu Shot in the Fall, consider Pneumonia Vaccination for patients with asthma (aged 19 and older).    Pharmacy:    [facility] [place] [place] - [address]  [id number] - [place], [place] - [address]   [id number] - [place], [place] - [address] AT [facility] OF  [address]                      Asthma Triggers  How To Control Things That Make Your Asthma Worse    Triggers are things that make your asthma worse.  Look at the list below to help you find your triggers and what you can do about them.  You can help prevent asthma flare-ups by staying away from your triggers.      Trigger                                                          What you can do   Cigarette Smoke  Tobacco smoke can make asthma worse. Do not allow smoking in your home, car or around you.  Be sure no one smokes at a child s day care or school.  If you smoke, ask your health care provider for ways to help you quit.  Ask family members to quit too.  Ask your health care provider for a referral to Quit Plan to help you quit smoking, or call 1-398-322-PLAN.     Colds, Flu, Bronchitis  These are common triggers of asthma. Wash your hands often.  Don t touch your eyes, nose or mouth.  Get a flu shot every year.     Dust Mites  These are tiny bugs that live in cloth or carpet. They are too small to see. Wash sheets and blankets in hot water every week.   Encase pillows and mattress in dust mite proof covers.  Avoid having carpet if you can. If you have carpet, vacuum weekly.   Use a dust mask and HEPA vacuum.   Pollen and Outdoor Mold  Some people are allergic to trees, grass, or weed pollen, or molds. Try to keep your windows closed.  Limit time out doors when pollen count is high.   Ask you health care provider about taking medicine during allergy season.      Animal Dander  Some people are allergic to skin flakes, urine or saliva from pets with fur or feathers. Keep pets with fur or feathers out of your home.    If you can t keep the pet outdoors, then keep the pet out of your bedroom.  Keep the bedroom door closed.  Keep pets off cloth furniture and away from stuffed toys.     Mice, Rats, and Cockroaches  Some people are allergic to the waste from these pests.   Cover food and garbage.  Clean up spills and food crumbs.  Store grease in the refrigerator.   Keep food out of the bedroom.   Indoor Mold  This can be a trigger if your home has high moisture. Fix leaking faucets, pipes, or other sources of water.   Clean moldy surfaces.  Dehumidify basement if it is damp and smelly.   Smoke, Strong Odors, and Sprays  These can reduce air quality. Stay away from strong odors and sprays, such as perfume, powder, hair spray, paints, smoke incense, paint, cleaning products, candles and new carpet.   Exercise or Sports  Some people with asthma have this trigger. Be active!  Ask your doctor about taking medicine before sports or exercise to prevent symptoms.    Warm up for 5-10 minutes before and after sports or exercise.     Other Triggers of Asthma  Cold air:  Cover your nose and mouth with a scarf.  Sometimes laughing or crying can be a trigger.  Some medicines and food can trigger asthma.

## 2018-05-02 NOTE — MR AVS SNAPSHOT
After Visit Summary   5/2/2018    Cristiana Myles    MRN: [id number]           Patient Information     Date Of Birth          1980        Visit Information        Provider Department      5/2/2018 9:45 AM Sudhakar Cuellar MD [facility] [place]        Today's Diagnoses     Mild intermittent reactive airway disease with wheezing without complication    -  1    Attention deficit hyperactivity disorder (ADHD), predominantly inattentive type        Cervicalgia        Strain of left trapezius muscle, subsequent encounter           Follow-ups after your visit        Additional Services     PHYSICAL THERAPY REFERRAL       *This order will print in the [facility] Central Scheduling Office*    [facility] provides Physical Therapy evaluation and treatment and many specialty services across the [facility] system.  If requesting a specialty program, please choose from the list below.    Call [phone] to schedule [facility] at all locations, with the exception of [facility], please call [phone].     Treatment: Evaluation & Treatment  Special Instructions/Modalities: evaluate and treat   Special Programs: Good HEP     Please be aware that coverage of these services is subject to the terms and limitations of your health insurance plan.  Call member services at your health plan with any benefit or coverage questions.      **Note to Provider** To refer patients to therapy outside of the location list, change the order class to External Referral in the order composer.                  Your next 10 appointments already scheduled     Aug 01, 2018  9:45 AM CDT   (Arrive by 9:30 AM)   SHORT with MD [facility] [place] ([facility] [place] )    [address]  [place] [address]   [phone]            Feb 27, 2019 10:00 AM CST   (Arrive by 9:45 AM)   [facility] ([facility] )    [address] [place] [address]   [phone]              Who to contact     If you have questions or need follow up information about today's clinic visit or your schedule please contact [facility] directly at [phone].  Normal or non-critical lab and imaging results will be communicated to you by MyChart, letter or phone within 4 business days after the clinic has received the results. If you do not hear from us within 7 days, please contact the clinic through [facility] or phone. If you have a critical or abnormal lab result, we will notify you by phone as soon as possible.  Submit refill requests through [facility] or call your pharmacy and they will forward the refill request to us. Please allow 3 business days for your refill to be completed.          Additional Information About Your Visit        MyChart Information     [facility] gives you secure access to your electronic health record. If you see a primary care provider, you can also send messages to your care team and make appointments. If you have questions, please call your primary care clinic.  If you do not have a primary care provider, please call [phone] and they will assist you.        Care EveryWhere ID     This is your Care EveryWhere ID. This could be used by other organizations to access your [facility] medical records  [id number]        Your Vitals Were     Pulse Temperature Pulse Oximetry BMI (Body Mass Index)          89 97.2  F (36.2  C) (Tympanic) 99% 29.53 kg/m2         Blood Pressure from Last 3 Encounters:   05/02/18 112/72   04/19/18 126/87   03/25/18 126/70    Weight from Last 3 Encounters:   05/02/18 200 lb (90.7 kg)   03/25/18 210 lb (95.3 kg)   02/14/18 199 lb (90.3 kg)              We Performed the Following     PHYSICAL THERAPY REFERRAL          Today's Medication Changes          These changes are accurate as of 5/2/18 10:27 AM.  If you have any questions,  ask your nurse or doctor.               These medicines have changed or have updated prescriptions.        Dose/Directions    * methylphenidate ER 54 MG CR tablet   Commonly known as:  CONCERTA   This may have changed:  Another medication with the same name was added. Make sure you understand how and when to take each.   Used for:  Attention deficit hyperactivity disorder (ADHD), predominantly inattentive type   Changed by:  Sudhakar Cuellar MD        Dose:  54 mg   Start taking on:  5/5/2018   Take 1 tablet (54 mg) by mouth daily   Quantity:  30 tablet   Refills:  0       * methylphenidate ER 54 MG CR tablet   Commonly known as:  CONCERTA   This may have changed:  Another medication with the same name was added. Make sure you understand how and when to take each.   Used for:  Attention deficit hyperactivity disorder (ADHD), predominantly inattentive type   Changed by:  Sudhakar Cuellar MD        Dose:  54 mg   Start taking on:  6/4/2018   Take 1 tablet (54 mg) by mouth every morning   Quantity:  30 tablet   Refills:  0       * methylphenidate ER 54 MG CR tablet   Commonly known as:  CONCERTA   This may have changed:  You were already taking a medication with the same name, and this prescription was added. Make sure you understand how and when to take each.   Used for:  Attention deficit hyperactivity disorder (ADHD), predominantly inattentive type   Changed by:  Sudhakar Cuellar MD        Dose:  54 mg   Start taking on:  7/3/2018   Take 1 tablet (54 mg) by mouth every morning   Quantity:  30 tablet   Refills:  0       * Notice:  This list has 3 medication(s) that are the same as other medications prescribed for you. Read the directions carefully, and ask your doctor or other care provider to review them with you.         Where to get your medicines      These medications were sent to [facility] - [address]  [address] [address]     Phone:  [phone]     albuterol  108 (90 Base) MCG/ACT Inhaler    tiZANidine 4 MG tablet         Some of these will need a paper prescription and others can be bought over the counter.  Ask your nurse if you have questions.     Bring a paper prescription for each of these medications     methylphenidate ER 54 MG CR tablet    methylphenidate ER 54 MG CR tablet    methylphenidate ER 54 MG CR tablet                Primary Care Provider Office Phone # Fax #    Sudhakar Cuellar [phone] [address] [phone]        Equal Access to Services     CALE BURTON : Hadii aad ku hadasho Soomaali, waaxda luqadaha, qaybta kaalmada adeegyada, waxay marquisein haylisandro vilchis . [facility] [phone].    ATENCIÓN: Si habla español, tiene a singh disposición servicios gratuitos de asistencia lingüística. [facility] [phone].    We comply with applicable federal civil rights laws and [place] laws. We do not discriminate on the basis of race, color, national origin, age, disability, sex, sexual orientation, or gender identity.            Thank you!     Thank you for choosing [facility]  for your care. Our goal is always to provide you with excellent care. Hearing back from our patients is one way we can continue to improve our services. Please take a few minutes to complete the written survey that you may receive in the mail after your visit with us. Thank you!             Your Updated Medication List - Protect others around you: Learn how to safely use, store and throw away your medicines at www.disposemymeds.org.          This list is accurate as of 5/2/18 10:27 AM.  Always use your most recent med list.                   Brand Name Dispense Instructions for use Diagnosis    albuterol 108 (90 Base) MCG/ACT Inhaler    PROAIR HFA    1 Inhaler    Inhale 2 puffs into the lungs every 4 hours as needed for shortness of breath / dyspnea    Mild intermittent reactive airway disease with wheezing without  complication       citalopram 40 MG tablet    celeXA    30 tablet    TAKE 1 TABLET BY MOUTH DAILY    DAFNE (generalized anxiety disorder), Major depressive disorder, recurrent episode, in full remission (H)       docusate sodium 100 MG capsule    COLACE    30 capsule    TAKE 1 CAPSULE BY MOUTH DAILY    Pelvic pain in female       gabapentin 100 MG capsule    NEURONTIN    90 capsule    TAKE 1 CAPSULE BY MOUTH 3 TIMES DAILY    LLQ abdominal pain       hydrOXYzine 25 MG capsule    VISTARIL    60 capsule    TAKE 1 OR 2 CAPSULES BY MOUTH EVERY 4-6 HOURS AS NEEDED FOR ANXIETY    Anxiety, Attention deficit disorder       * methylphenidate ER 54 MG CR tablet   Start taking on:  5/5/2018    CONCERTA    30 tablet    Take 1 tablet (54 mg) by mouth daily    Attention deficit hyperactivity disorder (ADHD), predominantly inattentive type       * methylphenidate ER 54 MG CR tablet   Start taking on:  6/4/2018    CONCERTA    30 tablet    Take 1 tablet (54 mg) by mouth every morning    Attention deficit hyperactivity disorder (ADHD), predominantly inattentive type       * methylphenidate ER 54 MG CR tablet   Start taking on:  7/3/2018    CONCERTA    30 tablet    Take 1 tablet (54 mg) by mouth every morning    Attention deficit hyperactivity disorder (ADHD), predominantly inattentive type       ranitidine 150 MG tablet    ZANTAC    60 tablet    Take 1 tablet (150 mg) by mouth 2 times daily    Gastroesophageal reflux disease without esophagitis       tiZANidine 4 MG tablet    ZANAFLEX    30 tablet    Take 0.5-1 tablets (2-4 mg) by mouth 3 times daily as needed for muscle spasms (MUSCLE SPASM)    Strain of left trapezius muscle, subsequent encounter, Cervicalgia       traZODone 50 MG tablet    DESYREL    90 tablet    TAKE 1/2 TO 3 TABLETS BY MOUTH AT BEDTIME AS NEEDED FOR INSOMNIA    Insomnia       TYLENOL PO           * Notice:  This list has 3 medication(s) that are the same as other medications prescribed for you. Read the directions  carefully, and ask your doctor or other care provider to review them with you.

## 2018-05-02 NOTE — PROGRESS NOTES
SUBJECTIVE:                                                    Cristiana Myles is a 37 year old female who presents to clinic today for the following health issues:        Medication Followup of methylphenidate ER     Taking Medication as prescribed: yes    Side Effects:  None    Medication Helping Symptoms:  yes       Musculoskeletal problem/pain      Duration: about 2 weeks    Description  Location: left side of shoulder and neck    Intensity:  mild    Accompanying signs and symptoms: radiation of pain to neck    History  Previous similar problem: no   Previous evaluation:  none    Precipitating or alleviating factors:  Trauma or overuse: YES, moving furniture  Aggravating factors include: any type of movement    Therapies tried and outcome: heat, ice and steroid and muscle relaxant from ER. Just not getting any better.    Pushing furniture and got sharp under left shoulder - some radicular pain into arm - better now.     Seen ER- notes reviewed. XR negative     Treatment not help          Problem list and histories reviewed & adjusted, as indicated.  Additional history:     Labs reviewed in EPIC    ROS:  CONSTITUTIONAL: NEGATIVE for fever, chills, change in weight  RESP: NEGATIVE for significant cough or SOB  CV: NEGATIVE for chest pain, palpitations or peripheral edema    OBJECTIVE:                                                    /72  Pulse 89  Temp 97.2  F (36.2  C) (Tympanic)  Wt 200 lb (90.7 kg)  SpO2 99%  BMI 29.53 kg/m2  Body mass index is 29.53 kg/(m^2).   GENERAL: healthy, alert, well nourished, well hydrated, no distress  Neck stiffness and tender over left side and into left trap. Muscle.   MS: extremities- no gross deformities noted, no edema Good ROM overall of left shoulder   PSYCH: Alert and oriented times 3; speech- coherent , normal rate and volume; able to articulate logical thoughts, able to abstract reason, no tangential thoughts, no hallucinations or delusions, affect- normal          ASSESSMENT/PLAN:                                                      (J45.20) Mild intermittent reactive airway disease with wheezing without complication  (primary encounter diagnosis)  Comment: RF needed  Plan: albuterol (PROAIR HFA) 108 (90 Base) MCG/ACT         Inhaler            (F90.0) Attention deficit hyperactivity disorder (ADHD), predominantly inattentive type  Comment: stable RF done for 3 months   Plan: methylphenidate ER (CONCERTA) 54 MG CR tablet,         methylphenidate ER (CONCERTA) 54 MG CR tablet,         methylphenidate ER (CONCERTA) 54 MG CR tablet              (M54.2) Cervicalgia  Comment: Discussed. Continue OTC motrin and zanaflex  Plan: PHYSICAL THERAPY REFERRAL, tiZANidine         (ZANAFLEX) 4 MG tablet        ADD PT.     (I90.354I) Strain of left trapezius muscle, subsequent encounter  Comment: as above.   Plan: PHYSICAL THERAPY REFERRAL, tiZANidine         (ZANAFLEX) 4 MG tablet        Discussed in length conservative measures of OTC medications for pain, Ice/Heat, elevation and the concept of R.I.C.E.. Continue behavioral changes and proper body mechanics to allow for healing. Follow up as directed.   Symptomatic treatment was discussed along when patient should call and/or come back into the clinic or go to ER/Urgent care. All questions answered.         See Patient Instructions    Sudhakar Cuellar MD  [facility]

## 2018-05-02 NOTE — PATIENT INSTRUCTIONS
Muscle Strain in the Extremities  A muscle strain is a stretching and tearing of muscle fibers. This causes pain, especially when you move that muscle. There may also be some swelling and bruising.  Home care    Keep the hurt area raised to reduce pain and swelling. This is especially important during the first 48 hours.    Apply an ice pack over the injured area for 15 to 20 minutes every 3 to 6 hours. You should do this for the first 24 to 48 hours. You can make an ice pack by filling a plastic bag that seals at the top with ice cubes and then wrapping it with a thin towel. Be careful not to injure your skin with the ice treatments. Ice should never be applied directly to skin. Continue the use of ice packs for relief of pain and swelling as needed. After 48 hours, apply heat (warm shower or warm bath) for 15 to 20 minutes several times a day, or alternate ice and heat.    You may use over-the-counter pain medicine to control pain, unless another medicine was prescribed. If you have chronic liver or kidney disease or ever had a stomach ulcer or GI bleeding, talk with your healthcare provider before using these medicines.    For leg strains: If crutches have been recommended, don t put full weight on the hurt leg until you can do so without pain. You can return to sports when you are able to hop and run on the injured leg without pain.  Follow-up care  Follow up with your healthcare provider, or as advised.  When to seek medical advice  Call your healthcare provider right away if any of these occur:    The toes of the injured leg become swollen, cold, blue, numb, or tingly    Pain or swelling increases  Date Last Reviewed: 11/19/2015 2000-2017 [employer]. [address] [place] [address], [place] [address]. All rights reserved. This information is not intended as a substitute for professional medical care. Always follow your healthcare professional's instructions.

## 2018-05-03 ASSESSMENT — PATIENT HEALTH QUESTIONNAIRE - PHQ9: SUM OF ALL RESPONSES TO PHQ QUESTIONS 1-9: 14

## 2018-05-03 ASSESSMENT — ANXIETY QUESTIONNAIRES: GAD7 TOTAL SCORE: 9

## 2018-05-03 ASSESSMENT — ASTHMA QUESTIONNAIRES: ACT_TOTALSCORE: 18

## 2018-05-29 ENCOUNTER — HOSPITAL ENCOUNTER (OUTPATIENT)
Dept: PHYSICAL THERAPY | Facility: HOSPITAL | Age: 38
Setting detail: THERAPIES SERIES
End: 2018-05-29
Attending: FAMILY MEDICINE
Payer: COMMERCIAL

## 2018-05-29 PROCEDURE — 97530 THERAPEUTIC ACTIVITIES: CPT | Mod: GP,59

## 2018-05-29 PROCEDURE — 97162 PT EVAL MOD COMPLEX 30 MIN: CPT | Mod: GP

## 2018-05-29 PROCEDURE — 97140 MANUAL THERAPY 1/> REGIONS: CPT | Mod: GP

## 2018-05-29 PROCEDURE — 40000718 ZZHC STATISTIC PT DEPARTMENT ORTHO VISIT

## 2018-05-29 NOTE — PROGRESS NOTES
05/29/18 1004   General Information   Type of Visit Initial OP Ortho PT Evaluation   Start of Care Date 05/29/18   Referring Physician Sudhakar Cuellar MD   Orders Evaluate and Treat   Orders Comment good HEP   Date of Order 05/02/18   Insurance Type [employer]   Insurance Comments/Visits Authorized 40 visits   Medical Diagnosis Cervicalgia, strain of left trapezius   Surgical/Medical history reviewed Yes   Precautions/Limitations no known precautions/limitations   Body Part(s)   Body Part(s) Cervical Spine   Presentation and Etiology   Pertinent history of current problem (include personal factors and/or comorbidities that impact the POC) C/O left shoulder and neck pain. With her fiance, Shorty.  Onset a little over a month ago while pushing furniture. She felt a buring in the left shoulder. She went to urgent care and then saw Dr Cuellar when she couldn't move her neck. It gets worse when she drives, with onset about 15 minutes into the drive. She is not sleeping. She wakes up at least 10 times during a night. She saw Nadira for vertigo issues, and that comes and goes.    Impairments A. Pain;E. Decreased flexibility;K. Numbness;L. Tingling;N. Headaches   Functional Limitations perform activities of daily living   How/Where did it occur While lifting   Onset date of current episode/exacerbation 04/21/18   Pain rating (0-10 point scale) Best (/10);Worst (/10);Other   Best (/10) 1   Worst (/10) 8   Pain quality A. Sharp;B. Dull;C. Aching   Frequency of pain/symptoms A. Constant   Pain/symptoms exacerbated by C. Lifting;D. Carrying;G. Certain positions;H. Overhead reach;K. Home tasks   Pain/symptoms eased by C. Rest;E. Changing positions;F. Certain positions   Current / Previous Interventions   Diagnostic Tests: X-ray   X-ray Results unremarkable   Current Level of Function   Current Community Support Family/friend caregiver   Patient role/employment history C. Homemaker   Fall Risk Screen   Fall screen completed by  PT   Have you fallen 2 or more times in the past year? No   Have you fallen and had an injury in the past year? Yes   Is patient a fall risk? No   Fall screen comments slipped on ice   Functional Scales   Functional Scales Other   Other Scales  Neck Disability Index, score 46% moderate disability   Cervical Spine   Posture Slouched, with head carried SR, right shoulder down   Palpation Left SI jt locked and left LE 1/2 inch longer than right. Left ASIS inferior and left PSIS superior relative to right. Right sacral sulcus deep and right GENNY prominent. T2-L5=NSRRL.    Planned Therapy Interventions   Planned Therapy Interventions manual therapy;neuromuscular re-education;strengthening;stretching   Clinical Impression   Criteria for Skilled Therapeutic Interventions Met yes, treatment indicated   PT Diagnosis Neck and shoulder pain mediated by mechanical dysfunction at pelvic, sacral, lumbar, and thoracic segments with functional leg length difference.   Clinical Presentation Evolving/Changing   Clinical Presentation Rationale clinical judgement   Clinical Decision Making (Complexity) Moderate complexity   Therapy Frequency 2 times/Week   Predicted Duration of Therapy Intervention (days/wks) 4 weeks   Risk & Benefits of therapy have been explained Yes   Patient, Family & other staff in agreement with plan of care Yes   Clinical Impression Comments Findings consistent with left anteriorly rotated innominate with functionally long left LE, SR sacrum, and SRRL curve in T-L spine that was associated with the leg length difference.   Education Assessment   Barriers to Learning No barriers   ORTHO GOALS   PT Ortho Eval Goals 1;2   Ortho Goal 1   Goal Identifier 1 Functional   Goal Description Improved SPADI score to 30% or better   Target Date 06/26/18   Ortho Goal 2   Goal Identifier 2 Outcome   Goal Description Resolution of mechanical dysfunction   Target Date 06/26/18   Total Evaluation Time   Total Evaluation Time 15'      JUSTIN HernandezT

## 2018-06-01 NOTE — PROGRESS NOTES
Outpatient Physical Therapy Discharge Note     Patient: Cristiana Myles  : 1980    Beginning/End Dates of Reporting Period:  2017 to 2018    Referring Provider: Dr. Cuellar    Therapy Diagnosis: dereased balance compensation with head turns   Client Self Report: states she has had a lot of issues with vertigo throughtout the years. CLient was seen until 3/08/2018 where she stated felt better with balance and overall function    Objective Measurements:                                    vertiog decreased and [place] 56/56      Outcome Measures (most recent score):      Goals:  Goal Identifier functional   Goal Description Client will be able to roll in bed without vertigo.   Target Date 18   Date Met      Progress:     Goal Identifier Functional   Goal Description Client will be able to turn head in kitchen and with grandchild  with no vertigo.   Target Date 18   Date Met      Progress:     Goal Identifier functional   Goal Description Client will have good balance evidnced by single leg x one minute.   Target Date 18   Date Met      Progress:     Goal Identifier     Goal Description     Target Date     Date Met      Progress:     Goal Identifier     Goal Description     Target Date     Date Met      Progress:     Goal Identifier     Goal Description     Target Date     Date Met      Progress:     Goal Identifier     Goal Description     Target Date     Date Met      Progress:     Goal Identifier     Goal Description     Target Date     Date Met      Progress:     Progress Toward Goals:   Progress this reporting period: Met goals and was functional the last session in PT    Plan:  Discharge from therapy.    Discharge:    Reason for Discharge: No further expectation of progress.    Equipment Issued:     Discharge Plan: Patient to continue home program.

## 2018-06-05 ENCOUNTER — HOSPITAL ENCOUNTER (OUTPATIENT)
Dept: PHYSICAL THERAPY | Facility: HOSPITAL | Age: 38
Setting detail: THERAPIES SERIES
End: 2018-06-05
Attending: FAMILY MEDICINE
Payer: COMMERCIAL

## 2018-06-05 PROCEDURE — 40000718 ZZHC STATISTIC PT DEPARTMENT ORTHO VISIT

## 2018-06-05 PROCEDURE — 97140 MANUAL THERAPY 1/> REGIONS: CPT | Mod: GP

## 2018-06-06 ENCOUNTER — HOSPITAL ENCOUNTER (OUTPATIENT)
Dept: PHYSICAL THERAPY | Facility: HOSPITAL | Age: 38
Setting detail: THERAPIES SERIES
End: 2018-06-06
Attending: FAMILY MEDICINE
Payer: COMMERCIAL

## 2018-06-06 PROCEDURE — 97110 THERAPEUTIC EXERCISES: CPT | Mod: GP

## 2018-06-06 PROCEDURE — 97140 MANUAL THERAPY 1/> REGIONS: CPT | Mod: GP

## 2018-06-06 PROCEDURE — 40000718 ZZHC STATISTIC PT DEPARTMENT ORTHO VISIT

## 2018-06-13 ENCOUNTER — HOSPITAL ENCOUNTER (OUTPATIENT)
Dept: PHYSICAL THERAPY | Facility: HOSPITAL | Age: 38
Setting detail: THERAPIES SERIES
End: 2018-06-13
Attending: FAMILY MEDICINE
Payer: COMMERCIAL

## 2018-06-13 PROCEDURE — 97110 THERAPEUTIC EXERCISES: CPT | Mod: GP

## 2018-06-13 PROCEDURE — 40000718 ZZHC STATISTIC PT DEPARTMENT ORTHO VISIT

## 2018-06-19 ENCOUNTER — HOSPITAL ENCOUNTER (OUTPATIENT)
Dept: PHYSICAL THERAPY | Facility: HOSPITAL | Age: 38
Setting detail: THERAPIES SERIES
End: 2018-06-19
Attending: FAMILY MEDICINE
Payer: COMMERCIAL

## 2018-06-19 DIAGNOSIS — M54.2 CERVICALGIA: Primary | ICD-10-CM

## 2018-06-19 PROCEDURE — 97110 THERAPEUTIC EXERCISES: CPT | Mod: GP

## 2018-06-19 PROCEDURE — 40000718 ZZHC STATISTIC PT DEPARTMENT ORTHO VISIT

## 2018-06-19 NOTE — PROGRESS NOTES
Outpatient Physical Therapy Progress Note     Patient: Cristiana Myles  : 1980    Beginning/End Dates of Reporting Period:  2018 to 2018    Referring Provider: Sudhakar Cuellar MD    Therapy Diagnosis: Cervicalgia and strain of left trap mediated by mechanical dysfunction at thoracic, lumbar, sacral, and pelvic segments with functional leg length difference.     Client Self Report: Patient seen [phone] for C/O left shoulder and neck pain. Better. But on Thursday she had to drive to [place] twice. She noticed the seats tilt back and that makes her sore, so the next time she will put a folded towel there. Overall she is feeling better.     Objective Measurements:  Objective Measure: Somatic dysfunction  Details: Left lateral shift in lumbar and pelvis. Leg lengths equal, pelvis level, and SI jts equally mobile. Sacral and lumbar segments WFL of mobility and alignment.     Outcome Measures (most recent score):  Neck Disability Index, score 20% today    Goals:  Goal Identifier 1 Functional   Goal Description Improved Neck Disability Index score to 30% or better   Target Date 18   Date Met  18   Progress:     Goal Identifier 2 Outcome   Goal Description Resolution of mechanical dysfunction   Target Date 18   Date Met  18   Progress:       Progress Toward Goals:   Progress this reporting period: yes, manual therapy goals have been met. Patient is ready to transition into Back in Balance program to tie together strength needs and appropriate posture. Will request signed, written referral.    Plan:  Changes to therapy plan of care: transition to Back in Balance for strength, continued posture work and HEP.    Discharge:  No    Cinda Castellon DPT

## 2018-06-26 ENCOUNTER — HOSPITAL ENCOUNTER (OUTPATIENT)
Dept: PHYSICAL THERAPY | Facility: HOSPITAL | Age: 38
Setting detail: THERAPIES SERIES
End: 2018-06-26
Attending: FAMILY MEDICINE
Payer: COMMERCIAL

## 2018-06-26 PROCEDURE — 40000718 ZZHC STATISTIC PT DEPARTMENT ORTHO VISIT

## 2018-06-26 PROCEDURE — 97110 THERAPEUTIC EXERCISES: CPT | Mod: GP

## 2018-07-11 ENCOUNTER — HOSPITAL ENCOUNTER (OUTPATIENT)
Dept: PHYSICAL THERAPY | Facility: HOSPITAL | Age: 38
Setting detail: THERAPIES SERIES
End: 2018-07-11
Attending: FAMILY MEDICINE
Payer: COMMERCIAL

## 2018-07-11 PROCEDURE — 40000718 ZZHC STATISTIC PT DEPARTMENT ORTHO VISIT: Performed by: PHYSICAL THERAPIST

## 2018-07-11 PROCEDURE — 97530 THERAPEUTIC ACTIVITIES: CPT | Mod: GP | Performed by: PHYSICAL THERAPIST

## 2018-07-11 PROCEDURE — 97750 PHYSICAL PERFORMANCE TEST: CPT | Mod: GP | Performed by: PHYSICAL THERAPIST

## 2018-07-11 PROCEDURE — 97110 THERAPEUTIC EXERCISES: CPT | Mod: GP | Performed by: PHYSICAL THERAPIST

## 2018-07-11 NOTE — PROGRESS NOTES
"   07/11/18 0917   Signing Clinician's Name / Credentials   Signing clinician's name / credentials Lisa Gracia DPAKIRA   Session Number   Session Number 7/40 [employer]   Adult Goals   PT Ortho Eval Goals 3;4   Ortho Goal 3   Goal Identifier LTG 3   Goal Description Pt will tolerate walking 2 miles with pain <2/10   Target Date 09/05/18   Ortho Goal 4   Goal Identifier LTG 4   Goal Description Pt will be able to return to prior activity level and day to day tasks demonstrating good core awareness with pain <2/10.   Target Date 09/05/18   Subjective Report   Subjective Report Pt reports she got  on Friday, states she has been on her feet and busy since the 3rd of July.  States she has increased pain and feels her legs are off.  Was dancing and doing other activity.  Pt states she was walking regularly about 2 miles per day up until about 6 months ago when she developed vertigo/back issues.     Objective Measures   Objective Measures Objective Measure 1;Objective Measure 2   Objective Measure 1   Objective Measure abdominal strength    Details 2/5   Objective Measure 2   Objective Measure Step test   Details Resting vitals: HR 69, O2 sats 98.  6\" step height: stopped due to SOB 2 minutes 30 seconds , O2 99%,  vitals after 1 minute rest HR 94, O2 sats 99%.    Treatment Interventions   Interventions Therapeutic Procedure/Exercise;Therapeutic Activity   Therapeutic Procedure/exercise   Minutes 24   Skilled Intervention therex   Patient Response good and appropriate   Treatment Detail Pt instructed in TA firing and TA firing with breathing 5 seconds x5 reps.  Pt instructed in pelvic tilts x10 reps.  Pt instructed in and completed abdominal exercise #1 2x10 reps with good control noted, bridging #1 x10 reps with cues for TA firing, lat pull down with orange band 2x10 with cues for TA firing.       Therapeutic Activity   Minutes 12   Skilled Intervention education   Patient Response good   Treatment Detail Pt educated " and introduced to Back and Balance program.  Pt provided with book and discussed layout and use of book.  Discussed importance of bringing book with her to every session.  Discussed goal of back in balance to increase activity level and tolerance, core strengthening and awareness, body mechanics, and working pt towards independence with their HEP and health.  Discussed use of pedometer to assess daily steps and progress through use of journal.  Pt thinks she has one at home so will work on trying to find before next session.     Assessments Completed   Assessments Completed Pt seen today for initiate of Back in Balance program.  Testing completed showing pt has fair activity tolerance and 2/5 abdominal strength.  Education provided and initiation of HEP.  Discussed goals of program and pt seems motivated to progress and participate with program.  Pt to been seen 2x/week x8 weeks with focus on core strengthening, increasing activity level and tolerance, and general strengthening.   Education   Learner Patient;Significant Other   Readiness Acceptance;Eager   Method Booklet/handout;Literature;Explanation   Response Verbalizes Understanding;Demonstrates Understanding   Plan   Home program abdominal #1, bridging #1, lat pull downs orange tband, continue with lateral shifts and extension against wall   Plan for next session Assess tolerance to HEP and form, progress core exercises as appropriate   Comments   Comments 12 minutes spent completing tests and assessment for back and balance program   Total Session Time   Timed Code Treatment Minutes 36   Total Treatment Time (sum of timed and untimed services) 48

## 2018-07-18 ENCOUNTER — HOSPITAL ENCOUNTER (OUTPATIENT)
Dept: PHYSICAL THERAPY | Facility: HOSPITAL | Age: 38
Setting detail: THERAPIES SERIES
End: 2018-07-18
Attending: FAMILY MEDICINE
Payer: COMMERCIAL

## 2018-07-18 PROCEDURE — 40000718 ZZHC STATISTIC PT DEPARTMENT ORTHO VISIT: Performed by: PHYSICAL THERAPIST

## 2018-07-18 PROCEDURE — 97110 THERAPEUTIC EXERCISES: CPT | Mod: GP | Performed by: PHYSICAL THERAPIST

## 2018-07-23 ENCOUNTER — HOSPITAL ENCOUNTER (OUTPATIENT)
Dept: PHYSICAL THERAPY | Facility: HOSPITAL | Age: 38
Setting detail: THERAPIES SERIES
End: 2018-07-23
Attending: FAMILY MEDICINE
Payer: COMMERCIAL

## 2018-07-23 PROCEDURE — 40000718 ZZHC STATISTIC PT DEPARTMENT ORTHO VISIT: Performed by: PHYSICAL THERAPIST

## 2018-07-23 PROCEDURE — 97110 THERAPEUTIC EXERCISES: CPT | Mod: GP | Performed by: PHYSICAL THERAPIST

## 2018-07-30 ENCOUNTER — HOSPITAL ENCOUNTER (OUTPATIENT)
Dept: PHYSICAL THERAPY | Facility: HOSPITAL | Age: 38
Setting detail: THERAPIES SERIES
End: 2018-07-30
Attending: FAMILY MEDICINE
Payer: COMMERCIAL

## 2018-07-30 PROCEDURE — 40000718 ZZHC STATISTIC PT DEPARTMENT ORTHO VISIT

## 2018-07-30 PROCEDURE — 97110 THERAPEUTIC EXERCISES: CPT | Mod: GP

## 2018-08-01 ENCOUNTER — OFFICE VISIT (OUTPATIENT)
Dept: FAMILY MEDICINE | Facility: OTHER | Age: 38
End: 2018-08-01
Attending: FAMILY MEDICINE
Payer: COMMERCIAL

## 2018-08-01 ENCOUNTER — HOSPITAL ENCOUNTER (OUTPATIENT)
Dept: PHYSICAL THERAPY | Facility: HOSPITAL | Age: 38
Setting detail: THERAPIES SERIES
End: 2018-08-01
Attending: FAMILY MEDICINE
Payer: COMMERCIAL

## 2018-08-01 VITALS
HEART RATE: 75 BPM | DIASTOLIC BLOOD PRESSURE: 72 MMHG | WEIGHT: 211 LBS | OXYGEN SATURATION: 98 % | TEMPERATURE: 97.5 F | BODY MASS INDEX: 31.16 KG/M2 | SYSTOLIC BLOOD PRESSURE: 112 MMHG

## 2018-08-01 DIAGNOSIS — K21.9 GASTROESOPHAGEAL REFLUX DISEASE WITHOUT ESOPHAGITIS: Primary | ICD-10-CM

## 2018-08-01 DIAGNOSIS — F90.0 ATTENTION DEFICIT HYPERACTIVITY DISORDER (ADHD), PREDOMINANTLY INATTENTIVE TYPE: ICD-10-CM

## 2018-08-01 PROCEDURE — 97110 THERAPEUTIC EXERCISES: CPT | Mod: GP

## 2018-08-01 PROCEDURE — 99000 SPECIMEN HANDLING OFFICE-LAB: CPT | Performed by: FAMILY MEDICINE

## 2018-08-01 PROCEDURE — 99213 OFFICE O/P EST LOW 20 MIN: CPT | Performed by: FAMILY MEDICINE

## 2018-08-01 PROCEDURE — G0463 HOSPITAL OUTPT CLINIC VISIT: HCPCS

## 2018-08-01 PROCEDURE — 80307 DRUG TEST PRSMV CHEM ANLYZR: CPT | Mod: ZL | Performed by: FAMILY MEDICINE

## 2018-08-01 PROCEDURE — 40000718 ZZHC STATISTIC PT DEPARTMENT ORTHO VISIT

## 2018-08-01 RX ORDER — METHYLPHENIDATE HYDROCHLORIDE 54 MG/1
54 TABLET ORAL EVERY MORNING
Qty: 30 TABLET | Refills: 0 | Status: SHIPPED | OUTPATIENT
Start: 2018-08-31 | End: 2018-09-20

## 2018-08-01 RX ORDER — METHYLPHENIDATE HYDROCHLORIDE 54 MG/1
54 TABLET ORAL DAILY
Qty: 30 TABLET | Refills: 0 | Status: SHIPPED | OUTPATIENT
Start: 2018-08-01 | End: 2018-11-15

## 2018-08-01 RX ORDER — METHYLPHENIDATE HYDROCHLORIDE 54 MG/1
54 TABLET ORAL EVERY MORNING
Qty: 30 TABLET | Refills: 0 | Status: SHIPPED | OUTPATIENT
Start: 2018-09-29 | End: 2018-09-20

## 2018-08-01 RX ORDER — OMEPRAZOLE 40 MG/1
40 CAPSULE, DELAYED RELEASE ORAL DAILY
Qty: 30 CAPSULE | Refills: 0 | Status: SHIPPED | OUTPATIENT
Start: 2018-08-01 | End: 2018-09-20

## 2018-08-01 ASSESSMENT — ANXIETY QUESTIONNAIRES
5. BEING SO RESTLESS THAT IT IS HARD TO SIT STILL: SEVERAL DAYS
7. FEELING AFRAID AS IF SOMETHING AWFUL MIGHT HAPPEN: SEVERAL DAYS
3. WORRYING TOO MUCH ABOUT DIFFERENT THINGS: MORE THAN HALF THE DAYS
6. BECOMING EASILY ANNOYED OR IRRITABLE: MORE THAN HALF THE DAYS
2. NOT BEING ABLE TO STOP OR CONTROL WORRYING: MORE THAN HALF THE DAYS
IF YOU CHECKED OFF ANY PROBLEMS ON THIS QUESTIONNAIRE, HOW DIFFICULT HAVE THESE PROBLEMS MADE IT FOR YOU TO DO YOUR WORK, TAKE CARE OF THINGS AT HOME, OR GET ALONG WITH OTHER PEOPLE: SOMEWHAT DIFFICULT
GAD7 TOTAL SCORE: 11
4. TROUBLE RELAXING: SEVERAL DAYS
1. FEELING NERVOUS, ANXIOUS, OR ON EDGE: MORE THAN HALF THE DAYS

## 2018-08-01 ASSESSMENT — PAIN SCALES - GENERAL: PAINLEVEL: NO PAIN (0)

## 2018-08-01 NOTE — NURSING NOTE
"Chief Complaint   Patient presents with     A.D.H.D       Initial /72 (BP Location: Right arm, Patient Position: Chair, Cuff Size: Adult Regular)  Pulse 75  Temp 97.5  F (36.4  C) (Tympanic)  Wt 211 lb (95.7 kg)  SpO2 98%  BMI 31.16 kg/m2 Estimated body mass index is 31.16 kg/(m^2) as calculated from the following:    Height as of 2/14/18: 5' 9\" (1.753 m).    Weight as of this encounter: 211 lb (95.7 kg).  Medication Reconciliation: hossein Steel    "

## 2018-08-01 NOTE — PROGRESS NOTES
SUBJECTIVE:   Crisitana Aguila is a 38 year old female who presents to clinic today for the following health issues:    ADHD Follow Up      Duration: Experiencing for years     Description (location/character/radiation): Easily distracted, hard to focus, mind racing     Intensity:  moderate    Accompanying signs and symptoms: Easily distracted, hard to focus, mind racing    History (similar episodes/previous evaluation): Diagnosed when since 24 years old    Precipitating or alleviating factors: Medication, taking a step back and giving herself a minute     Therapies tried and outcome: Concerta      **Feels that the Zantac is no longer working for her, experiencing heartburn   Has had less caffeine and tobacco.       concerta - last dose yesterday     Problem list and histories reviewed & adjusted, as indicated.  Additional history: as documented    Labs reviewed in EPIC    Reviewed and updated as needed this visit by clinical staff  Tobacco  Allergies  Meds  Med Hx  Surg Hx  Fam Hx  Soc Hx      Reviewed and updated as needed this visit by Provider         ROS:  CONSTITUTIONAL: NEGATIVE for fever, chills, change in weight  ENT/MOUTH: NEGATIVE for ear, mouth and throat problems  RESP: NEGATIVE for significant cough or SOB  CV: NEGATIVE for chest pain, palpitations or peripheral edema  ROS otherwise negative    OBJECTIVE:                                                    /72 (BP Location: Right arm, Patient Position: Chair, Cuff Size: Adult Regular)  Pulse 75  Temp 97.5  F (36.4  C) (Tympanic)  Wt 211 lb (95.7 kg)  SpO2 98%  BMI 31.16 kg/m2  Body mass index is 31.16 kg/(m^2).   GENERAL: healthy, alert, well nourished, well hydrated, no distress  ABDOMEN: soft, no tenderness, no  hepatosplenomegaly, no masses, normal bowel sounds  PSYCH: Alert and oriented times 3; speech- coherent , normal rate and volume; able to articulate logical thoughts, able to abstract reason, no tangential thoughts, no  hallucinations or delusions, affect- normal         ASSESSMENT/PLAN:                                                      (K21.9) Gastroesophageal reflux disease without esophagitis  (primary encounter diagnosis)  Comment: Reoccurance- exacerbation   Plan: omeprazole (PRILOSEC) 40 MG capsule        Keep working on bad habits.  Will add PPI for a month with zantac then continue Zantac when done with PPI    (F90.0) Attention deficit hyperactivity disorder (ADHD), predominantly inattentive type  Comment: stable -- UDS done and  pending   Plan: methylphenidate ER (CONCERTA) 54 MG CR tablet,         methylphenidate ER (CONCERTA) 54 MG CR tablet,         methylphenidate ER (CONCERTA) 54 MG CR tablet,         Pain Drug Scr UR W Rptd Meds              See Patient Instructions    Sudhakar Cuellar MD  [facility]

## 2018-08-01 NOTE — MR AVS SNAPSHOT
After Visit Summary   8/1/2018    Cristiana Aguila    MRN: [id number]           Patient Information     Date Of Birth          1980        Visit Information        Provider Department      8/1/2018 9:45 AM Sudhakar Cuellar MD [facility]        Today's Diagnoses     Gastroesophageal reflux disease without esophagitis    -  1    Attention deficit hyperactivity disorder (ADHD), predominantly inattentive type          Care Instructions      Lifestyle Changes for Controlling GERD  When you have GERD, stomach acid feels as if it s backing up toward your mouth. Whether or not you take medicine to control your GERD, your symptoms can often be improved with lifestyle changes. Talk to your healthcare provider about the following suggestions. These suggestions may help you get relief from your symptoms.      Raise your head  Reflux is more likely to strike when you re lying down flat, because stomach fluid can flow backward more easily. Raising the head of your bed 4 to 6 inches can help. To do this:    Slide blocks or books under the legs at the head of your bed. Or, place a wedge under the mattress. Many [employer] can make a suitable wedge for you. The wedge should run from your waist to the top of your head.    Don t just prop your head on several pillows. This increases pressure on your stomach. It can make GERD worse.  Watch your eating habits  Certain foods may increase the acid in your stomach or relax the lower esophageal sphincter. This makes GERD more likely. It s best to avoid the following if they cause you symptoms:    Coffee, tea, and carbonated drinks (with and without caffeine)    Fatty, fried, or spicy food    Mint, chocolate, onions, and tomatoes    Peppermint    Any other foods that seem to irritate your stomach or cause you pain  Relieve the pressure  Tips include the following:    Eat smaller meals, even if you have to eat more often.    Don t lie down right after you eat.  Wait a few hours for your stomach to empty.    Avoid tight belts and tight-fitting clothes.    Lose excess weight.  Tobacco and alcohol  Avoid smoking tobacco and drinking alcohol. They can make GERD symptoms worse.  Date Last Reviewed: 7/1/2016 2000-2017 [employer]. [address] [place] [address]. All rights reserved. This information is not intended as a substitute for professional medical care. Always follow your healthcare professional's instructions.                Follow-ups after your visit        Your next 10 appointments already scheduled     Aug 06, 2018  9:30 AM CDT   Treatment with Maria Elenaelen Garcia, PTA   [facility] ([facility] )    [address] [address]   [phone]            Aug 08, 2018  9:30 AM CDT   Treatment with Maria Elena Garcia, PTA   [facility] ([facility] )    [address] [address]   [phone]            Aug 13, 2018  9:30 AM CDT   Treatment with Maria Elena Garcia, PTA   [facility] ([facility] )    [address] [address]   [phone]            Aug 15, 2018  9:30 AM CDT   Treatment with Lisa Gracia, PT   [facility] ([facility] )    [address] [address]   [phone]            Aug 20, 2018  9:30 AM CDT   Treatment with Maria Elena Garcia, PTA   [facility] ([facility] )    [address] [address]   [phone]            Aug 27, 2018  9:30 AM CDT   Treatment with Maria Elena Garcia, PTA   [facility] ([facility] )    [address] [address]   [phone]            Aug 29, 2018  9:30 AM CDT   Treatment with Maria Elena Garcia, PTA   [facility] ([facility] )    [address] [address]   [phone]            Sep 05, 2018  9:30 AM CDT   Treatment with Lisa PAVEL CornellLeake, PT   [facility] ([facility] )    [address]  [place] [address]   [phone]            Oct 25, 2018  9:45 AM CDT   (Arrive by 9:30 AM)   SHORT with Sudhakar Cuellar MD   [facility] ([facility] )    [address] [place] [address]   [phone]            Feb 27, 2019 10:00 AM CST   (Arrive by 9:45 AM)   PHYSICAL with WINNIE Hunter CNM   [facility] ([facility] )    [address] [place] [address]   [phone]              Who to contact     If you have questions or need follow up information about today's clinic visit or your schedule please contact [facility] directly at [phone].  Normal or non-critical lab and imaging results will be communicated to you by MyChart, letter or phone within 4 business days after the clinic has received the results. If you do not hear from us within 7 days, please contact the clinic through [employer] or phone. If you have a critical or abnormal lab result, we will notify you by phone as soon as possible.  Submit refill requests through NOTIK or call your pharmacy and they will forward the refill request to us. Please allow 3 business days for your refill to be completed.          Additional Information About Your Visit        [employer] Information     NOTIK gives you secure access to your electronic health record. If you see a primary care provider, you can also send messages to your care team and make appointments. If you have questions, please call your primary care clinic.  If you do not have a primary care provider, please call [phone] and they will assist you.        Care EveryWhere ID     This is your Care EveryWhere ID. This could be used by other organizations to access your [place] medical records  [id number]        Your Vitals Were     Pulse Temperature Pulse Oximetry BMI (Body Mass Index)          75 97.5  F (36.4  C) (Tympanic) 98% 31.16 kg/m2         Blood Pressure from Last 3 Encounters:    08/01/18 112/72   05/02/18 112/72   04/19/18 126/87    Weight from Last 3 Encounters:   08/01/18 211 lb (95.7 kg)   05/02/18 200 lb (90.7 kg)   03/25/18 210 lb (95.3 kg)              We Performed the Following     Pain Drug Scr UR W Rptd Meds          Today's Medication Changes          These changes are accurate as of 8/1/18 10:24 AM.  If you have any questions, ask your nurse or doctor.               Start taking these medicines.        Dose/Directions    omeprazole 40 MG capsule   Commonly known as:  priLOSEC   Used for:  Gastroesophageal reflux disease without esophagitis   Started by:  Sudhakar Cuellar MD        Dose:  40 mg   Take 1 capsule (40 mg) by mouth daily Take 30-60 minutes before a meal.   Quantity:  30 capsule   Refills:  0         These medicines have changed or have updated prescriptions.        Dose/Directions    * methylphenidate ER 54 MG CR tablet   Commonly known as:  CONCERTA   This may have changed:  Another medication with the same name was changed. Make sure you understand how and when to take each.   Used for:  Attention deficit hyperactivity disorder (ADHD), predominantly inattentive type   Changed by:  Sudhakar Cuellar MD        Dose:  54 mg   Take 1 tablet (54 mg) by mouth daily   Quantity:  30 tablet   Refills:  0       * methylphenidate ER 54 MG CR tablet   Commonly known as:  CONCERTA   This may have changed:  These instructions start on 8/31/2018. If you are unsure what to do until then, ask your doctor or other care provider.   Used for:  Attention deficit hyperactivity disorder (ADHD), predominantly inattentive type   Changed by:  Sudhakar Cuellar MD        Dose:  54 mg   Start taking on:  8/31/2018   Take 1 tablet (54 mg) by mouth every morning   Quantity:  30 tablet   Refills:  0       * methylphenidate ER 54 MG CR tablet   Commonly known as:  CONCERTA   This may have changed:  These instructions start on 9/29/2018. If you are unsure what to do until then, ask your doctor or  other care provider.   Used for:  Attention deficit hyperactivity disorder (ADHD), predominantly inattentive type   Changed by:  Sudhakar Cuellar MD        Dose:  54 mg   Start taking on:  9/29/2018   Take 1 tablet (54 mg) by mouth every morning   Quantity:  30 tablet   Refills:  0       * Notice:  This list has 3 medication(s) that are the same as other medications prescribed for you. Read the directions carefully, and ask your doctor or other care provider to review them with you.         Where to get your medicines      These medications were sent to [facility] - [address] [address]     Phone:  [phone]     omeprazole 40 MG capsule         Some of these will need a paper prescription and others can be bought over the counter.  Ask your nurse if you have questions.     Bring a paper prescription for each of these medications     methylphenidate ER 54 MG CR tablet    methylphenidate ER 54 MG CR tablet    methylphenidate ER 54 MG CR tablet                Primary Care Provider Office Phone # Fax #    Sudhakar Cuellar [phone]       [facility] [address]        Equal Access to Services     [facility] AH: Hadii luis romero hadasho Sofavian, waaxda luqadaha, qaybta kaalmada ademary jo, can vilchis . So [facility] [place] [facility] [phone].    ATENCIÓN: Si habla español, tiene a singh disposición servicios gratuitos de asistencia lingüística. [facility] [phone].    We comply with applicable federal civil rights laws and [place] laws. We do not discriminate on the basis of race, color, national origin, age, disability, sex, sexual orientation, or gender identity.            Thank you!     Thank you for choosing [facility]  for your care. Our goal is always to provide you with excellent care. Hearing back from our patients is one way we can continue to improve our services. Please take a few minutes to  complete the written survey that you may receive in the mail after your visit with us. Thank you!             Your Updated Medication List - Protect others around you: Learn how to safely use, store and throw away your medicines at www.disposemymeds.org.          This list is accurate as of 8/1/18 10:24 AM.  Always use your most recent med list.                   Brand Name Dispense Instructions for use Diagnosis    albuterol 108 (90 Base) MCG/ACT Inhaler    PROAIR HFA    1 Inhaler    Inhale 2 puffs into the lungs every 4 hours as needed for shortness of breath / dyspnea    Mild intermittent reactive airway disease with wheezing without complication       citalopram 40 MG tablet    celeXA    30 tablet    TAKE 1 TABLET BY MOUTH DAILY    DAFNE (generalized anxiety disorder), Major depressive disorder, recurrent episode, in full remission (H)       docusate sodium 100 MG capsule    COLACE    30 capsule    TAKE 1 CAPSULE BY MOUTH DAILY    Pelvic pain in female       gabapentin 100 MG capsule    NEURONTIN    90 capsule    TAKE 1 CAPSULE BY MOUTH 3 TIMES DAILY    LLQ abdominal pain       hydrOXYzine 25 MG capsule    VISTARIL    60 capsule    TAKE 1 OR 2 CAPSULES BY MOUTH EVERY 4-6 HOURS AS NEEDED FOR ANXIETY    Anxiety, Attention deficit disorder       * methylphenidate ER 54 MG CR tablet    CONCERTA    30 tablet    Take 1 tablet (54 mg) by mouth daily    Attention deficit hyperactivity disorder (ADHD), predominantly inattentive type       * methylphenidate ER 54 MG CR tablet   Start taking on:  8/31/2018    CONCERTA    30 tablet    Take 1 tablet (54 mg) by mouth every morning    Attention deficit hyperactivity disorder (ADHD), predominantly inattentive type       * methylphenidate ER 54 MG CR tablet   Start taking on:  9/29/2018    CONCERTA    30 tablet    Take 1 tablet (54 mg) by mouth every morning    Attention deficit hyperactivity disorder (ADHD), predominantly inattentive type       omeprazole 40 MG capsule     priLOSEC    30 capsule    Take 1 capsule (40 mg) by mouth daily Take 30-60 minutes before a meal.    Gastroesophageal reflux disease without esophagitis       ranitidine 150 MG tablet    ZANTAC    60 tablet    Take 1 tablet (150 mg) by mouth 2 times daily    Gastroesophageal reflux disease without esophagitis       tiZANidine 4 MG tablet    ZANAFLEX    30 tablet    Take 0.5-1 tablets (2-4 mg) by mouth 3 times daily as needed for muscle spasms (MUSCLE SPASM)    Strain of left trapezius muscle, subsequent encounter, Cervicalgia       traZODone 50 MG tablet    DESYREL    90 tablet    TAKE 1/2 TO 3 TABLETS BY MOUTH AT BEDTIME AS NEEDED FOR INSOMNIA    Insomnia       TYLENOL PO           * Notice:  This list has 3 medication(s) that are the same as other medications prescribed for you. Read the directions carefully, and ask your doctor or other care provider to review them with you.

## 2018-08-01 NOTE — PATIENT INSTRUCTIONS
Lifestyle Changes for Controlling GERD  When you have GERD, stomach acid feels as if it s backing up toward your mouth. Whether or not you take medicine to control your GERD, your symptoms can often be improved with lifestyle changes. Talk to your healthcare provider about the following suggestions. These suggestions may help you get relief from your symptoms.      Raise your head  Reflux is more likely to strike when you re lying down flat, because stomach fluid can flow backward more easily. Raising the head of your bed 4 to 6 inches can help. To do this:    Slide blocks or books under the legs at the head of your bed. Or, place a wedge under the mattress. Many [employer] can make a suitable wedge for you. The wedge should run from your waist to the top of your head.    Don t just prop your head on several pillows. This increases pressure on your stomach. It can make GERD worse.  Watch your eating habits  Certain foods may increase the acid in your stomach or relax the lower esophageal sphincter. This makes GERD more likely. It s best to avoid the following if they cause you symptoms:    Coffee, tea, and carbonated drinks (with and without caffeine)    Fatty, fried, or spicy food    Mint, chocolate, onions, and tomatoes    Peppermint    Any other foods that seem to irritate your stomach or cause you pain  Relieve the pressure  Tips include the following:    Eat smaller meals, even if you have to eat more often.    Don t lie down right after you eat. Wait a few hours for your stomach to empty.    Avoid tight belts and tight-fitting clothes.    Lose excess weight.  Tobacco and alcohol  Avoid smoking tobacco and drinking alcohol. They can make GERD symptoms worse.  Date Last Reviewed: 7/1/2016 2000-2017 [employer]. [address] [place] [address]. All rights reserved. This information is not intended as a substitute for professional medical care. Always follow your healthcare  professional's instructions.

## 2018-08-02 ASSESSMENT — PATIENT HEALTH QUESTIONNAIRE - PHQ9: SUM OF ALL RESPONSES TO PHQ QUESTIONS 1-9: 9

## 2018-08-02 ASSESSMENT — ANXIETY QUESTIONNAIRES: GAD7 TOTAL SCORE: 11

## 2018-08-06 ENCOUNTER — HOSPITAL ENCOUNTER (OUTPATIENT)
Dept: PHYSICAL THERAPY | Facility: HOSPITAL | Age: 38
Setting detail: THERAPIES SERIES
End: 2018-08-06
Attending: FAMILY MEDICINE
Payer: COMMERCIAL

## 2018-08-06 PROCEDURE — 40000718 ZZHC STATISTIC PT DEPARTMENT ORTHO VISIT

## 2018-08-06 PROCEDURE — 97110 THERAPEUTIC EXERCISES: CPT | Mod: GP

## 2018-08-07 LAB — PAIN DRUG SCR UR W RPTD MEDS: NORMAL

## 2018-08-08 ENCOUNTER — HOSPITAL ENCOUNTER (OUTPATIENT)
Dept: PHYSICAL THERAPY | Facility: HOSPITAL | Age: 38
Setting detail: THERAPIES SERIES
End: 2018-08-08
Attending: FAMILY MEDICINE
Payer: COMMERCIAL

## 2018-08-08 PROCEDURE — 40000718 ZZHC STATISTIC PT DEPARTMENT ORTHO VISIT

## 2018-08-08 PROCEDURE — 97110 THERAPEUTIC EXERCISES: CPT | Mod: GP

## 2018-08-15 ENCOUNTER — HOSPITAL ENCOUNTER (OUTPATIENT)
Dept: PHYSICAL THERAPY | Facility: HOSPITAL | Age: 38
Setting detail: THERAPIES SERIES
End: 2018-08-15
Attending: FAMILY MEDICINE
Payer: COMMERCIAL

## 2018-08-15 DIAGNOSIS — G47.00 INSOMNIA: ICD-10-CM

## 2018-08-15 PROCEDURE — 40000718 ZZHC STATISTIC PT DEPARTMENT ORTHO VISIT: Performed by: PHYSICAL THERAPIST

## 2018-08-15 PROCEDURE — 97110 THERAPEUTIC EXERCISES: CPT | Mod: GP | Performed by: PHYSICAL THERAPIST

## 2018-08-16 RX ORDER — TRAZODONE HYDROCHLORIDE 50 MG/1
TABLET, FILM COATED ORAL
Qty: 90 TABLET | Refills: 3 | Status: SHIPPED | OUTPATIENT
Start: 2018-08-16 | End: 2019-02-26

## 2018-08-20 ENCOUNTER — HOSPITAL ENCOUNTER (OUTPATIENT)
Dept: PHYSICAL THERAPY | Facility: HOSPITAL | Age: 38
Setting detail: THERAPIES SERIES
End: 2018-08-20
Attending: FAMILY MEDICINE
Payer: COMMERCIAL

## 2018-08-20 PROCEDURE — 97110 THERAPEUTIC EXERCISES: CPT | Mod: GP

## 2018-08-20 PROCEDURE — 40000718 ZZHC STATISTIC PT DEPARTMENT ORTHO VISIT

## 2018-08-29 ENCOUNTER — HOSPITAL ENCOUNTER (OUTPATIENT)
Dept: PHYSICAL THERAPY | Facility: HOSPITAL | Age: 38
Setting detail: THERAPIES SERIES
End: 2018-08-29
Attending: FAMILY MEDICINE
Payer: COMMERCIAL

## 2018-08-29 PROCEDURE — 97110 THERAPEUTIC EXERCISES: CPT | Mod: GP

## 2018-08-29 PROCEDURE — 40000718 ZZHC STATISTIC PT DEPARTMENT ORTHO VISIT

## 2018-09-05 ENCOUNTER — HOSPITAL ENCOUNTER (OUTPATIENT)
Dept: PHYSICAL THERAPY | Facility: HOSPITAL | Age: 38
Setting detail: THERAPIES SERIES
End: 2018-09-05
Attending: FAMILY MEDICINE
Payer: COMMERCIAL

## 2018-09-05 PROCEDURE — 97110 THERAPEUTIC EXERCISES: CPT | Mod: GP | Performed by: PHYSICAL THERAPIST

## 2018-09-05 PROCEDURE — 40000718 ZZHC STATISTIC PT DEPARTMENT ORTHO VISIT: Performed by: PHYSICAL THERAPIST

## 2018-09-10 ENCOUNTER — HOSPITAL ENCOUNTER (OUTPATIENT)
Dept: PHYSICAL THERAPY | Facility: HOSPITAL | Age: 38
Setting detail: THERAPIES SERIES
End: 2018-09-10
Attending: FAMILY MEDICINE
Payer: COMMERCIAL

## 2018-09-10 PROCEDURE — 97110 THERAPEUTIC EXERCISES: CPT | Mod: GP

## 2018-09-10 PROCEDURE — 40000718 ZZHC STATISTIC PT DEPARTMENT ORTHO VISIT

## 2018-09-12 ENCOUNTER — HOSPITAL ENCOUNTER (OUTPATIENT)
Dept: PHYSICAL THERAPY | Facility: HOSPITAL | Age: 38
Setting detail: THERAPIES SERIES
End: 2018-09-12
Attending: FAMILY MEDICINE
Payer: COMMERCIAL

## 2018-09-12 PROCEDURE — 97110 THERAPEUTIC EXERCISES: CPT | Mod: GP

## 2018-09-12 PROCEDURE — 40000718 ZZHC STATISTIC PT DEPARTMENT ORTHO VISIT

## 2018-09-20 ENCOUNTER — HOSPITAL ENCOUNTER (EMERGENCY)
Facility: HOSPITAL | Age: 38
Discharge: HOME | End: 2018-09-20
Admitting: PHYSICIAN ASSISTANT
Payer: COMMERCIAL

## 2018-09-20 VITALS
OXYGEN SATURATION: 100 % | HEART RATE: 82 BPM | SYSTOLIC BLOOD PRESSURE: 123 MMHG | WEIGHT: 205 LBS | RESPIRATION RATE: 20 BRPM | HEIGHT: 69 IN | TEMPERATURE: 96.4 F | BODY MASS INDEX: 30.36 KG/M2 | DIASTOLIC BLOOD PRESSURE: 84 MMHG

## 2018-09-20 DIAGNOSIS — J01.00 ACUTE MAXILLARY SINUSITIS, RECURRENCE NOT SPECIFIED: ICD-10-CM

## 2018-09-20 PROCEDURE — G0463 HOSPITAL OUTPT CLINIC VISIT: HCPCS

## 2018-09-20 PROCEDURE — 99213 OFFICE O/P EST LOW 20 MIN: CPT | Performed by: PHYSICIAN ASSISTANT

## 2018-09-20 RX ORDER — GUAIFENESIN AND PSEUDOEPHEDRINE HCL 1200; 120 MG/1; MG/1
1 TABLET, EXTENDED RELEASE ORAL 2 TIMES DAILY
Qty: 28 TABLET | Refills: 0 | Status: SHIPPED | OUTPATIENT
Start: 2018-09-20 | End: 2019-02-19

## 2018-09-20 RX ORDER — CLINDAMYCIN HCL 300 MG
300 CAPSULE ORAL 3 TIMES DAILY
Qty: 30 CAPSULE | Refills: 0 | Status: SHIPPED | OUTPATIENT
Start: 2018-09-20 | End: 2019-02-19

## 2018-09-20 ASSESSMENT — ENCOUNTER SYMPTOMS
NECK PAIN: 0
NAUSEA: 0
TROUBLE SWALLOWING: 0
EYE REDNESS: 0
PSYCHIATRIC NEGATIVE: 1
FEVER: 0
HEADACHES: 0
ABDOMINAL PAIN: 0
DIARRHEA: 0
LIGHT-HEADEDNESS: 0
VOICE CHANGE: 0
FATIGUE: 1
VOMITING: 0
COUGH: 1
CARDIOVASCULAR NEGATIVE: 1
NECK STIFFNESS: 0
DIZZINESS: 0
APPETITE CHANGE: 0
EYE DISCHARGE: 0
SORE THROAT: 0

## 2018-09-20 NOTE — ED AVS SNAPSHOT
[facility]    [address]    [facility] [address]    Phone:  [phone]                                       Cristiana Aguila   MRN: [id number]    Department:  [facility]   Date of Visit:  9/20/2018           Patient Information     Date Of Birth          1980        Your diagnoses for this visit were:     Acute maxillary sinusitis, recurrence not specified       Follow-up Information     Follow up with Sudhakar Cuellar MD.    Specialty:  Family Practice    Why:  If symptoms worsen    Contact information:    [address]  [facility] [address]  [phone]          Follow up with [facility].    Specialty:  EMERGENCY MEDICINE    Why:  If further concerns develop    Contact information:    [address] [address] [phone]    Additional information:    From [facility]: Take [address]. Turn left at [address]. Turn left at the first stoplight on [address]. At the first stop sign, take a right onto [address]. Take a left into the parking lot and continue through until you reach the North enterance of the building.       From [place]: Take [address]. Take the [address] ramp towards [place]. Turn left onto [address]. Take a slight right onto [address]. Turn left at the first stoplight on [address]. At the first stop sign, take a right onto [address]. Take a left into the parking lot and continue through until you reach the North enterance of the building.       From [place]: Take [address]. Take a right at [address]. At the first stop sign, take a right onto [address]. Take a left into the parking lot and continue through until you reach the North enterance of the building.       Discharge References/Attachments     SINUSITIS (ANTIBIOTIC TREATMENT) (ENGLISH)      Your next 10 appointments already scheduled     Sep 21, 2018 10:00 AM CDT    Treatment with Dona Wei PTA   [facility] ([facility] )    [address] [place] [address]   [phone]            Sep 24, 2018 10:00 AM CDT   Treatment with Maria Elena Garcia PTA   [facility] )    [address] [place] [address]   [phone]            Sep 26, 2018  9:30 AM CDT   Treatment with Lisa Gracia, PT   [facility] )    [address] [place] [address]   [phone]            Oct 25, 2018  9:45 AM CDT   (Arrive by 9:30 AM)   [facility] Sud[facility]akar Cuellar MD   [facility]    [address] [facility] [address]   [phone]            Feb 27, 2019 10:00 AM CST   (Arrive by 9:45 AM)   PHYSICAL with WINNIE Hunter CNM   [facility]    [address] [facility] [address]   [phone]                 Review of your medicines      START taking        Dose / Directions Last dose taken    clindamycin 300 MG capsule   Commonly known as:  CLEOCIN   Dose:  300 mg   Quantity:  30 capsule        Take 1 capsule (300 mg) by mouth 3 times daily for 10 days   Refills:  0        PseudoePHEDrine-guaiFENesin 120-1200 MG Tb12   Dose:  1 tablet   Quantity:  28 tablet        Take 1 tablet by mouth 2 times daily   Refills:  0          Our records show that you are taking the medicines listed below. If these are incorrect, please call your family doctor or clinic.        Dose / Directions Last dose taken    albuterol 108 (90 Base) MCG/ACT inhaler   Commonly known as:  PROAIR HFA   Dose:  2 puff   Quantity:  1 Inhaler        Inhale 2 puffs into the lungs every 4 hours as needed for shortness of breath / dyspnea   Refills:  3        citalopram 40 MG tablet   Commonly known as:  celeXA   Quantity:  30 tablet        TAKE 1 TABLET BY MOUTH DAILY   Refills:  11        docusate sodium 100 MG capsule    Commonly known as:  COLACE   Quantity:  30 capsule        TAKE 1 CAPSULE BY MOUTH DAILY   Refills:  11        hydrOXYzine 25 MG capsule   Commonly known as:  VISTARIL   Quantity:  60 capsule        TAKE 1 OR 2 CAPSULES BY MOUTH EVERY 4-6 HOURS AS NEEDED FOR ANXIETY   Refills:  3        methylphenidate ER 54 MG CR tablet   Commonly known as:  CONCERTA   Dose:  54 mg   Quantity:  30 tablet        Take 1 tablet (54 mg) by mouth daily   Refills:  0        ranitidine 150 MG tablet   Commonly known as:  ZANTAC   Dose:  150 mg   Quantity:  60 tablet        Take 1 tablet (150 mg) by mouth 2 times daily   Refills:  11        traZODone 50 MG tablet   Commonly known as:  DESYREL   Quantity:  90 tablet        TAKE 1/2 TO 3 TABLETS BY MOUTH AT BEDTIME AS NEEDED FOR INSOMNIA   Refills:  3        TYLENOL PO        Refills:  0                Prescriptions were sent or printed at these locations (2 Prescriptions)                   [facility]  [address]   [address] [facility] [place] [address]    Telephone:  [phone]   Fax:  [phone]   Hours:                  E-Prescribed (1 of 2)         clindamycin (CLEOCIN) 300 MG capsule                 Printed at Department/Unit printer (1 of 2)         PseudoePHEDrine-guaiFENesin 120-1200 MG TB12                Orders Needing Specimen Collection     None      Pending Results     No orders found from 9/18/2018 to 9/21/2018.            Pending Culture Results     No orders found from 9/18/2018 to 9/21/2018.            Thank you for choosing [facility]       Thank you for choosing [facility] for your care. Our goal is always to provide you with excellent care. Hearing back from our patients is one way we can continue to improve our services. Please take a few minutes to complete the written survey that you may receive in the mail after you visit with us. Thank you!        [employer] Information     [employer] gives you secure access to your electronic health record.  If you see a primary care provider, you can also send messages to your care team and make appointments. If you have questions, please call your primary care clinic.  If you do not have a primary care provider, please call [phone] and they will assist you.        Care EveryWhere ID     This is your Care EveryWhere ID. This could be used by other organizations to access your [facility] medical records  [id number]        Equal Access to Services     CALE BURTON : Nicholas Isidro, juvenal hua, radha cardenas, can padron. So [facility] [phone].    ATENCIÓN: Si habla arnulfo, tiene a singh disposición servicios gratuitos de asistencia lingüística. Llame al [phone].    We comply with applicable federal civil rights laws and [place] laws. We do not discriminate on the basis of race, color, national origin, age, disability, sex, sexual orientation, or gender identity.            After Visit Summary       This is your record. Keep this with you and show to your community pharmacist(s) and doctor(s) at your next visit.

## 2018-09-20 NOTE — ED TRIAGE NOTES
Pt presents with productive cough bringing up green phlegm,chest heaviness,nasal congestion X 3 days.No OTCs were taken today.

## 2018-09-20 NOTE — ED PROVIDER NOTES
"  History     Chief Complaint   Patient presents with     Cough     productive X 1 day with chest wall heaviness     Sinusitis     The history is provided by the patient. No  was used.     Cristiana Aguila is a 38 year old female who \"presents with productive cough bringing up green phlegm,chest heaviness,nasal congestion X 3 days.No OTCs were taken today.\" no n/v/d/f/c. No rash. No change in b/b habits.    Problem List:    Patient Active Problem List    Diagnosis Date Noted     Well woman exam with routine gynecological exam 02/14/2018     Priority: Medium     Irritable bowel syndrome with both constipation and diarrhea 08/02/2017     Priority: Medium     Recurrent major depressive disorder, in partial remission (H) 08/02/2017     Priority: Medium     ACP (advance care planning) 03/02/2016     Priority: Medium     Advance Care Planning 3/2/2016: Receipt of ACP document:  Received: Health Care Directive which was witnessed or notarized on 1-25-16.  Document previously scanned on 2-5-16.  Validation form completed and sent to be scanned.  Code Status needs to be updated to reflect choices in most recent ACP document.  Confirmed/documented designated decision maker(s).  Added by Rachel Barber RN Advance Care Planning Liaison with [employer]             Pelvic pain in female 01/14/2016     Priority: Medium     FH: breast cancer      Priority: Medium     Tobacco abuse, in remission      Priority: Medium     Generalized anxiety disorder      Priority: Medium     Diagnosis updated by automated process. Provider to review and confirm.       Attention deficit disorder 05/31/2005     Priority: Medium     Problem list name updated by automated process. 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     FH: breast cancer      DAFNE " (generalised anxiety disorder)      Irritable bowel syndrome with both constipation and diarrhea 8/2/2017     MDD (major depressive disorder)      Migraine, unspecified, without mention of intractable migraine without mention of status migrainosus 3/13/2000     Tobacco abuse, in remission      Unspecified sinusitis (chronic) 3/8/2001       Past Surgical History:    Past Surgical History:   Procedure Laterality Date     ENDOSCOPY UPPER, COLONOSCOPY, COMBINED N/A 9/4/2015    Procedure: COMBINED ENDOSCOPY UPPER, COLONOSCOPY;  Surgeon: Griffin Barrientos DO;  [facility]     LAPAROSCOPIC CYSTECTOMY OVARIAN (BENIGN) Right 1/27/2016    Procedure: LAPAROSCOPIC CYSTECTOMY OVARIAN (BENIGN);  Surgeon: Kuldip España MD;  [facility]     LAPAROSCOPIC SALPINGO-OOPHORECTOMY Left 1/27/2016    Procedure: LAPAROSCOPIC SALPINGO-OOPHORECTOMY;  Surgeon: Kuldip España MD;  [facility]     LAPAROSCOPY DIAGNOSTIC (GYN) Left 1/27/2016    Procedure: LAPAROSCOPY DIAGNOSTIC (GYN);  Surgeon: Kuldip España MD;  Location: [facility]     TUBAL LIGATION  2006     wisdom teeth         Family History:    Family History   Problem Relation Age of Onset     Breast Cancer Maternal Grandmother      Hypertension Maternal Grandfather      Hyperlipidemia Maternal Grandfather      Diabetes Paternal Grandmother      Asthma No family hx of        Social History:  Marital Status:   [2]  Social History   Substance Use Topics     Smoking status: Current Every Day Smoker     Packs/day: 0.50     Years: 13.00     Types: Cigarettes     Last attempt to quit: 1/1/2015     Smokeless tobacco: Never Used     Alcohol use 0.0 oz/week      Comment: frequency: rarely        Medications:      Acetaminophen (TYLENOL PO)   albuterol (PROAIR HFA) 108 (90 Base) MCG/ACT Inhaler   citalopram (CELEXA) 40 MG tablet   clindamycin (CLEOCIN) 300 MG capsule   docusate sodium (COLACE) 100 MG capsule   methylphenidate ER (CONCERTA) 54 MG CR tablet  "  PseudoePHEDrine-guaiFENesin 120-1200 MG TB12   ranitidine (ZANTAC) 150 MG tablet   traZODone (DESYREL) 50 MG tablet   hydrOXYzine (VISTARIL) 25 MG capsule         Review of Systems   Constitutional: Positive for fatigue. Negative for appetite change and fever.   HENT: Positive for congestion, sinus pain and sinus pressure. Negative for ear pain, sore throat, trouble swallowing and voice change.    Eyes: Negative for discharge and redness.   Respiratory: Positive for cough.    Cardiovascular: Negative.    Gastrointestinal: Negative for abdominal pain, diarrhea, nausea and vomiting.   Genitourinary: Negative.    Musculoskeletal: Negative for neck pain and neck stiffness.   Skin: Negative for rash.   Neurological: Negative for dizziness, light-headedness and headaches.   Psychiatric/Behavioral: Negative.        Physical Exam   BP: 123/84  Pulse: 82  Temp: 96.4  F (35.8  C)  Resp: 20  Height: 175.3 cm (5' 9\")  Weight: 93 kg (205 lb)  SpO2: 100 %      Physical Exam   Constitutional: She is oriented to person, place, and time. She appears well-developed and well-nourished. No distress.   HENT:   Head: Normocephalic and atraumatic.   Right Ear: External ear normal.   Left Ear: External ear normal.   Mouth/Throat: Oropharynx is clear and moist.   Bilateral TMs/canals clear/wnl  No sinus TTP     Eyes: Conjunctivae and EOM are normal. Right eye exhibits no discharge. Left eye exhibits no discharge.   Neck: Normal range of motion. Neck supple.   Cardiovascular: Normal rate, regular rhythm and normal heart sounds.    Pulmonary/Chest: Effort normal and breath sounds normal. No respiratory distress.   Abdominal: Soft. Bowel sounds are normal. She exhibits no distension. There is no tenderness.   Neurological: She is alert and oriented to person, place, and time.   Skin: Skin is warm and dry. No rash noted. She is not diaphoretic.   Psychiatric: She has a normal mood and affect.   Nursing note and vitals reviewed.      ED Course "     ED Course     Procedures              Assessments & Plan (with Medical Decision Making)     I have reviewed the nursing notes.    I have reviewed the findings, diagnosis, plan and need for follow up with the patient.      Discharge Medication List as of 9/20/2018  9:14 AM      START taking these medications    Details   clindamycin (CLEOCIN) 300 MG capsule Take 1 capsule (300 mg) by mouth 3 times daily for 10 days, Disp-30 capsule, R-0, E-Prescribe      PseudoePHEDrine-guaiFENesin 120-1200 MG TB12 Take 1 tablet by mouth 2 times daily, Disp-28 tablet, R-0, Local Print             Final diagnoses:   Acute maxillary sinusitis, recurrence not specified           Patient verbally educated and given appropriate education sheets for each of the diagnoses and has no questions.  Take OTC motrin or tylenol as directed on the bottle as needed.  Take prescription medications as directed.  Increase fluids, wash hands often.  Sleep in a recliner or with multiple pillows until this has resolved.  Follow up with your provider if symptoms increase or if further concerns develop, return to the ER.  Arianne Mckenna Certified   Physician Assistant  9/21/2018  9:25 PM  URGENT CARE CLINIC    9/20/2018   [facility]     Arianne Mckenna PA  09/21/18 0422

## 2018-09-20 NOTE — ED AVS SNAPSHOT
[facility]    [address]    [place] [address]    Phone:  [phone]                                       Cristiana Aguila   MRN: [id number]    Department:  [facility]   Date of Visit:  9/20/2018           After Visit Summary Signature Page     I have received my discharge instructions, and my questions have been answered. I have discussed any challenges I see with this plan with the nurse or doctor.    ..........................................................................................................................................  Patient/Patient Representative Signature      ..........................................................................................................................................  Patient Representative Print Name and Relationship to Patient    ..................................................               ................................................  Date                                   Time    ..........................................................................................................................................  Reviewed by Signature/Title    ...................................................              ..............................................  Date                                               Time          22EPIC Rev 08/18

## 2018-09-21 ASSESSMENT — ENCOUNTER SYMPTOMS
SINUS PRESSURE: 1
SINUS PAIN: 1

## 2018-09-26 ENCOUNTER — HOSPITAL ENCOUNTER (OUTPATIENT)
Dept: PHYSICAL THERAPY | Facility: HOSPITAL | Age: 38
Setting detail: THERAPIES SERIES
End: 2018-09-26
Attending: FAMILY MEDICINE
Payer: COMMERCIAL

## 2018-09-26 PROCEDURE — 40000718 ZZHC STATISTIC PT DEPARTMENT ORTHO VISIT: Performed by: PHYSICAL THERAPIST

## 2018-09-26 PROCEDURE — 97110 THERAPEUTIC EXERCISES: CPT | Mod: GP | Performed by: PHYSICAL THERAPIST

## 2018-10-01 ENCOUNTER — HOSPITAL ENCOUNTER (OUTPATIENT)
Dept: PHYSICAL THERAPY | Facility: HOSPITAL | Age: 38
Setting detail: THERAPIES SERIES
End: 2018-10-01
Attending: FAMILY MEDICINE
Payer: COMMERCIAL

## 2018-10-01 PROCEDURE — 97110 THERAPEUTIC EXERCISES: CPT | Mod: GP | Performed by: PHYSICAL THERAPIST

## 2018-10-01 PROCEDURE — 40000718 ZZHC STATISTIC PT DEPARTMENT ORTHO VISIT: Performed by: PHYSICAL THERAPIST

## 2018-10-03 ENCOUNTER — HOSPITAL ENCOUNTER (OUTPATIENT)
Dept: PHYSICAL THERAPY | Facility: HOSPITAL | Age: 38
Setting detail: THERAPIES SERIES
End: 2018-10-03
Attending: FAMILY MEDICINE
Payer: COMMERCIAL

## 2018-10-03 PROCEDURE — 40000718 ZZHC STATISTIC PT DEPARTMENT ORTHO VISIT

## 2018-10-03 PROCEDURE — 97110 THERAPEUTIC EXERCISES: CPT | Mod: GP

## 2018-10-10 ENCOUNTER — HOSPITAL ENCOUNTER (OUTPATIENT)
Dept: PHYSICAL THERAPY | Facility: HOSPITAL | Age: 38
Setting detail: THERAPIES SERIES
End: 2018-10-10
Attending: FAMILY MEDICINE
Payer: COMMERCIAL

## 2018-10-10 PROCEDURE — 97110 THERAPEUTIC EXERCISES: CPT | Mod: GP

## 2018-10-10 PROCEDURE — 40000718 ZZHC STATISTIC PT DEPARTMENT ORTHO VISIT

## 2018-10-15 ENCOUNTER — HOSPITAL ENCOUNTER (EMERGENCY)
Facility: HOSPITAL | Age: 38
Discharge: LEFT BEFORE DISCHARGE | End: 2018-10-15
Admitting: FAMILY MEDICINE
Payer: COMMERCIAL

## 2018-10-15 ENCOUNTER — HOSPITAL ENCOUNTER (EMERGENCY)
Facility: HOSPITAL | Age: 38
Discharge: HOME | End: 2018-10-15
Attending: FAMILY MEDICINE | Admitting: FAMILY MEDICINE
Payer: COMMERCIAL

## 2018-10-15 VITALS
TEMPERATURE: 98.4 F | BODY MASS INDEX: 30.36 KG/M2 | HEIGHT: 69 IN | OXYGEN SATURATION: 100 % | RESPIRATION RATE: 16 BRPM | WEIGHT: 205 LBS | DIASTOLIC BLOOD PRESSURE: 73 MMHG | SYSTOLIC BLOOD PRESSURE: 117 MMHG

## 2018-10-15 DIAGNOSIS — F98.8 ATTENTION DEFICIT DISORDER, UNSPECIFIED HYPERACTIVITY PRESENCE: ICD-10-CM

## 2018-10-15 DIAGNOSIS — F41.9 ANXIETY: ICD-10-CM

## 2018-10-15 DIAGNOSIS — F41.0 ANXIETY ATTACK: ICD-10-CM

## 2018-10-15 LAB
ALBUMIN SERPL-MCNC: 3.5 G/DL (ref 3.4–5)
ALP SERPL-CCNC: 105 U/L (ref 40–150)
ALT SERPL W P-5'-P-CCNC: 22 U/L (ref 0–50)
ANION GAP SERPL CALCULATED.3IONS-SCNC: 7 MMOL/L (ref 3–14)
AST SERPL W P-5'-P-CCNC: 13 U/L (ref 0–45)
BASOPHILS # BLD AUTO: 0.1 10E9/L (ref 0–0.2)
BASOPHILS NFR BLD AUTO: 0.5 %
BILIRUB SERPL-MCNC: 0.4 MG/DL (ref 0.2–1.3)
BUN SERPL-MCNC: 8 MG/DL (ref 7–30)
CALCIUM SERPL-MCNC: 8.3 MG/DL (ref 8.5–10.1)
CHLORIDE SERPL-SCNC: 106 MMOL/L (ref 94–109)
CO2 SERPL-SCNC: 26 MMOL/L (ref 20–32)
CREAT SERPL-MCNC: 0.68 MG/DL (ref 0.52–1.04)
DIFFERENTIAL METHOD BLD: NORMAL
EOSINOPHIL # BLD AUTO: 0.1 10E9/L (ref 0–0.7)
EOSINOPHIL NFR BLD AUTO: 0.9 %
ERYTHROCYTE [DISTWIDTH] IN BLOOD BY AUTOMATED COUNT: 12.5 % (ref 10–15)
GFR SERPL CREATININE-BSD FRML MDRD: >90 ML/MIN/1.7M2
GLUCOSE SERPL-MCNC: 92 MG/DL (ref 70–99)
HCT VFR BLD AUTO: 40.7 % (ref 35–47)
HGB BLD-MCNC: 14.4 G/DL (ref 11.7–15.7)
IMM GRANULOCYTES # BLD: 0 10E9/L (ref 0–0.4)
IMM GRANULOCYTES NFR BLD: 0.1 %
LYMPHOCYTES # BLD AUTO: 3 10E9/L (ref 0.8–5.3)
LYMPHOCYTES NFR BLD AUTO: 33.1 %
MCH RBC QN AUTO: 30.1 PG (ref 26.5–33)
MCHC RBC AUTO-ENTMCNC: 35.4 G/DL (ref 31.5–36.5)
MCV RBC AUTO: 85 FL (ref 78–100)
MONOCYTES # BLD AUTO: 0.5 10E9/L (ref 0–1.3)
MONOCYTES NFR BLD AUTO: 5.9 %
NEUTROPHILS # BLD AUTO: 5.4 10E9/L (ref 1.6–8.3)
NEUTROPHILS NFR BLD AUTO: 59.5 %
NRBC # BLD AUTO: 0 10*3/UL
NRBC BLD AUTO-RTO: 0 /100
PLATELET # BLD AUTO: 331 10E9/L (ref 150–450)
POTASSIUM SERPL-SCNC: 3.4 MMOL/L (ref 3.4–5.3)
PROT SERPL-MCNC: 7.2 G/DL (ref 6.8–8.8)
RBC # BLD AUTO: 4.79 10E12/L (ref 3.8–5.2)
SODIUM SERPL-SCNC: 139 MMOL/L (ref 133–144)
TROPONIN I SERPL-MCNC: <0.015 UG/L (ref 0–0.04)
WBC # BLD AUTO: 9.2 10E9/L (ref 4–11)

## 2018-10-15 PROCEDURE — 80053 COMPREHEN METABOLIC PANEL: CPT | Performed by: FAMILY MEDICINE

## 2018-10-15 PROCEDURE — 85025 COMPLETE CBC W/AUTO DIFF WBC: CPT | Performed by: FAMILY MEDICINE

## 2018-10-15 PROCEDURE — 99284 EMERGENCY DEPT VISIT MOD MDM: CPT | Mod: Z6 | Performed by: FAMILY MEDICINE

## 2018-10-15 PROCEDURE — 84484 ASSAY OF TROPONIN QUANT: CPT | Performed by: FAMILY MEDICINE

## 2018-10-15 PROCEDURE — 96374 THER/PROPH/DIAG INJ IV PUSH: CPT

## 2018-10-15 PROCEDURE — 40000268 ZZH STATISTIC NO CHARGES

## 2018-10-15 PROCEDURE — 36415 COLL VENOUS BLD VENIPUNCTURE: CPT | Performed by: FAMILY MEDICINE

## 2018-10-15 PROCEDURE — 93005 ELECTROCARDIOGRAM TRACING: CPT

## 2018-10-15 PROCEDURE — 99284 EMERGENCY DEPT VISIT MOD MDM: CPT | Mod: 25

## 2018-10-15 PROCEDURE — 93010 ELECTROCARDIOGRAM REPORT: CPT | Performed by: INTERNAL MEDICINE

## 2018-10-15 PROCEDURE — 25000128 H RX IP 250 OP 636: Performed by: FAMILY MEDICINE

## 2018-10-15 RX ORDER — ACETAMINOPHEN 160 MG/5ML
300 SUSPENSION, ORAL (FINAL DOSE FORM) ORAL DAILY
COMMUNITY
End: 2020-04-29

## 2018-10-15 RX ORDER — HYDROXYZINE PAMOATE 25 MG/1
CAPSULE ORAL
Qty: 15 CAPSULE | Refills: 0 | Status: SHIPPED | OUTPATIENT
Start: 2018-10-15 | End: 2019-05-16

## 2018-10-15 RX ORDER — BIOTIN 5 MG
3 TABLET ORAL DAILY
COMMUNITY
End: 2020-03-04

## 2018-10-15 RX ORDER — LORAZEPAM 2 MG/ML
1 INJECTION INTRAMUSCULAR ONCE
Status: COMPLETED | OUTPATIENT
Start: 2018-10-15 | End: 2018-10-15

## 2018-10-15 RX ADMIN — LORAZEPAM 1 MG: 2 INJECTION INTRAMUSCULAR; INTRAVENOUS at 09:47

## 2018-10-15 NOTE — ED TRIAGE NOTES
Patient states she took her AM medications.  Then she refilled her pill bottle for the upcoming week and accidentally took her AM medications a second time last night;  She took Vitamin C, Biotin, Celexa, vit B12, Concerta, Zantac, and Nathaniel's Wort.  Patient states since then she's been having abdominal pain, high anxiety, chest pain and feels like she's crawling out of her skin.  Patient states she did not sleep last night and smoked 1 pack plus of cigarettes to help try to calm her down.  Patient ambulated to room, gait steady.  Skin is pink, warm, dry and intact.  Heart rate is in the 70-80's and feels regular.  Gown on, nursing assessment as charted, cardiac monitor on,  at bedside, warm blanket given and call light within reach.

## 2018-10-15 NOTE — ED AVS SNAPSHOT
[facility]    [address]    [place] [address]    Phone:  [phone]                                       Cristiana Aguila   MRN: [id number]    Department[facility]  [facility]   Date of Visit:  10/15/2018           After Visit Summary Signature Page     I have received my discharge instructions, and my questions have been answered. I have discussed any challenges I see with this plan with the nurse or doctor.    ..........................................................................................................................................  Patient/Patient Representative Signature      ..........................................................................................................................................  Patient Representative Print Name and Relationship to Patient    ..................................................               ................................................  Date                                   Time    ..........................................................................................................................................  Reviewed by Signature/Title    ...................................................              ..............................................  Date                                               Time          22EPIC Rev 08/18

## 2018-10-15 NOTE — ED AVS SNAPSHOT
[facility]    [address] [facility] [address]    Phone:  [phone]                                       Cristiana Aguila   MRN: [id number]    Department:  [facility]   Date of Visit:  10/15/2018           Patient Information     Date Of Birth          1980        Your diagnoses for this visit were:     Anxiety attack        You were seen by Tracy Hanson MD.      Follow-up Information     Follow up with Sudhakar Cuellar MD.    Specialty:  Family Practice    Why:  As needed    Contact information:    [address] [facility] [address]  [phone]          Discharge Instructions         Anxiety Reaction  Anxiety is the feeling we all get when we think something bad might happen. It is a normal response to stress and usually causes only a mild reaction. When anxiety becomes more severe, it can interfere with daily life. In some cases, you may not even be aware of what it is you re anxious about. There may also be a genetic link or it may be a learned behavior in the home.  Both psychological and physical triggers cause stress reaction. It's often a response to fear or emotional stress, real or imagined. This stress may come from home, family, work, or social relationships.  During an anxiety reaction, you may feel:    Helpless    Nervous    Depressed    Irritable  Your body may show signs of anxiety in many ways. You may experience:    Dry mouth    Shakiness    Dizziness    Weakness    Trouble breathing    Breathing fast (hyperventilating)    Chest pressure    Sweating    Headache    Nausea    Diarrhea    Tiredness    Inability to sleep    Sexual problems  Home care    Try to locate the sources of stress in your life. They may not be obvious. These may include:  ? Daily hassles of life (such as traffic jams, missed appointments, or car troubles)  ? Major life changes, both good (new baby or job promotion) and bad (loss of job or loss of loved one)  ? Overload:  feeling that you have too many responsibilities and can't take care of all of them at once  ? Feeling helpless or feeling that your problems are beyond what you re able to solve    Notice how your body reacts to stress. Learn to listen to your body signals. This will help you take action before the stress becomes severe.    When you can, do something about the source of your stress. (Avoid hassles, limit the amount of change that happens in your life at one time and take a break when you feel overloaded).    Unfortunately, many stressful situations can't be avoided. It is necessary to learn how to better manage stress. There are many proven methods that will reduce your anxiety. These include simple things like exercise, good nutrition, and adequate rest. Also, there are certain techniques that are helpful:  ? Relaxation  ? Breathing exercises  ? Visualization  ? Biofeedback  ? Meditation  For more information about this, consult your healthcare provider or go to a local bookstore and review the many books and tapes available on this subject.  Follow-up care  If you feel that your anxiety is not responding to self-help measures, contact your healthcare provider or make an appointment with a counselor. You may need short-term psychological counseling and temporary medicine to help you manage stress.  Call 911  Call 911 if any of these happen:    Trouble breathing    Confusion    Drowsiness or trouble wakening    Fainting or loss of consciousness    Rapid heart rate    Seizure    New chest pain that becomes more severe, lasts longer, or spreads into your shoulder, arm, neck, jaw, or back  When to seek medical advice  Call your healthcare provider right away if any of these happen:    Your symptoms get worse    Severe headache not relieved by rest and mild pain reliever  Date Last Reviewed: 10/1/2017    6856-0163 [employer]. [address] [place] [address]. All rights reserved. This information  is not intended as a substitute for professional medical care. Always follow your healthcare professional's instructions.          Your next 10 appointments already scheduled     Oct 16, 2018 10:00 AM CDT   Treatment with [facility] ([facility] )    [address] [address]   [phone]            Oct 25, 2018  9:45 AM CDT   (Arrive by 9:30 AM)   [facility]   [facility]    [address] [facility] [address]   [phone]            Feb 27, 2019 10:00 AM CST   (Arrive by 9:45 AM)   PHYSICAL with WINNIE Hunter CNM   [facility]    [address] [facility] [address]   [phone]                 Review of your medicines      Our records show that you are taking the medicines listed below. If these are incorrect, please call your family doctor or clinic.        Dose / Directions Last dose taken    albuterol 108 (90 Base) MCG/ACT inhaler   Commonly known as:  PROAIR HFA   Dose:  2 puff   Quantity:  1 Inhaler        Inhale 2 puffs into the lungs every 4 hours as needed for shortness of breath / dyspnea   Refills:  3        Biotin 5 MG Tabs   Dose:  3 tablet        Take 3 tablets by mouth daily   Refills:  0        citalopram 40 MG tablet   Commonly known as:  celeXA   Quantity:  30 tablet        TAKE 1 TABLET BY MOUTH DAILY   Refills:  11        docusate sodium 100 MG capsule   Commonly known as:  COLACE   Quantity:  30 capsule        TAKE 1 CAPSULE BY MOUTH DAILY   Refills:  11        hydrOXYzine 25 MG capsule   Commonly known as:  VISTARIL   Quantity:  60 capsule        TAKE 1 OR 2 CAPSULES BY MOUTH EVERY 4-6 HOURS AS NEEDED FOR ANXIETY   Refills:  3        methylphenidate ER 54 MG CR tablet   Commonly known as:  CONCERTA   Dose:  54 mg   Quantity:  30 tablet        Take 1 tablet (54 mg) by mouth daily   Refills:  0         PseudoePHEDrine-guaiFENesin 120-1200 MG Tb12   Dose:  1 tablet   Quantity:  28 tablet        Take 1 tablet by mouth 2 times daily   Refills:  0        ranitidine 150 MG tablet   Commonly known as:  ZANTAC   Dose:  150 mg   Quantity:  60 tablet        Take 1 tablet (150 mg) by mouth 2 times daily   Refills:  11        st johns wort 300 MG Tabs tablet   Dose:  300 mg        Take 300 mg by mouth daily   Refills:  0        traZODone 50 MG tablet   Commonly known as:  DESYREL   Quantity:  90 tablet        TAKE 1/2 TO 3 TABLETS BY MOUTH AT BEDTIME AS NEEDED FOR INSOMNIA   Refills:  3        TYLENOL PO        Refills:  0        VITAMIN B 12 PO   Dose:  500 mcg        Take 500 mcg by mouth daily   Refills:  0        VITAMIN C PO   Dose:  1000 mg        Take 1,000 mg by mouth 2 times daily   Refills:  0                Procedures and tests performed during your visit     CBC with platelets differential    Comprehensive metabolic panel    EKG 12 lead    Troponin I      Orders Needing Specimen Collection     None      Pending Results     No orders found from 10/13/2018 to 10/16/2018.            Pending Culture Results     No orders found from 10/13/2018 to 10/16/2018.            Thank you for choosing [facility]       Thank you for choosing [facility] for your care. Our goal is always to provide you with excellent care. Hearing back from our patients is one way we can continue to improve our services. Please take a few minutes to complete the written survey that you may receive in the mail after you visit with us. Thank you!        [employer] Information     [employer] gives you secure access to your electronic health record. If you see a primary care provider, you can also send messages to your care team and make appointments. If you have questions, please call your primary care clinic.  If you do not have a primary care provider, please call [phone] and they will assist you.        Care EveryWhere ID     This is your Care  EveryWhere ID. This could be used by other organizations to access your [facility] medical records  [id number]        Equal Access to Services     CALE BURTON : Nicholas Isidro, juvenal hua, radha cardenas, can padron. So [facility] [phone].    ATENCIÓN: Si habla español, tiene a singh disposición servicios gratuitos de asistencia lingüística. Llame al [phone].    We comply with applicable federal civil rights laws and [place] laws. We do not discriminate on the basis of race, color, national origin, age, disability, sex, sexual orientation, or gender identity.            After Visit Summary       This is your record. Keep this with you and show to your community pharmacist(s) and doctor(s) at your next visit.

## 2018-10-15 NOTE — ED TRIAGE NOTES
"Patient states she \"accidentally\" took her meds twice yesterday.  Took her AM medications around 0900-states since then has been having high anxiety, rapid heart rate, chest pain and unable to sleep.  States she also smoked 1 plus packs of cigarettes last night.  "

## 2018-10-15 NOTE — TELEPHONE ENCOUNTER
Dr. Cuellar last vistaril signed in 2016 will route a new Rx    hydrOXYzine (VISTARIL    Last Written Prescription Date:  8/12/16  Last Fill Quantity: 60,   # refills: 3  Last Office Visit: 8/1/18  Future Office visit:    Next 5 appointments (look out 90 days)     Oct 25, 2018  9:45 AM CDT   (Arrive by 9:30 AM)   SHORT with Sudhakar Cuellar MD   [facility]    [address] [place] [address]   [phone]                   Routing refill request to provider for review/approval because:  Drug not on the [facility], P or  Health refill protocol or controlled substance

## 2018-10-15 NOTE — DISCHARGE INSTRUCTIONS
Anxiety Reaction  Anxiety is the feeling we all get when we think something bad might happen. It is a normal response to stress and usually causes only a mild reaction. When anxiety becomes more severe, it can interfere with daily life. In some cases, you may not even be aware of what it is you re anxious about. There may also be a genetic link or it may be a learned behavior in the home.  Both psychological and physical triggers cause stress reaction. It's often a response to fear or emotional stress, real or imagined. This stress may come from home, family, work, or social relationships.  During an anxiety reaction, you may feel:    Helpless    Nervous    Depressed    Irritable  Your body may show signs of anxiety in many ways. You may experience:    Dry mouth    Shakiness    Dizziness    Weakness    Trouble breathing    Breathing fast (hyperventilating)    Chest pressure    Sweating    Headache    Nausea    Diarrhea    Tiredness    Inability to sleep    Sexual problems  Home care    Try to locate the sources of stress in your life. They may not be obvious. These may include:  ? Daily hassles of life (such as traffic jams, missed appointments, or car troubles)  ? Major life changes, both good (new baby or job promotion) and bad (loss of job or loss of loved one)  ? Overload: feeling that you have too many responsibilities and can't take care of all of them at once  ? Feeling helpless or feeling that your problems are beyond what you re able to solve    Notice how your body reacts to stress. Learn to listen to your body signals. This will help you take action before the stress becomes severe.    When you can, do something about the source of your stress. (Avoid hassles, limit the amount of change that happens in your life at one time and take a break when you feel overloaded).    Unfortunately, many stressful situations can't be avoided. It is necessary to learn how to better manage stress. There are many proven  methods that will reduce your anxiety. These include simple things like exercise, good nutrition, and adequate rest. Also, there are certain techniques that are helpful:  ? Relaxation  ? Breathing exercises  ? Visualization  ? Biofeedback  ? Meditation  For more information about this, consult your healthcare provider or go to a local bookstore and review the many books and tapes available on this subject.  Follow-up care  If you feel that your anxiety is not responding to self-help measures, contact your healthcare provider or make an appointment with a counselor. You may need short-term psychological counseling and temporary medicine to help you manage stress.  Call 911  Call 911 if any of these happen:    Trouble breathing    Confusion    Drowsiness or trouble wakening    Fainting or loss of consciousness    Rapid heart rate    Seizure    New chest pain that becomes more severe, lasts longer, or spreads into your shoulder, arm, neck, jaw, or back  When to seek medical advice  Call your healthcare provider right away if any of these happen:    Your symptoms get worse    Severe headache not relieved by rest and mild pain reliever  Date Last Reviewed: 10/1/2017    [id number] [employer]. [address] [place] [address], [place] [address]. All rights reserved. This information is not intended as a substitute for professional medical care. Always follow your healthcare professional's instructions.

## 2018-10-15 NOTE — ED PROVIDER NOTES
The patient returned, she had a prescription for vistaril that had .  I refilled this and her  medication disposed of.     Tracy Hanson MD  10/15/18 8832

## 2018-10-15 NOTE — ED PROVIDER NOTES
eMERGENCY dEPARTMENT eNCOUnter        CHIEF COMPLAINT    Chief Complaint   Patient presents with     Chest Pain     Anxiety       HPI    Cristiana Aguila is a 38 year old female who presents with chest pain and anxiety after she accidentally took double doses of her medications last night.  She takes celexa and concerta and Villalba's wort.  She was setting up her pill box for the week and accidentally took her morning meds instead of her evening.  She started to become very anxious, didn't sleep despite usual relaxing treatments.  This morning she realized what had happened and arrives here with her .   She feels agitated.    REVIEW OF SYSTEMS    Cardiac: +Chest Pain, Denies syncope  Respiratory: no sputum production, No hemoptysis  GI: No Vomiting or Diarrhea  General: No Fever or Chills  All other systems reviewed and are negative.     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     FH: breast cancer      DAFNE (generalised anxiety disorder)      Irritable bowel syndrome with both constipation and diarrhea 8/2/2017     MDD (major depressive disorder)      Migraine, unspecified, without mention of intractable migraine without mention of status migrainosus 3/13/2000     Tobacco abuse, in remission      Unspecified sinusitis (chronic) 3/8/2001     Past Surgical History:   Procedure Laterality Date     ENDOSCOPY UPPER, COLONOSCOPY, COMBINED N/A 9/4/2015    Procedure: COMBINED ENDOSCOPY UPPER, COLONOSCOPY;  Surgeon: Griffin Barrientos DO;  Location: [facility]     LAPAROSCOPIC CYSTECTOMY OVARIAN (BENIGN) Right 1/27/2016    Procedure: LAPAROSCOPIC CYSTECTOMY OVARIAN (BENIGN);  Surgeon: Kuldip España MD;  Location: [facility]     LAPAROSCOPIC SALPINGO-OOPHORECTOMY Left 1/27/2016    Procedure: LAPAROSCOPIC SALPINGO-OOPHORECTOMY;  Surgeon: Kuldip España MD;  Location: [facility]  OR     LAPAROSCOPY DIAGNOSTIC (GYN) Left 1/27/2016    Procedure: LAPAROSCOPY DIAGNOSTIC (GYN);  Surgeon: Kuldip España MD;  Location: [facility] OR     TUBAL LIGATION  2006     wisdom teeth         CURRENT MEDICATIONS    Current Outpatient Rx   Medication Sig Dispense Refill     albuterol (PROAIR HFA) 108 (90 Base) MCG/ACT Inhaler Inhale 2 puffs into the lungs every 4 hours as needed for shortness of breath / dyspnea 1 Inhaler 3     Ascorbic Acid (VITAMIN C PO) Take 1,000 mg by mouth 2 times daily       Biotin 5 MG TABS Take 3 tablets by mouth daily       citalopram (CELEXA) 40 MG tablet TAKE 1 TABLET BY MOUTH DAILY 30 tablet 11     Cyanocobalamin (VITAMIN B 12 PO) Take 500 mcg by mouth daily       docusate sodium (COLACE) 100 MG capsule TAKE 1 CAPSULE BY MOUTH DAILY 30 capsule 11     hydrOXYzine (VISTARIL) 25 MG capsule Take 1-2 tablets (25-50 mg) by mouth every 4-6 hours as needed for anxiety 15 capsule 0     methylphenidate ER (CONCERTA) 54 MG CR tablet Take 1 tablet (54 mg) by mouth daily 30 tablet 0     PseudoePHEDrine-guaiFENesin 120-1200 MG TB12 Take 1 tablet by mouth 2 times daily 28 tablet 0     ranitidine (ZANTAC) 150 MG tablet Take 1 tablet (150 mg) by mouth 2 times daily 60 tablet 11     st johns wort 300 MG TABS tablet Take 300 mg by mouth daily       traZODone (DESYREL) 50 MG tablet TAKE 1/2 TO 3 TABLETS BY MOUTH AT BEDTIME AS NEEDED FOR INSOMNIA 90 tablet 3     Acetaminophen (TYLENOL PO)        hydrOXYzine (VISTARIL) 25 MG capsule TAKE 1 OR 2 CAPSULES BY MOUTH EVERY 4-6 HOURS AS NEEDED FOR ANXIETY 60 capsule 3       ALLERGIES    Allergies   Allergen Reactions     Amoxicillin Diarrhea     Bee Venom      Bee venom (honey bee)       Latex      Sulfa Drugs      Sulfa (sulfonamide antibiotics)          SOCIAL & FAMILY HISTORY    Social History     Social History     Marital status:      Spouse name: N/A     Number of children: N/A     Years of education: N/A     Social History Main Topics     Smoking  "status: Current Every Day Smoker     Packs/day: 0.50     Years: 13.00     Types: Cigarettes     Last attempt to quit: 1/1/2015     Smokeless tobacco: Never Used     Alcohol use 0.0 oz/week      Comment: frequency: rarely     Drug use: No     Sexual activity: Yes     Partners: Male     Other Topics Concern      Service No     Blood Transfusions Yes     OPermits if needed     Caffeine Concern Yes     soda > 32 oz daily     Occupational Exposure No     Hobby Hazards No     Sleep Concern No     Stress Concern Yes     Weight Concern Yes     Special Diet No     Back Care No     Exercise Yes     daily     Seat Belt Yes     Parent/Sibling W/ Cabg, Mi Or Angioplasty Before 65f 55m? No     Social History Narrative     Family History   Problem Relation Age of Onset     Breast Cancer Maternal Grandmother      Hypertension Maternal Grandfather      Hyperlipidemia Maternal Grandfather      Diabetes Paternal Grandmother      Asthma No family hx of        PHYSICAL EXAM    VITAL SIGNS: /73  Temp 98.4  F (36.9  C) (Tympanic)  Resp 16  Ht 1.753 m (5' 9\")  Wt 93 kg (205 lb)  SpO2 100%  BMI 30.27 kg/m2   Constitutional:  Well developed, well nourished, no acute distress, she is alert but tearful, very anxious   HENT:  Atraumatic, moist mucus membranes  Neck: supple, no JVD   Respiratory:  Lungs reveal clear by auscultation, no retractions, no chest wall tenderness   Cardiovascular:  regular rate, no murmurs  Vascular: Radial pulses 2+ equal bilaterally  GI:  Soft, nontender, normal bowel sounds  Musculoskeletal:  No edema, no acute deformities  Integument:  Skin warm and dry, no petechiae   Neurologic:  Alert & oriented, no slurred speech  Psych: Pleasant affect, no hallucinations    EKG    Interpreted by emergency department physician  NSR, noischemic changes    RADIOLOGY/PROCEDURES    CXR:    Results for orders placed or performed during the hospital encounter of 04/19/18   XR Shoulder Left G/E 3 Views    Narrative "    Exam: XR SHOULDER LT G/E 3 VW     History:Female, age 37 years, pain;     Comparison:  None    Technique: Four views are submitted.    Findings: Bones are normally mineralized. No evidence of acute or  subacute fracture.  No evidence of dislocation.           Impression    Impression:  1.  No evidence of acute or subacute bony abnormality.    JEMIMA ZURITA MD         ED COURSE & MEDICAL DECISION MAKING    Pertinent tests interpreted. (See chart for details)  See chart for details of medications given during the ED stay.    Vitals:    10/15/18 1000 10/15/18 1015 10/15/18 1030 10/15/18 1045   BP: 137/90 129/89 117/73 117/73   Resp:  18 13 16   Temp:    98.4  F (36.9  C)   TempSrc:    Tympanic   SpO2: 100% 99% 99% 100%   Weight:       Height:             FINAL IMPRESSION    1. Anxiety attack        Plan: the patient is much more comfortable after 1 mg of ativan.  She is going home to nap, I encouraged her to limit nap so she can sleep tonight.  Discharged to home.      (Please note that this note was completed with a voice recognition program.  Every attempt was made to edit the dictations, but inevitably there remain words that are mis-transcribed.)         Tracy Hanson MD  10/16/18 9027

## 2018-10-16 RX ORDER — HYDROXYZINE PAMOATE 25 MG/1
CAPSULE ORAL
Qty: 60 CAPSULE | Refills: 3 | Status: SHIPPED | OUTPATIENT
Start: 2018-10-16 | End: 2019-05-16

## 2018-11-15 NOTE — PROGRESS NOTES
SUBJECTIVE:   Cristiana Aguila is a 38 year old female who presents to clinic today for the following health issues:      Medication Followup of concerta    Taking Medication as prescribed: yes    Side Effects:  None    Medication Helping Symptoms:  Yes    Every thing going well. meds help with homelife - does not work   Stable for years if not decade.  UDS UTD   Seen in ER for accidental double dose of Concerta            Problem list and histories reviewed & adjusted, as indicated.  Additional history: as documented    Labs reviewed in EPIC    Reviewed and updated as needed this visit by clinical staff  Tobacco  Allergies  Meds  Med Hx  Surg Hx  Fam Hx  Soc Hx      Reviewed and updated as needed this visit by Provider         ROS:  CONSTITUTIONAL: NEGATIVE for fever, chills, change in weight  RESP: NEGATIVE for significant cough or SOB  CV: NEGATIVE for chest pain, palpitations or peripheral edema    OBJECTIVE:                                                    /72  Pulse 86  Wt 196 lb (88.9 kg)  SpO2 99%  BMI 28.94 kg/m2  Body mass index is 28.94 kg/(m^2).   GENERAL: healthy, alert, well nourished, well hydrated, no distress  PSYCH: Alert and oriented times 3; speech- coherent , normal rate and volume; able to articulate logical thoughts, able to abstract reason, no tangential thoughts, no hallucinations or delusions, affect- normal         ASSESSMENT/PLAN:                                                    1. Attention deficit hyperactivity disorder (ADHD), predominantly inattentive type  Doing well. WT loss intentional. F/u in 3 months   - methylphenidate (CONCERTA) 54 MG CR tablet; Take 1 tablet (54 mg) by mouth daily  Dispense: 30 tablet; Refill: 0  - methylphenidate (CONCERTA) 54 MG CR tablet; Take 1 tablet (54 mg) by mouth every morning  Dispense: 30 tablet; Refill: 0  - methylphenidate (CONCERTA) 54 MG CR tablet; Take 1 tablet (54 mg) by mouth every morning  Dispense: 30 tablet; Refill:  0          Sudhakar Cuellar MD  [facility]

## 2018-11-20 ENCOUNTER — OFFICE VISIT (OUTPATIENT)
Dept: FAMILY MEDICINE | Facility: OTHER | Age: 38
End: 2018-11-20
Attending: FAMILY MEDICINE
Payer: COMMERCIAL

## 2018-11-20 VITALS
WEIGHT: 196 LBS | SYSTOLIC BLOOD PRESSURE: 128 MMHG | HEART RATE: 86 BPM | OXYGEN SATURATION: 99 % | BODY MASS INDEX: 28.94 KG/M2 | DIASTOLIC BLOOD PRESSURE: 72 MMHG

## 2018-11-20 DIAGNOSIS — F90.0 ATTENTION DEFICIT HYPERACTIVITY DISORDER (ADHD), PREDOMINANTLY INATTENTIVE TYPE: ICD-10-CM

## 2018-11-20 PROCEDURE — G0463 HOSPITAL OUTPT CLINIC VISIT: HCPCS

## 2018-11-20 PROCEDURE — 99213 OFFICE O/P EST LOW 20 MIN: CPT | Performed by: FAMILY MEDICINE

## 2018-11-20 RX ORDER — METHYLPHENIDATE HYDROCHLORIDE 54 MG/1
54 TABLET ORAL EVERY MORNING
Qty: 30 TABLET | Refills: 0 | Status: SHIPPED | OUTPATIENT
Start: 2019-01-19 | End: 2019-02-12

## 2018-11-20 RX ORDER — METHYLPHENIDATE HYDROCHLORIDE 54 MG/1
54 TABLET ORAL DAILY
Qty: 30 TABLET | Refills: 0 | Status: SHIPPED | OUTPATIENT
Start: 2018-11-20 | End: 2019-02-12

## 2018-11-20 RX ORDER — METHYLPHENIDATE HYDROCHLORIDE 54 MG/1
54 TABLET ORAL EVERY MORNING
Qty: 30 TABLET | Refills: 0 | Status: SHIPPED | OUTPATIENT
Start: 2018-12-20 | End: 2019-02-12

## 2018-11-20 ASSESSMENT — ANXIETY QUESTIONNAIRES
IF YOU CHECKED OFF ANY PROBLEMS ON THIS QUESTIONNAIRE, HOW DIFFICULT HAVE THESE PROBLEMS MADE IT FOR YOU TO DO YOUR WORK, TAKE CARE OF THINGS AT HOME, OR GET ALONG WITH OTHER PEOPLE: SOMEWHAT DIFFICULT
GAD7 TOTAL SCORE: 10
7. FEELING AFRAID AS IF SOMETHING AWFUL MIGHT HAPPEN: SEVERAL DAYS
1. FEELING NERVOUS, ANXIOUS, OR ON EDGE: MORE THAN HALF THE DAYS
5. BEING SO RESTLESS THAT IT IS HARD TO SIT STILL: SEVERAL DAYS
3. WORRYING TOO MUCH ABOUT DIFFERENT THINGS: SEVERAL DAYS
6. BECOMING EASILY ANNOYED OR IRRITABLE: MORE THAN HALF THE DAYS
2. NOT BEING ABLE TO STOP OR CONTROL WORRYING: SEVERAL DAYS
4. TROUBLE RELAXING: MORE THAN HALF THE DAYS

## 2018-11-20 ASSESSMENT — ASTHMA QUESTIONNAIRES
QUESTION_5 LAST FOUR WEEKS HOW WOULD YOU RATE YOUR ASTHMA CONTROL: WELL CONTROLLED
ACT_TOTALSCORE: 17
QUESTION_1 LAST FOUR WEEKS HOW MUCH OF THE TIME DID YOUR ASTHMA KEEP YOU FROM GETTING AS MUCH DONE AT WORK, SCHOOL OR AT HOME: SOME OF THE TIME
QUESTION_2 LAST FOUR WEEKS HOW OFTEN HAVE YOU HAD SHORTNESS OF BREATH: THREE TO SIX TIMES A WEEK
QUESTION_3 LAST FOUR WEEKS HOW OFTEN DID YOUR ASTHMA SYMPTOMS (WHEEZING, COUGHING, SHORTNESS OF BREATH, CHEST TIGHTNESS OR PAIN) WAKE YOU UP AT NIGHT OR EARLIER THAN USUAL IN THE MORNING: ONCE OR TWICE
QUESTION_4 LAST FOUR WEEKS HOW OFTEN HAVE YOU USED YOUR RESCUE INHALER OR NEBULIZER MEDICATION (SUCH AS ALBUTEROL): TWO OR THREE TIMES PER WEEK

## 2018-11-20 ASSESSMENT — PATIENT HEALTH QUESTIONNAIRE - PHQ9: SUM OF ALL RESPONSES TO PHQ QUESTIONS 1-9: 12

## 2018-11-20 NOTE — NURSING NOTE
"Chief Complaint   Patient presents with     A.D.H.D       Initial /72  Pulse 86  Wt 196 lb (88.9 kg)  SpO2 99%  BMI 28.94 kg/m2 Estimated body mass index is 28.94 kg/(m^2) as calculated from the following:    Height as of 10/15/18: 5' 9\" (1.753 m).    Weight as of this encounter: 196 lb (88.9 kg).  Medication Reconciliation: complete    Stephanie Montano LPN  "

## 2018-11-20 NOTE — MR AVS SNAPSHOT
After Visit Summary   11/20/2018    Cristiana Aguila    MRN: [id number]           Patient Information     Date Of Birth          1980        Visit Information        Provider Department      11/20/2018 3:30 PM Sudhakar Cuellar MD [facility]        Today's Diagnoses     Attention deficit hyperactivity disorder (ADHD), predominantly inattentive type           Follow-ups after your visit        Your next 10 appointments already scheduled     Feb 18, 2019  9:45 AM CST   (Arrive by 9:30 AM)   SHORT with Sudhakar Cuellar MD   [facility] ([facility]    [address]  [place] [address]   [phone]            Feb 27, 2019 10:00 AM CST   (Arrive by 9:45 AM)   PHYSICAL with WINNIE Hunter CNM   [facility] ([facility] )    [address]  [place] [address]   [phone]              Who to contact     If you have questions or need follow up information about today's clinic visit or your schedule please contact [facility] directly at [phone].  Normal or non-critical lab and imaging results will be communicated to you by MyChart, letter or phone within 4 business days after the clinic has received the results. If you do not hear from us within 7 days, please contact the clinic through MyChart or phone. If you have a critical or abnormal lab result, we will notify you by phone as soon as possible.  Submit refill requests through [employer] or call your pharmacy and they will forward the refill request to us. Please allow 3 business days for your refill to be completed.          Additional Information About Your Visit        MyChart Information     [employer] gives you secure access to your electronic health record. If you see a primary care provider, you can also send messages to your care team and make appointments. If you have questions, please call your primary  care clinic.  If you do not have a primary care provider, please call [phone] and they will assist you.        Care EveryWhere ID     This is your Care EveryWhere ID. This could be used by other organizations to access your [place] medical records  [id number]        Your Vitals Were     Pulse Pulse Oximetry BMI (Body Mass Index)             86 99% 28.94 kg/m2          Blood Pressure from Last 3 Encounters:   11/20/18 128/72   10/15/18 117/73   09/20/18 123/84    Weight from Last 3 Encounters:   11/20/18 196 lb (88.9 kg)   10/15/18 205 lb (93 kg)   09/20/18 205 lb (93 kg)              Today, you had the following     No orders found for display         Today's Medication Changes          These changes are accurate as of 11/20/18  5:40 PM.  If you have any questions, ask your nurse or doctor.               These medicines have changed or have updated prescriptions.        Dose/Directions    * methylphenidate 54 MG CR tablet   Commonly known as:  CONCERTA   This may have changed:  Another medication with the same name was added. Make sure you understand how and when to take each.   Used for:  Attention deficit hyperactivity disorder (ADHD), predominantly inattentive type   Changed by:  Sudhakar Cuellar MD        Dose:  54 mg   Take 1 tablet (54 mg) by mouth daily   Quantity:  30 tablet   Refills:  0       * methylphenidate 54 MG CR tablet   Commonly known as:  CONCERTA   This may have changed:  You were already taking a medication with the same name, and this prescription was added. Make sure you understand how and when to take each.   Used for:  Attention deficit hyperactivity disorder (ADHD), predominantly inattentive type   Changed by:  Sudhakar Cuellar MD        Dose:  54 mg   Start taking on:  12/20/2018   Take 1 tablet (54 mg) by mouth every morning   Quantity:  30 tablet   Refills:  0       * methylphenidate 54 MG CR tablet   Commonly known as:  CONCERTA   This may have changed:  You were already taking  a medication with the same name, and this prescription was added. Make sure you understand how and when to take each.   Used for:  Attention deficit hyperactivity disorder (ADHD), predominantly inattentive type   Changed by:  Sudhakar Cuellar MD        Dose:  54 mg   Start taking on:  1/19/2019   Take 1 tablet (54 mg) by mouth every morning   Quantity:  30 tablet   Refills:  0       * Notice:  This list has 3 medication(s) that are the same as other medications prescribed for you. Read the directions carefully, and ask your doctor or other care provider to review them with you.         Where to get your medicines      Some of these will need a paper prescription and others can be bought over the counter.  Ask your nurse if you have questions.     Bring a paper prescription for each of these medications     methylphenidate 54 MG CR tablet    methylphenidate 54 MG CR tablet    methylphenidate 54 MG CR tablet                Primary Care Provider Office Phone # Fax #    Sudhakar Cuellar [phone] [address] [phone]        Equal Access to Services     [facility] AH: Hadii aad ku hadasho Sofavian, waaxda luqadaha, qaybta kaalmada adeegyada, can vilchis . So [facility] [phone].    ATENCIÓN: Si habla español, tiene a singh disposición servicios gratuitos de asistencia lingüística. [facility] [phone].    We comply with applicable federal civil rights laws and [place] laws. We do not discriminate on the basis of race, color, national origin, age, disability, sex, sexual orientation, or gender identity.            Thank you!     Thank you for choosing [facility]  for your care. Our goal is always to provide you with excellent care. Hearing back from our patients is one way we can continue to improve our services. Please take a few minutes to complete the written survey that you may receive in the mail after your visit with us. Thank  you!             Your Updated Medication List - Protect others around you: Learn how to safely use, store and throw away your medicines at www.disposemymeds.org.          This list is accurate as of 11/20/18  5:40 PM.  Always use your most recent med list.                   Brand Name Dispense Instructions for use Diagnosis    albuterol 108 (90 Base) MCG/ACT inhaler    PROAIR HFA    1 Inhaler    Inhale 2 puffs into the lungs every 4 hours as needed for shortness of breath / dyspnea    Mild intermittent reactive airway disease with wheezing without complication       Biotin 5 MG Tabs      Take 3 tablets by mouth daily        citalopram 40 MG tablet    celeXA    30 tablet    TAKE 1 TABLET BY MOUTH DAILY    DAFNE (generalized anxiety disorder), Major depressive disorder, recurrent episode, in full remission (H)       docusate sodium 100 MG capsule    COLACE    30 capsule    TAKE 1 CAPSULE BY MOUTH DAILY    Pelvic pain in female       * hydrOXYzine 25 MG capsule    VISTARIL    15 capsule    Take 1-2 tablets (25-50 mg) by mouth every 4-6 hours as needed for anxiety        * hydrOXYzine 25 MG capsule    VISTARIL    60 capsule    TAKE 1 OR 2 CAPSULES BY MOUTH EVERY 4-6 HOURS AS NEEDED FOR ANXIETY    Anxiety, Attention deficit disorder, unspecified hyperactivity presence       * methylphenidate 54 MG CR tablet    CONCERTA    30 tablet    Take 1 tablet (54 mg) by mouth daily    Attention deficit hyperactivity disorder (ADHD), predominantly inattentive type       * methylphenidate 54 MG CR tablet   Start taking on:  12/20/2018    CONCERTA    30 tablet    Take 1 tablet (54 mg) by mouth every morning    Attention deficit hyperactivity disorder (ADHD), predominantly inattentive type       * methylphenidate 54 MG CR tablet   Start taking on:  1/19/2019    CONCERTA    30 tablet    Take 1 tablet (54 mg) by mouth every morning    Attention deficit hyperactivity disorder (ADHD), predominantly inattentive type        PseudoePHEDrine-guaiFENesin 120-1200 MG Tb12     28 tablet    Take 1 tablet by mouth 2 times daily        ranitidine 150 MG tablet    ZANTAC    60 tablet    TAKE 1 TABLET BY MOUTH 2 TIMES DAILY    Gastroesophageal reflux disease without esophagitis       st johns wort 300 MG Tabs tablet      Take 300 mg by mouth daily        traZODone 50 MG tablet    DESYREL    90 tablet    TAKE 1/2 TO 3 TABLETS BY MOUTH AT BEDTIME AS NEEDED FOR INSOMNIA    Insomnia       TYLENOL PO           VITAMIN B 12 PO      Take 500 mcg by mouth daily        VITAMIN C PO      Take 1,000 mg by mouth 2 times daily        * Notice:  This list has 5 medication(s) that are the same as other medications prescribed for you. Read the directions carefully, and ask your doctor or other care provider to review them with you.

## 2018-11-21 ASSESSMENT — ASTHMA QUESTIONNAIRES: ACT_TOTALSCORE: 17

## 2018-11-21 ASSESSMENT — ANXIETY QUESTIONNAIRES: GAD7 TOTAL SCORE: 10

## 2018-12-10 DIAGNOSIS — G47.00 INSOMNIA, UNSPECIFIED TYPE: Primary | ICD-10-CM

## 2018-12-12 RX ORDER — TRAZODONE HYDROCHLORIDE 50 MG/1
TABLET, FILM COATED ORAL
Qty: 90 TABLET | Refills: 1 | Status: SHIPPED | OUTPATIENT
Start: 2018-12-12 | End: 2019-02-27 | Stop reason: ALTCHOICE

## 2019-02-04 DIAGNOSIS — G47.00 INSOMNIA: Primary | ICD-10-CM

## 2019-02-05 RX ORDER — TRAZODONE HYDROCHLORIDE 50 MG/1
TABLET, FILM COATED ORAL
Qty: 90 TABLET | Refills: 1 | Status: SHIPPED | OUTPATIENT
Start: 2019-02-05 | End: 2019-02-19

## 2019-02-12 NOTE — PATIENT INSTRUCTIONS

## 2019-02-12 NOTE — PROGRESS NOTES
SUBJECTIVE:   Cristiana Aguila is a 38 year old female who presents to clinic today for the following health issues:      Medication Followup of  Concerta    Taking Medication as prescribed: yes    Side Effects:  None    Medication Helping Symptoms:  Yes    No concerns         PHQ-9 SCORE 8/1/2018 11/20/2018 2/19/2019   PHQ-9 Total Score - - -   PHQ-9 Total Score 9 12 24     DAFNE-7 SCORE 8/1/2018 11/20/2018 2/19/2019   Total Score 11 10 21         Depression and Anxiety Follow-Up    Status since last visit: Worsened -- dealing with one of father of one of her children who she shares 50% custody     Other associated symptoms:None    Complicating factors:     Significant life event: No     Current substance abuse: None    Son has facial rash- dryness and red thought to be do to neglect of not wearing a facial mask    Counselors at school involved / Special  and school nurse all involved. Son has [employer] worker also involved.  CPS may be getting involved.     PHQ 8/1/2018 11/20/2018 2/19/2019   PHQ-9 Total Score 9 12 24   Q9: Suicide Ideation Not at all Not at all Not at all     DAFNE-7 SCORE 8/1/2018 11/20/2018 2/19/2019   Total Score 11 10 21       PHQ-9  English  PHQ-9   Any Language  DAFNE-7  Suicide Assessment Five-step Evaluation and Treatment (SAFE-T)    Problem list and histories reviewed & adjusted, as indicated.  Additional history: as documented    Labs reviewed in EPIC    Reviewed and updated as needed this visit by clinical staff  Tobacco  Allergies  Meds  Med Hx  Surg Hx  Fam Hx  Soc Hx      Reviewed and updated as needed this visit by Provider         ROS:  CONSTITUTIONAL: NEGATIVE for fever, chills, change in weight  ENT/MOUTH: NEGATIVE for ear, mouth and throat problems  C?O maxillary sinus pain   RESP: NEGATIVE for significant cough or SOB  CV: NEGATIVE for chest pain, palpitations or peripheral edema  ROS otherwise negative    OBJECTIVE:                                                    BP  110/70 (BP Location: Left arm, Patient Position: Chair, Cuff Size: Adult Large)   Pulse 80   Temp 98  F (36.7  C) (Tympanic)   Resp 20   Wt 93.1 kg (205 lb 3.2 oz)   SpO2 98%   BMI 30.30 kg/m    Body mass index is 30.3 kg/m .   GENERAL: healthy, alert, well nourished, well hydrated, no distress  PSYCH: Alert and oriented times 3; speech- coherent , normal rate and volume; able to articulate logical thoughts, able to abstract reason, no tangential thoughts, no hallucinations or delusions, affect- little flat - overwhelmed some   HEENT -- sinus tenderness over maxillary.  Rest exam ok        ASSESSMENT/PLAN:                                                    (F41.1) Generalized anxiety disorder  (primary encounter diagnosis)  Comment: worse lately. Not using vistaril correctly and not falling sleep well  Plan: Will take vistaril correctly and can go up to 200mg Trazadone.    Pt to work on exercise and good health habits. Call if getting worse. Offered counseling - deferred     (F90.0) Attention deficit hyperactivity disorder (ADHD), predominantly inattentive type  Comment: stable on meds   Plan: methylphenidate (CONCERTA) 54 MG CR tablet,         methylphenidate (CONCERTA) 54 MG CR tablet,         methylphenidate (CONCERTA) 54 MG CR tablet            (Z72.0) Tobacco abuse  Comment: HAS quit smoking   Plan: Tobacco Cessation - Order to Satisfy Health         Maintenance            (Z71.6) Tobacco abuse counseling  Comment: Congrats   Plan: needs to continue off Tobacco.     (F33.41) Recurrent major depressive disorder, in partial remission (H)  Comment: discussed. As above.   Plan: work on sleep and good health behaviors. No major changes in meds.       Sinusitis- add doxycycline BID.Symptomatic treatment was discussed along what is available for OTC medications for symptomatic relief. \      Sudhakar Cuellar MD  [facility] [place]

## 2019-02-19 ENCOUNTER — OFFICE VISIT (OUTPATIENT)
Dept: FAMILY MEDICINE | Facility: OTHER | Age: 39
End: 2019-02-19
Attending: FAMILY MEDICINE
Payer: COMMERCIAL

## 2019-02-19 VITALS
SYSTOLIC BLOOD PRESSURE: 110 MMHG | TEMPERATURE: 98 F | RESPIRATION RATE: 20 BRPM | DIASTOLIC BLOOD PRESSURE: 70 MMHG | WEIGHT: 205.2 LBS | OXYGEN SATURATION: 98 % | HEART RATE: 80 BPM | BODY MASS INDEX: 30.3 KG/M2

## 2019-02-19 DIAGNOSIS — F90.0 ATTENTION DEFICIT HYPERACTIVITY DISORDER (ADHD), PREDOMINANTLY INATTENTIVE TYPE: ICD-10-CM

## 2019-02-19 DIAGNOSIS — Z71.6 TOBACCO ABUSE COUNSELING: ICD-10-CM

## 2019-02-19 DIAGNOSIS — F33.41 RECURRENT MAJOR DEPRESSIVE DISORDER, IN PARTIAL REMISSION (H): ICD-10-CM

## 2019-02-19 DIAGNOSIS — F41.1 GENERALIZED ANXIETY DISORDER: Primary | ICD-10-CM

## 2019-02-19 DIAGNOSIS — J01.00 ACUTE NON-RECURRENT MAXILLARY SINUSITIS: ICD-10-CM

## 2019-02-19 DIAGNOSIS — Z72.0 TOBACCO ABUSE: ICD-10-CM

## 2019-02-19 PROCEDURE — 99214 OFFICE O/P EST MOD 30 MIN: CPT | Performed by: FAMILY MEDICINE

## 2019-02-19 PROCEDURE — G0463 HOSPITAL OUTPT CLINIC VISIT: HCPCS

## 2019-02-19 RX ORDER — METHYLPHENIDATE HYDROCHLORIDE 54 MG/1
54 TABLET ORAL EVERY MORNING
Qty: 30 TABLET | Refills: 0 | Status: SHIPPED | OUTPATIENT
Start: 2019-02-19 | End: 2019-05-08

## 2019-02-19 RX ORDER — DOXYCYCLINE 100 MG/1
100 CAPSULE ORAL 2 TIMES DAILY
Qty: 20 CAPSULE | Refills: 0 | Status: SHIPPED | OUTPATIENT
Start: 2019-02-19 | End: 2019-05-16

## 2019-02-19 RX ORDER — METHYLPHENIDATE HYDROCHLORIDE 54 MG/1
54 TABLET ORAL EVERY MORNING
Qty: 30 TABLET | Refills: 0 | Status: SHIPPED | OUTPATIENT
Start: 2019-03-18 | End: 2019-05-08

## 2019-02-19 RX ORDER — METHYLPHENIDATE HYDROCHLORIDE 54 MG/1
54 TABLET ORAL DAILY
Qty: 30 TABLET | Refills: 0 | Status: SHIPPED | OUTPATIENT
Start: 2019-04-17 | End: 2019-05-08

## 2019-02-19 ASSESSMENT — ANXIETY QUESTIONNAIRES
GAD7 TOTAL SCORE: 21
5. BEING SO RESTLESS THAT IT IS HARD TO SIT STILL: NEARLY EVERY DAY
1. FEELING NERVOUS, ANXIOUS, OR ON EDGE: NEARLY EVERY DAY
IF YOU CHECKED OFF ANY PROBLEMS ON THIS QUESTIONNAIRE, HOW DIFFICULT HAVE THESE PROBLEMS MADE IT FOR YOU TO DO YOUR WORK, TAKE CARE OF THINGS AT HOME, OR GET ALONG WITH OTHER PEOPLE: EXTREMELY DIFFICULT
7. FEELING AFRAID AS IF SOMETHING AWFUL MIGHT HAPPEN: NEARLY EVERY DAY
2. NOT BEING ABLE TO STOP OR CONTROL WORRYING: NEARLY EVERY DAY
3. WORRYING TOO MUCH ABOUT DIFFERENT THINGS: NEARLY EVERY DAY
6. BECOMING EASILY ANNOYED OR IRRITABLE: NEARLY EVERY DAY

## 2019-02-19 ASSESSMENT — PATIENT HEALTH QUESTIONNAIRE - PHQ9
5. POOR APPETITE OR OVEREATING: NEARLY EVERY DAY
SUM OF ALL RESPONSES TO PHQ QUESTIONS 1-9: 24

## 2019-02-19 ASSESSMENT — PAIN SCALES - GENERAL: PAINLEVEL: MODERATE PAIN (5)

## 2019-02-19 NOTE — NURSING NOTE
"Chief Complaint   Patient presents with     A.D.H.D       Initial /70 (BP Location: Left arm, Patient Position: Chair, Cuff Size: Adult Large)   Pulse 80   Temp 98  F (36.7  C) (Tympanic)   Resp 20   Wt 93.1 kg (205 lb 3.2 oz)   SpO2 98%   BMI 30.30 kg/m   Estimated body mass index is 30.3 kg/m  as calculated from the following:    Height as of 10/15/18: 1.753 m (5' 9\").    Weight as of this encounter: 93.1 kg (205 lb 3.2 oz).  Medication Reconciliation: complete    Tanesha Méndez LPN    "

## 2019-02-20 ASSESSMENT — ASTHMA QUESTIONNAIRES: ACT_TOTALSCORE: 15

## 2019-02-20 ASSESSMENT — ANXIETY QUESTIONNAIRES: GAD7 TOTAL SCORE: 21

## 2019-02-26 ENCOUNTER — TELEPHONE (OUTPATIENT)
Dept: FAMILY MEDICINE | Facility: OTHER | Age: 39
End: 2019-02-26

## 2019-02-26 DIAGNOSIS — G47.00 INSOMNIA: ICD-10-CM

## 2019-02-26 DIAGNOSIS — F51.04 PSYCHOPHYSIOLOGICAL INSOMNIA: Primary | ICD-10-CM

## 2019-02-26 RX ORDER — TRAZODONE HYDROCHLORIDE 100 MG/1
200 TABLET ORAL AT BEDTIME
Qty: 60 TABLET | Refills: 5 | Status: SHIPPED | OUTPATIENT
Start: 2019-02-26 | End: 2019-09-03

## 2019-02-26 NOTE — TELEPHONE ENCOUNTER
Increase in trazadone is working good, is taking 50mg tablet 4 of them nightly and would like to change to the 100 mg tablet for refill.

## 2019-02-27 ENCOUNTER — OFFICE VISIT (OUTPATIENT)
Dept: OBGYN | Facility: OTHER | Age: 39
End: 2019-02-27
Attending: ADVANCED PRACTICE MIDWIFE
Payer: COMMERCIAL

## 2019-02-27 VITALS
HEART RATE: 68 BPM | DIASTOLIC BLOOD PRESSURE: 68 MMHG | SYSTOLIC BLOOD PRESSURE: 100 MMHG | HEIGHT: 69 IN | WEIGHT: 208 LBS | BODY MASS INDEX: 30.81 KG/M2

## 2019-02-27 DIAGNOSIS — Z12.31 ENCOUNTER FOR SCREENING MAMMOGRAM FOR BREAST CANCER: Primary | ICD-10-CM

## 2019-02-27 DIAGNOSIS — Z01.419 ENCOUNTER FOR WELL WOMAN EXAM WITH ROUTINE GYNECOLOGICAL EXAM: ICD-10-CM

## 2019-02-27 DIAGNOSIS — Z12.4 ENCOUNTER FOR SCREENING FOR CERVICAL CANCER: ICD-10-CM

## 2019-02-27 LAB
ERYTHROCYTE [DISTWIDTH] IN BLOOD BY AUTOMATED COUNT: 12.4 % (ref 10–15)
EST. AVERAGE GLUCOSE BLD GHB EST-MCNC: 97 MG/DL
GLUCOSE SERPL-MCNC: 75 MG/DL (ref 70–99)
HBA1C MFR BLD: 5 % (ref 0–5.6)
HCT VFR BLD AUTO: 39.9 % (ref 35–47)
HGB BLD-MCNC: 13.8 G/DL (ref 11.7–15.7)
MCH RBC QN AUTO: 29.5 PG (ref 26.5–33)
MCHC RBC AUTO-ENTMCNC: 34.6 G/DL (ref 31.5–36.5)
MCV RBC AUTO: 85 FL (ref 78–100)
PLATELET # BLD AUTO: 294 10E9/L (ref 150–450)
RBC # BLD AUTO: 4.68 10E12/L (ref 3.8–5.2)
SPECIMEN SOURCE: NORMAL
TSH SERPL DL<=0.005 MIU/L-ACNC: 1.39 MU/L (ref 0.4–4)
WBC # BLD AUTO: 9.5 10E9/L (ref 4–11)
WET PREP SPEC: NORMAL

## 2019-02-27 PROCEDURE — 87624 HPV HI-RISK TYP POOLED RSLT: CPT | Mod: ZL | Performed by: ADVANCED PRACTICE MIDWIFE

## 2019-02-27 PROCEDURE — 85027 COMPLETE CBC AUTOMATED: CPT | Mod: ZL | Performed by: ADVANCED PRACTICE MIDWIFE

## 2019-02-27 PROCEDURE — 84443 ASSAY THYROID STIM HORMONE: CPT | Mod: ZL | Performed by: ADVANCED PRACTICE MIDWIFE

## 2019-02-27 PROCEDURE — 87210 SMEAR WET MOUNT SALINE/INK: CPT | Mod: ZL | Performed by: ADVANCED PRACTICE MIDWIFE

## 2019-02-27 PROCEDURE — 99396 PREV VISIT EST AGE 40-64: CPT | Performed by: ADVANCED PRACTICE MIDWIFE

## 2019-02-27 PROCEDURE — G0123 SCREEN CERV/VAG THIN LAYER: HCPCS | Mod: ZL | Performed by: ADVANCED PRACTICE MIDWIFE

## 2019-02-27 PROCEDURE — 82947 ASSAY GLUCOSE BLOOD QUANT: CPT | Mod: ZL | Performed by: ADVANCED PRACTICE MIDWIFE

## 2019-02-27 PROCEDURE — 36415 COLL VENOUS BLD VENIPUNCTURE: CPT | Mod: ZL | Performed by: ADVANCED PRACTICE MIDWIFE

## 2019-02-27 PROCEDURE — 40000788 ZZHCL STATISTIC ESTIMATED AVERAGE GLUCOSE: Mod: ZL | Performed by: ADVANCED PRACTICE MIDWIFE

## 2019-02-27 PROCEDURE — G0476 HPV COMBO ASSAY CA SCREEN: HCPCS | Mod: ZL | Performed by: ADVANCED PRACTICE MIDWIFE

## 2019-02-27 PROCEDURE — 83036 HEMOGLOBIN GLYCOSYLATED A1C: CPT | Mod: ZL | Performed by: ADVANCED PRACTICE MIDWIFE

## 2019-02-27 PROCEDURE — 99000 SPECIMEN HANDLING OFFICE-LAB: CPT | Performed by: ADVANCED PRACTICE MIDWIFE

## 2019-02-27 RX ORDER — DOXYCYCLINE HYCLATE 100 MG
TABLET ORAL
Refills: 0 | COMMUNITY
Start: 2019-02-19 | End: 2019-06-28

## 2019-02-27 ASSESSMENT — ANXIETY QUESTIONNAIRES
7. FEELING AFRAID AS IF SOMETHING AWFUL MIGHT HAPPEN: NEARLY EVERY DAY
1. FEELING NERVOUS, ANXIOUS, OR ON EDGE: NEARLY EVERY DAY
GAD7 TOTAL SCORE: 20
5. BEING SO RESTLESS THAT IT IS HARD TO SIT STILL: MORE THAN HALF THE DAYS
2. NOT BEING ABLE TO STOP OR CONTROL WORRYING: NEARLY EVERY DAY
IF YOU CHECKED OFF ANY PROBLEMS ON THIS QUESTIONNAIRE, HOW DIFFICULT HAVE THESE PROBLEMS MADE IT FOR YOU TO DO YOUR WORK, TAKE CARE OF THINGS AT HOME, OR GET ALONG WITH OTHER PEOPLE: VERY DIFFICULT
4. TROUBLE RELAXING: NEARLY EVERY DAY
3. WORRYING TOO MUCH ABOUT DIFFERENT THINGS: NEARLY EVERY DAY
6. BECOMING EASILY ANNOYED OR IRRITABLE: NEARLY EVERY DAY

## 2019-02-27 ASSESSMENT — MIFFLIN-ST. JEOR: SCORE: 1677.86

## 2019-02-27 ASSESSMENT — PATIENT HEALTH QUESTIONNAIRE - PHQ9: SUM OF ALL RESPONSES TO PHQ QUESTIONS 1-9: 19

## 2019-02-27 ASSESSMENT — PAIN SCALES - GENERAL: PAINLEVEL: MODERATE PAIN (4)

## 2019-02-27 NOTE — NURSING NOTE
"Chief Complaint   Patient presents with     Gyn Exam       Initial /68   Pulse 68   Ht 1.753 m (5' 9\")   Wt 94.3 kg (208 lb)   BMI 30.72 kg/m   Estimated body mass index is 30.72 kg/m  as calculated from the following:    Height as of this encounter: 1.753 m (5' 9\").    Weight as of this encounter: 94.3 kg (208 lb).  Medication Reconciliation: complete    Gloria Crabtree LPN    "

## 2019-02-27 NOTE — PATIENT INSTRUCTIONS
Return to office in 1 year for well woman care and as needed.    Thank you for allowing Levi ZHOU CNM and our OB team to participate in your care.  If you have a scheduling or an appointment question please contact [facility] Coordinator at their direct line [phone].   ALL nursing questions or concerns can be directed to your OB nurse at: [phone] Gina Azevedo/Gloria

## 2019-02-27 NOTE — PROGRESS NOTES
ANNUAL    Cristiana is a 38 year old  female who presents for annual exam.   Youngest child 12  Largest Child 8 lb 13 oz  GDM n  Hypertension n  No LMP recorded.  Menses:  Cycles irregular When did they start 9 How many days bleed 1-2 days none heavy pain moderate cramps Contraception BTO.  Planning pregnancy: n  Concerns in addition to routine health maintenance?  Vaginal dryness.  GYNECOLOGIC HISTORY:   Surgery: BTO, left laparoscopy salpingo-oophorectomy   History sexually transmitted infections:  Chlamydia/treated 20 years ago  Safe?  y  STI testing offered?  y  History of abnormal Pap smear: n  Problems with sex? (bleeding/pain) pain r/t vaginal dryness  Family history of: breast cancer: mgm   Uterine cancer: n ovarian cancer: n   colon cancer: n    HEALTH MAINTENANCE:  Cholesterol: (No results found for: CHOL History of abnormal lipids: n  Mammo: y . History of abnormal Mammo:n   Regular Self Breast Exams: n Calcium/Vitamin D or Vitamin: y Exercise y. walking    TSH: (  TSH   Date Value Ref Range Status   2018 0.93 0.40 - 4.00 mU/L Final    )  Pap; (  Lab Results   Component Value Date    PAP NIL 2018    PAP NIL 2015    )    HISTORY:  Obstetric History       T3      L3     SAB0   TAB0   Ectopic0   Multiple0   Live Births0       # Outcome Date GA Lbr Maurice/2nd Weight Sex Delivery Anes PTL Lv   3 Term            2 Term            1 Term                 Past Medical History:   Diagnosis Date     Attention deficit disorder without mention of hyperactivity 2005     Dependent personality disorder (H) 2011     FH: breast cancer      DAFNE (generalised anxiety disorder)      Irritable bowel syndrome with both constipation and diarrhea 2017     MDD (major depressive disorder)      Migraine, unspecified, without mention of intractable migraine without mention of status migrainosus 3/13/2000     Tobacco abuse, in remission      Unspecified sinusitis (chronic) 3/8/2001     Past  Surgical History:   Procedure Laterality Date     ENDOSCOPY UPPER, COLONOSCOPY, COMBINED N/A 2015    Procedure: COMBINED ENDOSCOPY UPPER, COLONOSCOPY;  Surgeon: Griffin Barrientos DO;  [facility]: [facility]     LAPAROSCOPIC CYSTECTOMY OVARIAN (BENIGN) Right 2016    Procedure: LAPAROSCOPIC CYSTECTOMY OVARIAN (BENIGN);  Surgeon: Kuldip España MD;  [facility]     LAPAROSCOPIC SALPINGO-OOPHORECTOMY Left 2016    Procedure: LAPAROSCOPIC SALPINGO-OOPHORECTOMY;  Surgeon: Kuldip España MD;  [facility]     LAPAROSCOPY DIAGNOSTIC (GYN) Left 2016    Procedure: LAPAROSCOPY DIAGNOSTIC (GYN);  Surgeon: Kuldip España MD;  [facility]     TUBAL LIGATION  2006     wisdom teeth       Family History   Problem Relation Age of Onset     Breast Cancer Maternal Grandmother      Hypertension Maternal Grandfather      Hyperlipidemia Maternal Grandfather      Diabetes Paternal Grandmother      Asthma No family hx of      Social History     Socioeconomic History     Marital status:      Spouse name: None     Number of children: None     Years of education: None     Highest education level: None   Occupational History     None   Social Needs     Financial resource strain: None     Food insecurity:     Worry: None     Inability: None     Transportation needs:     Medical: None     Non-medical: None   Tobacco Use     Smoking status: Former Smoker     Packs/day: 0.50     Years: 13.00     Pack years: 6.50     Types: Cigarettes     Last attempt to quit: 2019     Years since quittin.1     Smokeless tobacco: Never Used   Substance and Sexual Activity     Alcohol use: Yes     Alcohol/week: 0.0 oz     Comment: frequency: rarely     Drug use: No     Sexual activity: Yes     Partners: Male   Lifestyle     Physical activity:     Days per week: None     Minutes per session: None     Stress: None   Relationships     Social connections:     Talks on phone: None     Gets together: None     Attends [employer]  service: None     Active member of club or organization: None     Attends meetings of clubs or organizations: None     Relationship status: None     Intimate partner violence:     Fear of current or ex partner: None     Emotionally abused: None     Physically abused: None     Forced sexual activity: None   Other Topics Concern      Service No     Blood Transfusions Yes     Comment: OPermits if needed     Caffeine Concern Yes     Comment: soda > 32 oz daily     Occupational Exposure No     Hobby Hazards No     Sleep Concern No     Stress Concern Yes     Weight Concern Yes     Special Diet No     Back Care No     Exercise Yes     Comment: daily     Bike Helmet Not Asked     Seat Belt Yes     Self-Exams Not Asked     Parent/sibling w/ CABG, MI or angioplasty before 65F 55M? No   Social History Narrative     None       Current Outpatient Medications:      Acetaminophen (TYLENOL PO), , Disp: , Rfl:      albuterol (PROAIR HFA) 108 (90 Base) MCG/ACT Inhaler, Inhale 2 puffs into the lungs every 4 hours as needed for shortness of breath / dyspnea, Disp: 1 Inhaler, Rfl: 3     Ascorbic Acid (VITAMIN C PO), Take 1,000 mg by mouth 2 times daily, Disp: , Rfl:      Biotin 5 MG TABS, Take 3 tablets by mouth daily, Disp: , Rfl:      citalopram (CELEXA) 40 MG tablet, TAKE 1 TABLET BY MOUTH DAILY, Disp: 30 tablet, Rfl: 11     Cyanocobalamin (VITAMIN B 12 PO), Take 500 mcg by mouth daily, Disp: , Rfl:      docusate sodium (COLACE) 100 MG capsule, TAKE 1 CAPSULE BY MOUTH DAILY, Disp: 30 capsule, Rfl: 11     doxycycline hyclate (VIBRAMYCIN) 100 MG capsule, Take 1 capsule (100 mg) by mouth 2 times daily for 10 days, Disp: 20 capsule, Rfl: 0     hydrOXYzine (VISTARIL) 25 MG capsule, TAKE 1 OR 2 CAPSULES BY MOUTH EVERY 4-6 HOURS AS NEEDED FOR ANXIETY, Disp: 60 capsule, Rfl: 3     [START ON 4/17/2019] methylphenidate (CONCERTA) 54 MG CR tablet, Take 1 tablet (54 mg) by mouth daily, Disp: 30 tablet, Rfl: 0     [START ON 3/18/2019]  methylphenidate (CONCERTA) 54 MG CR tablet, Take 1 tablet (54 mg) by mouth every morning, Disp: 30 tablet, Rfl: 0     methylphenidate (CONCERTA) 54 MG CR tablet, Take 1 tablet (54 mg) by mouth every morning, Disp: 30 tablet, Rfl: 0     ranitidine (ZANTAC) 150 MG tablet, TAKE 1 TABLET BY MOUTH 2 TIMES DAILY, Disp: 60 tablet, Rfl: 8     st johns wort 300 MG TABS tablet, Take 300 mg by mouth daily, Disp: , Rfl:      traZODone (DESYREL) 100 MG tablet, Take 2 tablets (200 mg) by mouth At Bedtime, Disp: 60 tablet, Rfl: 5     doxycycline hyclate (VIBRA-TABS) 100 MG tablet, , Disp: , Rfl: 0     hydrOXYzine (VISTARIL) 25 MG capsule, Take 1-2 tablets (25-50 mg) by mouth every 4-6 hours as needed for anxiety (Patient not taking: Reported on 2/27/2019), Disp: 15 capsule, Rfl: 0     Allergies   Allergen Reactions     Amoxicillin Diarrhea     Bee Venom      Bee venom (honey bee)       Latex      Sulfa Drugs      Sulfa (sulfonamide antibiotics)          Past medical, surgical, social and family history were reviewed and updated in [facility].    ROS:    CONSTITUTIONAL:     NEGATIVE for fever, chills, change in weight  INTEGUMENTARY/SKIN:       NEGATIVE for worrisome rashes, moles or lesions  EYES:     NEGATIVE for vision changes or irritation  ENT/MOUTH: NEGATIVE for ear, mouth and throat problems.  Recent sinus infection  RESP:     NEGATIVE for significant cough or SOB  CV:   NEGATIVE for chest pain, palpitations or peripheral edema  GI:     NEGATIVE for nausea, abdominal pain, heartburn.  Hx of IBS  :   NEGATIVE for frequency, dysuria, hematuria, vaginal discharge, or irregular bleeding,incontinence   MUSCULOSKELETAL:     NEGATIVE for significant arthralgias or myalgia  NEURO:      NEGATIVE for weakness, dizziness or paresthesias  ENDOCRINE:      NEGATIVE for temperature intolerance, hair changes.  Dry patches of skin around eyes  PSYCHIATRIC:  Situational changes in mood or affect. Seen PCP last week.  Denies suicidal  "ideation.    EXAM:   /68   Pulse 68   Ht 1.753 m (5' 9\")   Wt 94.3 kg (208 lb)   BMI 30.72 kg/m     BMI: Body mass index is 30.72 kg/m .  Constitutional: healthy, alert and no distress  Head: Normocephalic. No masses, lesions, tenderness or abnormalities  Neck: Neck supple. Trachea midline. No adenopathy. Thyroid symmetric, normal size.   Cardiovascular: RRR.   Respiratory: lungs clear   Breast: Breasts reveal mild symmetric fibrocystic densities, but there are no dominant, discrete, fixed or suspicious masses found.  Gastrointestinal: Abdomen soft, non-tender, non-distended. No masses, organomegaly.  :  Vulva:  No external lesions, normal female hair distribution, no inguinal adenopathy.    Urethra:  Midline, non-tender, well supported, no discharge  Vagina:  Moist, pink, thin clear discharge, no lesions  Cervix: clean   Uterus:  Normal size, non-tender, freely mobile  Ovaries:  No masses appreciated  Rectal Exam: deferred  Musculoskeletal: extremities normal  Skin: no suspicious lesions or rashes.  Dry skin patches around eyes  Psychiatric: Affect appropriate, cooperative,mentation appears normal.     COUNSELING:   regular exercise  healthy diet/nutrition  Immunizations   contraception  family planning  Folic Acid Counseling   reports that she quit smoking about 8 weeks ago. Her smoking use included cigarettes. She has a 6.50 pack-year smoking history. she has never used smokeless tobacco.  Tobacco Cessation Action Plan: Quit 1/1/19  Body mass index is 30.72 kg/m .  Weight management plan: Healthy food choices and strength training exercise  FRAX Risk Assessment    ASSESSMENT:  38 year old female with satisfactory annual exam  Need of a cervical cancer screening  Need of breast cancer screening  Hx of BTO and left laparoscopy salpingo-oophorectomy   Vaginal dryness/causing pain with sex  Declines flu shot with education  Hx depression and anxiety  PHQ-9 score 19:  Pt seen PCP last week for MH " issues  Pt has MH practitioner that comes to her home once a week  Denies suicidal ideation  Quit smoking  Dry ski patches around eyes/Declines derm referral  PLAN:   Pap with High Risk hpv  Tsh, random blood sugar,hp,hga1c  Order mammogram   Water-based vaginal gel for sexual intercourse  Continue with MH counseling    Return to office: 1 year for well woman appt and as needed    Total visit greater than 45 minutes with 40 minutes spent discussing routine well woman cares and prevention    WINNIE Alvarez, CNM

## 2019-02-28 ASSESSMENT — ANXIETY QUESTIONNAIRES: GAD7 TOTAL SCORE: 20

## 2019-03-06 LAB
COPATH REPORT: NORMAL
PAP: NORMAL

## 2019-03-06 NOTE — TELEPHONE ENCOUNTER
DOCUSATE SODIUM 100 MG CAPSULE    Last Written Prescription Date:  2/4/19  Last Fill Quantity: 30,   # refills: 0  Last Office Visit: 2/27/19  Future Office visit:    Next 5 appointments (look out 90 days)    May 16, 2019  9:45 AM CDT  (Arrive by 9:30 AM)  SHORT with Sudhakar Cuellar MD  [facility] ([facility] ) [address]  [place] [address]  [phone]

## 2019-03-19 ENCOUNTER — ANCILLARY PROCEDURE (OUTPATIENT)
Dept: MAMMOGRAPHY | Facility: OTHER | Age: 39
End: 2019-03-19
Attending: ADVANCED PRACTICE MIDWIFE
Payer: COMMERCIAL

## 2019-03-19 DIAGNOSIS — Z01.419 ENCOUNTER FOR WELL WOMAN EXAM WITH ROUTINE GYNECOLOGICAL EXAM: ICD-10-CM

## 2019-03-19 DIAGNOSIS — Z12.31 ENCOUNTER FOR SCREENING MAMMOGRAM FOR BREAST CANCER: ICD-10-CM

## 2019-03-19 PROCEDURE — 77067 SCR MAMMO BI INCL CAD: CPT | Mod: TC

## 2019-03-20 ENCOUNTER — HOSPITAL ENCOUNTER (EMERGENCY)
Facility: HOSPITAL | Age: 39
Discharge: HOME | End: 2019-03-20
Attending: FAMILY MEDICINE | Admitting: FAMILY MEDICINE
Payer: COMMERCIAL

## 2019-03-20 VITALS
SYSTOLIC BLOOD PRESSURE: 138 MMHG | RESPIRATION RATE: 16 BRPM | HEART RATE: 64 BPM | TEMPERATURE: 98.5 F | OXYGEN SATURATION: 99 % | DIASTOLIC BLOOD PRESSURE: 92 MMHG

## 2019-03-20 DIAGNOSIS — S60.512A ABRASION OF PALM OF HAND, LEFT, INITIAL ENCOUNTER: Primary | ICD-10-CM

## 2019-03-20 PROCEDURE — 99212 OFFICE O/P EST SF 10 MIN: CPT | Mod: Z6 | Performed by: FAMILY MEDICINE

## 2019-03-20 PROCEDURE — G0463 HOSPITAL OUTPT CLINIC VISIT: HCPCS

## 2019-03-20 ASSESSMENT — ENCOUNTER SYMPTOMS
CONFUSION: 0
HEADACHES: 0
ARTHRALGIAS: 0
NECK STIFFNESS: 0
FEVER: 0
EYE REDNESS: 0
SHORTNESS OF BREATH: 0
COLOR CHANGE: 0
ABDOMINAL PAIN: 0
DIFFICULTY URINATING: 0

## 2019-03-20 NOTE — ED AVS SNAPSHOT
[facility]  [address]  [place] [address]  Phone:  [phone]                                    Cristiana Aguila   MRN: [id number]    Department:  [facility]   Date of Visit:  3/20/2019           After Visit Summary Signature Page    I have received my discharge instructions, and my questions have been answered. I have discussed any challenges I see with this plan with the nurse or doctor.    ..........................................................................................................................................  Patient/Patient Representative Signature      ..........................................................................................................................................  Patient Representative Print Name and Relationship to Patient    ..................................................               ................................................  Date                                   Time    ..........................................................................................................................................  Reviewed by Signature/Title    ...................................................              ..............................................  Date                                               Time          22EPIC Rev 08/18

## 2019-03-21 NOTE — ED PROVIDER NOTES
"  History     Chief Complaint   Patient presents with     Wound Check     blister on left anterior hand with \"red line\" traveling up left wrist     HPI  Cristiana Aguila is a 38 year old woman who presents with a left palm abrasion that occurred while she was shoveling 3 days ago. She initially thought the redness was spreading and presents to have her wound evaluated. However, she notes no spreading redness, warm/tender skin, fevers/chills, purulent drainage or worsening pain. She feels that her wound is healing.    Allergies:  Allergies   Allergen Reactions     Amoxicillin Diarrhea     Bee Venom      Bee venom (honey bee)       Latex      Sulfa Drugs      Sulfa (sulfonamide antibiotics)          Problem List:    Patient Active Problem List    Diagnosis Date Noted     Well woman exam with routine gynecological exam 02/14/2018     Priority: Medium     Irritable bowel syndrome with both constipation and diarrhea 08/02/2017     Priority: Medium     Recurrent major depressive disorder, in partial remission (H) 08/02/2017     Priority: Medium     ACP (advance care planning) 03/02/2016     Priority: Medium     Advance Care Planning 3/2/2016: Receipt of ACP document:  Received: Health Care Directive which was witnessed or notarized on 1-25-16.  Document previously scanned on 2-5-16.  Validation form completed and sent to be scanned.  Code Status needs to be updated to reflect choices in most recent ACP document.  Confirmed/documented designated decision maker(s).  Added by Rachel Barber RN Advance Care Planning Liaison with [employer]             Pelvic pain in female 01/14/2016     Priority: Medium     FH: breast cancer      Priority: Medium     Tobacco abuse, in remission      Priority: Medium     Generalized anxiety disorder      Priority: Medium     Diagnosis updated by automated process. Provider to review and confirm.       Attention deficit disorder 05/31/2005     Priority: Medium     Problem list name " updated by automated process. Provider to review          Past Medical History:    Past Medical History:   Diagnosis Date     Attention deficit disorder without mention of hyperactivity 2005     Dependent personality disorder (H) 2011     FH: breast cancer      DAFNE (generalised anxiety disorder)      Irritable bowel syndrome with both constipation and diarrhea 2017     MDD (major depressive disorder)      Migraine, unspecified, without mention of intractable migraine without mention of status migrainosus 3/13/2000     Tobacco abuse, in remission      Unspecified sinusitis (chronic) 3/8/2001       Past Surgical History:    Past Surgical History:   Procedure Laterality Date     ENDOSCOPY UPPER, COLONOSCOPY, COMBINED N/A 2015    Procedure: COMBINED ENDOSCOPY UPPER, COLONOSCOPY;  Surgeon: Griffin Barrientos DO;  [facility]     LAPAROSCOPIC CYSTECTOMY OVARIAN (BENIGN) Right 2016    Procedure: LAPAROSCOPIC CYSTECTOMY OVARIAN (BENIGN);  Surgeon: Kuldip España MD;  [facility]     LAPAROSCOPIC SALPINGO-OOPHORECTOMY Left 2016    Procedure: LAPAROSCOPIC SALPINGO-OOPHORECTOMY;  Surgeon: Kuldip España MD;  [facility]     LAPAROSCOPY DIAGNOSTIC (GYN) Left 2016    Procedure: LAPAROSCOPY DIAGNOSTIC (GYN);  Surgeon: Kuldip España MD;  [facility]     TUBAL LIGATION  2006     wisdom teeth         Family History:    Family History   Problem Relation Age of Onset     Breast Cancer Maternal Grandmother      Hypertension Maternal Grandfather      Hyperlipidemia Maternal Grandfather      Diabetes Paternal Grandmother      Asthma No family hx of        Social History:  Marital Status:   [2]  Social History     Tobacco Use     Smoking status: Former Smoker     Packs/day: 0.50     Years: 13.00     Pack years: 6.50     Types: Cigarettes     Last attempt to quit: 2019     Years since quittin.2     Smokeless tobacco: Never Used   Substance Use Topics     Alcohol use: Yes      Alcohol/week: 0.0 oz     Comment: frequency: rarely     Drug use: No        Medications:      Acetaminophen (TYLENOL PO)   albuterol (PROAIR HFA) 108 (90 Base) MCG/ACT Inhaler   Ascorbic Acid (VITAMIN C PO)   Biotin 5 MG TABS   citalopram (CELEXA) 40 MG tablet   Cyanocobalamin (VITAMIN B 12 PO)   docusate sodium (COLACE) 100 MG capsule   doxycycline hyclate (VIBRA-TABS) 100 MG tablet   hydrOXYzine (VISTARIL) 25 MG capsule   hydrOXYzine (VISTARIL) 25 MG capsule   [START ON 4/17/2019] methylphenidate (CONCERTA) 54 MG CR tablet   methylphenidate (CONCERTA) 54 MG CR tablet   methylphenidate (CONCERTA) 54 MG CR tablet   ranitidine (ZANTAC) 150 MG tablet   st flores wort 300 MG TABS tablet   traZODone (DESYREL) 100 MG tablet         Review of Systems   Constitutional: Negative for fever.   HENT: Negative for congestion.    Eyes: Negative for redness.   Respiratory: Negative for shortness of breath.    Cardiovascular: Negative for chest pain.   Gastrointestinal: Negative for abdominal pain.   Genitourinary: Negative for difficulty urinating.   Musculoskeletal: Negative for arthralgias and neck stiffness.   Skin: Negative for color change.   Neurological: Negative for headaches.   Psychiatric/Behavioral: Negative for confusion.       Physical Exam   BP: 138/92  Pulse: 64  Temp: 98.5  F (36.9  C)  Resp: 16  SpO2: 99 %      Physical Exam  General: awake, alert, sitting comfortably    Head: atraumatic.    Ears: no drainage, external ears normal.    Eyes: no injection or discharge, non-icteric.    Nose: no discharge or congestion.    Mouth/Throat: moist mucous membranes.    Neck: supple, full & painless ROM.    Lungs: no retractions or acute respiratory distress. No tachypnea.    Musculoskeletal/Extremities: full & painless ROM, no lower extremity edema or gross abnormality.    Skin: warm, dry, no cyanosis or rash. 1 cm abrasion of the central left palm without spreading erythema, purulent drainage or underlying  fluctuance.    Psych: alert & oriented x 3, fluid, logical thought & speech.        ED Course        Procedures                 No results found for this or any previous visit (from the past 24 hour(s)).    Medications - No data to display    Assessments & Plan (with Medical Decision Making)   The patient is a 38 year old woman who presents with a left palm abrasion.    1) Left palm abrasion - patient will be treated with good wound care (which is discussed with the patient) and alternating APAP/ibuprofen with plan for PCP follow-up in 5 - 7 days regarding this ER visit. The patient verbalizes agreement with this plan as well as to immediately return to the ER with any new/worsening S/Sx.        I have reviewed the nursing notes.    I have reviewed the findings, diagnosis, plan and need for follow up with the patient.         Medication List      ASK your doctor about these medications    doxycycline hyclate 100 MG capsule  Commonly known as:  VIBRAMYCIN  100 mg, Oral, 2 TIMES DAILY  Ask about: Should I take this medication?            Final diagnoses:   Abrasion of palm of hand, left, initial encounter       3/20/2019   [facility]     Kyree Mcdonald MD  03/20/19 [id number]       Kyree Mcdonald MD  03/21/19 0567

## 2019-03-21 NOTE — ED NOTES
"Patient presents with reports of blister to L hand after shoveling. Reports \"tightness\" to skin. Denies fevers, chills.   "

## 2019-03-21 NOTE — ED NOTES
Patient given verbal and written discharge instructions. Patient verbalized understanding of discharge instructions. Bacitracin placed to open area on the palm of left hand. Covered area with a bandaid.

## 2019-04-09 DIAGNOSIS — F41.1 GENERALIZED ANXIETY DISORDER: Primary | ICD-10-CM

## 2019-04-09 RX ORDER — HYDROXYZINE HYDROCHLORIDE 25 MG/1
TABLET, FILM COATED ORAL
Qty: 60 TABLET | Refills: 3 | Status: SHIPPED | OUTPATIENT
Start: 2019-04-09 | End: 2020-09-18

## 2019-04-29 DIAGNOSIS — F41.1 GAD (GENERALIZED ANXIETY DISORDER): ICD-10-CM

## 2019-04-29 DIAGNOSIS — F33.42 MAJOR DEPRESSIVE DISORDER, RECURRENT EPISODE, IN FULL REMISSION (H): ICD-10-CM

## 2019-04-29 NOTE — TELEPHONE ENCOUNTER
celexa      Last Written Prescription Date:  5/1/18  Last Fill Quantity: 30,   # refills: 11  Last Office Visit: 2/19/19  Future Office visit:    Next 5 appointments (look out 90 days)    May 16, 2019  9:45 AM CDT  (Arrive by 9:30 AM)  SHORT with Sudhakar Cuellar MD  [facility] ([facility] ) [address]  [place] [address]  [phone]

## 2019-04-30 RX ORDER — CITALOPRAM HYDROBROMIDE 40 MG/1
TABLET ORAL
Qty: 30 TABLET | Refills: 5 | Status: SHIPPED | OUTPATIENT
Start: 2019-04-30 | End: 2019-10-29

## 2019-05-08 NOTE — PROGRESS NOTES
"  SUBJECTIVE:   Cristiana Aguila is a 38 year old female who presents to clinic today for the following   health issues:        Medication Followup of Concerta    Taking Medication as prescribed: yes    Side Effects:  None    Medication Helping Symptoms:  Yes    No concerns - doing well.     Stable for years ? Decade on this dose           Additional history: as documented    Reviewed  and updated as needed this visit by clinical staff         Reviewed and updated as needed this visit by Provider         Labs reviewed in EPIC    ROS:  CONSTITUTIONAL: NEGATIVE for fever, chills, change in weight    OBJECTIVE:                                                    /62   Pulse 72   Temp 98.1  F (36.7  C)   Ht 1.753 m (5' 9\")   Wt 93.4 kg (206 lb)   SpO2 98%   BMI 30.42 kg/m    Body mass index is 30.42 kg/m .   GENERAL: healthy, alert, well nourished, well hydrated, no distress  PSYCH: Alert and oriented times 3; speech- coherent , normal rate and volume; able to articulate logical thoughts, able to abstract reason, no tangential thoughts, no hallucinations or delusions, affect- normal         ASSESSMENT/PLAN:                                                    1. Attention deficit hyperactivity disorder (ADHD), predominantly inattentive type  Stable - will see back in 3 months. Will need labs.   - methylphenidate (CONCERTA) 54 MG CR tablet; Take 1 tablet (54 mg) by mouth daily  Dispense: 30 tablet; Refill: 0  - methylphenidate (CONCERTA) 54 MG CR tablet; Take 1 tablet (54 mg) by mouth every morning  Dispense: 30 tablet; Refill: 0  - methylphenidate (CONCERTA) 54 MG CR tablet; Take 1 tablet (54 mg) by mouth every morning  Dispense: 30 tablet; Refill: 0          Sudhakar Cuellar MD  [facility] [place]      "

## 2019-05-16 ENCOUNTER — OFFICE VISIT (OUTPATIENT)
Dept: FAMILY MEDICINE | Facility: OTHER | Age: 39
End: 2019-05-16
Attending: FAMILY MEDICINE
Payer: COMMERCIAL

## 2019-05-16 VITALS
TEMPERATURE: 98.1 F | BODY MASS INDEX: 30.51 KG/M2 | DIASTOLIC BLOOD PRESSURE: 62 MMHG | WEIGHT: 206 LBS | SYSTOLIC BLOOD PRESSURE: 102 MMHG | HEIGHT: 69 IN | HEART RATE: 72 BPM | OXYGEN SATURATION: 98 %

## 2019-05-16 DIAGNOSIS — F90.0 ATTENTION DEFICIT HYPERACTIVITY DISORDER (ADHD), PREDOMINANTLY INATTENTIVE TYPE: ICD-10-CM

## 2019-05-16 PROCEDURE — G0463 HOSPITAL OUTPT CLINIC VISIT: HCPCS

## 2019-05-16 PROCEDURE — 99213 OFFICE O/P EST LOW 20 MIN: CPT | Performed by: FAMILY MEDICINE

## 2019-05-16 RX ORDER — METHYLPHENIDATE HYDROCHLORIDE 54 MG/1
54 TABLET ORAL EVERY MORNING
Qty: 30 TABLET | Refills: 0 | Status: SHIPPED | OUTPATIENT
Start: 2019-05-16 | End: 2019-06-28

## 2019-05-16 RX ORDER — METHYLPHENIDATE HYDROCHLORIDE 54 MG/1
54 TABLET ORAL EVERY MORNING
Qty: 30 TABLET | Refills: 0 | Status: SHIPPED | OUTPATIENT
Start: 2019-06-14 | End: 2019-06-28

## 2019-05-16 RX ORDER — METHYLPHENIDATE HYDROCHLORIDE 54 MG/1
54 TABLET ORAL DAILY
Qty: 30 TABLET | Refills: 0 | Status: SHIPPED | OUTPATIENT
Start: 2019-07-14 | End: 2020-07-29

## 2019-05-16 ASSESSMENT — ASTHMA QUESTIONNAIRES
QUESTION_1 LAST FOUR WEEKS HOW MUCH OF THE TIME DID YOUR ASTHMA KEEP YOU FROM GETTING AS MUCH DONE AT WORK, SCHOOL OR AT HOME: A LITTLE OF THE TIME
QUESTION_2 LAST FOUR WEEKS HOW OFTEN HAVE YOU HAD SHORTNESS OF BREATH: ONCE OR TWICE A WEEK
QUESTION_5 LAST FOUR WEEKS HOW WOULD YOU RATE YOUR ASTHMA CONTROL: WELL CONTROLLED
QUESTION_3 LAST FOUR WEEKS HOW OFTEN DID YOUR ASTHMA SYMPTOMS (WHEEZING, COUGHING, SHORTNESS OF BREATH, CHEST TIGHTNESS OR PAIN) WAKE YOU UP AT NIGHT OR EARLIER THAN USUAL IN THE MORNING: ONCE OR TWICE
ACT_TOTALSCORE: 19
QUESTION_4 LAST FOUR WEEKS HOW OFTEN HAVE YOU USED YOUR RESCUE INHALER OR NEBULIZER MEDICATION (SUCH AS ALBUTEROL): TWO OR THREE TIMES PER WEEK

## 2019-05-16 ASSESSMENT — ANXIETY QUESTIONNAIRES
3. WORRYING TOO MUCH ABOUT DIFFERENT THINGS: MORE THAN HALF THE DAYS
4. TROUBLE RELAXING: MORE THAN HALF THE DAYS
2. NOT BEING ABLE TO STOP OR CONTROL WORRYING: MORE THAN HALF THE DAYS
7. FEELING AFRAID AS IF SOMETHING AWFUL MIGHT HAPPEN: SEVERAL DAYS
1. FEELING NERVOUS, ANXIOUS, OR ON EDGE: SEVERAL DAYS
6. BECOMING EASILY ANNOYED OR IRRITABLE: NEARLY EVERY DAY
IF YOU CHECKED OFF ANY PROBLEMS ON THIS QUESTIONNAIRE, HOW DIFFICULT HAVE THESE PROBLEMS MADE IT FOR YOU TO DO YOUR WORK, TAKE CARE OF THINGS AT HOME, OR GET ALONG WITH OTHER PEOPLE: SOMEWHAT DIFFICULT
5. BEING SO RESTLESS THAT IT IS HARD TO SIT STILL: MORE THAN HALF THE DAYS
GAD7 TOTAL SCORE: 13

## 2019-05-16 ASSESSMENT — MIFFLIN-ST. JEOR: SCORE: 1663.79

## 2019-05-16 ASSESSMENT — PAIN SCALES - GENERAL: PAINLEVEL: NO PAIN (0)

## 2019-05-16 ASSESSMENT — PATIENT HEALTH QUESTIONNAIRE - PHQ9: SUM OF ALL RESPONSES TO PHQ QUESTIONS 1-9: 12

## 2019-05-16 NOTE — NURSING NOTE
"Chief Complaint   Patient presents with     A.D.H.D       Initial /62   Pulse 72   Temp 98.1  F (36.7  C)   Ht 1.753 m (5' 9\")   Wt 93.4 kg (206 lb)   SpO2 98%   BMI 30.42 kg/m   Estimated body mass index is 30.42 kg/m  as calculated from the following:    Height as of this encounter: 1.753 m (5' 9\").    Weight as of this encounter: 93.4 kg (206 lb).  Medication Reconciliation: complete    Grabiel Cantor LPN  "

## 2019-05-16 NOTE — LETTER
My Asthma Action Plan  Name: Cristiana Aguila   YOB: 1980  Date: 5/16/2019   My doctor: Sudhakar Cuellar MD   [facility]        My Control Medicine: None  My Rescue Medicine: Albuterol (Proair/Ventolin/Proventil) inhaler 2 puffs every 4 hours as needed for shortness of breath   My Asthma Severity: mild persistent  Avoid your asthma triggers: upper respiratory infections, strong odors and fumes, emotions and cold air               GREEN ZONE   Good Control    I feel good    No cough or wheeze    Can work, sleep and play without asthma symptoms       Take your asthma control medicine every day.     1. If exercise triggers your asthma, take your rescue medication    15 minutes before exercise or sports, and    During exercise if you have asthma symptoms  2. Spacer to use with inhaler: If you have a spacer, make sure to use it with your inhaler             YELLOW ZONE Getting Worse  I have ANY of these:    I do not feel good    Cough or wheeze    Chest feels tight    Wake up at night   1. Keep taking your Green Zone medications  2. Start taking your rescue medicine:    every 20 minutes for up to 1 hour. Then every 4 hours for 24-48 hours.  3. If you stay in the Yellow Zone for more than 12-24 hours, contact your doctor.  4. If you do not return to the Green Zone in 12-24 hours or you get worse, start taking your oral steroid medicine if prescribed by your provider.           RED ZONE Medical Alert - Get Help  I have ANY of these:    I feel awful    Medicine is not helping    Breathing getting harder    Trouble walking or talking    Nose opens wide to breathe       1. Take your rescue medicine NOW  2. If your provider has prescribed an oral steroid medicine, start taking it NOW  3. Call your doctor NOW  4. If you are still in the Red Zone after 20 minutes and you have not reached your doctor:    Take your rescue medicine again and    Call 911 or go to the emergency room  right away    See your regular doctor within 2 weeks of an Emergency Room or Urgent Care visit for follow-up treatment.          Annual Reminders:  Meet with Asthma Educator,  Flu Shot in the Fall, consider Pneumonia Vaccination for patients with asthma (aged 19 and older).    Pharmacy:    [facility] [place] [place] [facility] [address] [facility] [place] [place] [facility] [address]   [id number] - [place], [place] - [address] AT [facility]  [address]                      Asthma Triggers  How To Control Things That Make Your Asthma Worse    Triggers are things that make your asthma worse.  Look at the list below to help you find your triggers and what you can do about them.  You can help prevent asthma flare-ups by staying away from your triggers.      Trigger                                                          What you can do   Cigarette Smoke  Tobacco smoke can make asthma worse. Do not allow smoking in your home, car or around you.  Be sure no one smokes at a child s day care or school.  If you smoke, ask your health care provider for ways to help you quit.  Ask family members to quit too.  Ask your health care provider for a referral to Quit Plan to help you quit smoking, or call 9-477-589-PLAN.     Colds, Flu, Bronchitis  These are common triggers of asthma. Wash your hands often.  Don t touch your eyes, nose or mouth.  Get a flu shot every year.     Dust Mites  These are tiny bugs that live in cloth or carpet. They are too small to see. Wash sheets and blankets in hot water every week.   Encase pillows and mattress in dust mite proof covers.  Avoid having carpet if you can. If you have carpet, vacuum weekly.   Use a dust mask and HEPA vacuum.   Pollen and Outdoor Mold  Some people are allergic to trees, grass, or weed pollen, or molds. Try to keep your windows closed.  Limit time out doors when pollen count is high.   Ask you health care provider about taking medicine during  allergy season.     Animal Dander  Some people are allergic to skin flakes, urine or saliva from pets with fur or feathers. Keep pets with fur or feathers out of your home.    If you can t keep the pet outdoors, then keep the pet out of your bedroom.  Keep the bedroom door closed.  Keep pets off cloth furniture and away from stuffed toys.     Mice, Rats, and Cockroaches  Some people are allergic to the waste from these pests.   Cover food and garbage.  Clean up spills and food crumbs.  Store grease in the refrigerator.   Keep food out of the bedroom.   Indoor Mold  This can be a trigger if your home has high moisture. Fix leaking faucets, pipes, or other sources of water.   Clean moldy surfaces.  Dehumidify basement if it is damp and smelly.   Smoke, Strong Odors, and Sprays  These can reduce air quality. Stay away from strong odors and sprays, such as perfume, powder, hair spray, paints, smoke incense, paint, cleaning products, candles and new carpet.   Exercise or Sports  Some people with asthma have this trigger. Be active!  Ask your doctor about taking medicine before sports or exercise to prevent symptoms.    Warm up for 5-10 minutes before and after sports or exercise.     Other Triggers of Asthma  Cold air:  Cover your nose and mouth with a scarf.  Sometimes laughing or crying can be a trigger.  Some medicines and food can trigger asthma.

## 2019-05-16 NOTE — LETTER
My Depression Action Plan  Name: Cristiana Aguila   Date of Birth 1980  Date: 5/16/2019    My doctor: Sudhakar Cuellar   My clinic: [facility] [place]  [address]  [place] [address]  [phone]          GREEN    ZONE   Good Control    What it looks like:     Things are going generally well. You have normal up s and down s. You may even feel depressed from time to time, but bad moods usually last less than a day.   What you need to do:  1. Continue to care for yourself (see self care plan)  2. Check your depression survival kit and update it as needed  3. Follow your physician s recommendations including any medication.  4. Do not stop taking medication unless you consult with your physician first.           YELLOW         ZONE Getting Worse    What it looks like:     Depression is starting to interfere with your life.     It may be hard to get out of bed; you may be starting to isolate yourself from others.    Symptoms of depression are starting to last most all day and this has happened for several days.     You may have suicidal thoughts but they are not constant.   What you need to do:     1. Call your care team, your response to treatment will improve if you keep your care team informed of your progress. Yellow periods are signs an adjustment may need to be made.     2. Continue your self-care, even if you have to fake it!    3. Talk to someone in your support network    4. Open up your depression survival kit           RED    ZONE Medical Alert - Get Help    What it looks like:     Depression is seriously interfering with your life.     You may experience these or other symptoms: You can t get out of bed most days, can t work or engage in other necessary activities, you have trouble taking care of basic hygiene, or basic responsibilities, thoughts of suicide or death that will not go away, self-injurious behavior.     What you need to do:  1. Call your care team and request  a same-day appointment. If they are not available (weekends or after hours) call your local crisis line, emergency room or 911.            Depression Self Care Plan / Survival Kit    Self-Care for Depression  Here s the deal. Your body and mind are really not as separate as most people think.  What you do and think affects how you feel and how you feel influences what you do and think. This means if you do things that people who feel good do, it will help you feel better.  Sometimes this is all it takes.  There is also a place for medication and therapy depending on how severe your depression is, so be sure to consult with your medical provider and/ or Behavioral Health Consultant if your symptoms are worsening or not improving.     In order to better manage my stress, I will:    Exercise  Get some form of exercise, every day. This will help reduce pain and release endorphins, the  feel good  chemicals in your brain. This is almost as good as taking antidepressants!  This is not the same as joining a gym and then never going! (they count on that by the way ) It can be as simple as just going for a walk or doing some gardening, anything that will get you moving.      Hygiene   Maintain good hygiene (Get out of bed in the morning, Make your bed, Brush your teeth, Take a shower, and Get dressed like you were going to work, even if you are unemployed).  If your clothes don't fit try to get ones that do.    Diet  I will strive to eat foods that are good for me, drink plenty of water, and avoid excessive sugar, caffeine, alcohol, and other mood-altering substances.  Some foods that are helpful in depression are: complex carbohydrates, B vitamins, flaxseed, fish or fish oil, fresh fruits and vegetables.    Psychotherapy  I agree to participate in Individual Therapy (if recommended).    Medication  If prescribed medications, I agree to take them.  Missing doses can result in serious side effects.  I understand that drinking  alcohol, or other illicit drug use, may cause potential side effects.  I will not stop my medication abruptly without first discussing it with my provider.    Staying Connected With Others  I will stay in touch with my friends, family members, and my primary care provider/team.    Use your imagination  Be creative.  We all have a creative side; it doesn t matter if it s oil painting, sand castles, or mud pies! This will also kick up the endorphins.    Witness Beauty  (AKA stop and smell the roses) Take a look outside, even in mid-winter. Notice colors, textures. Watch the squirrels and birds.     Service to others  Be of service to others.  There is always someone else in need.  By helping others we can  get out of ourselves  and remember the really important things.  This also provides opportunities for practicing all the other parts of the program.    Humor  Laugh and be silly!  Adjust your TV habits for less news and crime-drama and more comedy.    Control your stress  Try breathing deep, massage therapy, biofeedback, and meditation. Find time to relax each day.     My support system    Clinic Contact:  Phone number:    Contact 1:  Phone number:    Contact 2:  Phone number:    Buddhism/:  Phone number:    Therapist:  Phone number:    Local crisis center:    Phone number:    Other community support:  Phone number:

## 2019-05-17 ASSESSMENT — ANXIETY QUESTIONNAIRES: GAD7 TOTAL SCORE: 13

## 2019-05-17 ASSESSMENT — ASTHMA QUESTIONNAIRES: ACT_TOTALSCORE: 19

## 2019-06-28 ENCOUNTER — HOSPITAL ENCOUNTER (EMERGENCY)
Facility: HOSPITAL | Age: 39
Discharge: HOME | End: 2019-06-29
Attending: FAMILY MEDICINE | Admitting: FAMILY MEDICINE
Payer: COMMERCIAL
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
## 2019-06-28 DIAGNOSIS — T78.40XA ALLERGIC REACTION, INITIAL ENCOUNTER: ICD-10-CM

## 2019-06-28 DIAGNOSIS — S80.862A INSECT BITE OF LEFT LOWER LEG, INITIAL ENCOUNTER: ICD-10-CM

## 2019-06-28 PROCEDURE — 25000125 ZZHC RX 250: Performed by: FAMILY MEDICINE

## 2019-06-28 PROCEDURE — 99284 EMERGENCY DEPT VISIT MOD MDM: CPT | Mod: 25

## 2019-06-28 PROCEDURE — 96361 HYDRATE IV INFUSION ADD-ON: CPT

## 2019-06-28 PROCEDURE — 96375 TX/PRO/DX INJ NEW DRUG ADDON: CPT

## 2019-06-28 PROCEDURE — 96374 THER/PROPH/DIAG INJ IV PUSH: CPT

## 2019-06-28 PROCEDURE — 25000128 H RX IP 250 OP 636: Performed by: FAMILY MEDICINE

## 2019-06-28 PROCEDURE — 96372 THER/PROPH/DIAG INJ SC/IM: CPT | Mod: XS

## 2019-06-28 PROCEDURE — 99284 EMERGENCY DEPT VISIT MOD MDM: CPT | Mod: Z6 | Performed by: FAMILY MEDICINE

## 2019-06-28 RX ORDER — DIPHENHYDRAMINE HCL 25 MG
50 TABLET ORAL EVERY 6 HOURS PRN
COMMUNITY
End: 2019-08-05

## 2019-06-28 RX ORDER — METHYLPREDNISOLONE SODIUM SUCCINATE 125 MG/2ML
125 INJECTION, POWDER, LYOPHILIZED, FOR SOLUTION INTRAMUSCULAR; INTRAVENOUS ONCE
Status: COMPLETED | OUTPATIENT
Start: 2019-06-28 | End: 2019-06-28

## 2019-06-28 RX ORDER — DIPHENHYDRAMINE HYDROCHLORIDE 50 MG/ML
25 INJECTION INTRAMUSCULAR; INTRAVENOUS ONCE
Status: COMPLETED | OUTPATIENT
Start: 2019-06-28 | End: 2019-06-28

## 2019-06-28 RX ORDER — EPINEPHRINE 1 MG/ML
0.3 INJECTION, SOLUTION INTRAMUSCULAR; SUBCUTANEOUS ONCE
Status: COMPLETED | OUTPATIENT
Start: 2019-06-28 | End: 2019-06-28

## 2019-06-28 RX ADMIN — SODIUM CHLORIDE 500 ML: 9 INJECTION, SOLUTION INTRAVENOUS at 22:54

## 2019-06-28 RX ADMIN — EPINEPHRINE 0.3 MG: 1 INJECTION INTRAMUSCULAR; INTRAVENOUS; SUBCUTANEOUS at 22:52

## 2019-06-28 RX ADMIN — DIPHENHYDRAMINE HYDROCHLORIDE 25 MG: 50 INJECTION, SOLUTION INTRAMUSCULAR; INTRAVENOUS at 22:55

## 2019-06-28 RX ADMIN — FAMOTIDINE 20 MG: 10 INJECTION, SOLUTION INTRAVENOUS at 22:58

## 2019-06-28 RX ADMIN — METHYLPREDNISOLONE SODIUM SUCCINATE 125 MG: 125 INJECTION, POWDER, FOR SOLUTION INTRAMUSCULAR; INTRAVENOUS at 22:58

## 2019-06-28 NOTE — ED AVS SNAPSHOT
[facility]  [address]  [place] [address]  Phone:  [phone]                                    Cristiana Aguila   MRN: [id number]    Department:  [facility]   Date of Visit:  6/28/2019           After Visit Summary Signature Page    I have received my discharge instructions, and my questions have been answered. I have discussed any challenges I see with this plan with the nurse or doctor.    ..........................................................................................................................................  Patient/Patient Representative Signature      ..........................................................................................................................................  Patient Representative Print Name and Relationship to Patient    ..................................................               ................................................  Date                                   Time    ..........................................................................................................................................  Reviewed by Signature/Title    ...................................................              ..............................................  Date                                               Time          22EPIC Rev 08/18

## 2019-06-29 VITALS
RESPIRATION RATE: 18 BRPM | HEART RATE: 81 BPM | TEMPERATURE: 98.3 F | SYSTOLIC BLOOD PRESSURE: 117 MMHG | DIASTOLIC BLOOD PRESSURE: 80 MMHG | OXYGEN SATURATION: 95 %

## 2019-06-29 RX ORDER — PREDNISONE 20 MG/1
TABLET ORAL
Qty: 10 TABLET | Refills: 0 | Status: SHIPPED | OUTPATIENT
Start: 2019-06-29 | End: 2019-08-05

## 2019-06-29 ASSESSMENT — ENCOUNTER SYMPTOMS
NEUROLOGICAL NEGATIVE: 1
MUSCULOSKELETAL NEGATIVE: 1
CHEST TIGHTNESS: 1
HEMATOLOGIC/LYMPHATIC NEGATIVE: 1
FEVER: 0
PSYCHIATRIC NEGATIVE: 1
CHILLS: 0
PALPITATIONS: 0
ACTIVITY CHANGE: 0
ABDOMINAL PAIN: 1
FATIGUE: 0
TROUBLE SWALLOWING: 1
APPETITE CHANGE: 1
SORE THROAT: 1
SHORTNESS OF BREATH: 0
COLOR CHANGE: 1
FACIAL SWELLING: 0

## 2019-06-29 NOTE — ED NOTES
"Patient presents to emergency room with c/o chest tightness, dizziness, throat feels funny. Pt was doing yard work today when she was \"attacked by a bunch of wasps around 3pm today.\" hx allergic reaction to bee venom. Pt states \"I felt fine about 45 minutes after being stung so I thought I was going to be ok.\" Around 1745 pt took benadryl 50mg at home. Started feeling dizzy, chest tightness, and throat started feeling funny tonight. Pt also states the redness and swelling around the bites was getting worse tonight. Anxious. 99% on room air. No stridor and no wheezing.   "

## 2019-06-29 NOTE — DISCHARGE INSTRUCTIONS
Take prednisone as prescribed.  You can add Zyrtec 10 mg orally once daily to help decrease any hive-like reaction.  Generic works just as well.  Follow-up in ER if you have recurrence of your symptoms.

## 2019-06-29 NOTE — ED PROVIDER NOTES
History     Chief Complaint   Patient presents with     Insect Bite     3-4 bee stings at 1500.     HPI  Cristiana Aguila is a 39 year old female with a history of bee sting allergy with delayed sensitivity who [facility]ent[facility] to the ER after she had been stung earlier by at least 3 or 4 wasps.  She stated she did not really notice much other than the pain of the sting initially, but then as she started to relax she noted increasing swelling began to feel short of breath and felt like her throat was closing off.  She came to the ER immediately for evaluation and treatment.    Allergies:  Allergies   Allergen Reactions     Amoxicillin Diarrhea     Bee Venom      Bee venom (honey bee)       Latex      Sulfa Drugs      Sulfa (sulfonamide antibiotics)          Problem List:    Patient Active Problem List    Diagnosis Date Noted     Well woman exam with routine gynecological exam 02/14/2018     Priority: Medium     Irritable bowel syndrome with both constipation and diarrhea 08/02/2017     Priority: Medium     Recurrent major depressive disorder, in partial remission (H) 08/02/2017     Priority: Medium     ACP (advance care planning) 03/02/2016     Priority: Medium     Advance Care Planning 3/2/2016: Receipt of ACP document:  Received: Health Care Directive which was witnessed or notarized on 1-25-16.  Document previously scanned on 2-5-16.  Validation form completed and sent to be scanned.  Code Status needs to be updated to reflect choices in most recent ACP document.  Confirmed/documented designated decision maker(s).  Added by Rachel Barber RN Advance Care Planning Liaison with [employer]             Pelvic pain in female 01/14/2016     Priority: Medium     FH: breast cancer      Priority: Medium     Tobacco abuse, in remission      Priority: Medium     Generalized anxiety disorder      Priority: Medium     Diagnosis updated by automated process. Provider to review and confirm.       Attention deficit disorder  2005     Priority: Medium     Problem list name updated by automated process. Provider to review          Past Medical History:    Past Medical History:   Diagnosis Date     Attention deficit disorder without mention of hyperactivity 2005     Dependent personality disorder (H) 2011     FH: breast cancer      DAFNE (generalised anxiety disorder)      Irritable bowel syndrome with both constipation and diarrhea 2017     MDD (major depressive disorder)      Migraine, unspecified, without mention of intractable migraine without mention of status migrainosus 3/13/2000     Tobacco abuse, in remission      Unspecified sinusitis (chronic) 3/8/2001       Past Surgical History:    Past Surgical History:   Procedure Laterality Date     ENDOSCOPY UPPER, COLONOSCOPY, COMBINED N/A 2015    Procedure: COMBINED ENDOSCOPY UPPER, COLONOSCOPY;  Surgeon: Griffin Barrientos DO;  Location: [facility]     LAPAROSCOPIC CYSTECTOMY OVARIAN (BENIGN) Right 2016    Procedure: LAPAROSCOPIC CYSTECTOMY OVARIAN (BENIGN);  Surgeon: Kuldip España MD;  Location: [facility]     LAPAROSCOPIC SALPINGO-OOPHORECTOMY Left 2016    Procedure: LAPAROSCOPIC SALPINGO-OOPHORECTOMY;  Surgeon: Kuldip España MD;  Location: [facility]     LAPAROSCOPY DIAGNOSTIC (GYN) Left 2016    Procedure: LAPAROSCOPY DIAGNOSTIC (GYN);  Surgeon: Kuldip España MD;  Location: [facility]     TUBAL LIGATION  2006     wisdom teeth         Family History:    Family History   Problem Relation Age of Onset     Breast Cancer Maternal Grandmother      Hypertension Maternal Grandfather      Hyperlipidemia Maternal Grandfather      Diabetes Paternal Grandmother      Asthma No family hx of        Social History:  Marital Status:   [2]  Social History     Tobacco Use     Smoking status: Former Smoker     Packs/day: 0.50     Years: 13.00     Pack years: 6.50     Types: Cigarettes     Last attempt to quit: 2019     Years since quittin.4     Smokeless tobacco:  Never Used   Substance Use Topics     Alcohol use: Yes     Alcohol/week: 0.0 oz     Comment: frequency: rarely     Drug use: No        Medications:      Acetaminophen (TYLENOL PO)   albuterol (PROAIR HFA) 108 (90 Base) MCG/ACT Inhaler   Ascorbic Acid (VITAMIN C PO)   Biotin 5 MG TABS   citalopram (CELEXA) 40 MG tablet   Cyanocobalamin (VITAMIN B 12 PO)   diphenhydrAMINE (BENADRYL) 25 MG tablet   docusate sodium (COLACE) 100 MG capsule   hydrOXYzine (ATARAX) 25 MG tablet   [START ON 7/14/2019] methylphenidate (CONCERTA) 54 MG CR tablet   predniSONE (DELTASONE) 20 MG tablet   ranitidine (ZANTAC) 150 MG tablet   st flores wort 300 MG TABS tablet   traZODone (DESYREL) 100 MG tablet         Review of Systems   Constitutional: Positive for appetite change. Negative for activity change, chills, fatigue and fever.   HENT: Positive for congestion, sore throat and trouble swallowing. Negative for facial swelling.    Respiratory: Positive for chest tightness. Negative for shortness of breath.    Cardiovascular: Negative for chest pain and palpitations.   Gastrointestinal: Positive for abdominal pain.   Genitourinary: Negative.    Musculoskeletal: Negative.    Skin: Positive for color change and rash.   Neurological: Negative.    Hematological: Negative.    Psychiatric/Behavioral: Negative.        Physical Exam   BP: 130/88  Pulse: 81  Heart Rate: 77  Temp: 98.3  F (36.8  C)  Resp: 20  SpO2: 99 %      Physical Exam   Constitutional: She is oriented to person, place, and time. She appears well-developed and well-nourished. No distress.   HENT:   Head: Normocephalic and atraumatic.   Right Ear: External ear normal.   Left Ear: External ear normal.   Nose: Nose normal.   Mouth/Throat: Oropharynx is clear and moist.   Eyes: Pupils are equal, round, and reactive to light. Conjunctivae and EOM are normal.   Neck: Normal range of motion. Neck supple. No thyromegaly present.   Cardiovascular: Normal rate, regular rhythm and normal heart  sounds.   Pulmonary/Chest: Effort normal and breath sounds normal. No stridor. No respiratory distress. She has no wheezes.   Abdominal: Soft. Bowel sounds are normal. She exhibits no distension. There is no tenderness. There is no guarding.   Musculoskeletal: Normal range of motion.   Lymphadenopathy:     She has no cervical adenopathy.   Neurological: She is alert and oriented to person, place, and time.   Skin: Skin is warm and dry. Capillary refill takes less than 2 seconds. She is not diaphoretic.   Nursing note and vitals reviewed.      ED Course   Patient seen and examined.  She appears visibly anxious and is holding her throat like she cannot breathe.  She took Benadryl around 7 :45-50 mg orally.  He has not taken anything else.  She notes that her leg is more swollen and they are very painful.    Patient was given IV Benadryl IV cimetidine IV Solu-Medrol and a dose of epinephrine with significant improvement in her symptoms.  She was continued to be observed for about 2 hours and was feeling significantly improved.  She is comfortable with discharge at this time.    We will send patient home with prednisone 40 mg daily for 5 days.  She was instructed to use Zyrtec for any swelling or itching.  She should follow-up with her primary if any recurrent persistent concerns or here if she has recurrent symptoms.     Procedures            No results found for this or any previous visit (from the past 24 hour(s)).    Medications   famotidine (PEPCID) injection 20 mg (20 mg Intravenous Given 6/28/19 2258)   0.9% sodium chloride BOLUS (500 mLs Intravenous New Bag 6/28/19 2254)   diphenhydrAMINE (BENADRYL) injection 25 mg (25 mg Intravenous Given 6/28/19 2255)   methylPREDNISolone sodium succinate (solu-MEDROL) injection 125 mg (125 mg Intravenous Given 6/28/19 2258)   EPINEPHrine (Anaphylaxis) (ADRENALIN) injection (vial) 0.3 mg (0.3 mg Intramuscular Given 6/28/19 2252)       Assessments & Plan (with Medical Decision  Making)     I have reviewed the nursing notes.    I have reviewed the findings, diagnosis, plan and need for follow up with the patient.  See discussion above.        Medication List      Started    predniSONE 20 MG tablet  Commonly known as:  DELTASONE  Take two tablets (= 40mg) each day for 5 (five) days            Final diagnoses:   Allergic reaction, initial encounter   Insect bite of left lower leg, initial encounter       6/28/2019   [facility]     Cristy Curry MD  06/29/19 0013

## 2019-06-29 NOTE — ED NOTES
Warm blanket given. Redness around wasp bites is less red at this time. Swelling around the bites is going down. Less anxious. Denies SOB. Decrease in chest tightness at this time. Denies any throat swelling.

## 2019-06-29 NOTE — ED NOTES
Patient given verbal and written discharge instructions. Patient verbalized understanding of discharge instructions. Rx sent home with pt. Denies chest tightness at this time. Denies any throat swelling at this time.

## 2019-07-29 DIAGNOSIS — K21.00 GASTROESOPHAGEAL REFLUX DISEASE WITH ESOPHAGITIS: Primary | ICD-10-CM

## 2019-07-29 NOTE — PROGRESS NOTES
Subjective     Cristiana Aguila is a 39 year old female who presents to clinic today for the following health issues:    HPI   Medication Followup of ADHD meds    Taking Medication as prescribed: yes, Concerta     Side Effects:  None    Medication Helping Symptoms:  Yes    Have been on for years to decade- stable on dose.     Took dose today      Left ear      Duration: month or so     Description (location/character/radiation): left ear    Intensity:  moderate    Accompanying signs and symptoms: muffled hearing, feeling full, annoying sounds like fluid inside.     History (similar episodes/previous evaluation): not this one, but once before had an ear was     Precipitating or alleviating factors: None    Therapies tried and outcome: None    No pain - annoying mostly      Recent got bite by several wasps.  ER note reviewed. Pt has no Epipen. Many years ago- insurance would not cover        Current Outpatient Medications   Medication Sig Dispense Refill     Acetaminophen (TYLENOL PO) Take by mouth every 6 hours as needed        albuterol (PROAIR HFA) 108 (90 Base) MCG/ACT Inhaler Inhale 2 puffs into the lungs every 4 hours as needed for shortness of breath / dyspnea 1 Inhaler 3     Ascorbic Acid (VITAMIN C PO) Take 1,000 mg by mouth 2 times daily       Biotin 5 MG TABS Take 3 tablets by mouth daily       citalopram (CELEXA) 40 MG tablet TAKE 1 TABLET BY MOUTH DAILY 30 tablet 5     Cyanocobalamin (VITAMIN B 12 PO) Take 500 mcg by mouth daily       docusate sodium (COLACE) 100 MG capsule TAKE 1 CAPSULE BY MOUTH DAILY 30 capsule 11     hydrOXYzine (ATARAX) 25 MG tablet 1-2 tablets 3 times per day as needed 60 tablet 3     methylphenidate (CONCERTA) 54 MG CR tablet Take 1 tablet (54 mg) by mouth daily 30 tablet 0     ranitidine (ZANTAC) 150 MG tablet TAKE 1 TABLET BY MOUTH 2 TIMES DAILY 60 tablet 8     st johns wort 300 MG TABS tablet Take 300 mg by mouth daily       traZODone (DESYREL) 100 MG tablet Take 2 tablets (200  "mg) by mouth At Bedtime 60 tablet 5         Reviewed and updated as needed this visit by Provider         Review of Systems   ROS COMP: CONSTITUTIONAL: NEGATIVE for fever, chills, change in weight  RESP: NEGATIVE for significant cough or SOB      Objective    /78 (BP Location: Left arm, Patient Position: Sitting, Cuff Size: Adult Regular)   Pulse 83   Temp 98.4  F (36.9  C) (Tympanic)   Ht 1.753 m (5' 9\")   Wt 92.1 kg (203 lb)   SpO2 98%   BMI 29.98 kg/m    Body mass index is 29.98 kg/m .  Physical Exam   GENERAL: healthy, alert and no distress  HENT: ear canals and TM's normal- mild fullness of left TM , nose and mouth without ulcers or lesions  PSYCH: mentation appears normal, affect normal/bright            Assessment & Plan     1. Attention deficit hyperactivity disorder (ADHD), predominantly inattentive type  Stable on current meds for yeas. RF given and f/u in 3 months. Labs done today   - methylphenidate (CONCERTA) 54 MG CR tablet; Take 1 tablet (54 mg) by mouth daily  Dispense: 30 tablet; Refill: 0  - methylphenidate (CONCERTA) 54 MG CR tablet; Take 1 tablet (54 mg) by mouth daily  Dispense: 30 tablet; Refill: 0  - methylphenidate (CONCERTA) 54 MG CR tablet; Take 1 tablet (54 mg) by mouth daily  Dispense: 30 tablet; Refill: 0  - CBC with platelets differential  - Hepatic panel  - Urine Drugs of Abuse Screen (Tox13)    2. Bee sting allergy  Will Rx epipen  - EPINEPHrine (EPIPEN/ADRENACLICK/OR ANY BX GENERIC EQUIV) 0.3 MG/0.3ML injection 2-pack; Inject 0.3 mLs (0.3 mg) into the muscle as needed for anaphylaxis  Dispense: 2 each; Refill: 3    3. Dysfunction of left eustachian tube  Will add nasacort - pt is on Zyrtec  - triamcinolone (NASACORT) 55 MCG/ACT nasal aerosol; Spray 2 sprays into both nostrils daily  Dispense: 1 Bottle; Refill: 3    4. Mild intermittent reactive airway disease with wheezing without complication  Stable - needs RF Needs to keep off tobacco.  - albuterol (PROAIR HFA) 108 (90 " "Base) MCG/ACT inhaler; Inhale 2 puffs into the lungs every 4 hours as needed for shortness of breath / dyspnea  Dispense: 1 Inhaler; Refill: 3     Tobacco Cessation:   reports that she has been smoking cigarettes.  She has a 6.50 pack-year smoking history. She has never used smokeless tobacco.  Tobacco Cessation Action Plan: Information offered: Patient not interested at this time      BMI:   Estimated body mass index is 29.98 kg/m  as calculated from the following:    Height as of this encounter: 1.753 m (5' 9\").    Weight as of this encounter: 92.1 kg (203 lb).               No follow-ups on file.    Sudhakar Cuellar MD  [facility] - [place]      "

## 2019-08-05 ENCOUNTER — OFFICE VISIT (OUTPATIENT)
Dept: FAMILY MEDICINE | Facility: OTHER | Age: 39
End: 2019-08-05
Attending: FAMILY MEDICINE
Payer: COMMERCIAL

## 2019-08-05 VITALS
DIASTOLIC BLOOD PRESSURE: 78 MMHG | HEIGHT: 69 IN | WEIGHT: 203 LBS | HEART RATE: 83 BPM | OXYGEN SATURATION: 98 % | TEMPERATURE: 98.4 F | SYSTOLIC BLOOD PRESSURE: 108 MMHG | BODY MASS INDEX: 30.07 KG/M2

## 2019-08-05 DIAGNOSIS — F90.0 ATTENTION DEFICIT HYPERACTIVITY DISORDER (ADHD), PREDOMINANTLY INATTENTIVE TYPE: Primary | ICD-10-CM

## 2019-08-05 DIAGNOSIS — Z91.030 BEE STING ALLERGY: ICD-10-CM

## 2019-08-05 DIAGNOSIS — H69.92 DYSFUNCTION OF LEFT EUSTACHIAN TUBE: ICD-10-CM

## 2019-08-05 DIAGNOSIS — J45.20 MILD INTERMITTENT REACTIVE AIRWAY DISEASE WITH WHEEZING WITHOUT COMPLICATION: ICD-10-CM

## 2019-08-05 LAB
ALBUMIN SERPL-MCNC: 3.6 G/DL (ref 3.4–5)
ALP SERPL-CCNC: 90 U/L (ref 40–150)
ALT SERPL W P-5'-P-CCNC: 18 U/L (ref 0–50)
AMPHETAMINES UR QL: NOT DETECTED NG/ML
AST SERPL W P-5'-P-CCNC: 14 U/L (ref 0–45)
BARBITURATES UR QL SCN: NOT DETECTED NG/ML
BASOPHILS # BLD AUTO: 0.1 10E9/L (ref 0–0.2)
BASOPHILS NFR BLD AUTO: 0.6 %
BENZODIAZ UR QL SCN: NOT DETECTED NG/ML
BILIRUB DIRECT SERPL-MCNC: <0.1 MG/DL (ref 0–0.2)
BILIRUB SERPL-MCNC: 0.4 MG/DL (ref 0.2–1.3)
BUPRENORPHINE UR QL: NOT DETECTED NG/ML
CANNABINOIDS UR QL: NOT DETECTED NG/ML
COCAINE UR QL SCN: NOT DETECTED NG/ML
D-METHAMPHET UR QL: NOT DETECTED NG/ML
DIFFERENTIAL METHOD BLD: NORMAL
EOSINOPHIL # BLD AUTO: 0.1 10E9/L (ref 0–0.7)
EOSINOPHIL NFR BLD AUTO: 1.3 %
ERYTHROCYTE [DISTWIDTH] IN BLOOD BY AUTOMATED COUNT: 12.6 % (ref 10–15)
HCT VFR BLD AUTO: 41.4 % (ref 35–47)
HGB BLD-MCNC: 14.5 G/DL (ref 11.7–15.7)
IMM GRANULOCYTES # BLD: 0 10E9/L (ref 0–0.4)
IMM GRANULOCYTES NFR BLD: 0.2 %
LYMPHOCYTES # BLD AUTO: 2.7 10E9/L (ref 0.8–5.3)
LYMPHOCYTES NFR BLD AUTO: 32.8 %
MCH RBC QN AUTO: 30.5 PG (ref 26.5–33)
MCHC RBC AUTO-ENTMCNC: 35 G/DL (ref 31.5–36.5)
MCV RBC AUTO: 87 FL (ref 78–100)
METHADONE UR QL SCN: NOT DETECTED NG/ML
MONOCYTES # BLD AUTO: 0.6 10E9/L (ref 0–1.3)
MONOCYTES NFR BLD AUTO: 6.8 %
NEUTROPHILS # BLD AUTO: 4.8 10E9/L (ref 1.6–8.3)
NEUTROPHILS NFR BLD AUTO: 58.3 %
NRBC # BLD AUTO: 0 10*3/UL
NRBC BLD AUTO-RTO: 0 /100
OPIATES UR QL SCN: NOT DETECTED NG/ML
OXYCODONE UR QL SCN: NOT DETECTED NG/ML
PCP UR QL SCN: NOT DETECTED NG/ML
PLATELET # BLD AUTO: 290 10E9/L (ref 150–450)
PROPOXYPH UR QL: NOT DETECTED NG/ML
PROT SERPL-MCNC: 7.3 G/DL (ref 6.8–8.8)
RBC # BLD AUTO: 4.75 10E12/L (ref 3.8–5.2)
TRICYCLICS UR QL SCN: NOT DETECTED NG/ML
WBC # BLD AUTO: 8.2 10E9/L (ref 4–11)

## 2019-08-05 PROCEDURE — 85025 COMPLETE CBC W/AUTO DIFF WBC: CPT | Mod: ZL | Performed by: FAMILY MEDICINE

## 2019-08-05 PROCEDURE — G0463 HOSPITAL OUTPT CLINIC VISIT: HCPCS

## 2019-08-05 PROCEDURE — 80306 DRUG TEST PRSMV INSTRMNT: CPT | Mod: ZL | Performed by: FAMILY MEDICINE

## 2019-08-05 PROCEDURE — 99214 OFFICE O/P EST MOD 30 MIN: CPT | Performed by: FAMILY MEDICINE

## 2019-08-05 PROCEDURE — 36415 COLL VENOUS BLD VENIPUNCTURE: CPT | Mod: ZL | Performed by: FAMILY MEDICINE

## 2019-08-05 PROCEDURE — 80076 HEPATIC FUNCTION PANEL: CPT | Mod: ZL | Performed by: FAMILY MEDICINE

## 2019-08-05 RX ORDER — TRIAMCINOLONE ACETONIDE 55 UG/1
2 SPRAY, METERED NASAL DAILY
Qty: 1 BOTTLE | Refills: 3 | Status: SHIPPED | OUTPATIENT
Start: 2019-08-05 | End: 2019-08-05

## 2019-08-05 RX ORDER — METHYLPHENIDATE HYDROCHLORIDE 54 MG/1
54 TABLET ORAL DAILY
Qty: 30 TABLET | Refills: 0 | Status: SHIPPED | OUTPATIENT
Start: 2019-09-12 | End: 2019-09-30

## 2019-08-05 RX ORDER — EPINEPHRINE 0.3 MG/.3ML
0.3 INJECTION SUBCUTANEOUS PRN
Qty: 2 EACH | Refills: 3 | Status: SHIPPED | OUTPATIENT
Start: 2019-08-05 | End: 2021-05-28

## 2019-08-05 RX ORDER — METHYLPHENIDATE HYDROCHLORIDE 54 MG/1
54 TABLET ORAL DAILY
Qty: 30 TABLET | Refills: 0 | Status: SHIPPED | OUTPATIENT
Start: 2019-10-11 | End: 2019-09-30

## 2019-08-05 RX ORDER — FLUTICASONE PROPIONATE 50 MCG
1 SPRAY, SUSPENSION (ML) NASAL DAILY
COMMUNITY
End: 2021-05-28

## 2019-08-05 RX ORDER — ALBUTEROL SULFATE 90 UG/1
2 AEROSOL, METERED RESPIRATORY (INHALATION) EVERY 4 HOURS PRN
Qty: 1 INHALER | Refills: 3 | Status: SHIPPED | OUTPATIENT
Start: 2019-08-05 | End: 2020-06-09

## 2019-08-05 RX ORDER — METHYLPHENIDATE HYDROCHLORIDE 54 MG/1
54 TABLET ORAL DAILY
Qty: 30 TABLET | Refills: 0 | Status: SHIPPED | OUTPATIENT
Start: 2019-08-13 | End: 2019-09-30

## 2019-08-05 ASSESSMENT — ANXIETY QUESTIONNAIRES
5. BEING SO RESTLESS THAT IT IS HARD TO SIT STILL: MORE THAN HALF THE DAYS
GAD7 TOTAL SCORE: 12
6. BECOMING EASILY ANNOYED OR IRRITABLE: NEARLY EVERY DAY
3. WORRYING TOO MUCH ABOUT DIFFERENT THINGS: MORE THAN HALF THE DAYS
IF YOU CHECKED OFF ANY PROBLEMS ON THIS QUESTIONNAIRE, HOW DIFFICULT HAVE THESE PROBLEMS MADE IT FOR YOU TO DO YOUR WORK, TAKE CARE OF THINGS AT HOME, OR GET ALONG WITH OTHER PEOPLE: SOMEWHAT DIFFICULT
1. FEELING NERVOUS, ANXIOUS, OR ON EDGE: SEVERAL DAYS
7. FEELING AFRAID AS IF SOMETHING AWFUL MIGHT HAPPEN: SEVERAL DAYS
2. NOT BEING ABLE TO STOP OR CONTROL WORRYING: MORE THAN HALF THE DAYS
4. TROUBLE RELAXING: SEVERAL DAYS

## 2019-08-05 ASSESSMENT — PAIN SCALES - GENERAL: PAINLEVEL: NO PAIN (0)

## 2019-08-05 ASSESSMENT — MIFFLIN-ST. JEOR: SCORE: 1650.18

## 2019-08-05 ASSESSMENT — PATIENT HEALTH QUESTIONNAIRE - PHQ9: SUM OF ALL RESPONSES TO PHQ QUESTIONS 1-9: 13

## 2019-08-05 NOTE — NURSING NOTE
"Chief Complaint   Patient presents with     A.D.H.D       Initial /78 (BP Location: Left arm, Patient Position: Sitting, Cuff Size: Adult Regular)   Pulse 83   Temp 98.4  F (36.9  C) (Tympanic)   Ht 1.753 m (5' 9\")   Wt 92.1 kg (203 lb)   SpO2 98%   BMI 29.98 kg/m   Estimated body mass index is 29.98 kg/m  as calculated from the following:    Height as of this encounter: 1.753 m (5' 9\").    Weight as of this encounter: 92.1 kg (203 lb).  Medication Reconciliation: complete   Tracy Venegas    "

## 2019-08-06 ASSESSMENT — ANXIETY QUESTIONNAIRES: GAD7 TOTAL SCORE: 12

## 2019-09-03 DIAGNOSIS — F51.04 PSYCHOPHYSIOLOGICAL INSOMNIA: ICD-10-CM

## 2019-09-04 RX ORDER — TRAZODONE HYDROCHLORIDE 100 MG/1
TABLET ORAL
Qty: 60 TABLET | Refills: 3 | Status: SHIPPED | OUTPATIENT
Start: 2019-09-04 | End: 2020-01-07

## 2019-09-30 ENCOUNTER — HOSPITAL ENCOUNTER (EMERGENCY)
Facility: HOSPITAL | Age: 39
Discharge: HOME | End: 2019-09-30
Attending: PHYSICIAN ASSISTANT | Admitting: PHYSICIAN ASSISTANT
Payer: COMMERCIAL

## 2019-09-30 ENCOUNTER — APPOINTMENT (OUTPATIENT)
Dept: GENERAL RADIOLOGY | Facility: HOSPITAL | Age: 39
End: 2019-09-30
Attending: PHYSICIAN ASSISTANT
Payer: COMMERCIAL

## 2019-09-30 VITALS
DIASTOLIC BLOOD PRESSURE: 91 MMHG | OXYGEN SATURATION: 99 % | RESPIRATION RATE: 20 BRPM | SYSTOLIC BLOOD PRESSURE: 127 MMHG | TEMPERATURE: 97.9 F

## 2019-09-30 DIAGNOSIS — J40 BRONCHITIS: ICD-10-CM

## 2019-09-30 DIAGNOSIS — R07.89 ATYPICAL CHEST PAIN: ICD-10-CM

## 2019-09-30 LAB — TROPONIN I SERPL-MCNC: <0.015 UG/L (ref 0–0.04)

## 2019-09-30 PROCEDURE — 93005 ELECTROCARDIOGRAM TRACING: CPT

## 2019-09-30 PROCEDURE — 99214 OFFICE O/P EST MOD 30 MIN: CPT | Mod: Z6 | Performed by: PHYSICIAN ASSISTANT

## 2019-09-30 PROCEDURE — G0463 HOSPITAL OUTPT CLINIC VISIT: HCPCS | Mod: 25

## 2019-09-30 PROCEDURE — 71046 X-RAY EXAM CHEST 2 VIEWS: CPT | Mod: TC

## 2019-09-30 PROCEDURE — 84484 ASSAY OF TROPONIN QUANT: CPT | Performed by: PHYSICIAN ASSISTANT

## 2019-09-30 PROCEDURE — 93010 ELECTROCARDIOGRAM REPORT: CPT | Performed by: INTERNAL MEDICINE

## 2019-09-30 PROCEDURE — 36415 COLL VENOUS BLD VENIPUNCTURE: CPT | Performed by: PHYSICIAN ASSISTANT

## 2019-09-30 RX ORDER — CODEINE PHOSPHATE AND GUAIFENESIN 10; 100 MG/5ML; MG/5ML
1-2 SOLUTION ORAL EVERY 6 HOURS PRN
Qty: 180 ML | Refills: 0 | Status: SHIPPED | OUTPATIENT
Start: 2019-09-30 | End: 2020-03-04

## 2019-09-30 ASSESSMENT — ENCOUNTER SYMPTOMS
RHINORRHEA: 1
SINUS PRESSURE: 1
SHORTNESS OF BREATH: 1
ABDOMINAL PAIN: 0
NEUROLOGICAL NEGATIVE: 1
EYES NEGATIVE: 1
FEVER: 0
VOMITING: 1
COUGH: 1

## 2019-09-30 NOTE — ED TRIAGE NOTES
Patient presents today with c/o URI symptoms, chest wall pain and low back pain.   Ongoing symptoms for 3 weeks.   Experiencing, chest heaviness/tightness, SOB, productive cough, difficulty sleeping due to cough, diarrhea, nausea, vomiting, rhinorrhea, congestion, facial pain / pressure, chills, fatigue.   Denies any ear pain, eye irritation, fevers.   Taking ibuprofen - last dose was 9:00pm last night.  4/10 pain currently.

## 2019-09-30 NOTE — ED AVS SNAPSHOT
[facility]  [address]  [place] [address]  Phone:  [phone]                                    Cristiana Aguila   MRN: [id number]    Department:  [facility]   Date of Visit:  9/30/2019           After Visit Summary Signature Page    I have received my discharge instructions, and my questions have been answered. I have discussed any challenges I see with this plan with the nurse or doctor.    ..........................................................................................................................................  Patient/Patient Representative Signature      ..........................................................................................................................................  Patient Representative Print Name and Relationship to Patient    ..................................................               ................................................  Date                                   Time    ..........................................................................................................................................  Reviewed by Signature/Title    ...................................................              ..............................................  Date                                               Time          22EPIC Rev 08/18

## 2019-09-30 NOTE — ED PROVIDER NOTES
"  History     Chief Complaint   Patient presents with     Chest Wall Pain     cold x 2 weeks. \"coughing so hard that I vomit.\"      Back Pain     started yesterday. \"It's not muscle pain.\"      HPI  Cristiana Aguila is a 39 year old female who presents emergency department with complaints of chest pain in the middle of her chest which she describes as pressure, currently rated as 4 out of 10, 8 out of 10 at worst.  Patient states that she has been ill for the last 3 weeks and has had severe cough that causes her to vomit on occasion.  She reports having intermittent diarrhea for the past 3 weeks but denies any blood in the stools.  Patient smokes 2 cigarettes a day.  She has no history of diabetes.  She has been taking ibuprofen for pain relief with minimal improvement.  No personal or family history of early cardiac disease.  She denies recent fever.  Upper respiratory symptoms improving.      Allergies:  Allergies   Allergen Reactions     Amoxicillin Diarrhea     Bee Venom      Bee venom (honey bee)       Latex      Sulfa Drugs      Sulfa (sulfonamide antibiotics)          Problem List:    Patient Active Problem List    Diagnosis Date Noted     Well woman exam with routine gynecological exam 02/14/2018     Priority: Medium     Irritable bowel syndrome with both constipation and diarrhea 08/02/2017     Priority: Medium     Recurrent major depressive disorder, in partial remission (H) 08/02/2017     Priority: Medium     ACP (advance care planning) 03/02/2016     Priority: Medium     Advance Care Planning 3/2/2016: Receipt of ACP document:  Received: Health Care Directive which was witnessed or notarized on 1-25-16.  Document previously scanned on 2-5-16.  Validation form completed and sent to be scanned.  Code Status needs to be updated to reflect choices in most recent ACP document.  Confirmed/documented designated decision maker(s).  Added by Rachel Barber RN Advance Care Planning Liaison with [employer] [employer]             Pelvic pain in female 01/14/2016     Priority: Medium     FH: breast cancer      Priority: Medium     Tobacco abuse, in remission      Priority: Medium     Generalized anxiety disorder      Priority: Medium     Diagnosis updated by automated process. Provider to review and confirm.       Attention deficit disorder 05/31/2005     Priority: Medium     Problem list name updated by automated process. 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     FH: breast cancer      DAFNE (generalised anxiety disorder)      Irritable bowel syndrome with both constipation and diarrhea 8/2/2017     MDD (major depressive disorder)      Migraine, unspecified, without mention of intractable migraine without mention of status migrainosus 3/13/2000     Tobacco abuse, in remission      Unspecified sinusitis (chronic) 3/8/2001       Past Surgical History:    Past Surgical History:   Procedure Laterality Date     ENDOSCOPY UPPER, COLONOSCOPY, COMBINED N/A 9/4/2015    Procedure: COMBINED ENDOSCOPY UPPER, COLONOSCOPY;  Surgeon: Griffin Barrientos DO;  Location: [facility]     LAPAROSCOPIC CYSTECTOMY OVARIAN (BENIGN) Right 1/27/2016    Procedure: LAPAROSCOPIC CYSTECTOMY OVARIAN (BENIGN);  Surgeon: Kuldip España MD;  Location: [facility]     LAPAROSCOPIC SALPINGO-OOPHORECTOMY Left 1/27/2016    Procedure: LAPAROSCOPIC SALPINGO-OOPHORECTOMY;  Surgeon: Kuldip España MD;  Location: [facility]     LAPAROSCOPY DIAGNOSTIC (GYN) Left 1/27/2016    Procedure: LAPAROSCOPY DIAGNOSTIC (GYN);  Surgeon: Kuldip España MD;  Location: [facility]     TUBAL LIGATION  2006     wisdom teeth         Family History:    Family History   Problem Relation Age of Onset     Breast Cancer Maternal Grandmother      Hypertension Maternal Grandfather      Hyperlipidemia Maternal Grandfather      Diabetes  Paternal Grandmother      Asthma No family hx of        Social History:  Marital Status:   [2]  Social History     Tobacco Use     Smoking status: Current Every Day Smoker     Packs/day: 0.50     Years: 13.00     Pack years: 6.50     Types: Cigarettes     Smokeless tobacco: Never Used   Substance Use Topics     Alcohol use: Yes     Alcohol/week: 0.0 standard drinks     Comment: frequency: rarely     Drug use: No        Medications:    albuterol (PROAIR HFA) 108 (90 Base) MCG/ACT inhaler  Ascorbic Acid (VITAMIN C PO)  Biotin 5 MG TABS  citalopram (CELEXA) 40 MG tablet  Cyanocobalamin (VITAMIN B 12 PO)  docusate sodium (COLACE) 100 MG capsule  fluticasone (FLONASE) 50 MCG/ACT nasal spray  guaiFENesin-codeine (ROBITUSSIN AC) 100-10 MG/5ML solution  hydrOXYzine (ATARAX) 25 MG tablet  methylphenidate (CONCERTA) 54 MG CR tablet  ranitidine (ZANTAC) 150 MG tablet  st flores wort 300 MG TABS tablet  traZODone (DESYREL) 100 MG tablet  Acetaminophen (TYLENOL PO)  EPINEPHrine (EPIPEN/ADRENACLICK/OR ANY BX GENERIC EQUIV) 0.3 MG/0.3ML injection 2-pack          Review of Systems   Constitutional: Negative for fever.   HENT: Positive for rhinorrhea and sinus pressure.    Eyes: Negative.    Respiratory: Positive for cough and shortness of breath.    Cardiovascular: Positive for chest pain.   Gastrointestinal: Positive for vomiting. Negative for abdominal pain.   Neurological: Negative.        Physical Exam   BP: 127/91  Heart Rate: 88  Temp: 97.9  F (36.6  C)  Resp: 20  SpO2: 99 %      Physical Exam  Constitutional:       General: She is not in acute distress.     Appearance: She is not diaphoretic.   HENT:      Head: Atraumatic.      Mouth/Throat:      Pharynx: No oropharyngeal exudate.   Eyes:      General: No scleral icterus.     Pupils: Pupils are equal, round, and reactive to light.   Cardiovascular:      Heart sounds: Normal heart sounds.   Pulmonary:      Effort: No respiratory distress.      Breath sounds: Normal  breath sounds.   Abdominal:      Palpations: Abdomen is soft.      Tenderness: There is no tenderness.   Musculoskeletal:         General: No tenderness.   Skin:     General: Skin is warm.      Findings: No rash.         ED Course        Procedures  Chest symptoms concerning for cardiac cause but ECG and troponin unremarkable.  CXR negative for pneumonia.  Recommended f/u with PCP if symptoms not improving.  Return to ED if symptoms get progressively worse.  Prescription for Robitussin AC provided.               EKG Interpretation:      Interpreted by JAY JAY Ortega  Time reviewed: 0925  Symptoms at time of EKG: chest pressure   Rhythm: normal sinus   Rate: normal  Axis: normal  Ectopy: none  Conduction: normal  ST Segments/ T Waves: No ST-T wave changes  Q Waves: none    Clinical Impression: normal EKG           Results for orders placed or performed during the hospital encounter of 09/30/19 (from the past 24 hour(s))   Troponin I   Result Value Ref Range    Troponin I ES <0.015 0.000 - 0.045 ug/L   XR Chest 2 Views    Narrative    Procedure:XR CHEST 2 VW    Clinical history:Female, 39 years, cough, chest wall pain    Technique: Two views are submitted.    Comparison: 2/17/2017    Findings: The cardiac silhouette is normal. The pulmonary vasculature  is normal.    The lungs are clear. Bony structures demonstrate no evidence of an  acute fracture.      Impression    Impression:   No acute abnormality. No evidence of acute or active cardiopulmonary  disease.    JEMIMA ZURITA MD       Medications - No data to display    Assessments & Plan (with Medical Decision Making)     I have reviewed the nursing notes.    I have reviewed the findings, diagnosis, plan and need for follow up with the patient.    Discharge Medication List as of 9/30/2019 10:43 AM      START taking these medications    Details   guaiFENesin-codeine (ROBITUSSIN AC) 100-10 MG/5ML solution Take 5-10 mLs by mouth every 6 hours as needed for  cough, Disp-180 mL, R-0, Local Print             Final diagnoses:   Atypical chest pain   Bronchitis     JAY JAY Ortega on 9/30/2019 at 3:18 PM   9/30/2019   [facility]     Braulio Avilez PA  09/30/19 [id number]

## 2019-10-01 DIAGNOSIS — K21.9 GASTROESOPHAGEAL REFLUX DISEASE WITHOUT ESOPHAGITIS: ICD-10-CM

## 2019-10-29 DIAGNOSIS — F33.42 MAJOR DEPRESSIVE DISORDER, RECURRENT EPISODE, IN FULL REMISSION (H): ICD-10-CM

## 2019-10-29 DIAGNOSIS — F41.1 GAD (GENERALIZED ANXIETY DISORDER): ICD-10-CM

## 2019-10-30 RX ORDER — CITALOPRAM HYDROBROMIDE 40 MG/1
TABLET ORAL
Qty: 30 TABLET | Refills: 9 | Status: SHIPPED | OUTPATIENT
Start: 2019-10-30 | End: 2020-09-18

## 2020-01-06 DIAGNOSIS — F51.04 PSYCHOPHYSIOLOGICAL INSOMNIA: ICD-10-CM

## 2020-01-07 RX ORDER — TRAZODONE HYDROCHLORIDE 100 MG/1
TABLET ORAL
Qty: 60 TABLET | Refills: 5 | Status: SHIPPED | OUTPATIENT
Start: 2020-01-07 | End: 2020-07-16

## 2020-01-29 NOTE — PROGRESS NOTES
Subjective     Cristiana Aguila is a 39 year old female who presents to clinic today for the following health issues:    HPI   Medication Followup of concerta    Taking Medication as prescribed: yes, until ran out    Side Effects:  None    Medication Helping Symptoms:  Yes    Stable for years on same dose    Helps with her hectic life          Current Outpatient Medications   Medication Sig Dispense Refill     albuterol (PROAIR HFA) 108 (90 Base) MCG/ACT inhaler Inhale 2 puffs into the lungs every 4 hours as needed for shortness of breath / dyspnea 1 Inhaler 3     Ascorbic Acid (VITAMIN C PO) Take 1,000 mg by mouth 2 times daily       citalopram (CELEXA) 40 MG tablet TAKE 1 TABLET BY MOUTH DAILY 30 tablet 9     Cyanocobalamin (VITAMIN B 12 PO) Take 500 mcg by mouth daily       docusate sodium (COLACE) 100 MG capsule TAKE 1 CAPSULE BY MOUTH DAILY 30 capsule 11     ranitidine (ZANTAC) 150 MG tablet Take 1 tablet (150 mg) by mouth 2 times daily 60 tablet 3     st johns wort 300 MG TABS tablet Take 300 mg by mouth daily       traZODone (DESYREL) 100 MG tablet TAKE 2 TABLETS (200MG) BY MOUTH AT BEDTIME 60 tablet 5     Acetaminophen (TYLENOL PO) Take by mouth every 6 hours as needed        Biotin 5 MG TABS Take 3 tablets by mouth daily       EPINEPHrine (EPIPEN/ADRENACLICK/OR ANY BX GENERIC EQUIV) 0.3 MG/0.3ML injection 2-pack Inject 0.3 mLs (0.3 mg) into the muscle as needed for anaphylaxis (Patient not taking: Reported on 1/31/2020) 2 each 3     fluticasone (FLONASE) 50 MCG/ACT nasal spray Spray 1 spray into both nostrils daily       guaiFENesin-codeine (ROBITUSSIN AC) 100-10 MG/5ML solution Take 5-10 mLs by mouth every 6 hours as needed for cough (Patient not taking: Reported on 1/31/2020) 180 mL 0     hydrOXYzine (ATARAX) 25 MG tablet 1-2 tablets 3 times per day as needed (Patient not taking: Reported on 1/31/2020) 60 tablet 3     methylphenidate (CONCERTA) 54 MG CR tablet Take 1 tablet (54 mg) by mouth daily (Patient  "not taking: Reported on 1/31/2020) 30 tablet 0     Allergies   Allergen Reactions     Amoxicillin Diarrhea     Bee Venom      Bee venom (honey bee)       Latex      Sulfa Drugs      Sulfa (sulfonamide antibiotics)            Reviewed and updated as needed this visit by Provider         Review of Systems   ROS COMP: Constitutional, HEENT, cardiovascular, pulmonary, gi and gu systems are negative, except as otherwise noted.      Objective    /68 (BP Location: Left arm, Patient Position: Sitting, Cuff Size: Adult Regular)   Pulse 84   Temp 98.3  F (36.8  C) (Tympanic)   Resp 18   Wt 96.3 kg (212 lb 6.4 oz)   SpO2 97%   BMI 31.37 kg/m    Body mass index is 31.37 kg/m .  Physical Exam   GENERAL: healthy, alert and no distress  PSYCH: mentation appears normal, affect normal/bright            Assessment & Plan       ICD-10-CM    1. Attention deficit hyperactivity disorder (ADHD), predominantly inattentive type F90.0 methylphenidate (CONCERTA) 54 MG CR tablet     methylphenidate (CONCERTA) 54 MG CR tablet     methylphenidate (CONCERTA) 54 MG CR tablet   3 month RF given and pt to call for 3 more months. Will see now every 6 months- since very stable on meds. Continue current medications and behavioral changes.      BMI:   Estimated body mass index is 31.37 kg/m  as calculated from the following:    Height as of 8/5/19: 1.753 m (5' 9\").    Weight as of this encounter: 96.3 kg (212 lb 6.4 oz).               No follow-ups on file.    Sudhakar Cuellar MD  [facility] - [place]      "

## 2020-01-31 ENCOUNTER — OFFICE VISIT (OUTPATIENT)
Dept: FAMILY MEDICINE | Facility: OTHER | Age: 40
End: 2020-01-31
Attending: FAMILY MEDICINE
Payer: COMMERCIAL

## 2020-01-31 VITALS
OXYGEN SATURATION: 97 % | HEART RATE: 84 BPM | SYSTOLIC BLOOD PRESSURE: 122 MMHG | BODY MASS INDEX: 31.37 KG/M2 | TEMPERATURE: 98.3 F | DIASTOLIC BLOOD PRESSURE: 68 MMHG | RESPIRATION RATE: 18 BRPM | WEIGHT: 212.4 LBS

## 2020-01-31 DIAGNOSIS — F90.0 ATTENTION DEFICIT HYPERACTIVITY DISORDER (ADHD), PREDOMINANTLY INATTENTIVE TYPE: Primary | ICD-10-CM

## 2020-01-31 PROCEDURE — G0463 HOSPITAL OUTPT CLINIC VISIT: HCPCS

## 2020-01-31 PROCEDURE — 99213 OFFICE O/P EST LOW 20 MIN: CPT | Performed by: FAMILY MEDICINE

## 2020-01-31 RX ORDER — METHYLPHENIDATE HYDROCHLORIDE 54 MG/1
54 TABLET ORAL DAILY
Qty: 30 TABLET | Refills: 0 | Status: SHIPPED | OUTPATIENT
Start: 2020-01-31 | End: 2020-04-29

## 2020-01-31 RX ORDER — METHYLPHENIDATE HYDROCHLORIDE 54 MG/1
54 TABLET ORAL DAILY
Qty: 30 TABLET | Refills: 0 | Status: SHIPPED | OUTPATIENT
Start: 2020-04-02 | End: 2020-05-05

## 2020-01-31 RX ORDER — METHYLPHENIDATE HYDROCHLORIDE 54 MG/1
54 TABLET ORAL DAILY
Qty: 30 TABLET | Refills: 0 | Status: SHIPPED | OUTPATIENT
Start: 2020-03-02 | End: 2020-04-29

## 2020-01-31 ASSESSMENT — PAIN SCALES - GENERAL: PAINLEVEL: MILD PAIN (2)

## 2020-01-31 ASSESSMENT — ASTHMA QUESTIONNAIRES
ACT_TOTALSCORE: 19
QUESTION_4 LAST FOUR WEEKS HOW OFTEN HAVE YOU USED YOUR RESCUE INHALER OR NEBULIZER MEDICATION (SUCH AS ALBUTEROL): TWO OR THREE TIMES PER WEEK
QUESTION_2 LAST FOUR WEEKS HOW OFTEN HAVE YOU HAD SHORTNESS OF BREATH: ONCE OR TWICE A WEEK
QUESTION_1 LAST FOUR WEEKS HOW MUCH OF THE TIME DID YOUR ASTHMA KEEP YOU FROM GETTING AS MUCH DONE AT WORK, SCHOOL OR AT HOME: A LITTLE OF THE TIME
QUESTION_5 LAST FOUR WEEKS HOW WOULD YOU RATE YOUR ASTHMA CONTROL: WELL CONTROLLED
QUESTION_3 LAST FOUR WEEKS HOW OFTEN DID YOUR ASTHMA SYMPTOMS (WHEEZING, COUGHING, SHORTNESS OF BREATH, CHEST TIGHTNESS OR PAIN) WAKE YOU UP AT NIGHT OR EARLIER THAN USUAL IN THE MORNING: ONCE OR TWICE

## 2020-01-31 ASSESSMENT — ANXIETY QUESTIONNAIRES
7. FEELING AFRAID AS IF SOMETHING AWFUL MIGHT HAPPEN: SEVERAL DAYS
GAD7 TOTAL SCORE: 15
1. FEELING NERVOUS, ANXIOUS, OR ON EDGE: MORE THAN HALF THE DAYS
3. WORRYING TOO MUCH ABOUT DIFFERENT THINGS: MORE THAN HALF THE DAYS
2. NOT BEING ABLE TO STOP OR CONTROL WORRYING: MORE THAN HALF THE DAYS
6. BECOMING EASILY ANNOYED OR IRRITABLE: MORE THAN HALF THE DAYS
IF YOU CHECKED OFF ANY PROBLEMS ON THIS QUESTIONNAIRE, HOW DIFFICULT HAVE THESE PROBLEMS MADE IT FOR YOU TO DO YOUR WORK, TAKE CARE OF THINGS AT HOME, OR GET ALONG WITH OTHER PEOPLE: SOMEWHAT DIFFICULT
5. BEING SO RESTLESS THAT IT IS HARD TO SIT STILL: NEARLY EVERY DAY
4. TROUBLE RELAXING: NEARLY EVERY DAY

## 2020-01-31 ASSESSMENT — PATIENT HEALTH QUESTIONNAIRE - PHQ9: SUM OF ALL RESPONSES TO PHQ QUESTIONS 1-9: 15

## 2020-01-31 NOTE — LETTER
My Asthma Action Plan  Name: Cristiana Aguila   YOB: 1980  Date: 5/16/2019   My doctor: Sudhakar Cuellar MD   [facility]        My Control Medicine: None  My Rescue Medicine: Albuterol (Proair/Ventolin/Proventil) inhaler 2 puffs every 4 hours as needed for shortness of breath   My Asthma Severity: mild persistent  Avoid your asthma triggers: upper respiratory infections, strong odors and fumes, emotions and cold air               GREEN ZONE   Good Control    I feel good    No cough or wheeze    Can work, sleep and play without asthma symptoms       Take your asthma control medicine every day.     1. If exercise triggers your asthma, take your rescue medication    15 minutes before exercise or sports, and    During exercise if you have asthma symptoms  2. Spacer to use with inhaler: If you have a spacer, make sure to use it with your inhaler             YELLOW ZONE Getting Worse  I have ANY of these:    I do not feel good    Cough or wheeze    Chest feels tight    Wake up at night   1. Keep taking your Green Zone medications  2. Start taking your rescue medicine:    every 20 minutes for up to 1 hour. Then every 4 hours for 24-48 hours.  3. If you stay in the Yellow Zone for more than 12-24 hours, contact your doctor.  4. If you do not return to the Green Zone in 12-24 hours or you get worse, start taking your oral steroid medicine if prescribed by your provider.           RED ZONE Medical Alert - Get Help  I have ANY of these:    I feel awful    Medicine is not helping    Breathing getting harder    Trouble walking or talking    Nose opens wide to breathe       1. Take your rescue medicine NOW  2. If your provider has prescribed an oral steroid medicine, start taking it NOW  3. Call your doctor NOW  4. If you are still in the Red Zone after 20 minutes and you have not reached your doctor:    Take your rescue medicine again and    Call 911 or go to the emergency room  right away    See your regular doctor within 2 weeks of an Emergency Room or Urgent Care visit for follow-up treatment.          Annual Reminders:  Meet with Asthma Educator,  Flu Shot in the Fall, consider Pneumonia Vaccination for patients with asthma (aged 19 and older).    Pharmacy:    [facility] - [place] [place] [facility] [address] [facility] [place] [place] - [address]   [id number] - [place], [place] - [address] AT [facility]  [address]                      Asthma Triggers  How To Control Things That Make Your Asthma Worse    Triggers are things that make your asthma worse.  Look at the list below to help you find your triggers and what you can do about them.  You can help prevent asthma flare-ups by staying away from your triggers.      Trigger                                                          What you can do   Cigarette Smoke  Tobacco smoke can make asthma worse. Do not allow smoking in your home, car or around you.  Be sure no one smokes at a child s day care or school.  If you smoke, ask your health care provider for ways to help you quit.  Ask family members to quit too.  Ask your health care provider for a referral to Quit Plan to help you quit smoking, or call 3-919-103-PLAN.     Colds, Flu, Bronchitis  These are common triggers of asthma. Wash your hands often.  Don t touch your eyes, nose or mouth.  Get a flu shot every year.     Dust Mites  These are tiny bugs that live in cloth or carpet. They are too small to see. Wash sheets and blankets in hot water every week.   Encase pillows and mattress in dust mite proof covers.  Avoid having carpet if you can. If you have carpet, vacuum weekly.   Use a dust mask and HEPA vacuum.   Pollen and Outdoor Mold  Some people are allergic to trees, grass, or weed pollen, or molds. Try to keep your windows closed.  Limit time out doors when pollen count is high.   Ask you health care provider about taking medicine during  allergy season.     Animal Dander  Some people are allergic to skin flakes, urine or saliva from pets with fur or feathers. Keep pets with fur or feathers out of your home.    If you can t keep the pet outdoors, then keep the pet out of your bedroom.  Keep the bedroom door closed.  Keep pets off cloth furniture and away from stuffed toys.     Mice, Rats, and Cockroaches  Some people are allergic to the waste from these pests.   Cover food and garbage.  Clean up spills and food crumbs.  Store grease in the refrigerator.   Keep food out of the bedroom.   Indoor Mold  This can be a trigger if your home has high moisture. Fix leaking faucets, pipes, or other sources of water.   Clean moldy surfaces.  Dehumidify basement if it is damp and smelly.   Smoke, Strong Odors, and Sprays  These can reduce air quality. Stay away from strong odors and sprays, such as perfume, powder, hair spray, paints, smoke incense, paint, cleaning products, candles and new carpet.   Exercise or Sports  Some people with asthma have this trigger. Be active!  Ask your doctor about taking medicine before sports or exercise to prevent symptoms.    Warm up for 5-10 minutes before and after sports or exercise.     Other Triggers of Asthma  Cold air:  Cover your nose and mouth with a scarf.  Sometimes laughing or crying can be a trigger.  Some medicines and food can trigger asthma.

## 2020-01-31 NOTE — NURSING NOTE
"Chief Complaint   Patient presents with     A.D.H.D       Initial /68 (BP Location: Left arm, Patient Position: Sitting, Cuff Size: Adult Regular)   Pulse 84   Temp 98.3  F (36.8  C) (Tympanic)   Resp 18   Wt 96.3 kg (212 lb 6.4 oz)   SpO2 97%   BMI 31.37 kg/m   Estimated body mass index is 31.37 kg/m  as calculated from the following:    Height as of 8/5/19: 1.753 m (5' 9\").    Weight as of this encounter: 96.3 kg (212 lb 6.4 oz).  Medication Reconciliation: complete  Jose Jean-Baptiste LPN  "

## 2020-02-01 ASSESSMENT — ANXIETY QUESTIONNAIRES: GAD7 TOTAL SCORE: 15

## 2020-02-01 ASSESSMENT — ASTHMA QUESTIONNAIRES: ACT_TOTALSCORE: 19

## 2020-02-10 DIAGNOSIS — K21.9 GASTROESOPHAGEAL REFLUX DISEASE WITHOUT ESOPHAGITIS: ICD-10-CM

## 2020-03-02 ENCOUNTER — HEALTH MAINTENANCE LETTER (OUTPATIENT)
Age: 40
End: 2020-03-02

## 2020-03-04 ENCOUNTER — OFFICE VISIT (OUTPATIENT)
Dept: OBGYN | Facility: OTHER | Age: 40
End: 2020-03-04
Attending: ADVANCED PRACTICE MIDWIFE
Payer: COMMERCIAL

## 2020-03-04 VITALS
WEIGHT: 215 LBS | DIASTOLIC BLOOD PRESSURE: 76 MMHG | HEIGHT: 69 IN | BODY MASS INDEX: 31.84 KG/M2 | SYSTOLIC BLOOD PRESSURE: 120 MMHG

## 2020-03-04 DIAGNOSIS — Z12.4 ENCOUNTER FOR SCREENING FOR CERVICAL CANCER: ICD-10-CM

## 2020-03-04 DIAGNOSIS — Z12.31 ENCOUNTER FOR SCREENING MAMMOGRAM FOR BREAST CANCER: ICD-10-CM

## 2020-03-04 DIAGNOSIS — Z01.419 WELL WOMAN EXAM WITH ROUTINE GYNECOLOGICAL EXAM: Primary | ICD-10-CM

## 2020-03-04 DIAGNOSIS — L30.9 DERMATITIS: ICD-10-CM

## 2020-03-04 LAB — TSH SERPL DL<=0.005 MIU/L-ACNC: 1.83 MU/L (ref 0.4–4)

## 2020-03-04 PROCEDURE — 84443 ASSAY THYROID STIM HORMONE: CPT | Mod: ZL | Performed by: ADVANCED PRACTICE MIDWIFE

## 2020-03-04 PROCEDURE — 87624 HPV HI-RISK TYP POOLED RSLT: CPT | Mod: ZL | Performed by: ADVANCED PRACTICE MIDWIFE

## 2020-03-04 PROCEDURE — G0476 HPV COMBO ASSAY CA SCREEN: HCPCS | Mod: ZL | Performed by: ADVANCED PRACTICE MIDWIFE

## 2020-03-04 PROCEDURE — 36415 COLL VENOUS BLD VENIPUNCTURE: CPT | Mod: ZL | Performed by: ADVANCED PRACTICE MIDWIFE

## 2020-03-04 PROCEDURE — 99396 PREV VISIT EST AGE 40-64: CPT | Performed by: ADVANCED PRACTICE MIDWIFE

## 2020-03-04 PROCEDURE — G0123 SCREEN CERV/VAG THIN LAYER: HCPCS | Mod: ZL | Performed by: ADVANCED PRACTICE MIDWIFE

## 2020-03-04 ASSESSMENT — ANXIETY QUESTIONNAIRES
3. WORRYING TOO MUCH ABOUT DIFFERENT THINGS: MORE THAN HALF THE DAYS
IF YOU CHECKED OFF ANY PROBLEMS ON THIS QUESTIONNAIRE, HOW DIFFICULT HAVE THESE PROBLEMS MADE IT FOR YOU TO DO YOUR WORK, TAKE CARE OF THINGS AT HOME, OR GET ALONG WITH OTHER PEOPLE: SOMEWHAT DIFFICULT
1. FEELING NERVOUS, ANXIOUS, OR ON EDGE: SEVERAL DAYS
7. FEELING AFRAID AS IF SOMETHING AWFUL MIGHT HAPPEN: SEVERAL DAYS
2. NOT BEING ABLE TO STOP OR CONTROL WORRYING: MORE THAN HALF THE DAYS
4. TROUBLE RELAXING: MORE THAN HALF THE DAYS
6. BECOMING EASILY ANNOYED OR IRRITABLE: MORE THAN HALF THE DAYS
5. BEING SO RESTLESS THAT IT IS HARD TO SIT STILL: SEVERAL DAYS
GAD7 TOTAL SCORE: 11

## 2020-03-04 ASSESSMENT — PATIENT HEALTH QUESTIONNAIRE - PHQ9: SUM OF ALL RESPONSES TO PHQ QUESTIONS 1-9: 12

## 2020-03-04 ASSESSMENT — MIFFLIN-ST. JEOR: SCORE: 1704.61

## 2020-03-04 ASSESSMENT — PAIN SCALES - GENERAL: PAINLEVEL: NO PAIN (0)

## 2020-03-04 NOTE — PATIENT INSTRUCTIONS
Return to office in one week for Mirena placement.     Thank you for allowing Levi ZHOU CNM and our OB team to participate in your care.  If you have a scheduling or an appointment question please contact [facility] Unit Coordinator at their direct line [phone].   ALL nursing questions or concerns can be directed to your OB nurse at: [phone] Gina Azevedo/Gloria

## 2020-03-04 NOTE — NURSING NOTE
"Chief Complaint   Patient presents with     Gyn Exam       Initial /76 (BP Location: Left arm, Patient Position: Chair, Cuff Size: Adult Regular)   Ht 1.753 m (5' 9\")   Wt 97.5 kg (215 lb)   BMI 31.75 kg/m   Estimated body mass index is 31.75 kg/m  as calculated from the following:    Height as of this encounter: 1.753 m (5' 9\").    Weight as of this encounter: 97.5 kg (215 lb).  Medication Reconciliation: complete  Jolly Serrano LPN    "

## 2020-03-04 NOTE — PROGRESS NOTES
"ANNUAL    Cristiana is a 39 year old G 3 P 3 female who presents for annual exam.   Youngest child 13  Largest Child 8 lbs 13.5 oz  GDM n  Hypertension n  No LMP recorded.  Menses:  Cycles 28 days When did they start 7 y.o. How many days bleed 1-2 days; for two months very light then one month will be heavy with clotting for a couple hours  pain cramping Contraception BTO 2006.  Planning pregnancy: n    Concerns in addition to routine health maintenance?    Pt reports menorrhagia with every third period. She will leak through a tampon, pad, and clothing. She will then have to sit on the toilet due to the large amount of bleeding, after a couple of hours the bleeding subsides to a moderate flow. Cramping with the bleeding.   A couple of days prior to menstruation, the patient will break out in a rash on random parts of the body in which she reports looks like \"hives\". It is very itchy. This has consistently happened over the last 3 menstruations. Home treatment includes benadryl cream which helps. Pt reports having very sensitive skin.    Decrease libido for the last couple of years. Has worsened over the last one year. No changes in medications since then. Increase in stressors and increase in weight that seems to effect libido.       GYNECOLOGIC HISTORY:   Surgery: BTO; left oophorectomy  History sexually transmitted infections: Chlamydia treated  Safe?  y  STI testing offered?  Y, declines  History of abnormal Pap smear: n  Problems with sex? (bleeding/pain) n  Family history of: breast cancer: mgm   Uterine cancer: n ovarian cancer: n   colon cancer: n    HEALTH MAINTENANCE:  Cholesterol: (No results found for: CHOL History of abnormal lipids: n  Mammo: y . History of abnormal Mammo:n   Regular Self Breast Exams: n Calcium/Vitamin D or Vitamin: n Exercise n, winter is hard for patient; walks in the spring/summer    TSH: (  TSH   Date Value Ref Range Status   02/27/2019 1.39 0.40 - 4.00 mU/L Final    )  Pap; (  Lab " Results   Component Value Date    PAP NIL 2019    PAP NIL 2018    PAP NIL 2015    )    HISTORY:  OB History    Para Term  AB Living   3 3 3 0 0 3   SAB TAB Ectopic Multiple Live Births   0 0 0 0 0      # Outcome Date GA Lbr Maurice/2nd Weight Sex Delivery Anes PTL Lv   3 Term            2 Term            1 Term              Past Medical History:   Diagnosis Date     Attention deficit disorder without mention of hyperactivity 2005     Dependent personality disorder (H) 2011     FH: breast cancer      DAFNE (generalised anxiety disorder)      Irritable bowel syndrome with both constipation and diarrhea 2017     MDD (major depressive disorder)      Migraine, unspecified, without mention of intractable migraine without mention of status migrainosus 3/13/2000     Tobacco abuse, in remission      Unspecified sinusitis (chronic) 3/8/2001     Past Surgical History:   Procedure Laterality Date     ENDOSCOPY UPPER, COLONOSCOPY, COMBINED N/A 2015    Procedure: COMBINED ENDOSCOPY UPPER, COLONOSCOPY;  Surgeon: Griffin Barrientos DO;  Location: [facility]     LAPAROSCOPIC CYSTECTOMY OVARIAN (BENIGN) Right 2016    Procedure: LAPAROSCOPIC CYSTECTOMY OVARIAN (BENIGN);  Surgeon: Kuldip España MD;  Location: [facility]     LAPAROSCOPIC SALPINGO-OOPHORECTOMY Left 2016    Procedure: LAPAROSCOPIC SALPINGO-OOPHORECTOMY;  Surgeon: Kuldip España MD;  Location: [facility]     LAPAROSCOPY DIAGNOSTIC (GYN) Left 2016    Procedure: LAPAROSCOPY DIAGNOSTIC (GYN);  Surgeon: Kuldip España MD;  Location: [facility]     TUBAL LIGATION  2006     wisdom teeth       Family History   Problem Relation Age of Onset     Breast Cancer Maternal Grandmother      Hypertension Maternal Grandfather      Hyperlipidemia Maternal Grandfather      Diabetes Paternal Grandmother      Asthma No family hx of      Social History     Socioeconomic History     Marital status:      Spouse name: None     Number of children:  None     Years of education: None     Highest education level: None   Occupational History     None   Social Needs     Financial resource strain: None     Food insecurity:     Worry: None     Inability: None     Transportation needs:     Medical: None     Non-medical: None   Tobacco Use     Smoking status: Former Smoker     Packs/day: 0.50     Years: 13.00     Pack years: 6.50     Types: Cigarettes     Last attempt to quit: 2020     Years since quittin.1     Smokeless tobacco: Never Used   Substance and Sexual Activity     Alcohol use: Yes     Alcohol/week: 0.0 standard drinks     Comment: frequency: rarely     Drug use: No     Sexual activity: Yes     Partners: Male   Lifestyle     Physical activity:     Days per week: None     Minutes per session: None     Stress: None   Relationships     Social connections:     Talks on phone: None     Gets together: None     Attends [employer] service: None     Active member of club or organization: None     Attends meetings of clubs or organizations: None     Relationship status: None     Intimate partner violence:     Fear of current or ex partner: None     Emotionally abused: None     Physically abused: None     Forced sexual activity: None   Other Topics Concern      Service No     Blood Transfusions Yes     Comment: OPermits if needed     Caffeine Concern Yes     Comment: soda > 32 oz daily     Occupational Exposure No     Hobby Hazards No     Sleep Concern No     Stress Concern Yes     Weight Concern Yes     Special Diet No     Back Care No     Exercise Yes     Comment: daily     Bike Helmet Not Asked     Seat Belt Yes     Self-Exams Not Asked     Parent/sibling w/ CABG, MI or angioplasty before 65F 55M? No   Social History Narrative     None       Current Outpatient Medications:      Acetaminophen (TYLENOL PO), Take by mouth every 6 hours as needed , Disp: , Rfl:      albuterol (PROAIR HFA) 108 (90 Base) MCG/ACT inhaler, Inhale 2 puffs into the lungs every  4 hours as needed for shortness of breath / dyspnea, Disp: 1 Inhaler, Rfl: 3     Ascorbic Acid (VITAMIN C PO), Take 1,000 mg by mouth 2 times daily, Disp: , Rfl:      citalopram (CELEXA) 40 MG tablet, TAKE 1 TABLET BY MOUTH DAILY, Disp: 30 tablet, Rfl: 9     Cyanocobalamin (VITAMIN B 12 PO), Take 500 mcg by mouth daily, Disp: , Rfl:      docusate sodium (COLACE) 100 MG capsule, TAKE 1 CAPSULE BY MOUTH DAILY, Disp: 30 capsule, Rfl: 11     EPINEPHrine (EPIPEN/ADRENACLICK/OR ANY BX GENERIC EQUIV) 0.3 MG/0.3ML injection 2-pack, Inject 0.3 mLs (0.3 mg) into the muscle as needed for anaphylaxis, Disp: 2 each, Rfl: 3     fluticasone (FLONASE) 50 MCG/ACT nasal spray, Spray 1 spray into both nostrils daily, Disp: , Rfl:      hydrOXYzine (ATARAX) 25 MG tablet, 1-2 tablets 3 times per day as needed, Disp: 60 tablet, Rfl: 3     methylphenidate (CONCERTA) 54 MG CR tablet, Take 1 tablet (54 mg) by mouth daily, Disp: 30 tablet, Rfl: 0     [START ON 4/2/2020] methylphenidate (CONCERTA) 54 MG CR tablet, Take 1 tablet (54 mg) by mouth daily, Disp: 30 tablet, Rfl: 0     methylphenidate (CONCERTA) 54 MG CR tablet, Take 1 tablet (54 mg) by mouth daily, Disp: 30 tablet, Rfl: 0     ranitidine (ZANTAC) 150 MG tablet, TAKE 1 TABLET BY MOUTH 2 TIMES DAILY, Disp: 180 tablet, Rfl: 3     st johns wort 300 MG TABS tablet, Take 300 mg by mouth daily, Disp: , Rfl:      traZODone (DESYREL) 100 MG tablet, TAKE 2 TABLETS (200MG) BY MOUTH AT BEDTIME, Disp: 60 tablet, Rfl: 5     methylphenidate (CONCERTA) 54 MG CR tablet, Take 1 tablet (54 mg) by mouth daily, Disp: 30 tablet, Rfl: 0     Allergies   Allergen Reactions     Amoxicillin Diarrhea     Bee Venom      Bee venom (honey bee)       Food      Artificial cinnamon      Latex      Sulfa Drugs      Sulfa (sulfonamide antibiotics)          Past medical, surgical, social and family history were reviewed and updated in EPIC.    ROS:    CONSTITUTIONAL:     NEGATIVE for fever, chills, change in  "weight  INTEGUMENTARY/SKIN:       NEGATIVE for worrisome rashes, moles or lesions; Overall dry skin; rash, see HPI.   EYES:     NEGATIVE for vision changes or irritation  ENT/MOUTH: NEGATIVE for ear, mouth and throat problems; hx sinusitis  RESP:     NEGATIVE for significant cough or SOB; Hx chronic bronchitis  CV:   NEGATIVE for chest pain, palpitations or peripheral edema  GI:     NEGATIVE for nausea, abdominal pain, heartburn; Hx IBS with diarrhea and constipation  :   NEGATIVE for frequency, dysuria, hematuria, vaginal discharge, or irregular bleeding, incontinence   MUSCULOSKELETAL:     NEGATIVE for significant arthralgias or myalgia  NEURO:      NEGATIVE for weakness, dizziness or paresthesias  ENDOCRINE:      NEGATIVE for temperature intolerance, skin/hair changes  PSYCHIATRIC:      NEGATIVE for changes in mood or affect. Hx depression and anxiety, pt feels well managed.    EXAM:   /76 (BP Location: Left arm, Patient Position: Chair, Cuff Size: Adult Regular)   Ht 1.753 m (5' 9\")   Wt 97.5 kg (215 lb)   BMI 31.75 kg/m     BMI: Body mass index is 31.75 kg/m .  Constitutional: healthy, alert and no distress  Head: Normocephalic. No masses, lesions, tenderness or abnormalities  Neck: Neck supple. Trachea midline. No adenopathy. Thyroid symmetric, normal size.   Cardiovascular: RRR.   Respiratory: lungs clear   Breast: Breasts reveal mild symmetric fibrocystic densities, but there are no dominant, discrete, fixed or suspicious masses found.  Gastrointestinal: Abdomen soft, non-tender, non-distended. No masses, organomegaly.  :  Vulva:  No external lesions, normal female hair distribution, no inguinal adenopathy.    Urethra:  Midline, non-tender, well supported, no discharge  Vagina:  Moist, pink, no abnormal discharge, no lesions  Cervix: clean, small bloody discharge (patient on menses).  Uterus:  Normal size, non-tender, freely mobile  Ovaries:  No masses appreciated  Rectal Exam: " deferred  Musculoskeletal: extremities normal  Skin: no suspicious lesions or rashes  Psychiatric: Affect appropriate, cooperative,mentation appears normal; PHQ-9:12 DAFNE-7:11    COUNSELING:   regular exercise  healthy diet/nutrition  Immunizations  Folic Acid Counseling   reports that she quit smoking about 1 months ago. Her smoking use included cigarettes. She has a 6.50 pack-year smoking history. She has never used smokeless tobacco.  Body mass index is 31.75 kg/m .  Weight management plan: increase exercise and healthy eating  FRAX Risk Assessment    ASSESSMENT:  39 year old female with satisfactory annual exam  Cervical cancer screening  Breast cancer screening  Family planning:  BTO  Depression and anxiety  Decrease libido  Dermatitis  Menorrhagia   Hx of IBS  PLAN:   Pap with High Risk hpv  Schedule for Mirena placement  Tsh  mammogram    Return to office: 1 week for Mirena placement    Total visit greater than 45 minutes with 40 minutes spent discussing well woman care, health promotion, decreased libido, menorrhagia, Mirena, dermatitis, and disease prevention.    Billie Arora, student FNP    Patient is seen in conjunction with NP student.  History is reviewed with patient and pertinent portions of the exam are repeated.  Assessment and plan is reviewed with the patient.    WINNIE Alvarez, MITRA

## 2020-03-05 ASSESSMENT — ANXIETY QUESTIONNAIRES: GAD7 TOTAL SCORE: 11

## 2020-03-09 DIAGNOSIS — R10.2 PELVIC PAIN IN FEMALE: ICD-10-CM

## 2020-03-10 LAB
COPATH REPORT: ABNORMAL
PAP: ABNORMAL

## 2020-03-11 NOTE — TELEPHONE ENCOUNTER
herb      Last Written Prescription Date:  3/6/19  Last Fill Quantity: 30,   # refills: 11  Last Office Visit: 3/4/20  Future Office visit:    Next 5 appointments (look out 90 days)    Mar 18, 2020 10:30 AM CDT  (Arrive by 10:15 AM)  Office Visit with WINNIE Hunter CNM  [facility] ([facility] ) [address]  [place] [address]  [phone]

## 2020-03-12 RX ORDER — DOCUSATE SODIUM 100 MG/1
CAPSULE, LIQUID FILLED ORAL
Qty: 30 CAPSULE | Refills: 3 | Status: SHIPPED | OUTPATIENT
Start: 2020-03-12 | End: 2020-07-16

## 2020-03-24 ENCOUNTER — TELEPHONE (OUTPATIENT)
Dept: MAMMOGRAPHY | Facility: OTHER | Age: 40
End: 2020-03-24

## 2020-03-24 NOTE — TELEPHONE ENCOUNTER
Spoke with Cristiana and cancelled mammogram on 04/03/2020 @ 1000. We will call and reschedule when we get the ok to do so.

## 2020-04-06 ENCOUNTER — TELEPHONE (OUTPATIENT)
Dept: FAMILY MEDICINE | Facility: OTHER | Age: 40
End: 2020-04-06

## 2020-04-06 DIAGNOSIS — K21.9 GASTROESOPHAGEAL REFLUX DISEASE WITHOUT ESOPHAGITIS: Primary | ICD-10-CM

## 2020-04-06 NOTE — TELEPHONE ENCOUNTER
Pharmacy requesting alternative medication since ranitidine is no longer being made.  Pharmacist suggests famotidine or alternate medication that you recommend.    Last office visit was 1/31/20.     Pharmacy is [facility].  Thank you.     Ciera Quintero RN

## 2020-04-07 RX ORDER — FAMOTIDINE 20 MG/1
20 TABLET, FILM COATED ORAL 2 TIMES DAILY
Qty: 180 TABLET | Refills: 3 | Status: SHIPPED | OUTPATIENT
Start: 2020-04-07 | End: 2021-02-22

## 2020-04-29 ENCOUNTER — OFFICE VISIT (OUTPATIENT)
Dept: OBGYN | Facility: OTHER | Age: 40
End: 2020-04-29
Attending: ADVANCED PRACTICE MIDWIFE
Payer: COMMERCIAL

## 2020-04-29 VITALS
RESPIRATION RATE: 16 BRPM | WEIGHT: 215 LBS | DIASTOLIC BLOOD PRESSURE: 72 MMHG | HEART RATE: 77 BPM | SYSTOLIC BLOOD PRESSURE: 120 MMHG | HEIGHT: 69 IN | BODY MASS INDEX: 31.84 KG/M2 | OXYGEN SATURATION: 98 %

## 2020-04-29 DIAGNOSIS — Z30.430 ENCOUNTER FOR INSERTION OF INTRAUTERINE CONTRACEPTIVE DEVICE (IUD): Primary | ICD-10-CM

## 2020-04-29 DIAGNOSIS — N92.0 MENORRHAGIA WITH REGULAR CYCLE: ICD-10-CM

## 2020-04-29 LAB
SPECIMEN SOURCE: NORMAL
WET PREP SPEC: NORMAL

## 2020-04-29 PROCEDURE — 87210 SMEAR WET MOUNT SALINE/INK: CPT | Mod: ZL | Performed by: ADVANCED PRACTICE MIDWIFE

## 2020-04-29 PROCEDURE — 87591 N.GONORRHOEAE DNA AMP PROB: CPT | Mod: ZL | Performed by: ADVANCED PRACTICE MIDWIFE

## 2020-04-29 PROCEDURE — G0463 HOSPITAL OUTPT CLINIC VISIT: HCPCS | Mod: 25

## 2020-04-29 PROCEDURE — 87491 CHLMYD TRACH DNA AMP PROBE: CPT | Mod: ZL | Performed by: ADVANCED PRACTICE MIDWIFE

## 2020-04-29 PROCEDURE — 58300 INSERT INTRAUTERINE DEVICE: CPT | Performed by: ADVANCED PRACTICE MIDWIFE

## 2020-04-29 ASSESSMENT — MIFFLIN-ST. JEOR: SCORE: 1704.61

## 2020-04-29 ASSESSMENT — PAIN SCALES - GENERAL: PAINLEVEL: NO PAIN (0)

## 2020-04-29 NOTE — PATIENT INSTRUCTIONS
Return to office in 3 weeks for follow up care and as needed.    Thank you for allowing Levi ZHOU CNM and our OB team to participate in your care.  If you have a scheduling or an appointment question please contact [facility] Unit Coordinator at their direct line [phone].   ALL nursing questions or concerns can be directed to your OB nurse at: [phone] Gina Azevedo/Gloria

## 2020-04-29 NOTE — NURSING NOTE
"Chief Complaint   Patient presents with     IUD       Initial /72   Pulse 77   Resp 16   Ht 1.753 m (5' 9\")   Wt 97.5 kg (215 lb)   SpO2 98%   BMI 31.75 kg/m   Estimated body mass index is 31.75 kg/m  as calculated from the following:    Height as of this encounter: 1.753 m (5' 9\").    Weight as of this encounter: 97.5 kg (215 lb).  Medication Reconciliation: complete  Billie Mcclain LPN    "

## 2020-04-29 NOTE — PROGRESS NOTES
"CC:  IUD insertion for bleeding control  HPI:  Cristiana Aguila is a 39 year old female No LMP recorded.  No other c/o.  She is here for an IUD insertion. Patient has verbalized understanding of risks and benefits and has signed the consent form.          Prior ectopic n  Prior CT years ago/treated    Allergies: Amoxicillin; Bee venom; Food; Latex; and Sulfa drugs    EXAM:  Blood pressure 120/72, pulse 77, resp. rate 16, height 1.753 m (5' 9\"), weight 97.5 kg (215 lb), SpO2 98 %, not currently breastfeeding.  General - pleasant female in no acute distress.  Pelvic - External Genitalia: normal adult female; [place]tholin,urethral and [place]: within normal limits,   Vagina: well rugated, no discharge,   Cervix: no Cervical Motion Tenderness, Nabothian cyst noted  Uterus: firm, normal sized and nontender, Adnexae: no masses or tenderness.    PROCEDURE:  After informed consent was obtained from the patient, a speculum was placed in the vagina to visualized the cervix  Tenaculum was placed at the 12 o'clock.  The Mirena IUD was then placed in the usual fashion.  Strings were clipped about 2-3 cm from the cervical os.  Tenaculum was removed and cervix was hemostatic. There were no complications. The patient tolerated the procedure well.    2/10 went to 0/10 immediately after.    ASSESSMENT:  Hormonal management for menorrhagia  Hx of BTO  Request Mirena IUD    PLAN:  Wet prep, GC/CT  Mirena IUD insertion     The patient should feel for the IUD strings after her next menses.  If unable to locate them, she should return to clinic for a speculum examination for confirmation that the IUD is in place. Bleeding pattern of this particular IUD was discussed with the patient. She is aware that the IUD will need to be removed in 5 years or PRN.  She is to return in 3 wks  to clinic and for her next annual or PRN.      .  WINNIE Alvarez, MITRA      "

## 2020-04-30 LAB
C TRACH DNA SPEC QL NAA+PROBE: NOT DETECTED
N GONORRHOEA DNA SPEC QL NAA+PROBE: NOT DETECTED
SPECIMEN SOURCE: NORMAL

## 2020-05-04 DIAGNOSIS — F90.0 ATTENTION DEFICIT HYPERACTIVITY DISORDER (ADHD), PREDOMINANTLY INATTENTIVE TYPE: ICD-10-CM

## 2020-05-05 RX ORDER — METHYLPHENIDATE HYDROCHLORIDE 54 MG/1
TABLET ORAL
Qty: 30 TABLET | Refills: 0 | Status: SHIPPED | OUTPATIENT
Start: 2020-05-05 | End: 2020-06-05

## 2020-05-05 NOTE — TELEPHONE ENCOUNTER
methylphenidate      Last Written Prescription Date:  4/2/20  Last Fill Quantity: 30,   # refills: 0  Last Office Visit: 1/31/20  Future Office visit:    Next 5 appointments (look out 90 days)    May 20, 2020 10:00 AM CDT  (Arrive by 9:45 AM)  SHORT with WINNIE Hunter CNM  [facility] [place] ([facility] ) [address] [place] [address]  [phone]   Jul 29, 2020  9:45 AM CDT  (Arrive by 9:30 AM)  SHORT with Sudhakar Cuellar MD  [facility] ([facility] ) [address]  [place] [address]  [phone]           Routing refill request to provider for review/approval because:  Drug not on the [facility], P or [facility] refill protocol or controlled substance

## 2020-05-20 ENCOUNTER — OFFICE VISIT (OUTPATIENT)
Dept: OBGYN | Facility: OTHER | Age: 40
End: 2020-05-20
Attending: ADVANCED PRACTICE MIDWIFE
Payer: COMMERCIAL

## 2020-05-20 VITALS
WEIGHT: 215 LBS | SYSTOLIC BLOOD PRESSURE: 116 MMHG | BODY MASS INDEX: 31.84 KG/M2 | DIASTOLIC BLOOD PRESSURE: 72 MMHG | HEIGHT: 69 IN

## 2020-05-20 DIAGNOSIS — Z30.431 ENCOUNTER FOR ROUTINE CHECKING OF INTRAUTERINE CONTRACEPTIVE DEVICE (IUD): Primary | ICD-10-CM

## 2020-05-20 PROCEDURE — G0463 HOSPITAL OUTPT CLINIC VISIT: HCPCS | Mod: 25

## 2020-05-20 PROCEDURE — G0463 HOSPITAL OUTPT CLINIC VISIT: HCPCS

## 2020-05-20 PROCEDURE — 99213 OFFICE O/P EST LOW 20 MIN: CPT | Performed by: ADVANCED PRACTICE MIDWIFE

## 2020-05-20 ASSESSMENT — MIFFLIN-ST. JEOR: SCORE: 1699.61

## 2020-05-20 ASSESSMENT — PAIN SCALES - GENERAL: PAINLEVEL: NO PAIN (0)

## 2020-05-20 NOTE — PROGRESS NOTES
"Cristiana Aguila is a 40 year old female  here today for contraception management with routine IUD check @ 3 weeks post insertion.    Pt denies pain or abnormal bleeding.  Occasional light bleeding  Denies pain with sexual intercourse .        O:   /72 (BP Location: Left arm, Patient Position: Chair, Cuff Size: Adult Regular)   Ht 1.753 m (5' 9\")   Wt 97.5 kg (215 lb)   BMI 31.75 kg/m     Pleasant without acute distress .  Pelvic:  Vagina and vulva are normal;  no discharge is noted.    Cervix: normal without lesions.    Uterus:  mobile,  normal in size and shape without tenderness.  Adnexa: without masses or tenderness.      A:    Contraception management  IUD strings visualized    P:    Pelvic exam  Routine IUD check    Total visit greater than 15 minutes with 10 minutes spent face to face counseling this patient on IUD risks, benefits, side effects, effectiveness, abnormal bleeding or pain.    Levi Mcrae, WINNIE, CNM  "

## 2020-05-20 NOTE — NURSING NOTE
"Chief Complaint   Patient presents with     IUD     IUD check        Initial /72 (BP Location: Left arm, Patient Position: Chair, Cuff Size: Adult Regular)   Ht 1.753 m (5' 9\")   Wt 97.5 kg (215 lb)   BMI 31.75 kg/m   Estimated body mass index is 31.75 kg/m  as calculated from the following:    Height as of this encounter: 1.753 m (5' 9\").    Weight as of this encounter: 97.5 kg (215 lb).  Medication Reconciliation: complete  Jolly Serrano LPN    "

## 2020-06-04 DIAGNOSIS — F90.0 ATTENTION DEFICIT HYPERACTIVITY DISORDER (ADHD), PREDOMINANTLY INATTENTIVE TYPE: ICD-10-CM

## 2020-06-04 NOTE — TELEPHONE ENCOUNTER
methylphenidate (CONCERTA) 54 MG CR tablet      Last Written Prescription Date:  5/5/20  Last Fill Quantity: 30,   # refills: 0  Last Office Visit: 1/31/20  Future Office visit:    Next 5 appointments (look out 90 days)    Jul 29, 2020  9:45 AM CDT  (Arrive by 9:30 AM)  SHORT with Sudhakar Cuellar MD  [facility] ([facility] ) [address]  [place] [address]  [phone]           Routing refill request to provider for review/approval because:  Drug not on the [facility], P or [facility] refill protocol or controlled substance

## 2020-06-05 RX ORDER — METHYLPHENIDATE HYDROCHLORIDE 54 MG/1
TABLET ORAL
Qty: 30 TABLET | Refills: 0 | Status: SHIPPED | OUTPATIENT
Start: 2020-06-05 | End: 2020-07-16

## 2020-06-08 DIAGNOSIS — J45.20 MILD INTERMITTENT REACTIVE AIRWAY DISEASE WITH WHEEZING WITHOUT COMPLICATION: ICD-10-CM

## 2020-06-08 NOTE — TELEPHONE ENCOUNTER
Toni      Last Written Prescription Date:  08/05/19  Last Fill Quantity: 1 inhaler,   # refills: 3  Last Office Visit: 01/31/20  Future Office visit:    Next 5 appointments (look out 90 days)    Jul 29, 2020  9:45 AM CDT  (Arrive by 9:30 AM)  SHORT with Sudhakar Cuellar MD  [facility] [place] ([facility] ) [address]  [place] [address]  [phone]           Routing refill request to provider for review/approval because:

## 2020-06-09 RX ORDER — ALBUTEROL SULFATE 90 UG/1
AEROSOL, METERED RESPIRATORY (INHALATION)
Qty: 8.5 G | Refills: 1 | Status: SHIPPED | OUTPATIENT
Start: 2020-06-09 | End: 2021-01-18

## 2020-06-09 NOTE — TELEPHONE ENCOUNTER
PROAIR  (90 Base) MCG/ACT inhaler     Asthma Maintenance Inhalers - Anticholinergics Failed    Asthma control assessment score within normal limits in last 6 months    ACT Total Scores 1/31/2020   ACT TOTAL SCORE (Goal Greater than or Equal to 20) 19   In the past 12 months, how many times did you visit the emergency room for your asthma without being admitted to the hospital? 0   In the past 12 months, how many times were you hospitalized overnight because of your asthma? 0

## 2020-06-19 ENCOUNTER — ANCILLARY PROCEDURE (OUTPATIENT)
Dept: MAMMOGRAPHY | Facility: OTHER | Age: 40
End: 2020-06-19
Attending: ADVANCED PRACTICE MIDWIFE
Payer: COMMERCIAL

## 2020-06-19 DIAGNOSIS — Z12.31 ENCOUNTER FOR SCREENING MAMMOGRAM FOR BREAST CANCER: ICD-10-CM

## 2020-06-19 PROCEDURE — 77067 SCR MAMMO BI INCL CAD: CPT | Mod: TC

## 2020-07-16 DIAGNOSIS — F90.0 ATTENTION DEFICIT HYPERACTIVITY DISORDER (ADHD), PREDOMINANTLY INATTENTIVE TYPE: ICD-10-CM

## 2020-07-16 DIAGNOSIS — R10.2 PELVIC PAIN IN FEMALE: ICD-10-CM

## 2020-07-16 RX ORDER — DOCUSATE SODIUM 100 MG/1
CAPSULE, LIQUID FILLED ORAL
Qty: 30 CAPSULE | Refills: 0 | Status: SHIPPED | OUTPATIENT
Start: 2020-07-16 | End: 2021-01-18

## 2020-07-16 RX ORDER — METHYLPHENIDATE HYDROCHLORIDE 54 MG/1
TABLET ORAL
Qty: 30 TABLET | Refills: 0 | Status: SHIPPED | OUTPATIENT
Start: 2020-07-16 | End: 2020-07-29

## 2020-07-16 NOTE — TELEPHONE ENCOUNTER
METHYLPHENIDATE ER 54 MG TAB       Last Written Prescription Date:  6/5/2020  Last Fill Quantity: 30,   # refills: 0  Last Office Visit: 1/31/20  Future Office visit:       Routing refill request to provider for review/approval because:  Drug not on the [facility], P or [facility] refill protocol or controlled substance

## 2020-07-23 NOTE — PROGRESS NOTES
"Subjective     Cristiana Aguila is a 40 year old female who presents to clinic today for the following health issues:    HPI       Medication Followup of ***    Taking Medication as prescribed: {.:220043::\"yes\"}    Side Effects:  {NONEORCHOOSE:051217::\"None\"}    Medication Helping Symptoms:  {.:848426::\"yes\"}     {additonal problems for provider to add (Optional):221458}    {HIST REVIEW/ LINKS 2 (Optional):175227}    Reviewed and updated as needed this visit by Provider         Review of Systems   {ROS COMP (Optional):136785}      Objective    There were no vitals taken for this visit.  There is no height or weight on file to calculate BMI.  Physical Exam   {Exam List (Optional):373653}    {Diagnostic Test Results (Optional):541494::\"Diagnostic Test Results:\",\"Labs reviewed in Epic\"}        {PROVIDER CHARTING PREFERENCE:238972}  "

## 2020-07-23 NOTE — PROGRESS NOTES
"Cristiana Aguila is a 40 year old female who is being evaluated via a billable telephone visit.      The patient has been notified of following:     \"This telephone visit will be conducted via a call between you and your physician/provider. We have found that certain health care needs can be provided without the need for a physical exam.  This service lets us provide the care you need with a short phone conversation.  If a prescription is necessary we can send it directly to your pharmacy.  If lab work is needed we can place an order for that and you can then stop by our lab to have the test done at a later time.    Telephone visits are billed at different rates depending on your insurance coverage. During this emergency period, for some insurers they may be billed the same as an in-person visit.  Please reach out to your insurance provider with any questions.    If during the course of the call the physician/provider feels a telephone visit is not appropriate, you will not be charged for this service.\"    Patient has given verbal consent for Telephone visit?  Yes    What phone number would you like to be contacted at? [phone]    How would you like to obtain your AVS? [facility]    Subjective     Cristiana Aguila is a 40 year old female who presents via phone visit today for the following health issues:    HPI    Medication Followup of concerta    Taking Medication as prescribed: yes    Side Effects:  None    Medication Helping Symptoms:  Yes    Overall doing well    No concerns    Works well.        Dealing with ill father- had large CVA  Depression and anxiety still stable overall.               Current Outpatient Medications   Medication Sig Dispense Refill     Acetaminophen (TYLENOL PO) Take by mouth every 6 hours as needed        Ascorbic Acid (VITAMIN C PO) Take 1,000 mg by mouth 2 times daily       citalopram (CELEXA) 40 MG tablet TAKE 1 TABLET BY MOUTH DAILY 30 tablet 9     Cyanocobalamin (VITAMIN B 12 PO) " Take 500 mcg by mouth daily       docusate sodium (COLACE) 100 MG capsule TAKE 1 CAPSULE BY MOUTH DAILY 30 capsule 0     EPINEPHrine (EPIPEN/ADRENACLICK/OR ANY BX GENERIC EQUIV) 0.3 MG/0.3ML injection 2-pack Inject 0.3 mLs (0.3 mg) into the muscle as needed for anaphylaxis 2 each 3     famotidine (PEPCID) 20 MG tablet Take 1 tablet (20 mg) by mouth 2 times daily 180 tablet 3     fluticasone (FLONASE) 50 MCG/ACT nasal spray Spray 1 spray into both nostrils daily       hydrOXYzine (ATARAX) 25 MG tablet 1-2 tablets 3 times per day as needed 60 tablet 3     [START ON 10/13/2020] methylphenidate (CONCERTA) 54 MG CR tablet Take 1 tablet (54 mg) by mouth daily 30 tablet 0     [START ON 9/14/2020] methylphenidate (CONCERTA) 54 MG CR tablet Take 1 tablet (54 mg) by mouth daily 30 tablet 0     [START ON 8/14/2020] methylphenidate (CONCERTA) 54 MG CR tablet Take 1 tablet (54 mg) by mouth every morning 30 tablet 0     PROAIR  (90 Base) MCG/ACT inhaler INHALE 2 PUFFS INTO LUNGS EVERY 4 HOURS AS NEEDED FOR SHORTNESS OF BREATH 8.5 g 1     ranitidine (ZANTAC) 150 MG tablet TAKE 1 TABLET BY MOUTH 2 TIMES DAILY 180 tablet 3     traZODone (DESYREL) 100 MG tablet TAKE 2 TABLETS (200MG) BY MOUTH AT BEDTIME 60 tablet 0     Allergies   Allergen Reactions     Amoxicillin Diarrhea     Bee Venom      Bee venom (honey bee)       Food      Artificial cinnamon      Latex      Sulfa Drugs      Sulfa (sulfonamide antibiotics)          Reviewed and updated as needed this visit by Provider         Review of Systems   CONSTITUTIONAL: NEGATIVE for fever, chills, change in weight  CV: NEGATIVE for chest pain, palpitations or peripheral edema       Objective   Reported vitals:  There were no vitals taken for this visit.   healthy, alert and no distress  PSYCH: Alert and oriented times 3; coherent speech, normal   rate and volume, able to articulate logical thoughts, able   to abstract reason, no tangential thoughts, no hallucinations   or  delusions  Her affect is normal  RESP: No cough, no audible wheezing, able to talk in full sentences  Remainder of exam unable to be completed due to telephone visits            Assessment/Plan:      ICD-10-CM    1. Recurrent major depressive disorder, in partial remission (H)  F33.41    2. Attention deficit hyperactivity disorder (ADHD), predominantly inattentive type  F90.0 methylphenidate (CONCERTA) 54 MG CR tablet     methylphenidate (CONCERTA) 54 MG CR tablet     methylphenidate (CONCERTA) 54 MG CR tablet   3. Generalized anxiety disorder  F41.1    overall doing well. Well see in clinic in 6 months and labs needed including UDS. Pt is reliable and no s/s of diversion or abuse in present or past. Continue current medications and behavioral changes.       No follow-ups on file.      Phone call duration:  5 minutes    Sudhakar Cuellar MD

## 2020-07-29 ENCOUNTER — VIRTUAL VISIT (OUTPATIENT)
Dept: FAMILY MEDICINE | Facility: OTHER | Age: 40
End: 2020-07-29
Attending: FAMILY MEDICINE
Payer: COMMERCIAL

## 2020-07-29 DIAGNOSIS — F33.41 RECURRENT MAJOR DEPRESSIVE DISORDER, IN PARTIAL REMISSION (H): Primary | ICD-10-CM

## 2020-07-29 DIAGNOSIS — F41.1 GENERALIZED ANXIETY DISORDER: ICD-10-CM

## 2020-07-29 DIAGNOSIS — F90.0 ATTENTION DEFICIT HYPERACTIVITY DISORDER (ADHD), PREDOMINANTLY INATTENTIVE TYPE: ICD-10-CM

## 2020-07-29 PROCEDURE — 99212 OFFICE O/P EST SF 10 MIN: CPT | Mod: 95 | Performed by: FAMILY MEDICINE

## 2020-07-29 RX ORDER — METHYLPHENIDATE HYDROCHLORIDE 54 MG/1
54 TABLET ORAL DAILY
Qty: 30 TABLET | Refills: 0 | Status: SHIPPED | OUTPATIENT
Start: 2020-10-13 | End: 2020-11-18

## 2020-07-29 RX ORDER — METHYLPHENIDATE HYDROCHLORIDE 54 MG/1
54 TABLET ORAL EVERY MORNING
Qty: 30 TABLET | Refills: 0 | Status: SHIPPED | OUTPATIENT
Start: 2020-08-14 | End: 2020-12-21

## 2020-07-29 RX ORDER — METHYLPHENIDATE HYDROCHLORIDE 54 MG/1
54 TABLET ORAL DAILY
Qty: 30 TABLET | Refills: 0 | Status: SHIPPED | OUTPATIENT
Start: 2020-09-14 | End: 2020-12-21

## 2020-07-29 ASSESSMENT — ANXIETY QUESTIONNAIRES
1. FEELING NERVOUS, ANXIOUS, OR ON EDGE: MORE THAN HALF THE DAYS
5. BEING SO RESTLESS THAT IT IS HARD TO SIT STILL: SEVERAL DAYS
2. NOT BEING ABLE TO STOP OR CONTROL WORRYING: SEVERAL DAYS
4. TROUBLE RELAXING: NEARLY EVERY DAY
3. WORRYING TOO MUCH ABOUT DIFFERENT THINGS: NEARLY EVERY DAY
6. BECOMING EASILY ANNOYED OR IRRITABLE: NEARLY EVERY DAY
7. FEELING AFRAID AS IF SOMETHING AWFUL MIGHT HAPPEN: SEVERAL DAYS
GAD7 TOTAL SCORE: 14
IF YOU CHECKED OFF ANY PROBLEMS ON THIS QUESTIONNAIRE, HOW DIFFICULT HAVE THESE PROBLEMS MADE IT FOR YOU TO DO YOUR WORK, TAKE CARE OF THINGS AT HOME, OR GET ALONG WITH OTHER PEOPLE: VERY DIFFICULT

## 2020-07-29 ASSESSMENT — PATIENT HEALTH QUESTIONNAIRE - PHQ9: SUM OF ALL RESPONSES TO PHQ QUESTIONS 1-9: 13

## 2020-07-29 ASSESSMENT — PAIN SCALES - GENERAL: PAINLEVEL: NO PAIN (0)

## 2020-07-29 NOTE — NURSING NOTE
"Chief Complaint   Patient presents with     A.D.H.D       Initial There were no vitals taken for this visit. Estimated body mass index is 31.75 kg/m  as calculated from the following:    Height as of 5/20/20: 1.753 m (5' 9\").    Weight as of 5/20/20: 97.5 kg (215 lb).  Medication Reconciliation: complete  Grabiel Cantor LPN  "

## 2020-07-30 ASSESSMENT — ANXIETY QUESTIONNAIRES: GAD7 TOTAL SCORE: 14

## 2020-08-17 DIAGNOSIS — F51.04 PSYCHOPHYSIOLOGICAL INSOMNIA: ICD-10-CM

## 2020-08-17 DIAGNOSIS — K21.9 GASTROESOPHAGEAL REFLUX DISEASE WITHOUT ESOPHAGITIS: ICD-10-CM

## 2020-08-17 RX ORDER — TRAZODONE HYDROCHLORIDE 100 MG/1
TABLET ORAL
Qty: 60 TABLET | Refills: 4 | Status: SHIPPED | OUTPATIENT
Start: 2020-08-17 | End: 2021-01-18

## 2020-08-17 RX ORDER — OMEPRAZOLE 40 MG/1
CAPSULE, DELAYED RELEASE ORAL
Qty: 90 CAPSULE | Refills: 3 | Status: SHIPPED | OUTPATIENT
Start: 2020-08-17 | End: 2021-08-13

## 2020-08-17 NOTE — TELEPHONE ENCOUNTER
trazadone      Last Written Prescription Date:  7/16/20  Last Fill Quantity: 60,   # refills: 0  Last Office Visit: 7/29/20  Future Office visit:       Routing refill request to provider for review/approval because:    omeprazole      Last Written Prescription Date:    Last Fill Quantity: ,   # refills:   Last Office Visit: 7/29/20  Future Office visit:       Routing refill request to provider for review/approval because:

## 2020-09-17 DIAGNOSIS — F41.1 GAD (GENERALIZED ANXIETY DISORDER): ICD-10-CM

## 2020-09-17 DIAGNOSIS — F33.42 MAJOR DEPRESSIVE DISORDER, RECURRENT EPISODE, IN FULL REMISSION (H): ICD-10-CM

## 2020-09-17 DIAGNOSIS — F41.1 GENERALIZED ANXIETY DISORDER: ICD-10-CM

## 2020-09-17 NOTE — TELEPHONE ENCOUNTER
Celexa       Last Written Prescription Date:  10/30/2019  Last Fill Quantity: 30,   # refills: 9    Atarax       Last Written Prescription Date:  4/9/2019  Last Fill Quantity: 60,   # refills: 3  Last Office Visit: 7/29/2020  Future Office visit:

## 2020-09-18 RX ORDER — CITALOPRAM HYDROBROMIDE 40 MG/1
TABLET ORAL
Qty: 90 TABLET | Refills: 3 | Status: SHIPPED | OUTPATIENT
Start: 2020-09-18 | End: 2021-09-10

## 2020-09-18 RX ORDER — HYDROXYZINE HYDROCHLORIDE 25 MG/1
TABLET, FILM COATED ORAL
Qty: 60 TABLET | Refills: 3 | Status: SHIPPED | OUTPATIENT
Start: 2020-09-18 | End: 2022-01-25

## 2020-11-16 DIAGNOSIS — F90.0 ATTENTION DEFICIT HYPERACTIVITY DISORDER (ADHD), PREDOMINANTLY INATTENTIVE TYPE: ICD-10-CM

## 2020-11-18 ENCOUNTER — NURSE TRIAGE (OUTPATIENT)
Dept: FAMILY MEDICINE | Facility: OTHER | Age: 40
End: 2020-11-18

## 2020-11-18 DIAGNOSIS — Z20.822 SUSPECTED COVID-19 VIRUS INFECTION: Primary | ICD-10-CM

## 2020-11-18 RX ORDER — METHYLPHENIDATE HYDROCHLORIDE 54 MG/1
TABLET ORAL
Qty: 30 TABLET | Refills: 0 | Status: SHIPPED | OUTPATIENT
Start: 2020-11-18 | End: 2020-12-21

## 2020-11-18 NOTE — TELEPHONE ENCOUNTER
"    Reason for Disposition    [1] Symptoms of COVID-19 (e.g., cough, fever, SOB, or others) AND [2] within 14 days of EXPOSURE (close contact) with diagnosed or suspected COVID-19 patient    [1] COVID-19 infection suspected by caller or triager AND [2] mild symptoms (cough, fever, or others) AND [3] no complications or SOB    Answer Assessment - Initial Assessment Questions  1. CLOSE CONTACT: \"Who is the person with the confirmed or suspected COVID-19 infection that you were exposed to?\"      family  2. PLACE of CONTACT: \"Where were you when you were exposed to COVID-19?\" (e.g., home, school, medical waiting room; which city?)      home  3. TYPE of CONTACT: \"How much contact was there?\" (e.g., sitting next to, live in same house, work in same office, same building)      Sitting with  4. DURATION of CONTACT: \"How long were you in contact with the COVID-19 patient?\" (e.g., a few seconds, passed by person, a few minutes, live with the patient)      hours  5. DATE of CONTACT: \"When did you have contact with a COVID-19 patient?\" (e.g., how many days ago)      11/7  6. TRAVEL: \"Have you traveled out of the country recently?\" If so, \"When and where?\"      * Also ask about out-of-state travel, since the [facility] has identified some high-risk cities for community spread in the .      * Note: Travel becomes less relevant if there is widespread community transmission where the patient lives.      no  7. COMMUNITY SPREAD: \"Are there lots of cases of COVID-19 (community spread) where you live?\" (See public health department website, if unsure)        yes  8. SYMPTOMS: \"Do you have any symptoms?\" (e.g., fever, cough, breathing difficulty)      Sob cough sore throat chills fatigued runny nose and congestion  9. PREGNANCY OR POSTPARTUM: \"Is there any chance you are pregnant?\" \"When was your last menstrual period?\" \"Did you deliver in the last 2 weeks?\"      no  10. HIGH RISK: \"Do you have any heart or lung problems? Do you have a weak " "immune system?\" (e.g., CHF, COPD, asthma, HIV positive, chemotherapy, renal failure, diabetes mellitus, sickle cell anemia)        asthma    Protocols used: CORONAVIRUS (COVID-19) EXPOSURE-A-AH 8.4.20, CORONAVIRUS (COVID-19) DIAGNOSED OR [id number] 8.4.20      "

## 2020-11-18 NOTE — TELEPHONE ENCOUNTER
methylphenidate      Last Written Prescription Date:  10/13/20  Last Fill Quantity: 30,   # refills: 0  Last Office Visit: 7/29/20  Future Office visit:       Routing refill request to provider for review/approval because:  Drug not on the G, P or [facility] refill protocol or controlled substance

## 2020-11-19 ENCOUNTER — OFFICE VISIT (OUTPATIENT)
Dept: FAMILY MEDICINE | Facility: OTHER | Age: 40
End: 2020-11-19
Attending: FAMILY MEDICINE
Payer: COMMERCIAL

## 2020-11-19 DIAGNOSIS — Z20.822 SUSPECTED COVID-19 VIRUS INFECTION: ICD-10-CM

## 2020-11-19 PROCEDURE — U0003 INFECTIOUS AGENT DETECTION BY NUCLEIC ACID (DNA OR RNA); SEVERE ACUTE RESPIRATORY SYNDROME CORONAVIRUS 2 (SARS-COV-2) (CORONAVIRUS DISEASE [COVID-19]), AMPLIFIED PROBE TECHNIQUE, MAKING USE OF HIGH THROUGHPUT TECHNOLOGIES AS DESCRIBED BY CMS-2020-01-R: HCPCS | Mod: ZL | Performed by: FAMILY MEDICINE

## 2020-11-21 LAB
SARS-COV-2 RNA SPEC QL NAA+PROBE: NOT DETECTED
SPECIMEN SOURCE: NORMAL

## 2020-12-14 NOTE — PROGRESS NOTES
Subjective     Cristiana Aguila is a 40 year old female who presents to clinic today for the following health issues:    HPI         Depression and Anxiety Follow-Up    How are you doing with your depression since your last visit? Worsened dad passed in August    How are you doing with your anxiety since your last visit?  Worsened dad passed in August    Are you having other symptoms that might be associated with depression or anxiety? Yes:  insominia    Have you had a significant life event? Grief or Loss     Do you have any concerns with your use of alcohol or other drugs? No     Father had a CVA but went into coma -- pt made decision to let him go    Pt has lots of quilt since was not involved in his life due to his heavy drinking     Hard time sleeping - falling asleep then does well after falling a sleep    On Trazodone 200mg        Social History     Tobacco Use     Smoking status: Current Every Day Smoker     Packs/day: 0.50     Years: 13.00     Pack years: 6.50     Types: Cigarettes     Last attempt to quit: 2020     Years since quittin.9     Smokeless tobacco: Never Used   Substance Use Topics     Alcohol use: Yes     Alcohol/week: 0.0 standard drinks     Comment: frequency: rarely     Drug use: No     PHQ 3/4/2020 2020 2020   PHQ-9 Total Score 12 13 21   Q9: Thoughts of better off dead/self-harm past 2 weeks Not at all Not at all Not at all     DAFNE-7 SCORE 3/4/2020 2020 2020   Total Score 11 14 17     Last PHQ-9 2020   1.  Little interest or pleasure in doing things 3   2.  Feeling down, depressed, or hopeless 3   3.  Trouble falling or staying asleep, or sleeping too much 3   4.  Feeling tired or having little energy 3   5.  Poor appetite or overeating 2   6.  Feeling bad about yourself 2   7.  Trouble concentrating 3   8.  Moving slowly or restless 2   Q9: Thoughts of better off dead/self-harm past 2 weeks 0   PHQ-9 Total Score 21   Difficulty at work, home, or with  "people Very difficult     DAFNE-7  12/21/2020   1. Feeling nervous, anxious, or on edge 3   2. Not being able to stop or control worrying 2   3. Worrying too much about different things 2   4. Trouble relaxing 3   5. Being so restless that it is hard to sit still 2   6. Becoming easily annoyed or irritable 3   7. Feeling afraid, as if something awful might happen 2   DAFNE-7 Total Score 17   If you checked any problems, how difficult have they made it for you to do your work, take care of things at home, or get along with other people? Very difficult       Suicide Assessment Five-step Evaluation and Treatment (SAFE-T)    Asthma Follow-Up    Was ACT completed today?    Yes    ACT Total Scores 12/21/2020   ACT TOTAL SCORE (Goal Greater than or Equal to 20) 17   In the past 12 months, how many times did you visit the emergency room for your asthma without being admitted to the hospital? 0   In the past 12 months, how many times were you hospitalized overnight because of your asthma? 0       How many days per week do you miss taking your asthma controller medication?  I do not have an asthma controller medication    Please describe any recent triggers for your asthma: smoke, upper respiratory infections, emotions and cold air    Have you had any Emergency Room Visits, Urgent Care Visits, or Hospital Admissions since your last office visit?  No        Medication Followup of concerta    Taking Medication as prescribed: yes    Side Effects:  None    Medication Helping Symptoms:  Yes    Pt has been out since Friday on Concerta          Review of Systems   Constitutional, HEENT, cardiovascular, pulmonary, gi and gu systems are negative, except as otherwise noted.      Objective    /78   Pulse 88   Temp 98.6  F (37  C)   Ht 1.753 m (5' 9\")   Wt 99.2 kg (218 lb 12.8 oz)   SpO2 97%   BMI 32.31 kg/m    There is no height or weight on file to calculate BMI.  Physical Exam   GENERAL: healthy, alert and no distress  NECK: " no adenopathy, no asymmetry, masses, or scars and thyroid normal to palpation  RESP: lungs clear to auscultation - no rales, rhonchi or wheezes  CV: regular rate and rhythm, normal S1 S2, no S3 or S4, no murmur, click or rub, no peripheral edema and peripheral pulses strong  ABDOMEN: soft, nontender, no hepatosplenomegaly, no masses and bowel sounds normal  MS: no gross musculoskeletal defects noted, no edema  PSYCH: mentation appears normal, affect normal/bright    Results for orders placed or performed in visit on 12/21/20 (from the past 24 hour(s))   CBC with platelets differential   Result Value Ref [place]    WBC 9.5 4.0 - 11.0 10e9/L    RBC Count 4.88 3.8 - 5.2 10e12/L    Hemoglobin 14.7 11.7 - 15.7 g/dL    Hematocrit 42.2 35.0 - 47.0 %    MCV 87 78 - 100 fl    MCH 30.1 26.5 - 33.0 pg    MCHC 34.8 31.5 - 36.5 g/dL    RDW 12.5 10.0 - 15.0 %    Platelet Count 291 150 - 450 10e9/L    Diff Method Automated Method     % Neutrophils 62.1 %    % Lymphocytes 27.0 %    % Monocytes 7.2 %    % Eosinophils 2.8 %    % Basophils 0.6 %    % Immature Granulocytes 0.3 %    Nucleated RBCs 0 0 /100    Absolute Neutrophil 5.9 1.6 - 8.3 10e9/L    Absolute Lymphocytes 2.6 0.8 - 5.3 10e9/L    Absolute Monocytes 0.7 0.0 - 1.3 10e9/L    Absolute Eosinophils 0.3 0.0 - 0.7 10e9/L    Absolute Basophils 0.1 0.0 - 0.2 10e9/L    Abs Immature Granulocytes 0.0 0 - 0.4 10e9/L    Absolute Nucleated RBC 0.0    Comprehensive metabolic panel   Result Value Ref [place]    Sodium 142 133 - 144 mmol/L    Potassium 3.8 3.4 - 5.3 mmol/L    Chloride 109 94 - 109 mmol/L    Carbon Dioxide 25 20 - 32 mmol/L    Anion Gap 8 3 - 14 mmol/L    Glucose 103 (H) 70 - 99 mg/dL    Urea Nitrogen 13 7 - 30 mg/dL    Creatinine 0.69 0.52 - 1.04 mg/dL    GFR Estimate >90 >60 mL/min/[1.73_m2]    GFR Estimate If Black >90 >60 mL/min/[1.73_m2]    Calcium 8.8 8.5 - 10.1 mg/dL    Bilirubin Total 0.2 0.2 - 1.3 mg/dL    Albumin 3.7 3.4 - 5.0 g/dL    Protein Total 7.3 6.8 - 8.8 g/dL     Alkaline Phosphatase 123 40 - 150 U/L    ALT 23 0 - 50 U/L    AST 12 0 - 45 U/L           Assessment & Plan     Attention deficit hyperactivity disorder (ADHD), predominantly inattentive type  Stable - RF done. UDS done - MAY COME UP NEAGATIVE --ran out of meds on time and did not refill due todays visit . Pt feels the difference off meds.  Has been stable on current dose for years if not decade   - Comprehensive metabolic panel; Future  - CBC with platelets differential; Future  - Pain Drug Scr UR W Rptd Meds; Future  - Pain Drug Scr UR W Rptd Meds  - CBC with platelets differential  - Comprehensive metabolic panel  - methylphenidate (CONCERTA) 54 MG CR tablet; Take 1 tablet (54 mg) by mouth daily  - methylphenidate (CONCERTA) 54 MG CR tablet; Take 1 tablet (54 mg) by mouth every morning  - methylphenidate (CONCERTA) 54 MG CR tablet; Take 1 tablet (54 mg) by mouth daily    Tobacco abuse counseling  Discussed. Pt wants to try patch again  - nicotine (NICODERM CQ) 21 MG/24HR 24 hr patch; Place 1 patch onto the skin every 24 hours  - nicotine (NICODERM CQ) 14 MG/24HR 24 hr patch; Place 1 patch onto the skin every 24 hours  - nicotine (NICODERM CQ) 7 MG/24HR 24 hr patch; Place 1 patch onto the skin every 24 hours    Recurrent major depressive disorder, in partial remission (H)  Little worse- long discussion. REcommended counseling- she declined. Discussed increase meds- will hold on that. Work on better sleep    Generalized anxiety disorder  As above.     Psychophysiological insomnia  Discussed behavioral changes and will try OTC melatonin     Mild intermittent reactive airway disease with wheezing without complication  Stable but gets worse with increase tobacco.       Tobacco Cessation:   reports that she has been smoking cigarettes. She has a 6.50 pack-year smoking history. She has never used smokeless tobacco.        BMI:   Estimated body mass index is 32.31 kg/m  as calculated from the following:    Height as  "of this encounter: 1.753 m (5' 9\").    Weight as of this encounter: 99.2 kg (218 lb 12.8 oz).             No follow-ups on file.    Sudhakar Cuellar MD  [facility] [place]    "

## 2020-12-20 ENCOUNTER — HEALTH MAINTENANCE LETTER (OUTPATIENT)
Age: 40
End: 2020-12-20

## 2020-12-21 ENCOUNTER — OFFICE VISIT (OUTPATIENT)
Dept: FAMILY MEDICINE | Facility: OTHER | Age: 40
End: 2020-12-21
Attending: FAMILY MEDICINE
Payer: COMMERCIAL

## 2020-12-21 VITALS
SYSTOLIC BLOOD PRESSURE: 130 MMHG | BODY MASS INDEX: 32.41 KG/M2 | OXYGEN SATURATION: 97 % | DIASTOLIC BLOOD PRESSURE: 78 MMHG | WEIGHT: 218.8 LBS | TEMPERATURE: 98.6 F | HEART RATE: 88 BPM | HEIGHT: 69 IN

## 2020-12-21 DIAGNOSIS — F33.41 RECURRENT MAJOR DEPRESSIVE DISORDER, IN PARTIAL REMISSION (H): ICD-10-CM

## 2020-12-21 DIAGNOSIS — F41.1 GENERALIZED ANXIETY DISORDER: ICD-10-CM

## 2020-12-21 DIAGNOSIS — F90.0 ATTENTION DEFICIT HYPERACTIVITY DISORDER (ADHD), PREDOMINANTLY INATTENTIVE TYPE: Primary | ICD-10-CM

## 2020-12-21 DIAGNOSIS — F51.04 PSYCHOPHYSIOLOGICAL INSOMNIA: ICD-10-CM

## 2020-12-21 DIAGNOSIS — J45.20 MILD INTERMITTENT REACTIVE AIRWAY DISEASE WITH WHEEZING WITHOUT COMPLICATION: ICD-10-CM

## 2020-12-21 DIAGNOSIS — Z71.6 TOBACCO ABUSE COUNSELING: ICD-10-CM

## 2020-12-21 LAB
ALBUMIN SERPL-MCNC: 3.7 G/DL (ref 3.4–5)
ALP SERPL-CCNC: 123 U/L (ref 40–150)
ALT SERPL W P-5'-P-CCNC: 23 U/L (ref 0–50)
ANION GAP SERPL CALCULATED.3IONS-SCNC: 8 MMOL/L (ref 3–14)
AST SERPL W P-5'-P-CCNC: 12 U/L (ref 0–45)
BASOPHILS # BLD AUTO: 0.1 10E9/L (ref 0–0.2)
BASOPHILS NFR BLD AUTO: 0.6 %
BILIRUB SERPL-MCNC: 0.2 MG/DL (ref 0.2–1.3)
BUN SERPL-MCNC: 13 MG/DL (ref 7–30)
CALCIUM SERPL-MCNC: 8.8 MG/DL (ref 8.5–10.1)
CHLORIDE SERPL-SCNC: 109 MMOL/L (ref 94–109)
CO2 SERPL-SCNC: 25 MMOL/L (ref 20–32)
CREAT SERPL-MCNC: 0.69 MG/DL (ref 0.52–1.04)
DIFFERENTIAL METHOD BLD: NORMAL
EOSINOPHIL # BLD AUTO: 0.3 10E9/L (ref 0–0.7)
EOSINOPHIL NFR BLD AUTO: 2.8 %
ERYTHROCYTE [DISTWIDTH] IN BLOOD BY AUTOMATED COUNT: 12.5 % (ref 10–15)
GFR SERPL CREATININE-BSD FRML MDRD: >90 ML/MIN/{1.73_M2}
GLUCOSE SERPL-MCNC: 103 MG/DL (ref 70–99)
HCT VFR BLD AUTO: 42.2 % (ref 35–47)
HGB BLD-MCNC: 14.7 G/DL (ref 11.7–15.7)
IMM GRANULOCYTES # BLD: 0 10E9/L (ref 0–0.4)
IMM GRANULOCYTES NFR BLD: 0.3 %
LYMPHOCYTES # BLD AUTO: 2.6 10E9/L (ref 0.8–5.3)
LYMPHOCYTES NFR BLD AUTO: 27 %
MCH RBC QN AUTO: 30.1 PG (ref 26.5–33)
MCHC RBC AUTO-ENTMCNC: 34.8 G/DL (ref 31.5–36.5)
MCV RBC AUTO: 87 FL (ref 78–100)
MONOCYTES # BLD AUTO: 0.7 10E9/L (ref 0–1.3)
MONOCYTES NFR BLD AUTO: 7.2 %
NEUTROPHILS # BLD AUTO: 5.9 10E9/L (ref 1.6–8.3)
NEUTROPHILS NFR BLD AUTO: 62.1 %
NRBC # BLD AUTO: 0 10*3/UL
NRBC BLD AUTO-RTO: 0 /100
PLATELET # BLD AUTO: 291 10E9/L (ref 150–450)
POTASSIUM SERPL-SCNC: 3.8 MMOL/L (ref 3.4–5.3)
PROT SERPL-MCNC: 7.3 G/DL (ref 6.8–8.8)
RBC # BLD AUTO: 4.88 10E12/L (ref 3.8–5.2)
SODIUM SERPL-SCNC: 142 MMOL/L (ref 133–144)
WBC # BLD AUTO: 9.5 10E9/L (ref 4–11)

## 2020-12-21 PROCEDURE — 85025 COMPLETE CBC W/AUTO DIFF WBC: CPT | Mod: ZL | Performed by: FAMILY MEDICINE

## 2020-12-21 PROCEDURE — 80307 DRUG TEST PRSMV CHEM ANLYZR: CPT | Mod: ZL | Performed by: FAMILY MEDICINE

## 2020-12-21 PROCEDURE — 99214 OFFICE O/P EST MOD 30 MIN: CPT | Performed by: FAMILY MEDICINE

## 2020-12-21 PROCEDURE — G0463 HOSPITAL OUTPT CLINIC VISIT: HCPCS

## 2020-12-21 PROCEDURE — 80053 COMPREHEN METABOLIC PANEL: CPT | Mod: ZL | Performed by: FAMILY MEDICINE

## 2020-12-21 PROCEDURE — 36415 COLL VENOUS BLD VENIPUNCTURE: CPT | Mod: ZL | Performed by: FAMILY MEDICINE

## 2020-12-21 RX ORDER — NICOTINE 21 MG/24HR
1 PATCH, TRANSDERMAL 24 HOURS TRANSDERMAL EVERY 24 HOURS
Qty: 30 PATCH | Refills: 1 | Status: SHIPPED | OUTPATIENT
Start: 2020-12-21 | End: 2021-07-16

## 2020-12-21 RX ORDER — METHYLPHENIDATE HYDROCHLORIDE 54 MG/1
54 TABLET ORAL DAILY
Qty: 30 TABLET | Refills: 0 | Status: SHIPPED | OUTPATIENT
Start: 2020-12-21 | End: 2021-06-25

## 2020-12-21 RX ORDER — METHYLPHENIDATE HYDROCHLORIDE 54 MG/1
54 TABLET ORAL EVERY MORNING
Qty: 30 TABLET | Refills: 0 | Status: SHIPPED | OUTPATIENT
Start: 2021-01-19 | End: 2021-06-25

## 2020-12-21 RX ORDER — METHYLPHENIDATE HYDROCHLORIDE 54 MG/1
54 TABLET ORAL DAILY
Qty: 30 TABLET | Refills: 0 | Status: SHIPPED | OUTPATIENT
Start: 2021-02-18 | End: 2021-03-23

## 2020-12-21 ASSESSMENT — ANXIETY QUESTIONNAIRES
IF YOU CHECKED OFF ANY PROBLEMS ON THIS QUESTIONNAIRE, HOW DIFFICULT HAVE THESE PROBLEMS MADE IT FOR YOU TO DO YOUR WORK, TAKE CARE OF THINGS AT HOME, OR GET ALONG WITH OTHER PEOPLE: VERY DIFFICULT
GAD7 TOTAL SCORE: 17
6. BECOMING EASILY ANNOYED OR IRRITABLE: NEARLY EVERY DAY
4. TROUBLE RELAXING: NEARLY EVERY DAY
7. FEELING AFRAID AS IF SOMETHING AWFUL MIGHT HAPPEN: MORE THAN HALF THE DAYS
1. FEELING NERVOUS, ANXIOUS, OR ON EDGE: NEARLY EVERY DAY
2. NOT BEING ABLE TO STOP OR CONTROL WORRYING: MORE THAN HALF THE DAYS
5. BEING SO RESTLESS THAT IT IS HARD TO SIT STILL: MORE THAN HALF THE DAYS
3. WORRYING TOO MUCH ABOUT DIFFERENT THINGS: MORE THAN HALF THE DAYS

## 2020-12-21 ASSESSMENT — ASTHMA QUESTIONNAIRES
QUESTION_2 LAST FOUR WEEKS HOW OFTEN HAVE YOU HAD SHORTNESS OF BREATH: THREE TO SIX TIMES A WEEK
QUESTION_3 LAST FOUR WEEKS HOW OFTEN DID YOUR ASTHMA SYMPTOMS (WHEEZING, COUGHING, SHORTNESS OF BREATH, CHEST TIGHTNESS OR PAIN) WAKE YOU UP AT NIGHT OR EARLIER THAN USUAL IN THE MORNING: NOT AT ALL
QUESTION_1 LAST FOUR WEEKS HOW MUCH OF THE TIME DID YOUR ASTHMA KEEP YOU FROM GETTING AS MUCH DONE AT WORK, SCHOOL OR AT HOME: SOME OF THE TIME
ACT_TOTALSCORE: 17
QUESTION_5 LAST FOUR WEEKS HOW WOULD YOU RATE YOUR ASTHMA CONTROL: WELL CONTROLLED
QUESTION_4 LAST FOUR WEEKS HOW OFTEN HAVE YOU USED YOUR RESCUE INHALER OR NEBULIZER MEDICATION (SUCH AS ALBUTEROL): ONE OR TWO TIMES PER DAY

## 2020-12-21 ASSESSMENT — PATIENT HEALTH QUESTIONNAIRE - PHQ9: SUM OF ALL RESPONSES TO PHQ QUESTIONS 1-9: 21

## 2020-12-21 ASSESSMENT — MIFFLIN-ST. JEOR: SCORE: 1716.85

## 2020-12-21 ASSESSMENT — PAIN SCALES - GENERAL: PAINLEVEL: NO PAIN (0)

## 2020-12-22 ASSESSMENT — ANXIETY QUESTIONNAIRES: GAD7 TOTAL SCORE: 17

## 2020-12-22 ASSESSMENT — ASTHMA QUESTIONNAIRES: ACT_TOTALSCORE: 17

## 2020-12-24 LAB — PAIN DRUG SCR UR W RPTD MEDS: NORMAL

## 2021-01-18 DIAGNOSIS — R10.2 PELVIC PAIN IN FEMALE: ICD-10-CM

## 2021-01-18 RX ORDER — DOCUSATE SODIUM 100 MG/1
CAPSULE, LIQUID FILLED ORAL
Qty: 30 CAPSULE | Refills: 0 | Status: SHIPPED | OUTPATIENT
Start: 2021-01-18 | End: 2021-06-23 | Stop reason: DRUGHIGH

## 2021-02-19 ENCOUNTER — APPOINTMENT (OUTPATIENT)
Dept: ULTRASOUND IMAGING | Facility: HOSPITAL | Age: 41
End: 2021-02-19
Attending: NURSE PRACTITIONER
Payer: COMMERCIAL

## 2021-02-19 ENCOUNTER — HOSPITAL ENCOUNTER (EMERGENCY)
Facility: HOSPITAL | Age: 41
Discharge: HOME | End: 2021-02-19
Attending: NURSE PRACTITIONER | Admitting: NURSE PRACTITIONER
Payer: COMMERCIAL

## 2021-02-19 ENCOUNTER — APPOINTMENT (OUTPATIENT)
Dept: CT IMAGING | Facility: HOSPITAL | Age: 41
End: 2021-02-19
Attending: NURSE PRACTITIONER
Payer: COMMERCIAL

## 2021-02-19 VITALS
DIASTOLIC BLOOD PRESSURE: 96 MMHG | RESPIRATION RATE: 16 BRPM | OXYGEN SATURATION: 98 % | HEART RATE: 88 BPM | SYSTOLIC BLOOD PRESSURE: 160 MMHG | TEMPERATURE: 97.8 F

## 2021-02-19 DIAGNOSIS — Z87.09 HISTORY OF ASTHMA: ICD-10-CM

## 2021-02-19 DIAGNOSIS — M79.89 PAIN AND SWELLING OF LEFT LOWER EXTREMITY: Primary | ICD-10-CM

## 2021-02-19 DIAGNOSIS — M79.605 PAIN AND SWELLING OF LEFT LOWER EXTREMITY: Primary | ICD-10-CM

## 2021-02-19 DIAGNOSIS — R06.02 SHORTNESS OF BREATH: ICD-10-CM

## 2021-02-19 LAB
ANION GAP SERPL CALCULATED.3IONS-SCNC: 2 MMOL/L (ref 3–14)
BASOPHILS # BLD AUTO: 0.1 10E9/L (ref 0–0.2)
BASOPHILS NFR BLD AUTO: 0.9 %
BUN SERPL-MCNC: 10 MG/DL (ref 7–30)
CALCIUM SERPL-MCNC: 8.7 MG/DL (ref 8.5–10.1)
CHLORIDE SERPL-SCNC: 105 MMOL/L (ref 94–109)
CO2 SERPL-SCNC: 28 MMOL/L (ref 20–32)
CREAT SERPL-MCNC: 0.72 MG/DL (ref 0.52–1.04)
CRP SERPL-MCNC: 11.9 MG/L (ref 0–8)
D DIMER PPP FEU-MCNC: 0.7 UG/ML FEU (ref 0–0.5)
DIFFERENTIAL METHOD BLD: NORMAL
EOSINOPHIL # BLD AUTO: 0.1 10E9/L (ref 0–0.7)
EOSINOPHIL NFR BLD AUTO: 1.8 %
ERYTHROCYTE [DISTWIDTH] IN BLOOD BY AUTOMATED COUNT: 12.3 % (ref 10–15)
GFR SERPL CREATININE-BSD FRML MDRD: >90 ML/MIN/{1.73_M2}
GLUCOSE SERPL-MCNC: 95 MG/DL (ref 70–99)
HCT VFR BLD AUTO: 41.3 % (ref 35–47)
HGB BLD-MCNC: 14.6 G/DL (ref 11.7–15.7)
IMM GRANULOCYTES # BLD: 0 10E9/L (ref 0–0.4)
IMM GRANULOCYTES NFR BLD: 0.1 %
LYMPHOCYTES # BLD AUTO: 2.1 10E9/L (ref 0.8–5.3)
LYMPHOCYTES NFR BLD AUTO: 28.9 %
MCH RBC QN AUTO: 30.5 PG (ref 26.5–33)
MCHC RBC AUTO-ENTMCNC: 35.4 G/DL (ref 31.5–36.5)
MCV RBC AUTO: 86 FL (ref 78–100)
MONOCYTES # BLD AUTO: 0.5 10E9/L (ref 0–1.3)
MONOCYTES NFR BLD AUTO: 6.5 %
NEUTROPHILS # BLD AUTO: 4.6 10E9/L (ref 1.6–8.3)
NEUTROPHILS NFR BLD AUTO: 61.8 %
NRBC # BLD AUTO: 0 10*3/UL
NRBC BLD AUTO-RTO: 0 /100
PLATELET # BLD AUTO: 318 10E9/L (ref 150–450)
POTASSIUM SERPL-SCNC: 4 MMOL/L (ref 3.4–5.3)
RBC # BLD AUTO: 4.79 10E12/L (ref 3.8–5.2)
SODIUM SERPL-SCNC: 135 MMOL/L (ref 133–144)
WBC # BLD AUTO: 7.4 10E9/L (ref 4–11)

## 2021-02-19 PROCEDURE — 99214 OFFICE O/P EST MOD 30 MIN: CPT | Performed by: NURSE PRACTITIONER

## 2021-02-19 PROCEDURE — G0463 HOSPITAL OUTPT CLINIC VISIT: HCPCS | Mod: 25

## 2021-02-19 PROCEDURE — 93971 EXTREMITY STUDY: CPT | Mod: LT

## 2021-02-19 PROCEDURE — 71275 CT ANGIOGRAPHY CHEST: CPT

## 2021-02-19 PROCEDURE — 85379 FIBRIN DEGRADATION QUANT: CPT | Performed by: NURSE PRACTITIONER

## 2021-02-19 PROCEDURE — 80048 BASIC METABOLIC PNL TOTAL CA: CPT | Performed by: NURSE PRACTITIONER

## 2021-02-19 PROCEDURE — 85025 COMPLETE CBC W/AUTO DIFF WBC: CPT | Performed by: NURSE PRACTITIONER

## 2021-02-19 PROCEDURE — 250N000011 HC RX IP 250 OP 636: Performed by: RADIOLOGY

## 2021-02-19 PROCEDURE — 86140 C-REACTIVE PROTEIN: CPT | Performed by: NURSE PRACTITIONER

## 2021-02-19 PROCEDURE — 36415 COLL VENOUS BLD VENIPUNCTURE: CPT | Performed by: NURSE PRACTITIONER

## 2021-02-19 RX ORDER — IOPAMIDOL 755 MG/ML
100 INJECTION, SOLUTION INTRAVASCULAR ONCE
Status: COMPLETED | OUTPATIENT
Start: 2021-02-19 | End: 2021-02-19

## 2021-02-19 RX ADMIN — IOPAMIDOL 100 ML: 755 INJECTION, SOLUTION INTRAVENOUS at 13:17

## 2021-02-19 ASSESSMENT — ENCOUNTER SYMPTOMS
JOINT SWELLING: 1
MYALGIAS: 1
ARTHRALGIAS: 0

## 2021-02-19 NOTE — DISCHARGE INSTRUCTIONS
Use the Ace wrap, elevate your leg and take Tylenol or ibuprofen as needed for pain.    Schedule an appointment with your doctor in 3 to 5 days for reevaluation.    Return to emergency department for worsening or concerning symptoms.          What to expect when you have contrast    During your exam, we will inject  contrast  into your vein or artery. (Contrast is a clear liquid with iodine in it. It shows up on X-rays.)    You may feel warm or hot. You may have a metal taste in your mouth and a slight upset stomach. You may also feel pressure near the kidneys and bladder. These effects will last about 1 to 3 minutes.    Please tell us if you have:   Sneezing    Itching   Hives    Swelling in the face   A hoarse voice   Breathing problems   Other new symptoms    Serious problems are rare.  They may include:   Irregular heartbeat    Seizures   Kidney failure             Tissue damage   Shock     Death    If you have any problems during the exam, we  will treat them right away.    When you get home    Call your hospital if you have any new symptoms in the next 2 days, like hives or swelling. (Phone numbers are at the bottom of this page.) Or call your family doctor.     If you have wheezing or trouble breathing, call 911.    Self-care  -Drink at least 4 extra glasses of water today.   This reduces the stress on your kidneys.  -Keep taking your regular medicines.    The contrast will pass out of your body in your  Urine(pee). This will happen in the next 24 hours. You  will not feel this. Your urine will not  change color.    If you have kidney problems or take metformin    Drink 4 to 8 large glasses of water for the next  2 days, if you are not on a fluid restriction.    ?If you take metformin (Glucophage or Glucovance) for diabetes, keep taking it.      ?Your kidney function tests are abnormal.  If you take Metformin, do not take it for 48 hours. Please go to your clinic for a blood test within 3 days after your exam  before the restarting this medicine.     (Note to provider:please give patient prescription for lab tests.)    ?Special instructions:     I have read and understand the above information.    Patient Sign Here:______________________________________Date:________Time:______    Staff Sign Here:________________________________________Date:_______Time:______      Radiology Departments:     ?[facility]: [phone] ?[place]: [phone]     ?[place]: [phone] ?[facility]:[phone]      ?[place]: [phone]  ?[place]: [phone]  ?[place]:[phone]    ?[facility]:[phone]  ?[facility]:[phone]

## 2021-02-19 NOTE — ED PROVIDER NOTES
History     Chief Complaint   Patient presents with     Leg Pain     left sided x2 weeks with pain radiating up leg. slight increase in size to LLE. no hx of DVTs or PE. No SOB. Pt denies recent surgeries or change in activity. no known injury     HPI  Cristiana Aguila is a 40 year old female who presents ambulatory to urgent care for concerns of left leg pain.  Patient reports 2 weeks ago she had pain noted to her left calf along with some swelling.  Now patient reports the pain has moved up to behind her thigh and she continues to have swelling to this leg.  No known injury.  No history of blood clots.  No recent surgeries, long trips.  Patient does report a family history of blood clotting disorders but is unsure which one.  She does not have a personal history of blood clotting disorder.  She is currently on Mirena.  She did also reports chronic shortness of breath due to asthma.  She tells me her shortness of breath has slightly worsened over the last week.  No chest pain, fever, cough.    Allergies:  Allergies   Allergen Reactions     Amoxicillin Diarrhea     Bee Venom      Bee venom (honey bee)       Food      Artificial cinnamon      Latex      Sulfa Drugs      Sulfa (sulfonamide antibiotics)          Problem List:    Patient Active Problem List    Diagnosis Date Noted     Psychophysiological insomnia 12/21/2020     Priority: Medium     Mild intermittent reactive airway disease with wheezing without complication 12/21/2020     Priority: Medium     Encounter for insertion of intrauterine contraceptive device (IUD) 04/29/2020     Priority: Medium     Well woman exam with routine gynecological exam 02/14/2018     Priority: Medium     Irritable bowel syndrome with both constipation and diarrhea 08/02/2017     Priority: Medium     Recurrent major depressive disorder, in partial remission (H) 08/02/2017     Priority: Medium     ACP (advance care planning) 03/02/2016     Priority: Medium     Advance Care Planning  3/2/2016: Receipt of ACP document:  Received: Health Care Directive which was witnessed or notarized on 1-25-16.  Document previously scanned on 2-5-16.  Validation form completed and sent to be scanned.  Code Status needs to be updated to reflect choices in most recent ACP document.  Confirmed/documented designated decision maker(s).  Added by Rachel Barber RN Advance Care Planning Liaison with [employer]             FH: breast cancer      Priority: Medium     Generalized anxiety disorder      Priority: Medium     Diagnosis updated by automated process. Provider to review and confirm.       Attention deficit disorder 05/31/2005     Priority: Medium     Problem list name updated by automated process. 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     FH: breast cancer      DAFNE (generalised anxiety disorder)      Irritable bowel syndrome with both constipation and diarrhea 8/2/2017     MDD (major depressive disorder)      Migraine, unspecified, without mention of intractable migraine without mention of status migrainosus 3/13/2000     Tobacco abuse, in remission      Unspecified sinusitis (chronic) 3/8/2001       Past Surgical History:    Past Surgical History:   Procedure Laterality Date     ENDOSCOPY UPPER, COLONOSCOPY, COMBINED N/A 9/4/2015    Procedure: COMBINED ENDOSCOPY UPPER, COLONOSCOPY;  Surgeon: Griffin Barrientos DO;  Location: [facility]     LAPAROSCOPIC CYSTECTOMY OVARIAN (BENIGN) Right 1/27/2016    Procedure: LAPAROSCOPIC CYSTECTOMY OVARIAN (BENIGN);  Surgeon: Kuldip España MD;  Location: [facility]     LAPAROSCOPIC SALPINGO-OOPHORECTOMY Left 1/27/2016    Procedure: LAPAROSCOPIC SALPINGO-OOPHORECTOMY;  Surgeon: Kuldip España MD;  Location: [facility]     LAPAROSCOPY DIAGNOSTIC (GYN) Left 1/27/2016    Procedure: LAPAROSCOPY DIAGNOSTIC (GYN);   Surgeon: Kuldip España MD;  Location: [facility]     TUBAL LIGATION  2006     wisdom teeth         Family History:    Family History   Problem Relation Age of Onset     Breast Cancer Maternal Grandmother      Hypertension Maternal Grandfather      Hyperlipidemia Maternal Grandfather      Diabetes Paternal Grandmother      Asthma No family hx of        Social History:  Marital Status:   [2]  Social History     Tobacco Use     Smoking status: Current Every Day Smoker     Packs/day: 0.50     Years: 13.00     Pack years: 6.50     Types: Cigarettes     Last attempt to quit: 2020     Years since quittin.1     Smokeless tobacco: Never Used   Substance Use Topics     Alcohol use: Yes     Alcohol/week: 0.0 standard drinks     Comment: frequency: rarely     Drug use: No        Medications:    Acetaminophen (TYLENOL PO)  albuterol (PROAIR HFA/PROVENTIL HFA/VENTOLIN HFA) 108 (90 Base) MCG/ACT inhaler  Ascorbic Acid (VITAMIN C PO)  citalopram (CELEXA) 40 MG tablet  Cyanocobalamin (VITAMIN B 12 PO)  docusate sodium (COLACE) 100 MG capsule  EPINEPHrine (EPIPEN/ADRENACLICK/OR ANY BX GENERIC EQUIV) 0.3 MG/0.3ML injection 2-pack  famotidine (PEPCID) 20 MG tablet  fluticasone (FLONASE) 50 MCG/ACT nasal spray  hydrOXYzine (ATARAX) 25 MG tablet  methylphenidate (CONCERTA) 54 MG CR tablet  methylphenidate (CONCERTA) 54 MG CR tablet  methylphenidate (CONCERTA) 54 MG CR tablet  nicotine (NICODERM CQ) 14 MG/24HR 24 hr patch  nicotine (NICODERM CQ) 21 MG/24HR 24 hr patch  nicotine (NICODERM CQ) 7 MG/24HR 24 hr patch  omeprazole (PRILOSEC) 40 MG DR capsule  traZODone (DESYREL) 100 MG tablet          Review of Systems   Musculoskeletal: Positive for joint swelling and myalgias. Negative for arthralgias.   All other systems reviewed and are negative.      Physical Exam   BP: 160/96  Pulse: 88  Temp: 97.8  F (36.6  C)  Resp: 16  SpO2: 98 %      Physical Exam  Vitals signs and nursing note reviewed.   Constitutional:       Appearance:  Normal appearance. She is not ill-appearing or toxic-appearing.   HENT:      Head: Normocephalic and atraumatic.      Right Ear: Tympanic membrane and ear canal normal.      Left Ear: Tympanic membrane and ear canal normal.   Eyes:      Pupils: Pupils are equal, round, and reactive to light.   Neck:      Musculoskeletal: Neck supple.   Cardiovascular:      Rate and Rhythm: Normal rate and regular rhythm.      Heart sounds: Normal heart sounds.   Pulmonary:      Effort: Pulmonary effort is normal.      Breath sounds: Normal breath sounds.   Musculoskeletal: Normal range of motion.      Left upper leg: She exhibits tenderness.        Legs:       Comments: Left leg swelling and TTP to posterior left knee and upper thigh. FROM to left knee and leg.    Skin:     General: Skin is warm and dry.      Capillary Refill: Capillary refill takes less than 2 seconds.   Neurological:      General: No focal deficit present.      Mental Status: She is alert and oriented to person, place, and time.         ED Course   1135: Ultrasound negative for DVT to left lower extremity.  Small fluid collection noted to popliteal fossa.  D-dimer pending.    1200: D-dimer mildly elevated at 0.7.  Discussed findings with patient.  Given patient is complaining of acute on chronic shortness of breath will order CTA to rule out a PE.     Procedures               Results for orders placed or performed during the hospital encounter of 02/19/21 (from the past 24 hour(s))   D dimer quantitative   Result Value Ref Range    D Dimer 0.7 (H) 0.0 - 0.50 ug/ml FEU   US Lower Extremity Venous Duplex Left    Narrative    US LOWER EXTREMITY VENOUS DUPLEX LEFT  2/19/2021 11:05 AM    History:Female, age 40 years; ; left leg swelling and pain x 2 weeks,  started in calf now pain is posterior thigh.    Comparison: None.    Technique: Grayscale and color Doppler ultrasound of the left lower  extremity deep venous structures.    Findings:   The deep venous structures  are patent and fully compressible. There is  normal augmentation of flow.      Impression    Impression:  No evidence of DVT.     Small fluid collection in the lateral aspect of the popliteal fossa  measuring 3.7 x 2.4 x 0.4 cm in size    JEMIMA ZURITA MD   Basic metabolic panel   Result Value Ref Range    Sodium 135 133 - 144 mmol/L    Potassium 4.0 3.4 - 5.3 mmol/L    Chloride 105 94 - 109 mmol/L    Carbon Dioxide 28 20 - 32 mmol/L    Anion Gap 2 (L) 3 - 14 mmol/L    Glucose 95 70 - 99 mg/dL    Urea Nitrogen 10 7 - 30 mg/dL    Creatinine 0.72 0.52 - 1.04 mg/dL    GFR Estimate >90 >60 mL/min/[1.73_m2]    GFR Estimate If Black >90 >60 mL/min/[1.73_m2]    Calcium 8.7 8.5 - 10.1 mg/dL   CRP inflammation   Result Value Ref Range    CRP Inflammation 11.9 (H) 0.0 - 8.0 mg/L   CBC with platelets differential   Result Value Ref Range    WBC 7.4 4.0 - 11.0 10e9/L    RBC Count 4.79 3.8 - 5.2 10e12/L    Hemoglobin 14.6 11.7 - 15.7 g/dL    Hematocrit 41.3 35.0 - 47.0 %    MCV 86 78 - 100 fl    MCH 30.5 26.5 - 33.0 pg    MCHC 35.4 31.5 - 36.5 g/dL    RDW 12.3 10.0 - 15.0 %    Platelet Count 318 150 - 450 10e9/L    Diff Method Automated Method     % Neutrophils 61.8 %    % Lymphocytes 28.9 %    % Monocytes 6.5 %    % Eosinophils 1.8 %    % Basophils 0.9 %    % Immature Granulocytes 0.1 %    Nucleated RBCs 0 0 /100    Absolute Neutrophil 4.6 1.6 - 8.3 10e9/L    Absolute Lymphocytes 2.1 0.8 - 5.3 10e9/L    Absolute Monocytes 0.5 0.0 - 1.3 10e9/L    Absolute Eosinophils 0.1 0.0 - 0.7 10e9/L    Absolute Basophils 0.1 0.0 - 0.2 10e9/L    Abs Immature Granulocytes 0.0 0 - 0.4 10e9/L    Absolute Nucleated RBC 0.0    CTA Chest with Contrast    Narrative    PROCEDURE: CTA CHEST WITH CONTRAST 2/19/2021 1:33 PM    HISTORY: PE suspected, low/intermediate prob, positive D-dimer    COMPARISONS: None.    Meds/Dose Given:    TECHNIQUE: CT scan of the chest with sagittal coronal MIPS  reconstructions    FINDINGS: There is a 3 mm subpleural  nodule seen on series 3 axial  image 93 of the right lower lobe. There is a 3 mm subpleural nodule in  the left lower lobe on series 3 axial image 97. There are no pulmonary  infiltrates. No hilar or mediastinal masses or lymphadenopathy is  noted. Axillary and supraclavicular lymph nodes appear normal.    CT angiogram reveals no pulmonary emboli. The thoracic aorta appears  normal. The heart is normal in size.    The upper portion of the liver is normal. There is a low-density  lesion in the spleen measuring 2 cm in diameter.    There is a compression fracture of lower thoracic vertebral body  stable from previous examination in 2019         Impression    IMPRESSION: No pulmonary emboli. There are 2 small subpleural nodules  one in the right one in the left lower lobes. 2 cm low-density lesion  in the spleen possibly a splenic cyst.    ASHLEY GILMORE MD       Medications   sodium chloride (PF) 0.9% PF flush 100 mL (100 mLs Intravenous Given 2/19/21 1317)   iopamidol (ISOVUE-370) solution 100 mL (100 mLs Intravenous Given 2/19/21 1317)       Assessments & Plan (with Medical Decision Making)     I have reviewed the nursing notes.    I have reviewed the findings, diagnosis, plan and need for follow up with the patient.  40-year-old female that presented ambulatory to urgent care for concerns of left leg pain and swelling with main concern of being a DVT.  Reports family history of blood clotting disorders (unsure which ones).  No personal history of DVTs or blood clotting disorders.  Physical exam findings as noted above.  Ultrasound venous duplex left lower extremity negative for DVT but does show a fluid collection to left lateral popliteal fossa.  D-dimer was also done I treated patient's report of having increased shortness of breath.  D-dimer was elevated and therefore CTA was done which was negative for PE.  Patient is not tachycardic.  Respirations are nonlabored with oxygen saturation 98% on room air.  She  does have notable swelling to left leg.  No findings for DVT today.  Fluid collection noted on ultrasound could possibly be due to cyst or knee effusion.  Patient discharged home in stable condition.  Ace wrap was applied in urgent care.  Recommended she elevate her leg.  APAP or ibuprofen as needed for pain.  Advised her to continue using albuterol inhaler as needed for shortness of breath and/or wheezing.  Recommended close follow-up with PCP for reevaluation.  Return to ED/UC for worsening or concerning symptoms.  Patient verbalized understanding.    This document was prepared using a combination of typing and voice generated software.  While every attempt was made for accuracy, spelling and grammatical errors may exist.    New Prescriptions    No medications on file       Final diagnoses:   Pain and swelling of left lower extremity   Shortness of breath   History of asthma       2/19/2021   [facility]     Mpofu, Prudence, CNP  02/19/21 2037

## 2021-02-19 NOTE — ED TRIAGE NOTES
Pt is here with c/o left leg pain   Pain started in the calf 2 weeks ago and reports it has travel to left thigh in the back 1 week ago  Pt denies any hx of DVT or PE but states it has run in the family  Pt notes swelling

## 2021-02-22 ENCOUNTER — OFFICE VISIT (OUTPATIENT)
Dept: FAMILY MEDICINE | Facility: OTHER | Age: 41
End: 2021-02-22
Attending: FAMILY MEDICINE
Payer: COMMERCIAL

## 2021-02-22 VITALS
DIASTOLIC BLOOD PRESSURE: 88 MMHG | SYSTOLIC BLOOD PRESSURE: 122 MMHG | HEART RATE: 80 BPM | WEIGHT: 225 LBS | HEIGHT: 69 IN | OXYGEN SATURATION: 97 % | BODY MASS INDEX: 33.33 KG/M2

## 2021-02-22 DIAGNOSIS — M71.22 BAKER CYST, LEFT: Primary | ICD-10-CM

## 2021-02-22 PROCEDURE — 99213 OFFICE O/P EST LOW 20 MIN: CPT | Performed by: FAMILY MEDICINE

## 2021-02-22 PROCEDURE — G0463 HOSPITAL OUTPT CLINIC VISIT: HCPCS

## 2021-02-22 ASSESSMENT — ANXIETY QUESTIONNAIRES
5. BEING SO RESTLESS THAT IT IS HARD TO SIT STILL: MORE THAN HALF THE DAYS
6. BECOMING EASILY ANNOYED OR IRRITABLE: NEARLY EVERY DAY
4. TROUBLE RELAXING: MORE THAN HALF THE DAYS
GAD7 TOTAL SCORE: 15
7. FEELING AFRAID AS IF SOMETHING AWFUL MIGHT HAPPEN: MORE THAN HALF THE DAYS
3. WORRYING TOO MUCH ABOUT DIFFERENT THINGS: MORE THAN HALF THE DAYS
2. NOT BEING ABLE TO STOP OR CONTROL WORRYING: MORE THAN HALF THE DAYS
1. FEELING NERVOUS, ANXIOUS, OR ON EDGE: MORE THAN HALF THE DAYS

## 2021-02-22 ASSESSMENT — ASTHMA QUESTIONNAIRES
ACT_TOTALSCORE: 18
QUESTION_2 LAST FOUR WEEKS HOW OFTEN HAVE YOU HAD SHORTNESS OF BREATH: THREE TO SIX TIMES A WEEK
QUESTION_5 LAST FOUR WEEKS HOW WOULD YOU RATE YOUR ASTHMA CONTROL: WELL CONTROLLED
QUESTION_4 LAST FOUR WEEKS HOW OFTEN HAVE YOU USED YOUR RESCUE INHALER OR NEBULIZER MEDICATION (SUCH AS ALBUTEROL): ONE OR TWO TIMES PER DAY
QUESTION_3 LAST FOUR WEEKS HOW OFTEN DID YOUR ASTHMA SYMPTOMS (WHEEZING, COUGHING, SHORTNESS OF BREATH, CHEST TIGHTNESS OR PAIN) WAKE YOU UP AT NIGHT OR EARLIER THAN USUAL IN THE MORNING: NOT AT ALL
QUESTION_1 LAST FOUR WEEKS HOW MUCH OF THE TIME DID YOUR ASTHMA KEEP YOU FROM GETTING AS MUCH DONE AT WORK, SCHOOL OR AT HOME: A LITTLE OF THE TIME

## 2021-02-22 ASSESSMENT — MIFFLIN-ST. JEOR: SCORE: 1744.97

## 2021-02-22 ASSESSMENT — PAIN SCALES - GENERAL: PAINLEVEL: MODERATE PAIN (4)

## 2021-02-22 ASSESSMENT — PATIENT HEALTH QUESTIONNAIRE - PHQ9: SUM OF ALL RESPONSES TO PHQ QUESTIONS 1-9: 12

## 2021-02-22 NOTE — PROGRESS NOTES
"    Assessment & Plan     Baker cyst, left  Full work up done in ER/[facility]. Confirmed popiteal/Baker cyst. Education done and conservative mgmt discussed. Discussed in length conservative measures of OTC medications for pain, Ice/Heat, elevation and the concept of R.I.C.E.. Continue behavioral changes and proper body mechanics to allow for healing. Follow up as directed.   IF worsens or still problematic- pt to call and will send to ortho for aspiration /steroid injection vs surgery.                BMI:   Estimated body mass index is 33.23 kg/m  as calculated from the following:    Height as of this encounter: 1.753 m (5' 9\").    Weight as of this encounter: 102.1 kg (225 lb).           No follow-ups on file.    Sudhakar Cuellar MD  [facility] - KEN Zendejas is a 40 year old who presents for the following health issues     HPI       ED/UC Followup:    Facility:  [facility] [place]  Date of visit: 2-19-21  Reason for visit: pain and swelling of LLE, SOB  Current Status: pt states swelling has not changed. States this comes and goes. Pain is behind left knee. Does have hx of left knee inury. SOB also unchanged since urgent care visit. Weight is up.     Large work up in ER done and reviewed.  No trauma recently  Main symptom in tenderness and fullness behind left knee        Review of Systems   Constitutional, HEENT, cardiovascular, pulmonary, gi and gu systems are negative, except as otherwise noted.      Objective    /88 (Patient Position: Sitting)   Pulse 80   Ht 1.753 m (5' 9\")   Wt 102.1 kg (225 lb)   SpO2 97%   BMI 33.23 kg/m    Body mass index is 33.23 kg/m .  Physical Exam   GENERAL: healthy, alert and no distress  MS:  Left knee and leg-- no gross musculoskeletal defects noted, no edema, full ROM and no laxity on left knee. Tenderness noted over mass in posterior knee joint space. Baker cyst noted.                 "

## 2021-02-22 NOTE — PATIENT INSTRUCTIONS
Patient Education     Dao s Cyst    You have a Baker s cyst. This is a lump or bulge in the back of your knee. It is caused when extra joint fluid flows into a small sac behind the knee. The extra fluid occurs because arthritis or a torn cartilage irritates the knee joint.  A small Dao s cyst often has no symptoms. A larger cyst can cause some knee pain or a feeling of pressure behind your knee when you try to fully straighten or bend that joint. A Baker s cyst can leak, leading fluid to move down into your lower leg. This causes swelling, pain, and redness.  Treatment may involve draining the extra fluid. Or medicine may be injected to reduce redness and swelling. If the extra fluid is caused by a torn cartilage, then surgery to repair the cartilage may be the best treatment option. If arthritis is the cause, and it does not get better with treatment, surgery may need to address the arthritis and cyst.  Home care    If you have knee pain, stay off the affected leg as much as possible until the pain eases.    Apply an ice pack to the painful area for no more than 20 minutes. Do this every 3 to 6 hours for the first 24 to 48 hours. Keep using ice packs 3 to 4 times a day for the next few days, as needed for pain. To make an ice pack, put ice cubes in a sealed zip-lock plastic bag. Wrap the bag in a clean, thin towel or cloth. Never put ice or an ice pack directly on the skin.    If you were given a hook-and-loop knee brace, you may open the brace to apply ice. Unless told otherwise, you may remove the brace to bathe and sleep.    You may use over-the-counter pain medicine to control pain, unless another medicine was prescribed. Talk with your provider before using these medicines if you have chronic liver or kidney disease, or have ever had a stomach ulcer or gastrointestinal bleeding.       If crutches or a walker have been recommended, don t bear full weight on your injured leg until you can do so without  pain. Check with your provider before returning to sports or full work duties.    Follow-up care  Follow up with your healthcare provider within 1 to 2 weeks, or as advised.  If X-rays were taken, you will be notified of any new findings that may affect your care.  When to seek medical advice  Call your healthcare provider right away if any of these occur:    Toes or foot become swollen, cold, blue, numb or tingly    Pain or swelling increases    Warmth or redness appears over the knee    Redness, swelling or pain occurs in the calf or lower leg    Fever or chills  [employer] last reviewed this educational content on 5/1/2018 2000-2020 [employer]. [address] [place] [address]. All rights reserved. This information is not intended as a substitute for professional medical care. Always follow your healthcare professional's instructions.           Patient Education     Understanding Baker s Cyst (Popliteal Cyst)  A Baker s cyst (popliteal cyst) is a fluid-filled sac that forms behind the knee.  Parts of the knee  The knee is a complex joint that has many parts. The lower end of the thighbone (femur) rotates on the upper end of the shinbone (tibia). There are several small bursae around the knee joint. These are small sacs filled with a special fluid (synovial fluid) that cushions the rest of the joint. Between the bones is a space that also contains this fluid.   What causes a Baker s cyst?  It is caused when extra fluid from the knee joint flows into the small bursa that sits behind the knee. When this sac fills with too much fluid, it s called a Baker s cyst. This might happen when an injury or disease irritates the knee joint.    In adults, other problems with the knee joint often cause the Baker s cyst. Injury or a knee disorder can change the normal structure of the knee joint. This can cause a cyst to form.   The synovial fluid inside the joint space may build up as a result of injury  or disease. As the pressure builds up, the fluid may bulge into the back of the knee. This can cause the cyst.   Symptoms of a Baker s cyst  A Baker s cyst often doesn t cause symptoms. A cyst will more often be seen on an imaging test, like MRI, done for other reasons. If you do have symptoms, they may include:     Pain in the back of the knee    Knee stiffness    Sense of swelling or fullness behind the knee, especially when you straighten your leg    A swelling behind the knee that goes away when you bend your knee  These symptoms tend to get worse when standing for a long time, or being active.  Diagnosing a Baker s cyst  Your healthcare provider will ask you about your medical history and your symptoms. He or she will give you a physical exam, which will include a careful exam of your knee. It s important to make sure your symptoms are caused by a Baker s cyst and not a tumor or a blood clot.   If the cause of your symptoms is not clear, you may have imaging tests, such as:    Ultrasound, to look at the cyst in more detail or to look for a blood clot    X-ray, to get more information about the bones of the joint    MRI, if the diagnosis is still unclear after ultrasound, or if your health care provider is considering surgery  [employer] last reviewed this educational content on 6/1/2020 2000-2020 [employer]. [address] [place] [address], [place] [address]. All rights reserved. This information is not intended as a substitute for professional medical care. Always follow your healthcare professional's instructions.           Patient Education     Treatment for Baker s Cyst (Popliteal Cyst)  A Baker s cyst (popliteal cyst) is a fluid-filled sac that forms behind the knee.  Types of treatment  You likely won t need any treatment if you don t have any symptoms from your Baker s cyst. Some Baker s cysts go away without any treatment. If your cyst starts causing symptoms, you might need treatment at that  time.  If you do have symptoms, you may be treated depending on the cause of your cyst. For example, you may need medicine for rheumatoid arthritis.  Other treatments for a Baker s cyst can include:    Over-the-counter pain medicines    Arthrocentesis, where a needle is used to remove extra fluid from the joint space    Steroid injection into the joint to reduce cyst size    Surgery to remove the cyst  Possible complications of a Baker s cyst  In rare cases, a Baker s cyst may cause complications. The cyst may get larger, which may cause redness and swelling. The cyst may also rupture, causing warmth, redness, and pain in your calf.  The symptoms may be the same as a blood clot in the veins of the legs. Your healthcare provider may need imaging tests of your leg to make sure you don t have a clot. Rupture can also lead to its own complications, such as:    Trapping of a tibial nerve. This causes calf pain and numbness behind the leg. It can be treated with arthrocentesis and steroid injections.    Blockage of the popliteal artery. This causes pain and lack of blood flow to the leg. It can be treated with arthrocentesis and steroid injections or surgery.    Compartment syndrome. This causes intense pain and problems moving the foot or toes. It is the result of pressure building in the muscles causing blood flow to decrease. Compartment syndrome is a medical emergency that requires immediate surgery. It can lead to permanent muscle damage if not treated right away.  When to call the healthcare provider  If your cyst starts causing mild symptoms, plan to see your healthcare provider soon. See him or her right away if you have symptoms such as redness and swelling of your leg, or numbness and discoloration of the foot. These symptoms may mean your Baker s cyst has ruptured or causing other problems.  [employer] last reviewed this educational content on 5/1/2018 2000-2020 [employer] [address], [place] [address]. All rights reserved. This information is not intended as a substitute for professional medical care. Always follow your healthcare professional's instructions.

## 2021-02-22 NOTE — NURSING NOTE
"Chief Complaint   Patient presents with     UC Follow-Up       Initial /88 (Patient Position: Sitting)   Pulse 80   Ht 1.753 m (5' 9\")   Wt 102.1 kg (225 lb)   SpO2 97%   BMI 33.23 kg/m   Estimated body mass index is 33.23 kg/m  as calculated from the following:    Height as of this encounter: 1.753 m (5' 9\").    Weight as of this encounter: 102.1 kg (225 lb).  Medication Reconciliation: complete  Stephanie Montano LPN  "

## 2021-02-23 ASSESSMENT — ANXIETY QUESTIONNAIRES: GAD7 TOTAL SCORE: 15

## 2021-02-23 ASSESSMENT — ASTHMA QUESTIONNAIRES: ACT_TOTALSCORE: 18

## 2021-03-10 ENCOUNTER — OFFICE VISIT (OUTPATIENT)
Dept: OBGYN | Facility: OTHER | Age: 41
End: 2021-03-10
Attending: ADVANCED PRACTICE MIDWIFE
Payer: COMMERCIAL

## 2021-03-10 VITALS
DIASTOLIC BLOOD PRESSURE: 78 MMHG | WEIGHT: 227 LBS | SYSTOLIC BLOOD PRESSURE: 137 MMHG | BODY MASS INDEX: 33.62 KG/M2 | HEIGHT: 69 IN

## 2021-03-10 DIAGNOSIS — Z12.4 ENCOUNTER FOR SCREENING FOR CERVICAL CANCER: ICD-10-CM

## 2021-03-10 DIAGNOSIS — K59.04 CHRONIC IDIOPATHIC CONSTIPATION: ICD-10-CM

## 2021-03-10 DIAGNOSIS — Z01.419 WELL WOMAN EXAM WITH ROUTINE GYNECOLOGICAL EXAM: Primary | ICD-10-CM

## 2021-03-10 PROCEDURE — G0123 SCREEN CERV/VAG THIN LAYER: HCPCS | Mod: ZL | Performed by: ADVANCED PRACTICE MIDWIFE

## 2021-03-10 PROCEDURE — 87624 HPV HI-RISK TYP POOLED RSLT: CPT | Mod: ZL | Performed by: ADVANCED PRACTICE MIDWIFE

## 2021-03-10 PROCEDURE — 99396 PREV VISIT EST AGE 40-64: CPT | Performed by: ADVANCED PRACTICE MIDWIFE

## 2021-03-10 PROCEDURE — G0463 HOSPITAL OUTPT CLINIC VISIT: HCPCS | Mod: 25

## 2021-03-10 RX ORDER — DOCUSATE SODIUM 100 MG/1
100 CAPSULE, LIQUID FILLED ORAL 2 TIMES DAILY
Qty: 180 CAPSULE | Refills: 3 | Status: SHIPPED | OUTPATIENT
Start: 2021-03-10 | End: 2022-03-17

## 2021-03-10 ASSESSMENT — ANXIETY QUESTIONNAIRES
4. TROUBLE RELAXING: MORE THAN HALF THE DAYS
1. FEELING NERVOUS, ANXIOUS, OR ON EDGE: MORE THAN HALF THE DAYS
3. WORRYING TOO MUCH ABOUT DIFFERENT THINGS: SEVERAL DAYS
GAD7 TOTAL SCORE: 13
IF YOU CHECKED OFF ANY PROBLEMS ON THIS QUESTIONNAIRE, HOW DIFFICULT HAVE THESE PROBLEMS MADE IT FOR YOU TO DO YOUR WORK, TAKE CARE OF THINGS AT HOME, OR GET ALONG WITH OTHER PEOPLE: SOMEWHAT DIFFICULT
5. BEING SO RESTLESS THAT IT IS HARD TO SIT STILL: MORE THAN HALF THE DAYS
2. NOT BEING ABLE TO STOP OR CONTROL WORRYING: MORE THAN HALF THE DAYS
6. BECOMING EASILY ANNOYED OR IRRITABLE: NEARLY EVERY DAY
7. FEELING AFRAID AS IF SOMETHING AWFUL MIGHT HAPPEN: SEVERAL DAYS

## 2021-03-10 ASSESSMENT — MIFFLIN-ST. JEOR: SCORE: 1754.05

## 2021-03-10 ASSESSMENT — PATIENT HEALTH QUESTIONNAIRE - PHQ9: SUM OF ALL RESPONSES TO PHQ QUESTIONS 1-9: 15

## 2021-03-10 ASSESSMENT — PAIN SCALES - GENERAL: PAINLEVEL: MILD PAIN (3)

## 2021-03-10 NOTE — NURSING NOTE
"Chief Complaint   Patient presents with     Gyn Exam       Initial /78 (BP Location: Left arm, Patient Position: Chair, Cuff Size: Adult Regular)   Ht 1.753 m (5' 9\")   Wt 103 kg (227 lb)   BMI 33.52 kg/m   Estimated body mass index is 33.52 kg/m  as calculated from the following:    Height as of this encounter: 1.753 m (5' 9\").    Weight as of this encounter: 103 kg (227 lb).  Medication Reconciliation: complete  Jolly Serrano LPN    "

## 2021-03-10 NOTE — PROGRESS NOTES
ANNUAL    Cristiana is a 40 year old  female who presents for annual exam.   Youngest child 14  Largest Child 8 lb 13.5 oz  GDM n  Hypertension n  No LMP recorded. (Menstrual status: IUD).  Menses:  Cycles amenorrhea with IUD When did they start 9 How many days bleed occasional spotting pain n Contraception Mirena IUD.  Planning pregnancy: n  Concerns in addition to routine health maintenance?  Migraines increasing with Mirena IUD.  GYNECOLOGIC HISTORY:   Surgery: BTO and left oophorectomy  History sexually transmitted infections:No STD history   Safe?  y  STI testing offered?  y  History of abnormal Pap smear: n  Problems with sex? (bleeding/pain) n  Family history of: breast cancer: mgm   Uterine cancer: n ovarian cancer: n   colon cancer: n    HEALTH MAINTENANCE:  Cholesterol: (No results found for: CHOL History of abnormal lipids: n  Mammo: y . History of abnormal Mammo: n   Regular Self Breast Exams: n Calcium/Vitamin D or Vitamin: y Exercise n    TSH: (  TSH   Date Value Ref Range Status   2020 1.83 0.40 - 4.00 mU/L Final    )  Pap; (  Lab Results   Component Value Date    PAP ASC-US 2020    PAP NIL 2019    PAP NIL 2018    )    HISTORY:  OB History    Para Term  AB Living   3 3 3 0 0 3   SAB TAB Ectopic Multiple Live Births   0 0 0 0 0      # Outcome Date GA Lbr Maurice/2nd Weight Sex Delivery Anes PTL Lv   3 Term            2 Term            1 Term              Past Medical History:   Diagnosis Date     Attention deficit disorder without mention of hyperactivity 2005     Dependent personality disorder (H) 2011     FH: breast cancer      DAFNE (generalised anxiety disorder)      Irritable bowel syndrome with both constipation and diarrhea 2017     MDD (major depressive disorder)      Migraine, unspecified, without mention of intractable migraine without mention of status migrainosus 3/13/2000     Tobacco abuse, in remission      Unspecified sinusitis (chronic)  3/8/2001     Past Surgical History:   Procedure Laterality Date     ENDOSCOPY UPPER, COLONOSCOPY, COMBINED N/A 2015    Procedure: COMBINED ENDOSCOPY UPPER, COLONOSCOPY;  Surgeon: Griffin Barrientos DO;  [facility]     LAPAROSCOPIC CYSTECTOMY OVARIAN (BENIGN) Right 2016    Procedure: LAPAROSCOPIC CYSTECTOMY OVARIAN (BENIGN);  Surgeon: Kuldip España MD;  [facility]     LAPAROSCOPIC SALPINGO-OOPHORECTOMY Left 2016    Procedure: LAPAROSCOPIC SALPINGO-OOPHORECTOMY;  Surgeon: Kuldip España MD;  [facility]     LAPAROSCOPY DIAGNOSTIC (GYN) Left 2016    Procedure: LAPAROSCOPY DIAGNOSTIC (GYN);  Surgeon: Kuldip España MD;  [facility]     TUBAL LIGATION  2006     wisdom teeth       Family History   Problem Relation Age of Onset     Breast Cancer Maternal Grandmother      Hypertension Maternal Grandfather      Hyperlipidemia Maternal Grandfather      Diabetes Paternal Grandmother      Asthma No family hx of      Social History     Socioeconomic History     Marital status:      Spouse name: None     Number of children: None     Years of education: None     Highest education level: None   Occupational History     None   Social Needs     Financial resource strain: None     Food insecurity     Worry: None     Inability: None     Transportation needs     Medical: None     Non-medical: None   Tobacco Use     Smoking status: Former Smoker     Packs/day: 0.50     Years: 13.00     Pack years: 6.50     Types: Cigarettes     Quit date: 2020     Years since quittin.1     Smokeless tobacco: Never Used   Substance and Sexual Activity     Alcohol use: Yes     Alcohol/week: 0.0 standard drinks     Comment: frequency: rarely     Drug use: No     Sexual activity: Yes     Partners: Male   Lifestyle     Physical activity     Days per week: None     Minutes per session: None     Stress: None   Relationships     Social connections     Talks on phone: None     Gets together: None     Attends  [employer] service: None     Active member of club or organization: None     Attends meetings of clubs or organizations: None     Relationship status: None     Intimate partner violence     Fear of current or ex partner: None     Emotionally abused: None     Physically abused: None     Forced sexual activity: None   Other Topics Concern      Service No     Blood Transfusions Yes     Comment: OPermits if needed     Caffeine Concern Yes     Comment: soda > 32 oz daily     Occupational Exposure No     Hobby Hazards No     Sleep Concern No     Stress Concern Yes     Weight Concern Yes     Special Diet No     Back Care No     Exercise Yes     Comment: daily     Bike Helmet Not Asked     Seat Belt Yes     Self-Exams Not Asked     Parent/sibling w/ CABG, MI or angioplasty before 65F 55M? No   Social History Narrative     None       Current Outpatient Medications:      Acetaminophen (TYLENOL PO), Take by mouth every 6 hours as needed , Disp: , Rfl:      albuterol (PROAIR HFA/PROVENTIL HFA/VENTOLIN HFA) 108 (90 Base) MCG/ACT inhaler, INHALE 2 PUFFS INTO LUNGS EVERY 4 HOURS AS NEEDED FOR SHORTNESS OF BREATH, Disp: 8.5 g, Rfl: 3     Ascorbic Acid (VITAMIN C PO), Take 1,000 mg by mouth 2 times daily, Disp: , Rfl:      citalopram (CELEXA) 40 MG tablet, TAKE 1 TABLET BY MOUTH DAILY, Disp: 90 tablet, Rfl: 3     Cyanocobalamin (VITAMIN B 12 PO), Take 500 mcg by mouth daily, Disp: , Rfl:      EPINEPHrine (EPIPEN/ADRENACLICK/OR ANY BX GENERIC EQUIV) 0.3 MG/0.3ML injection 2-pack, Inject 0.3 mLs (0.3 mg) into the muscle as needed for anaphylaxis, Disp: 2 each, Rfl: 3     fluticasone (FLONASE) 50 MCG/ACT nasal spray, Spray 1 spray into both nostrils daily, Disp: , Rfl:      hydrOXYzine (ATARAX) 25 MG tablet, TAKE 1 TO 2 TABLETS BY MOUTH 3 TIMES A DAY AS NEEDED, Disp: 60 tablet, Rfl: 3     methylphenidate (CONCERTA) 54 MG CR tablet, Take 1 tablet (54 mg) by mouth daily, Disp: 30 tablet, Rfl: 0     methylphenidate (CONCERTA) 54 MG  CR tablet, Take 1 tablet (54 mg) by mouth every morning, Disp: 30 tablet, Rfl: 0     methylphenidate (CONCERTA) 54 MG CR tablet, Take 1 tablet (54 mg) by mouth daily, Disp: 30 tablet, Rfl: 0     nicotine (NICODERM CQ) 14 MG/24HR 24 hr patch, Place 1 patch onto the skin every 24 hours, Disp: 30 patch, Rfl: 1     nicotine (NICODERM CQ) 21 MG/24HR 24 hr patch, Place 1 patch onto the skin every 24 hours, Disp: 30 patch, Rfl: 1     nicotine (NICODERM CQ) 7 MG/24HR 24 hr patch, Place 1 patch onto the skin every 24 hours, Disp: 30 patch, Rfl: 1     omeprazole (PRILOSEC) 40 MG DR capsule, TAKE 1 CAPSULE BY MOUTH DAILY, TAKE 30 TO 60 MINUTES PRIOR TO A MEAL, Disp: 90 capsule, Rfl: 3     traZODone (DESYREL) 100 MG tablet, TAKE 2 TABLETS (200MG) BY MOUTH AT BEDTIME, Disp: 60 tablet, Rfl: 5     docusate sodium (COLACE) 100 MG capsule, TAKE 1 CAPSULE BY MOUTH DAILY (Patient not taking: Reported on 3/10/2021), Disp: 30 capsule, Rfl: 0    Current Facility-Administered Medications:      levonorgestrel (MIRENA) 20 MCG/24HR IUD 20 mcg, 1 each, Intrauterine, Once, Levi Mcrae APRN CNM     Allergies   Allergen Reactions     Amoxicillin Diarrhea     Bee Venom      Bee venom (honey bee)       Food      Artificial cinnamon      Latex      Sulfa Drugs      Sulfa (sulfonamide antibiotics)          Past medical, surgical, social and family history were reviewed and updated in [facility].    ROS:    CONSTITUTIONAL:     NEGATIVE for fever, chills, change in weight  INTEGUMENTARY/SKIN:       NEGATIVE for worrisome rashes, moles or lesions  EYES:     NEGATIVE for vision changes or irritation  ENT/MOUTH: NEGATIVE for ear, mouth and throat problems  RESP:     NEGATIVE for significant cough or SOB  CV:   NEGATIVE for chest pain, palpitations or peripheral edema  GI:     NEGATIVE for nausea, abdominal pain, heartburn, or change in bowel habits.  Constipation   :   NEGATIVE for frequency, dysuria, hematuria, vaginal discharge, or irregular  "bleeding,incontinence  MUSCULOSKELETAL:     NEGATIVE for significant arthralgias or myalgia  NEURO:      NEGATIVE for weakness, dizziness.  Some tingling in arms in morning  ENDOCRINE:      NEGATIVE for temperature intolerance, skin/hair changes  PSYCHIATRIC:    Changes in mood or affect. Reports quitting smoking and migraines affecting mood.    EXAM:   /78 (BP Location: Left arm, Patient Position: Chair, Cuff Size: Adult Regular)   Ht 1.753 m (5' 9\")   Wt 103 kg (227 lb)   BMI 33.52 kg/m     BMI: Body mass index is 33.52 kg/m .  Constitutional: healthy, alert and no distress  Head: Normocephalic. No masses, lesions, tenderness or abnormalities  Neck: Neck supple. Trachea midline. No adenopathy. Thyroid symmetric, normal size.   Cardiovascular: RRR.   Respiratory: lungs clear   Breast: Breasts reveal mild symmetric fibrocystic densities, but there are no dominant, discrete, fixed or suspicious masses found.  Gastrointestinal: Abdomen soft, non-tender, non-distended. No masses, organomegaly.  :  Vulva:  No external lesions, normal female hair distribution, no inguinal adenopathy.    Urethra:  Midline, non-tender, well supported, no discharge  Vagina:  Moist, pink, no abnormal discharge, no lesions  Cervix: clean IUD strings visible  Uterus:  Normal size,  non-tender, freely mobile  Ovaries:  No masses appreciated  Rectal Exam: deferred  Musculoskeletal: extremities normal  Skin: no suspicious lesions or rashes  Psychiatric: Affect appropriate, cooperative,mentation appears normal.     COUNSELING:   regular exercise  healthy diet/nutrition  Immunizations   contraception  family planning  Folic Acid Counseling     reports that she quit smoking about 14 months ago. Her smoking use included cigarettes. She has a 6.50 pack-year smoking history. She has never used smokeless tobacco.  Tobacco Cessation Action Plan: quit   Body mass index is 33.52 kg/m .  Weight management plan: healthy eating and exercise  FRAX " Risk Assessment    ASSESSMENT:  40 year old female with satisfactory annual exam  Need of cervical cancer screening  IUD strings visualized  Hx of BTO and left oophorectomy   Mammogram done in June, 2020  Reports increase in migraines with Mirena IUD  Request IUD removal and Nexplanon insertion  Chronic idiopathic constipation  Declines flu shot with education  PLAN:   Pap with High Risk hpv  IUD check  mammogram order for June, 2021  Colace 100 mg PO BID  Schedule appt for tomorrow for IUD removal and Nexplanon insertion    Return to office: 1 year for well woman care and as needed    30 minutes spent on the date of the encounter doing chart review, history and exam, documentation and further activities as noted above     WINNIE Alvarez, CNM

## 2021-03-10 NOTE — PATIENT INSTRUCTIONS
Return to office in one year for well woman care and as needed.    Thank you for allowing Levi ZHOU CNM and our OB team to participate in your care.  If you have a scheduling or an appointment question please contact Mariya [facility] Coordinator at their direct line [phone].   ALL nursing questions or concerns can be directed to your OB nurse at: [phone] Gina Azevedo/Gloria

## 2021-03-11 ENCOUNTER — OFFICE VISIT (OUTPATIENT)
Dept: OBGYN | Facility: OTHER | Age: 41
End: 2021-03-11
Attending: ADVANCED PRACTICE MIDWIFE
Payer: COMMERCIAL

## 2021-03-11 VITALS
SYSTOLIC BLOOD PRESSURE: 133 MMHG | BODY MASS INDEX: 33.62 KG/M2 | WEIGHT: 227 LBS | DIASTOLIC BLOOD PRESSURE: 74 MMHG | HEIGHT: 69 IN

## 2021-03-11 DIAGNOSIS — Z30.017 NEXPLANON INSERTION: ICD-10-CM

## 2021-03-11 DIAGNOSIS — Z30.017 ENCOUNTER FOR INITIAL PRESCRIPTION OF IMPLANTABLE SUBDERMAL CONTRACEPTIVE: ICD-10-CM

## 2021-03-11 DIAGNOSIS — Z30.432 ENCOUNTER FOR IUD REMOVAL: Primary | ICD-10-CM

## 2021-03-11 PROCEDURE — G0463 HOSPITAL OUTPT CLINIC VISIT: HCPCS | Mod: 25

## 2021-03-11 PROCEDURE — 58301 REMOVE INTRAUTERINE DEVICE: CPT | Performed by: ADVANCED PRACTICE MIDWIFE

## 2021-03-11 PROCEDURE — 11981 INSERTION DRUG DLVR IMPLANT: CPT | Performed by: ADVANCED PRACTICE MIDWIFE

## 2021-03-11 RX ADMIN — ETONOGESTREL 68 MG: 68 IMPLANT SUBCUTANEOUS at 15:10

## 2021-03-11 ASSESSMENT — ANXIETY QUESTIONNAIRES: GAD7 TOTAL SCORE: 13

## 2021-03-11 ASSESSMENT — PAIN SCALES - GENERAL: PAINLEVEL: NO PAIN (0)

## 2021-03-11 ASSESSMENT — MIFFLIN-ST. JEOR: SCORE: 1754.05

## 2021-03-11 NOTE — PROGRESS NOTES
Procedure:  After verbal consent was obtained from the patient, IUD strings were grasped with ring forcep and IUD easily removed intact with minimal patient discomfort noted.  No bleeding noted.    CC: nexplanon insertion    HPI: Cristiana Aguila is a 40 year old   who is here for nexplanon insertion.  She is currently using IUD for contraception but would like to continue with a more long term with less maintenance.  We have discussed options for long term reversible contraception including nexplanon, depo provera, IUD and she has chosen nexplanon.  She is otherwise without complaints.  No LMP recorded. (Menstrual status: IUD)..    ROS:  CONSTITUTIONAL: NEGATIVE for fever, chills, change in weight  RESP: NEGATIVE for significant cough or SOB  CV: NEGATIVE for chest pain, palpitations or peripheral edema  GI: NEGATIVE for nausea, abdominal pain, heartburn, or change in bowel habits  : NEGATIVE for frequency, dysuria, hematuria, vaginal discharge  PSYCHIATRIC: NEGATIVE for changes in mood or affect      Past Medical History:   Diagnosis Date     Attention deficit disorder without mention of hyperactivity 2005     Dependent personality disorder (H) 2011     FH: breast cancer      DAFNE (generalised anxiety disorder)      Irritable bowel syndrome with both constipation and diarrhea 2017     MDD (major depressive disorder)      Migraine, unspecified, without mention of intractable migraine without mention of status migrainosus 3/13/2000     Tobacco abuse, in remission      Unspecified sinusitis (chronic) 3/8/2001       Past Surgical History:   Procedure Laterality Date     ENDOSCOPY UPPER, COLONOSCOPY, COMBINED N/A 2015    Procedure: COMBINED ENDOSCOPY UPPER, COLONOSCOPY;  Surgeon: Griffin Barrientos DO;  [facility]     LAPAROSCOPIC CYSTECTOMY OVARIAN (BENIGN) Right 2016    Procedure: LAPAROSCOPIC CYSTECTOMY OVARIAN (BENIGN);  Surgeon: Kuldip España MD;  [facility]      LAPAROSCOPIC SALPINGO-OOPHORECTOMY Left 1/27/2016    Procedure: LAPAROSCOPIC SALPINGO-OOPHORECTOMY;  Surgeon: Kuldip España MD;  [facility]     LAPAROSCOPY DIAGNOSTIC (GYN) Left 1/27/2016    Procedure: LAPAROSCOPY DIAGNOSTIC (GYN);  Surgeon: Kuldip España MD;  [facility]     TUBAL LIGATION  2006     wisdom teeth           Family History   Problem Relation Age of Onset     Breast Cancer Maternal Grandmother      Hypertension Maternal Grandfather      Hyperlipidemia Maternal Grandfather      Diabetes Paternal Grandmother      Asthma No family hx of           Allergies   Allergen Reactions     Amoxicillin Diarrhea     Bee Venom      Bee venom (honey bee)       Food      Artificial cinnamon      Latex      Sulfa Drugs      Sulfa (sulfonamide antibiotics)          Current Outpatient Medications   Medication Sig Dispense Refill     Acetaminophen (TYLENOL PO) Take by mouth every 6 hours as needed        albuterol (PROAIR HFA/PROVENTIL HFA/VENTOLIN HFA) 108 (90 Base) MCG/ACT inhaler INHALE 2 PUFFS INTO LUNGS EVERY 4 HOURS AS NEEDED FOR SHORTNESS OF BREATH 8.5 g 3     Ascorbic Acid (VITAMIN C PO) Take 1,000 mg by mouth 2 times daily       citalopram (CELEXA) 40 MG tablet TAKE 1 TABLET BY MOUTH DAILY 90 tablet 3     Cyanocobalamin (VITAMIN B 12 PO) Take 500 mcg by mouth daily       docusate sodium (COLACE) 100 MG capsule Take 1 capsule (100 mg) by mouth 2 times daily 180 capsule 3     docusate sodium (COLACE) 100 MG capsule TAKE 1 CAPSULE BY MOUTH DAILY 30 capsule 0     EPINEPHrine (EPIPEN/ADRENACLICK/OR ANY BX GENERIC EQUIV) 0.3 MG/0.3ML injection 2-pack Inject 0.3 mLs (0.3 mg) into the muscle as needed for anaphylaxis 2 each 3     fluticasone (FLONASE) 50 MCG/ACT nasal spray Spray 1 spray into both nostrils daily       hydrOXYzine (ATARAX) 25 MG tablet TAKE 1 TO 2 TABLETS BY MOUTH 3 TIMES A DAY AS NEEDED 60 tablet 3     methylphenidate (CONCERTA) 54 MG CR tablet Take 1 tablet (54 mg) by mouth daily 30 tablet 0      methylphenidate (CONCERTA) 54 MG CR tablet Take 1 tablet (54 mg) by mouth every morning 30 tablet 0     methylphenidate (CONCERTA) 54 MG CR tablet Take 1 tablet (54 mg) by mouth daily 30 tablet 0     nicotine (NICODERM CQ) 14 MG/24HR 24 hr patch Place 1 patch onto the skin every 24 hours 30 patch 1     nicotine (NICODERM CQ) 21 MG/24HR 24 hr patch Place 1 patch onto the skin every 24 hours 30 patch 1     nicotine (NICODERM CQ) 7 MG/24HR 24 hr patch Place 1 patch onto the skin every 24 hours 30 patch 1     omeprazole (PRILOSEC) 40 MG DR capsule TAKE 1 CAPSULE BY MOUTH DAILY, TAKE 30 TO 60 MINUTES PRIOR TO A MEAL 90 capsule 3     traZODone (DESYREL) 100 MG tablet TAKE 2 TABLETS (200MG) BY MOUTH AT BEDTIME 60 tablet 5       Social History     Socioeconomic History     Marital status:      Spouse name: Not on file     Number of children: Not on file     Years of education: Not on file     Highest education level: Not on file   Occupational History     Not on file   Social Needs     Financial resource strain: Not on file     Food insecurity     Worry: Not on file     Inability: Not on file     Transportation needs     Medical: Not on file     Non-medical: Not on file   Tobacco Use     Smoking status: Former Smoker     Packs/day: 0.50     Years: 13.00     Pack years: 6.50     Types: Cigarettes     Quit date: 2020     Years since quittin.1     Smokeless tobacco: Never Used   Substance and Sexual Activity     Alcohol use: Yes     Alcohol/week: 0.0 standard drinks     Comment: frequency: rarely     Drug use: No     Sexual activity: Yes     Partners: Male   Lifestyle     Physical activity     Days per week: Not on file     Minutes per session: Not on file     Stress: Not on file   Relationships     Social connections     Talks on phone: Not on file     Gets together: Not on file     Attends [employer] service: Not on file     Active member of club or organization: Not on file     Attends meetings of clubs or  "organizations: Not on file     Relationship status: Not on file     Intimate partner violence     Fear of current or ex partner: Not on file     Emotionally abused: Not on file     Physically abused: Not on file     Forced sexual activity: Not on file   Other Topics Concern      Service No     Blood Transfusions Yes     Comment: OPermits if needed     Caffeine Concern Yes     Comment: soda > 32 oz daily     Occupational Exposure No     Hobby Hazards No     Sleep Concern No     Stress Concern Yes     Weight Concern Yes     Special Diet No     Back Care No     Exercise Yes     Comment: daily     Bike Helmet Not Asked     Seat Belt Yes     Self-Exams Not Asked     Parent/sibling w/ CABG, MI or angioplasty before 65F 55M? No   Social History Narrative     Not on file       /74 (BP Location: Left arm, Patient Position: Chair, Cuff Size: Adult Regular)   Ht 1.753 m (5' 9\")   Wt 103 kg (227 lb)   BMI 33.52 kg/m      Gen: Healthy appearing female in no acute distress  Ext: no cyanosis/clubbing/edema . Left arm without abnormalities.    Procedure:  After informed consent was obtained, the patient was positioned on the exam table with the  left  arm extended and elbow bent at 90 degree angle.  The biceps grove was identified and a james was placed 8-10cm from the medial epicondyle of the humerus.  A second james was placed a few cm past the original james as a guide.  The insertion site was then swabbed with betadine x 3.  5cc of 1% lidocaine was injected along the insertion tract.  Next the nexplanon was inserted without difficulty in the recommended fashion.  The device was removed and the implant was both visualized and palpated by myself and the patient.  A small amount of bleeding was noted at the insertion site.  A gauze and pressure wrap was applied to the insertion site.  She tolerated the procedure well    Assessment/Plan  1) Contraception, successful nexplanon insertion   Note:  Nexplanon device tip of " applicator punctured skin midway through, prior to releasing the Nexplanon pasquale. Readjusted.  Treated  < 0.5 cm puncture with Triple antibiotic and pressure dressing.    We discussed signs/symptoms of infection and when to call.  We again reviewed potential side effects including irregular bleeding.  She is to call with any questions.  Otherwise, return to clinic prn.    30 minutes spent on the date of the encounter doing chart review, history and exam, documentation and further activities as noted above      WINNIE Alvarez, ANA MM

## 2021-03-11 NOTE — NURSING NOTE
"Chief Complaint   Patient presents with     Contraception     IUD removal, nexplanon insertion        Initial /74 (BP Location: Left arm, Patient Position: Chair, Cuff Size: Adult Regular)   Ht 1.753 m (5' 9\")   Wt 103 kg (227 lb)   BMI 33.52 kg/m   Estimated body mass index is 33.52 kg/m  as calculated from the following:    Height as of this encounter: 1.753 m (5' 9\").    Weight as of this encounter: 103 kg (227 lb).  Medication Reconciliation: complete  Jolly Serrano LPN    "

## 2021-03-11 NOTE — PATIENT INSTRUCTIONS
Return to office in one year for well woman care and as needed.    Thank you for allowing Levi ZHOU CNM and our OB team to participate in your care.  If you have a scheduling or an appointment question please contact Mariya [facility] Health Unit Coordinator at their direct line [phone].   ALL nursing questions or concerns can be directed to your OB nurse at: [phone] Gina Azevedo/Gloria

## 2021-03-12 LAB
COPATH REPORT: NORMAL
PAP: NORMAL

## 2021-03-17 ENCOUNTER — TELEPHONE (OUTPATIENT)
Dept: FAMILY MEDICINE | Facility: OTHER | Age: 41
End: 2021-03-17

## 2021-03-17 DIAGNOSIS — M25.562 ACUTE PAIN OF LEFT KNEE: ICD-10-CM

## 2021-03-17 DIAGNOSIS — M25.562 LEFT KNEE PAIN: ICD-10-CM

## 2021-03-17 DIAGNOSIS — M71.22 BAKER'S CYST OF KNEE, LEFT: Primary | ICD-10-CM

## 2021-03-23 DIAGNOSIS — F90.0 ATTENTION DEFICIT HYPERACTIVITY DISORDER (ADHD), PREDOMINANTLY INATTENTIVE TYPE: ICD-10-CM

## 2021-03-23 RX ORDER — METHYLPHENIDATE HYDROCHLORIDE 54 MG/1
TABLET, EXTENDED RELEASE ORAL
Qty: 30 TABLET | Refills: 0 | Status: SHIPPED | OUTPATIENT
Start: 2021-03-23 | End: 2021-04-26

## 2021-03-23 NOTE — TELEPHONE ENCOUNTER
Not on protocol, please advise      methylphenidate (CONCERTA) 54 MG CR tablet      Last Written Prescription Date:  2/18/21  Last Fill Quantity: 30,   # refills: 0  Last Office Visit: 2/22/21  Future Office visit:       Routing refill request to provider for review/approval because:  Drug not on the [facility], P or [facility] refill protocol or controlled substance

## 2021-03-25 ENCOUNTER — MEDICAL CORRESPONDENCE (OUTPATIENT)
Dept: MRI IMAGING | Facility: HOSPITAL | Age: 41
End: 2021-03-25

## 2021-04-13 ENCOUNTER — HOSPITAL ENCOUNTER (OUTPATIENT)
Dept: MRI IMAGING | Facility: HOSPITAL | Age: 41
Discharge: HOME | End: 2021-04-13
Attending: ORTHOPAEDIC SURGERY | Admitting: ORTHOPAEDIC SURGERY
Payer: COMMERCIAL

## 2021-04-13 DIAGNOSIS — M25.562 PAIN AND SWELLING OF LEFT KNEE: ICD-10-CM

## 2021-04-13 DIAGNOSIS — M25.462 PAIN AND SWELLING OF LEFT KNEE: ICD-10-CM

## 2021-04-13 DIAGNOSIS — M23.92 INTERNAL DERANGEMENT OF LEFT KNEE: ICD-10-CM

## 2021-04-13 PROCEDURE — 73721 MRI JNT OF LWR EXTRE W/O DYE: CPT | Mod: LT

## 2021-04-26 DIAGNOSIS — F90.0 ATTENTION DEFICIT HYPERACTIVITY DISORDER (ADHD), PREDOMINANTLY INATTENTIVE TYPE: ICD-10-CM

## 2021-04-26 RX ORDER — METHYLPHENIDATE HYDROCHLORIDE 54 MG/1
TABLET, EXTENDED RELEASE ORAL
Qty: 30 TABLET | Refills: 0 | Status: SHIPPED | OUTPATIENT
Start: 2021-04-26 | End: 2021-05-28

## 2021-04-26 NOTE — TELEPHONE ENCOUNTER
concerta      Last Written Prescription Date:  3/23/21  Last Fill Quantity: 30,   # refills: 0  Last Office Visit: 2/22/21  Future Office visit:    Next 5 appointments (look out 90 days)    Jun 23, 2021 10:15 AM  (Arrive by 10:00 AM)  SHORT with Sudhakar Cuellar MD  [facility] [place] ([facility] [place] ) [address]  [place] [address]  [phone]

## 2021-05-12 ENCOUNTER — HOSPITAL ENCOUNTER (EMERGENCY)
Facility: HOSPITAL | Age: 41
Discharge: HOME | End: 2021-05-12
Attending: NURSE PRACTITIONER | Admitting: NURSE PRACTITIONER
Payer: COMMERCIAL

## 2021-05-12 VITALS
RESPIRATION RATE: 16 BRPM | TEMPERATURE: 98.3 F | SYSTOLIC BLOOD PRESSURE: 122 MMHG | HEART RATE: 69 BPM | DIASTOLIC BLOOD PRESSURE: 82 MMHG | OXYGEN SATURATION: 97 %

## 2021-05-12 DIAGNOSIS — R11.2 NAUSEA AND VOMITING: ICD-10-CM

## 2021-05-12 DIAGNOSIS — Z20.822 SUSPECTED COVID-19 VIRUS INFECTION: Primary | ICD-10-CM

## 2021-05-12 LAB
LABORATORY COMMENT REPORT: NORMAL
SARS-COV-2 RNA RESP QL NAA+PROBE: NEGATIVE
SPECIMEN SOURCE: NORMAL
SPECIMEN SOURCE: NORMAL
STREP GROUP A PCR: NOT DETECTED

## 2021-05-12 PROCEDURE — C9803 HOPD COVID-19 SPEC COLLECT: HCPCS

## 2021-05-12 PROCEDURE — U0005 INFEC AGEN DETEC AMPLI PROBE: HCPCS | Performed by: NURSE PRACTITIONER

## 2021-05-12 PROCEDURE — U0003 INFECTIOUS AGENT DETECTION BY NUCLEIC ACID (DNA OR RNA); SEVERE ACUTE RESPIRATORY SYNDROME CORONAVIRUS 2 (SARS-COV-2) (CORONAVIRUS DISEASE [COVID-19]), AMPLIFIED PROBE TECHNIQUE, MAKING USE OF HIGH THROUGHPUT TECHNOLOGIES AS DESCRIBED BY CMS-2020-01-R: HCPCS | Performed by: NURSE PRACTITIONER

## 2021-05-12 PROCEDURE — 99213 OFFICE O/P EST LOW 20 MIN: CPT | Performed by: NURSE PRACTITIONER

## 2021-05-12 PROCEDURE — G0463 HOSPITAL OUTPT CLINIC VISIT: HCPCS

## 2021-05-12 PROCEDURE — 87651 STREP A DNA AMP PROBE: CPT | Performed by: NURSE PRACTITIONER

## 2021-05-12 RX ORDER — ONDANSETRON 4 MG/1
4 TABLET, ORALLY DISINTEGRATING ORAL EVERY 8 HOURS PRN
Qty: 10 TABLET | Refills: 0 | Status: SHIPPED | OUTPATIENT
Start: 2021-05-12 | End: 2021-06-25

## 2021-05-12 ASSESSMENT — ENCOUNTER SYMPTOMS
HEADACHES: 1
FEVER: 0
VOMITING: 1
COUGH: 1
CHILLS: 1
TROUBLE SWALLOWING: 0
EYE PAIN: 0
MYALGIAS: 1
SHORTNESS OF BREATH: 1
FATIGUE: 1
SORE THROAT: 1
EYE ITCHING: 0
SINUS PRESSURE: 0
PSYCHIATRIC NEGATIVE: 1
NAUSEA: 1
DIARRHEA: 0
EYE REDNESS: 0
APPETITE CHANGE: 0
RHINORRHEA: 1

## 2021-05-12 NOTE — ED PROVIDER NOTES
History     Chief Complaint   Patient presents with     Covid Concern     HPI  Cristiana Aguila is a 41 year old female who presents to urgent care today (ambulatory) accompanied by spouse for complaints of fatigue, chills, congestion, rhinorrhea, sore throat, cough, SOB, nausea, vomiting, myalgia and headaches.  Vomiting off and on for the past 3 days, declines lab work.  Nonsmoker.  Asthma, last took albuterol inhaler this morning which is helpful.  Has seasonal allergies has not taken her Flonase for quite a while and hasn't taken cetrizine for 3+ days.  Denies fever, diarrhea, wheezing or chest pain.  Son is currently COVID +.  No other concerns.     Allergies:  Allergies   Allergen Reactions     Amoxicillin Diarrhea     Bee Venom      Bee venom (honey bee)       Food      Artificial cinnamon      Sulfa Drugs      Sulfa (sulfonamide antibiotics)        Latex Rash       Problem List:    Patient Active Problem List    Diagnosis Date Noted     Encounter for initial prescription of implantable subdermal contraceptive 03/11/2021     Priority: Medium     Psychophysiological insomnia 12/21/2020     Priority: Medium     Mild intermittent reactive airway disease with wheezing without complication 12/21/2020     Priority: Medium     Encounter for insertion of intrauterine contraceptive device (IUD) 04/29/2020     Priority: Medium     Well woman exam with routine gynecological exam 02/14/2018     Priority: Medium     Irritable bowel syndrome with both constipation and diarrhea 08/02/2017     Priority: Medium     Recurrent major depressive disorder, in partial remission (H) 08/02/2017     Priority: Medium     ACP (advance care planning) 03/02/2016     Priority: Medium     Advance Care Planning 3/2/2016: Receipt of ACP document:  Received: Health Care Directive which was witnessed or notarized on 1-25-16.  Document previously scanned on 2-5-16.  Validation form completed and sent to be scanned.  Code Status needs to be  updated to reflect choices in most recent ACP document.  Confirmed/documented designated decision maker(s).  Added by Rachel Barber RN Advance Care Planning Liaison with [employer]             FH: breast cancer      Priority: Medium     Generalized anxiety disorder      Priority: Medium     Diagnosis updated by automated process. Provider to review and confirm.       Attention deficit disorder 05/31/2005     Priority: Medium     Problem list name updated by automated process. 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     FH: breast cancer      DAFNE (generalised anxiety disorder)      Irritable bowel syndrome with both constipation and diarrhea 8/2/2017     MDD (major depressive disorder)      Migraine, unspecified, without mention of intractable migraine without mention of status migrainosus 3/13/2000     Tobacco abuse, in remission      Unspecified sinusitis (chronic) 3/8/2001       Past Surgical History:    Past Surgical History:   Procedure Laterality Date     ENDOSCOPY UPPER, COLONOSCOPY, COMBINED N/A 9/4/2015    Procedure: COMBINED ENDOSCOPY UPPER, COLONOSCOPY;  Surgeon: Griffin Barrientos DO;  Location: [facility]     LAPAROSCOPIC CYSTECTOMY OVARIAN (BENIGN) Right 1/27/2016    Procedure: LAPAROSCOPIC CYSTECTOMY OVARIAN (BENIGN);  Surgeon: Kuldip España MD;  Location: [facility]     LAPAROSCOPIC SALPINGO-OOPHORECTOMY Left 1/27/2016    Procedure: LAPAROSCOPIC SALPINGO-OOPHORECTOMY;  Surgeon: Kuldip España MD;  Location: [facility]     LAPAROSCOPY DIAGNOSTIC (GYN) Left 1/27/2016    Procedure: LAPAROSCOPY DIAGNOSTIC (GYN);  Surgeon: Kuldip España MD;  Location: [facility]     TUBAL LIGATION  2006     wisdom teeth         Family History:    Family History   Problem Relation Age of Onset     Breast Cancer Maternal Grandmother      Hypertension Maternal  Grandfather      Hyperlipidemia Maternal Grandfather      Diabetes Paternal Grandmother      Asthma No family hx of        Social History:  Marital Status:   [2]  Social History     Tobacco Use     Smoking status: Former Smoker     Packs/day: 0.50     Years: 13.00     Pack years: 6.50     Types: Cigarettes     Quit date: 2020     Years since quittin.3     Smokeless tobacco: Never Used   Substance Use Topics     Alcohol use: Yes     Alcohol/week: 0.0 standard drinks     Comment: frequency: rarely     Drug use: No        Medications:    Acetaminophen (TYLENOL PO)  albuterol (PROAIR HFA/PROVENTIL HFA/VENTOLIN HFA) 108 (90 Base) MCG/ACT inhaler  Ascorbic Acid (VITAMIN C PO)  citalopram (CELEXA) 40 MG tablet  CONCERTA 54 MG CR tablet  Cyanocobalamin (VITAMIN B 12 PO)  docusate sodium (COLACE) 100 MG capsule  docusate sodium (COLACE) 100 MG capsule  hydrOXYzine (ATARAX) 25 MG tablet  methylphenidate (CONCERTA) 54 MG CR tablet  methylphenidate (CONCERTA) 54 MG CR tablet  nicotine (NICODERM CQ) 14 MG/24HR 24 hr patch  nicotine (NICODERM CQ) 21 MG/24HR 24 hr patch  omeprazole (PRILOSEC) 40 MG DR capsule  ondansetron (ZOFRAN-ODT) 4 MG ODT tab  traZODone (DESYREL) 100 MG tablet  EPINEPHrine (EPIPEN/ADRENACLICK/OR ANY BX GENERIC EQUIV) 0.3 MG/0.3ML injection 2-pack  fluticasone (FLONASE) 50 MCG/ACT nasal spray  nicotine (NICODERM CQ) 7 MG/24HR 24 hr patch      Review of Systems   Constitutional: Positive for chills and fatigue. Negative for appetite change and fever.   HENT: Positive for congestion, rhinorrhea and sore throat. Negative for ear pain, sinus pressure and trouble swallowing.    Eyes: Negative for pain, redness and itching.   Respiratory: Positive for cough and shortness of breath.    Cardiovascular: Negative for chest pain.   Gastrointestinal: Positive for nausea and vomiting. Negative for diarrhea.   Genitourinary: Negative for decreased urine volume.   Musculoskeletal: Positive for myalgias.    Skin: Negative for rash.   Neurological: Positive for headaches.   Psychiatric/Behavioral: Negative.      Physical Exam   BP: 122/82  Pulse: 69  Temp: 98.3  F (36.8  C)  Resp: 16  SpO2: 97 %    Physical Exam  Vitals signs and nursing note reviewed.   Constitutional:       General: She is not in acute distress.     Appearance: She is not ill-appearing.   HENT:      Right Ear: Tympanic membrane, ear canal and external ear normal.      Left Ear: Tympanic membrane, ear canal and external ear normal.      Nose: Congestion and rhinorrhea present.      Mouth/Throat:      Mouth: Mucous membranes are moist.      Pharynx: Oropharynx is clear. Posterior oropharyngeal erythema present.   Eyes:      Extraocular Movements: Extraocular movements intact.      Conjunctiva/sclera: Conjunctivae normal.      Pupils: Pupils are equal, round, and reactive to light.   Neck:      Musculoskeletal: Normal range of motion and neck supple.   Cardiovascular:      Rate and Rhythm: Normal rate and regular rhythm.      Pulses: Normal pulses.      Heart sounds: Normal heart sounds.   Pulmonary:      Effort: Pulmonary effort is normal.      Breath sounds: Normal breath sounds. No wheezing, rhonchi or rales.   Abdominal:      General: Bowel sounds are normal.      Palpations: Abdomen is soft.      Tenderness: There is no abdominal tenderness.   Lymphadenopathy:      Cervical: Cervical adenopathy present.   Skin:     General: Skin is warm and dry.      Capillary Refill: Capillary refill takes less than 2 seconds.   Neurological:      Mental Status: She is alert.   Psychiatric:         Mood and Affect: Mood normal.       ED Course     Results for orders placed or performed during the hospital encounter of 05/12/21 (from the past 24 hour(s))   Symptomatic SARS-CoV-2 COVID-19 Virus (Coronavirus) by PCR    Specimen: Nasopharyngeal   Result Value Ref Range    SARS-CoV-2 Virus Specimen Source Nasopharyngeal     SARS-CoV-2 PCR Result NEGATIVE      SARS-CoV-2 PCR Comment       Testing was performed using the Xpert Xpress SARS-CoV-2 Assay on the [employer] - 2 Gene-Xpert   Instrument Systems. Additional information about this Emergency Use Authorization (EUA)   assay can be found via the Lab Guide.     Group A Streptococcus PCR Throat Swab    Specimen: Throat   Result Value Ref Range    Specimen Description Throat     Strep Group A PCR Not Detected NDET^Not Detected       Medications - No data to display    Assessments & Plan (with Medical Decision Making)     I have reviewed the nursing notes.    I have reviewed the findings, diagnosis, plan and need for follow up with the patient.  (Z20.822) Suspected COVID-19 virus infection  (primary encounter diagnosis)  Plan: COVID-19 GetWell Loop Referral  Patient to start taking her cetirizine and Flonase again to see if that helps with the congestion and rhinorrhea.  Albuterol effective for SOB per patient.  Lungs clear, no wheezing, rales or rhonchi.  Son is COVID+.  Patient to monitor for any signs of secondary infection and follow up as needed.    COVID TEST PENDING-WILL UPDATE YOU ON RESULTS  STREP TEST-NEGATIVE    Zofran as needed for nausea and vomiting.    Symptomatic treatments recommended.  -Discussed that antibiotics would not help symptoms of viral URI. Education provided on symptoms of secondary bacterial infection such as new fever, chills, rigors, shortness of breath, increased work of breathing, that can occur with viral URI and need for further evaluation, if they occur.   - Ensure you are staying hydrated by drinking plenty of fluids or eating foods such as popsicles, jello, pudding.  - Honey can be soothing for sore throat  - Warm salt water gurgles can help soothe sore throat  - Rest  - Humidifier can help with congestion and help keep mucus membranes such as throat and nose from drying out.  - Sleeping slightly propped up can help with congestion and postnasal drainage that can worsen cough at bedtime.  -  As long as you have never been told to take Tylenol and/or Ibuprofen you can use them to manage fever and body aches per package instructions  Make sure you eat when you take ibuprofen to avoid stomach upset.  - OTC cough medications per package instructions to help with cough. Check to see if the cough/cold medication already has acetaminophen (Tylenol) in it. If it does avoid taking additional Tylenol.  - If sudden onset of new fever, worsening symptoms return for further evaluation.  - OTC nasal steroid such as Flonase can help decrease sinus inflammation to help with congestion.  - Education provided on symptoms of post-viral bacterial infections including ear infection and pneumonia. This would require re-evaluation for treatment.    Return to urgent care/ED with any worsening in condition or additional concerns.     (R11.2) Nausea and vomiting  Plan: COVID-19 GetWell Loop Referral  Declines lab work at this time    Zofran ordered for nausea and vomiting   Patient to return to urgent care/ED with any worsening in conditions or additional concerns.     Discharge Medication List as of 5/12/2021 10:22 AM      START taking these medications    Details   ondansetron (ZOFRAN-ODT) 4 MG ODT tab Take 1 tablet (4 mg) by mouth every 8 hours as needed for nausea, Disp-10 tablet, R-0, E-Prescribe           Final diagnoses:   Nausea and vomiting   Suspected COVID-19 virus infection     5/12/2021   [facility]     Susan Rucker, NP  05/12/21 0380

## 2021-05-12 NOTE — DISCHARGE INSTRUCTIONS
COVID TEST PENDING-WILL UPDATE YOU ON RESULTS  STREP TEST-NEGATIVE    Zofran as needed for nausea and vomiting.    Symptomatic treatments recommended.  -Discussed that antibiotics would not help symptoms of viral URI. Education provided on symptoms of secondary bacterial infection such as new fever, chills, rigors, shortness of breath, increased work of breathing, that can occur with viral URI and need for further evaluation, if they occur.   - Ensure you are staying hydrated by drinking plenty of fluids or eating foods such as popsicles, jello, pudding.  - Honey can be soothing for sore throat  - Warm salt water gurgles can help soothe sore throat  - Rest  - Humidifier can help with congestion and help keep mucus membranes such as throat and nose from drying out.  - Sleeping slightly propped up can help with congestion and postnasal drainage that can worsen cough at bedtime.  - As long as you have never been told to take Tylenol and/or Ibuprofen you can use them to manage fever and body aches per package instructions  Make sure you eat when you take ibuprofen to avoid stomach upset.  - OTC cough medications per package instructions to help with cough. Check to see if the cough/cold medication already has acetaminophen (Tylenol) in it. If it does avoid taking additional Tylenol.  - If sudden onset of new fever, worsening symptoms return for further evaluation.  - OTC nasal steroid such as Flonase can help decrease sinus inflammation to help with congestion.  - Education provided on symptoms of post-viral bacterial infections including ear infection and pneumonia. This would require re-evaluation for treatment.    Return to urgent care/ED with any worsening in condition or additional concerns.

## 2021-05-12 NOTE — ED TRIAGE NOTES
Pt presents with being sick for 3 days with nausea/vomiting, sore throat, shortness of breath, fever off and on, bodyaches, headache. States that she was exposed by her son.

## 2021-05-27 DIAGNOSIS — F90.0 ATTENTION DEFICIT HYPERACTIVITY DISORDER (ADHD), PREDOMINANTLY INATTENTIVE TYPE: ICD-10-CM

## 2021-05-27 DIAGNOSIS — Z91.030 BEE STING ALLERGY: ICD-10-CM

## 2021-05-27 DIAGNOSIS — J30.2 SEASONAL ALLERGIC RHINITIS, UNSPECIFIED TRIGGER: Primary | ICD-10-CM

## 2021-05-27 NOTE — TELEPHONE ENCOUNTER
Ceoncerta      Last Written Prescription Date:  04/26/21  Last Fill Quantity: 30,   # refills: 0  Last Office Visit: 02/22/21  Future Office visit:    Next 5 appointments (look out 90 days)    Jun 23, 2021 10:15 AM  (Arrive by 10:00 AM)  SHORT with Sudhakar Cuellar MD  [facility] [place] ([facility] [place] ) [address]  [place] [address]  [phone]         Epipen      Last Written Prescription Date:  08/05/19  Last Fill Quantity: 2,   # refills: 3  Last Office Visit: 02/22/21  Future Office visit:    Next 5 appointments (look out 90 days)    Jun 23, 2021 10:15 AM  (Arrive by 10:00 AM)  SHORT with Sudhakar Cuellar MD  [facility] [place] ([facility] [place] ) [address]  [place] [address]  [phone]         Flonase      Last Written Prescription Date:  08/05/19  Last Fill Quantity: 1,   # refills: 0  Last Office Visit: 02/22/21  Future Office visit:    Next 5 appointments (look out 90 days)    Jun 23, 2021 10:15 AM  (Arrive by 10:00 AM)  SHORT with Sudhakar Cuellar MD  [facility] [place] ([facility] [place] ) [address]  [place] [address]  [phone]

## 2021-05-28 RX ORDER — FLUTICASONE PROPIONATE 50 MCG
SPRAY, SUSPENSION (ML) NASAL
Qty: 16 G | Refills: 3 | Status: SHIPPED | OUTPATIENT
Start: 2021-05-28 | End: 2021-10-18

## 2021-05-28 RX ORDER — METHYLPHENIDATE HYDROCHLORIDE 54 MG/1
TABLET, EXTENDED RELEASE ORAL
Qty: 30 TABLET | Refills: 0 | Status: SHIPPED | OUTPATIENT
Start: 2021-05-28 | End: 2021-06-23

## 2021-05-28 RX ORDER — EPINEPHRINE 0.3 MG/.3ML
INJECTION SUBCUTANEOUS
Qty: 2 EACH | Refills: 1 | Status: SHIPPED | OUTPATIENT
Start: 2021-05-28 | End: 2023-07-07

## 2021-06-16 NOTE — PROGRESS NOTES
"    Assessment & Plan     Attention deficit hyperactivity disorder (ADHD), predominantly inattentive type  Some worse sx with new business and noticed harder time staying on task in last 6 months.  No s/s of worsening depression or anxiety. Discussed options. Pt has been on 54mg of Cncerta for decade. Will max it out at 72mg. Follow-up in 1 month   - methylphenidate (CONCERTA) 36 MG CR tablet; 2 tablets orally daily    Chronic pain of left knee  Discussed and reviewed MRI. No notes from OA. Discussed options.  Pt wants to seek second opinion. Referral sent.   - Orthopedic  Referral; Future    Abnormal MRI, knee  As above.   - Orthopedic  Referral; Future    Mild intermittent reactive airway disease with wheezing without complication  Overall good control.  ACT score little high. no change in meds needed at this time.            BMI:   Estimated body mass index is 33.67 kg/m  as calculated from the following:    Height as of this encounter: 1.753 m (5' 9\").    Weight as of this encounter: 103.4 kg (228 lb).           No follow-ups on file.    Sudhakar Cuellar MD  [facility] Zendejas is a 41 year old who presents for the following health issues  accompanied by her spouse:    HPI     Depression and Anxiety Follow-Up    How are you doing with your depression since your last visit? Improved     How are you doing with your anxiety since your last visit?  No change    Are you having other symptoms that might be associated with depression or anxiety? No    Have you had a significant life event? Grief or Loss     Do you have any concerns with your use of alcohol or other drugs? No    Social History     Tobacco Use     Smoking status: Former Smoker     Packs/day: 0.50     Years: 13.00     Pack years: 6.50     Types: Cigarettes     Quit date: 2020     Years since quittin.4     Smokeless tobacco: Never Used   Substance Use Topics     Alcohol use: Yes     " Alcohol/week: 0.0 standard drinks     Comment: frequency: rarely     Drug use: No     PHQ 2/22/2021 3/10/2021 6/23/2021   PHQ-9 Total Score 12 15 11   Q9: Thoughts of better off dead/self-harm past 2 weeks Not at all Not at all Not at all     DAFNE-7 SCORE 2/22/2021 3/10/2021 6/23/2021   Total Score 15 13 12     Last PHQ-9 6/23/2021   1.  Little interest or pleasure in doing things 1   2.  Feeling down, depressed, or hopeless 1   3.  Trouble falling or staying asleep, or sleeping too much 1   4.  Feeling tired or having little energy 2   5.  Poor appetite or overeating 1   6.  Feeling bad about yourself 1   7.  Trouble concentrating 2   8.  Moving slowly or restless 2   Q9: Thoughts of better off dead/self-harm past 2 weeks 0   PHQ-9 Total Score 11   Difficulty at work, home, or with people Somewhat difficult     DAFNE-7  6/23/2021   1. Feeling nervous, anxious, or on edge 2   2. Not being able to stop or control worrying 1   3. Worrying too much about different things 2   4. Trouble relaxing 2   5. Being so restless that it is hard to sit still 1   6. Becoming easily annoyed or irritable 3   7. Feeling afraid, as if something awful might happen 1   DAFNE-7 Total Score 12   If you checked any problems, how difficult have they made it for you to do your work, take care of things at home, or get along with other people? Somewhat difficult       Suicide Assessment Five-step Evaluation and Treatment (SAFE-T)    Asthma Follow-Up    Was ACT completed today?    Yes    ACT Total Scores 6/23/2021   ACT TOTAL SCORE (Goal Greater than or Equal to 20) 17   In the past 12 months, how many times did you visit the emergency room for your asthma without being admitted to the hospital? 0   In the past 12 months, how many times were you hospitalized overnight because of your asthma? 0       How many days per week do you miss taking your asthma controller medication?  0    Please describe any recent triggers for your asthma: upper  "respiratory infections, pollens, animal dander and humidity    Have you had any Emergency Room Visits, Urgent Care Visits, or Hospital Admissions since your last office visit?  No        Medication Followup of concerta    Taking Medication as prescribed: yes    Side Effects:  None    Medication Helping Symptoms:  Yes    Stable for decades on same dose    Has worsening concentration and hard time focus.    UDS UTD      Still has left knee pain . Seen Dr Del Rosario at [facility]. Did not give much attention and stated that nothing can be done on the Baker Cyst. MRI is abnormal with inflammation of Retinaculum . No other treatment options given other than rest and NSAIDs.  Pt has been doing that and still not improved and has increase swelling with minimal to moderate activity     Review of Systems   Constitutional, HEENT, cardiovascular, pulmonary, gi and gu systems are negative, except as otherwise noted.      Objective    /68   Pulse 76   Temp 98  F (36.7  C)   Ht 1.753 m (5' 9\")   Wt 103.4 kg (228 lb)   SpO2 98%   BMI 33.67 kg/m    Body mass index is 33.67 kg/m .  Physical Exam   GENERAL: healthy, alert and no distress  PSYCH: mentation appears normal, affect normal/bright                "

## 2021-06-23 ENCOUNTER — OFFICE VISIT (OUTPATIENT)
Dept: FAMILY MEDICINE | Facility: OTHER | Age: 41
End: 2021-06-23
Attending: FAMILY MEDICINE
Payer: COMMERCIAL

## 2021-06-23 VITALS
WEIGHT: 228 LBS | BODY MASS INDEX: 33.77 KG/M2 | SYSTOLIC BLOOD PRESSURE: 122 MMHG | DIASTOLIC BLOOD PRESSURE: 68 MMHG | TEMPERATURE: 98 F | HEART RATE: 76 BPM | HEIGHT: 69 IN | OXYGEN SATURATION: 98 %

## 2021-06-23 DIAGNOSIS — F90.0 ATTENTION DEFICIT HYPERACTIVITY DISORDER (ADHD), PREDOMINANTLY INATTENTIVE TYPE: ICD-10-CM

## 2021-06-23 DIAGNOSIS — R93.6 ABNORMAL MRI, KNEE: ICD-10-CM

## 2021-06-23 DIAGNOSIS — M25.562 CHRONIC PAIN OF LEFT KNEE: Primary | ICD-10-CM

## 2021-06-23 DIAGNOSIS — J45.20 MILD INTERMITTENT REACTIVE AIRWAY DISEASE WITH WHEEZING WITHOUT COMPLICATION: ICD-10-CM

## 2021-06-23 DIAGNOSIS — G89.29 CHRONIC PAIN OF LEFT KNEE: Primary | ICD-10-CM

## 2021-06-23 PROCEDURE — 99214 OFFICE O/P EST MOD 30 MIN: CPT | Performed by: FAMILY MEDICINE

## 2021-06-23 PROCEDURE — G0463 HOSPITAL OUTPT CLINIC VISIT: HCPCS

## 2021-06-23 RX ORDER — MULTIVIT-MIN/IRON/FOLIC ACID/K 18-600-40
1 CAPSULE ORAL DAILY
COMMUNITY
End: 2023-04-13

## 2021-06-23 RX ORDER — METHYLPHENIDATE HYDROCHLORIDE 54 MG/1
54 TABLET ORAL EVERY MORNING
Qty: 30 TABLET | Refills: 0 | Status: CANCELLED | OUTPATIENT
Start: 2021-06-23

## 2021-06-23 RX ORDER — METHYLPHENIDATE HYDROCHLORIDE 54 MG/1
54 TABLET ORAL EVERY MORNING
Qty: 30 TABLET | Refills: 0 | Status: CANCELLED | OUTPATIENT
Start: 2021-08-20

## 2021-06-23 RX ORDER — METHYLPHENIDATE HYDROCHLORIDE 54 MG/1
54 TABLET ORAL DAILY
Qty: 30 TABLET | Refills: 0 | Status: CANCELLED | OUTPATIENT
Start: 2021-07-22

## 2021-06-23 RX ORDER — METHYLPHENIDATE HYDROCHLORIDE 36 MG/1
TABLET ORAL
Qty: 60 TABLET | Refills: 0 | Status: SHIPPED | OUTPATIENT
Start: 2021-06-23 | End: 2021-10-18

## 2021-06-23 ASSESSMENT — ANXIETY QUESTIONNAIRES
1. FEELING NERVOUS, ANXIOUS, OR ON EDGE: MORE THAN HALF THE DAYS
3. WORRYING TOO MUCH ABOUT DIFFERENT THINGS: MORE THAN HALF THE DAYS
IF YOU CHECKED OFF ANY PROBLEMS ON THIS QUESTIONNAIRE, HOW DIFFICULT HAVE THESE PROBLEMS MADE IT FOR YOU TO DO YOUR WORK, TAKE CARE OF THINGS AT HOME, OR GET ALONG WITH OTHER PEOPLE: SOMEWHAT DIFFICULT
GAD7 TOTAL SCORE: 12
5. BEING SO RESTLESS THAT IT IS HARD TO SIT STILL: SEVERAL DAYS
6. BECOMING EASILY ANNOYED OR IRRITABLE: NEARLY EVERY DAY
7. FEELING AFRAID AS IF SOMETHING AWFUL MIGHT HAPPEN: SEVERAL DAYS
2. NOT BEING ABLE TO STOP OR CONTROL WORRYING: SEVERAL DAYS
4. TROUBLE RELAXING: MORE THAN HALF THE DAYS

## 2021-06-23 ASSESSMENT — MIFFLIN-ST. JEOR: SCORE: 1753.58

## 2021-06-23 ASSESSMENT — ASTHMA QUESTIONNAIRES
QUESTION_2 LAST FOUR WEEKS HOW OFTEN HAVE YOU HAD SHORTNESS OF BREATH: THREE TO SIX TIMES A WEEK
ACT_TOTALSCORE: 17
QUESTION_3 LAST FOUR WEEKS HOW OFTEN DID YOUR ASTHMA SYMPTOMS (WHEEZING, COUGHING, SHORTNESS OF BREATH, CHEST TIGHTNESS OR PAIN) WAKE YOU UP AT NIGHT OR EARLIER THAN USUAL IN THE MORNING: ONCE OR TWICE
QUESTION_1 LAST FOUR WEEKS HOW MUCH OF THE TIME DID YOUR ASTHMA KEEP YOU FROM GETTING AS MUCH DONE AT WORK, SCHOOL OR AT HOME: SOME OF THE TIME
QUESTION_5 LAST FOUR WEEKS HOW WOULD YOU RATE YOUR ASTHMA CONTROL: WELL CONTROLLED
QUESTION_4 LAST FOUR WEEKS HOW OFTEN HAVE YOU USED YOUR RESCUE INHALER OR NEBULIZER MEDICATION (SUCH AS ALBUTEROL): TWO OR THREE TIMES PER WEEK

## 2021-06-23 ASSESSMENT — PAIN SCALES - GENERAL: PAINLEVEL: MILD PAIN (3)

## 2021-06-23 ASSESSMENT — PATIENT HEALTH QUESTIONNAIRE - PHQ9: SUM OF ALL RESPONSES TO PHQ QUESTIONS 1-9: 11

## 2021-06-23 NOTE — NURSING NOTE
"Chief Complaint   Patient presents with     A.D.H.D       Initial /68   Pulse 76   Temp 98  F (36.7  C)   Ht 1.753 m (5' 9\")   Wt 103.4 kg (228 lb)   SpO2 98%   BMI 33.67 kg/m   Estimated body mass index is 33.67 kg/m  as calculated from the following:    Height as of this encounter: 1.753 m (5' 9\").    Weight as of this encounter: 103.4 kg (228 lb).  Medication Reconciliation: complete  Grabiel Cantor LPN  "

## 2021-06-23 NOTE — LETTER
My Asthma Action Plan  Name: Cristiana Aguila   YOB: 1980  Date: 5/16/2019   My doctor: Sudhakar Cuellar MD   My clinic: [facility] - [place]        My Control Medicine: None  My Rescue Medicine: Albuterol (Proair/Ventolin/Proventil) inhaler 2 puffs every 4 hours as needed for shortness of breath   My Asthma Severity: mild persistent  Avoid your asthma triggers: upper respiratory infections, strong odors and fumes, emotions and cold air  smoke  upper respiratory infections  emotions  cold air            GREEN ZONE   Good Control    I feel good    No cough or wheeze    Can work, sleep and play without asthma symptoms       Take your asthma control medicine every day.     1. If exercise triggers your asthma, take your rescue medication    15 minutes before exercise or sports, and    During exercise if you have asthma symptoms  2. Spacer to use with inhaler: If you have a spacer, make sure to use it with your inhaler             YELLOW ZONE Getting Worse  I have ANY of these:    I do not feel good    Cough or wheeze    Chest feels tight    Wake up at night   1. Keep taking your Green Zone medications  2. Start taking your rescue medicine:    every 20 minutes for up to 1 hour. Then every 4 hours for 24-48 hours.  3. If you stay in the Yellow Zone for more than 12-24 hours, contact your doctor.  4. If you do not return to the Green Zone in 12-24 hours or you get worse, start taking your oral steroid medicine if prescribed by your provider.           RED ZONE Medical Alert - Get Help  I have ANY of these:    I feel awful    Medicine is not helping    Breathing getting harder    Trouble walking or talking    Nose opens wide to breathe       1. Take your rescue medicine NOW  2. If your provider has prescribed an oral steroid medicine, start taking it NOW  3. Call your doctor NOW  4. If you are still in the Red Zone after 20 minutes and you have not reached your doctor:    Take your rescue  medicine again and    Call 911 or go to the emergency room right away    See your regular doctor within 2 weeks of an Emergency Room or Urgent Care visit for follow-up treatment.          Annual Reminders:  Meet with Asthma Educator,  Flu Shot in the Fall, consider Pneumonia Vaccination for patients with asthma (aged 19 and older).    [facility]arm[facility]    [facility] [place] [place] [facility] [address]  [id number] - [place], [place] - [address]   [id number] - [place], [place] - [address] AT [facility] OF  [address]                      Asthma Triggers  How To Control Things That Make Your Asthma Worse    Triggers are things that make your asthma worse.  Look at the list below to help you find your triggers and what you can do about them.  You can help prevent asthma flare-ups by staying away from your triggers.      Trigger                                                          What you can do   Cigarette Smoke  Tobacco smoke can make asthma worse. Do not allow smoking in your home, car or around you.  Be sure no one smokes at a child s day care or school.  If you smoke, ask your health care provider for ways to help you quit.  Ask family members to quit too.  Ask your health care provider for a referral to Quit Plan to help you quit smoking, or call 7-350-115-PLAN.     Colds, Flu, Bronchitis  These are common triggers of asthma. Wash your hands often.  Don t touch your eyes, nose or mouth.  Get a flu shot every year.     Dust Mites  These are tiny bugs that live in cloth or carpet. They are too small to see. Wash sheets and blankets in hot water every week.   Encase pillows and mattress in dust mite proof covers.  Avoid having carpet if you can. If you have carpet, vacuum weekly.   Use a dust mask and HEPA vacuum.   Pollen and Outdoor Mold  Some people are allergic to trees, grass, or weed pollen, or molds. Try to keep your windows closed.  Limit time out doors when pollen count is high.    Ask you health care provider about taking medicine during allergy season.     Animal Dander  Some people are allergic to skin flakes, urine or saliva from pets with fur or feathers. Keep pets with fur or feathers out of your home.    If you can t keep the pet outdoors, then keep the pet out of your bedroom.  Keep the bedroom door closed.  Keep pets off cloth furniture and away from stuffed toys.     Mice, Rats, and Cockroaches  Some people are allergic to the waste from these pests.   Cover food and garbage.  Clean up spills and food crumbs.  Store grease in the refrigerator.   Keep food out of the bedroom.   Indoor Mold  This can be a trigger if your home has high moisture. Fix leaking faucets, pipes, or other sources of water.   Clean moldy surfaces.  Dehumidify basement if it is damp and smelly.   Smoke, Strong Odors, and Sprays  These can reduce air quality. Stay away from strong odors and sprays, such as perfume, powder, hair spray, paints, smoke incense, paint, cleaning products, candles and new carpet.   Exercise or Sports  Some people with asthma have this trigger. Be active!  Ask your doctor about taking medicine before sports or exercise to prevent symptoms.    Warm up for 5-10 minutes before and after sports or exercise.     Other Triggers of Asthma  Cold air:  Cover your nose and mouth with a scarf.  Sometimes laughing or crying can be a trigger.  Some medicines and food can trigger asthma.

## 2021-06-24 ASSESSMENT — ASTHMA QUESTIONNAIRES: ACT_TOTALSCORE: 17

## 2021-06-24 ASSESSMENT — ANXIETY QUESTIONNAIRES: GAD7 TOTAL SCORE: 12

## 2021-06-25 ENCOUNTER — HOSPITAL ENCOUNTER (EMERGENCY)
Facility: HOSPITAL | Age: 41
Discharge: HOME | End: 2021-06-25
Attending: NURSE PRACTITIONER | Admitting: NURSE PRACTITIONER
Payer: COMMERCIAL

## 2021-06-25 VITALS
TEMPERATURE: 98.8 F | OXYGEN SATURATION: 97 % | SYSTOLIC BLOOD PRESSURE: 136 MMHG | DIASTOLIC BLOOD PRESSURE: 96 MMHG | RESPIRATION RATE: 16 BRPM | HEART RATE: 67 BPM

## 2021-06-25 DIAGNOSIS — H69.92 DYSFUNCTION OF LEFT EUSTACHIAN TUBE: Primary | ICD-10-CM

## 2021-06-25 PROCEDURE — G0463 HOSPITAL OUTPT CLINIC VISIT: HCPCS

## 2021-06-25 PROCEDURE — 99213 OFFICE O/P EST LOW 20 MIN: CPT | Performed by: NURSE PRACTITIONER

## 2021-06-25 NOTE — ED TRIAGE NOTES
Pt presents with left sided ear pain. Pt has complaints of headaches that kind of feel migraines and ear fullness. Sx started a couple of weeks but pain started yesterday. Pt also has vertigo issues. Pt states she has been taken  Ibuprofen and tylenol. They knock the pain down some but theres so much pressure in her ear that it isn't helping much.

## 2021-06-26 NOTE — DISCHARGE INSTRUCTIONS
Continue using the nasal spray.  Start taking an oral decongestant such as Sudafed.    Follow-up with ENT for reevaluation of your ear.    Return to emergency department for any concerning symptoms.

## 2021-06-27 ASSESSMENT — ENCOUNTER SYMPTOMS
FEVER: 0
SINUS PRESSURE: 0
CHILLS: 0
SINUS PAIN: 0
RHINORRHEA: 0

## 2021-06-28 NOTE — ED PROVIDER NOTES
History     Chief Complaint   Patient presents with     Otalgia     c/o lt ear pain     HPI  Cristiana Aguila is a 41 year old female who presents to urgent care for evaluation of left ear pain.  Onset 2 weeks ago.  Patient notes left ear fullness with the pain is starting to radiate up to her head resulting in headaches.  No ear drainage.  No known fevers.  No history of surgeries to this ear.  Patient has been taking OTC pain medications and using Flonase nasal spray with minimal effectiveness.      Allergies:  Allergies   Allergen Reactions     Amoxicillin Diarrhea     Bee Venom      Bee venom (honey bee)       Food      Artificial cinnamon      Sulfa Drugs      Sulfa (sulfonamide antibiotics)        Latex Rash       Problem List:    Patient Active Problem List    Diagnosis Date Noted     Encounter for initial prescription of implantable subdermal contraceptive 03/11/2021     Priority: Medium     Psychophysiological insomnia 12/21/2020     Priority: Medium     Mild intermittent reactive airway disease with wheezing without complication 12/21/2020     Priority: Medium     Encounter for insertion of intrauterine contraceptive device (IUD) 04/29/2020     Priority: Medium     Well woman exam with routine gynecological exam 02/14/2018     Priority: Medium     Irritable bowel syndrome with both constipation and diarrhea 08/02/2017     Priority: Medium     Recurrent major depressive disorder, in partial remission (H) 08/02/2017     Priority: Medium     ACP (advance care planning) 03/02/2016     Priority: Medium     Advance Care Planning 3/2/2016: Receipt of ACP document:  Received: Health Care Directive which was witnessed or notarized on 1-25-16.  Document previously scanned on 2-5-16.  Validation form completed and sent to be scanned.  Code Status needs to be updated to reflect choices in most recent ACP document.  Confirmed/documented designated decision maker(s).  Added by Rachel Barber RN Advance Care Planning  Liaison with [employer]             FH: breast cancer      Priority: Medium     Generalized anxiety disorder      Priority: Medium     Diagnosis updated by automated process. Provider to review and confirm.       Attention deficit disorder 05/31/2005     Priority: Medium     Problem list name updated by automated process. 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     FH: breast cancer      DAFNE (generalised anxiety disorder)      Irritable bowel syndrome with both constipation and diarrhea 8/2/2017     MDD (major depressive disorder)      Migraine, unspecified, without mention of intractable migraine without mention of status migrainosus 3/13/2000     Tobacco abuse, in remission      Unspecified sinusitis (chronic) 3/8/2001       Past Surgical History:    Past Surgical History:   Procedure Laterality Date     ENDOSCOPY UPPER, COLONOSCOPY, COMBINED N/A 9/4/2015    Procedure: COMBINED ENDOSCOPY UPPER, COLONOSCOPY;  Surgeon: Griffin Barrientos DO;  Location: [facility]     LAPAROSCOPIC CYSTECTOMY OVARIAN (BENIGN) Right 1/27/2016    Procedure: LAPAROSCOPIC CYSTECTOMY OVARIAN (BENIGN);  Surgeon: Kuldip España MD;  Location: [facility]     LAPAROSCOPIC SALPINGO-OOPHORECTOMY Left 1/27/2016    Procedure: LAPAROSCOPIC SALPINGO-OOPHORECTOMY;  Surgeon: Kuldip España MD;  Location: [facility]     LAPAROSCOPY DIAGNOSTIC (GYN) Left 1/27/2016    Procedure: LAPAROSCOPY DIAGNOSTIC (GYN);  Surgeon: Kuldip España MD;  Location: [facility]     TUBAL LIGATION  2006     wisdom teeth         Family History:    Family History   Problem Relation Age of Onset     Breast Cancer Maternal Grandmother      Hypertension Maternal Grandfather      Hyperlipidemia Maternal Grandfather      Diabetes Paternal Grandmother      Asthma No family hx of        Social History:  Marital Status:    [2]  Social History     Tobacco Use     Smoking status: Former Smoker     Packs/day: 0.50     Years: 13.00     Pack years: 6.50     Types: Cigarettes     Quit date: 2020     Years since quittin.4     Smokeless tobacco: Never Used   Substance Use Topics     Alcohol use: Yes     Alcohol/week: 0.0 standard drinks     Comment: frequency: rarely     Drug use: No        Medications:    Acetaminophen (TYLENOL PO)  Ascorbic Acid (VITAMIN C PO)  citalopram (CELEXA) 40 MG tablet  Cyanocobalamin (VITAMIN B 12 PO)  docusate sodium (COLACE) 100 MG capsule  fluticasone (FLONASE) 50 MCG/ACT nasal spray  L-THEANINE PO  methylphenidate (CONCERTA) 36 MG CR tablet  NONFORMULARY  NONFORMULARY  NONFORMULARY  NONFORMULARY  NONFORMULARY  NONFORMULARY  Nutritional Supplements (MENOPAUSE FORMULA PO)  omeprazole (PRILOSEC) 40 MG DR capsule  traZODone (DESYREL) 100 MG tablet  TURMERIC CURCUMIN PO  vitamin B complex with vitamin C (VITAMIN  B COMPLEX) tablet  Vitamin D, Cholecalciferol, 25 MCG (1000 UT) TABS  albuterol (PROAIR HFA/PROVENTIL HFA/VENTOLIN HFA) 108 (90 Base) MCG/ACT inhaler  EPINEPHrine (ANY BX GENERIC EQUIV) 0.3 MG/0.3ML injection 2-pack  hydrOXYzine (ATARAX) 25 MG tablet  nicotine (NICODERM CQ) 14 MG/24HR 24 hr patch  nicotine (NICODERM CQ) 21 MG/24HR 24 hr patch  nicotine (NICODERM CQ) 7 MG/24HR 24 hr patch          Review of Systems   Constitutional: Negative for chills and fever.   HENT: Positive for ear pain. Negative for ear discharge, rhinorrhea, sinus pressure and sinus pain.    All other systems reviewed and are negative.      Physical Exam   BP: 136/96  Pulse: 67  Temp: 98.8  F (37.1  C)  Resp: 16  SpO2: 97 %      Physical Exam  Vitals signs and nursing note reviewed.   Constitutional:       Appearance: Normal appearance. She is not ill-appearing or toxic-appearing.   HENT:      Head: Normocephalic.      Jaw: No trismus.      Right Ear: External ear normal. No drainage. There is no impacted cerumen. No  hemotympanum. Tympanic membrane is not scarred, perforated, erythematous, retracted or bulging.      Left Ear: External ear normal. No drainage. There is no impacted cerumen. No hemotympanum. Tympanic membrane is bulging. Tympanic membrane is not injected, scarred, perforated, erythematous or retracted.      Mouth/Throat:      Mouth: Mucous membranes are moist.   Eyes:      Extraocular Movements: Extraocular movements intact.      Pupils: Pupils are equal, round, and reactive to light.   Neck:      Musculoskeletal: Neck supple.   Cardiovascular:      Rate and Rhythm: Normal rate and regular rhythm.      Heart sounds: Normal heart sounds.   Pulmonary:      Effort: Pulmonary effort is normal.      Breath sounds: Normal breath sounds.   Skin:     General: Skin is warm and dry.   Neurological:      Mental Status: She is alert and oriented to person, place, and time.         ED Course        Procedures         No results found for this or any previous visit (from the past 24 hour(s)).    Medications - No data to display    Assessments & Plan (with Medical Decision Making)     I have reviewed the nursing notes.    I have reviewed the findings, diagnosis, plan and need for follow up with the patient.  41-year-old female that presented for evaluation of left ear fullness x2 weeks.  Patient using Flonase nasal spray and OTC pain medications from effectiveness.  Patient does have a bulging left TM with no signs of infection.  She is afebrile.  Discussed with patient that she has eustachian tube dysfunction.  Recommended taking an oral decongestant in addition to using Flonase nasal spray.  She may continue taking Tylenol or ibuprofen as needed for pain.  Referral placed to ENT for reevaluation of her ear.  Return to ED/UC for any concerning symptoms.  Patient verbalized understanding.    This document was prepared using a combination of typing and voice generated software.  While every attempt was made for accuracy, spelling  and grammatical errors may exist.    Discharge Medication List as of 6/25/2021  7:14 PM          Final diagnoses:   Dysfunction of left eustachian tube       6/25/2021   [facility]     Mpofu, Jennifernce, CNP  06/27/21 2121

## 2021-07-06 NOTE — PROGRESS NOTES
"    Assessment & Plan     Attention deficit hyperactivity disorder (ADHD), predominantly inattentive type  No change in meds. Continue current medications and behavioral changes.   Follow-up in 3 months to reassess.  Some counseling on her present stressors discussed   - methylphenidate (CONCERTA) 36 MG CR tablet; 2 tabs orally daily  - methylphenidate (CONCERTA) 36 MG CR tablet; 2 tabs orally daily  - methylphenidate (CONCERTA) 36 MG CR tablet; 2 tabs orally daily             BMI:   Estimated body mass index is 34.41 kg/m  as calculated from the following:    Height as of this encounter: 1.753 m (5' 9\").    Weight as of this encounter: 105.7 kg (233 lb).         No follow-ups on file.    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      Objective    /74   Pulse 80   Temp 97.6  F (36.4  C)   Resp 17   Ht 1.753 m (5' 9\")   Wt 105.7 kg (233 lb)   SpO2 98%   BMI 34.41 kg/m    Body mass index is 34.41 kg/m .  Physical Exam   GENERAL: healthy, alert and no distress  PSYCH: mentation appears normal, affect normal/bright                "

## 2021-07-14 DIAGNOSIS — F51.04 PSYCHOPHYSIOLOGICAL INSOMNIA: ICD-10-CM

## 2021-07-15 RX ORDER — TRAZODONE HYDROCHLORIDE 100 MG/1
TABLET ORAL
Qty: 60 TABLET | Refills: 1 | Status: SHIPPED | OUTPATIENT
Start: 2021-07-15 | End: 2021-09-10

## 2021-07-16 ENCOUNTER — OFFICE VISIT (OUTPATIENT)
Dept: FAMILY MEDICINE | Facility: OTHER | Age: 41
End: 2021-07-16
Attending: FAMILY MEDICINE
Payer: COMMERCIAL

## 2021-07-16 VITALS
HEART RATE: 80 BPM | WEIGHT: 233 LBS | HEIGHT: 69 IN | TEMPERATURE: 97.6 F | DIASTOLIC BLOOD PRESSURE: 74 MMHG | BODY MASS INDEX: 34.51 KG/M2 | OXYGEN SATURATION: 98 % | SYSTOLIC BLOOD PRESSURE: 120 MMHG | RESPIRATION RATE: 17 BRPM

## 2021-07-16 DIAGNOSIS — F90.0 ATTENTION DEFICIT HYPERACTIVITY DISORDER (ADHD), PREDOMINANTLY INATTENTIVE TYPE: Primary | ICD-10-CM

## 2021-07-16 PROCEDURE — G0463 HOSPITAL OUTPT CLINIC VISIT: HCPCS

## 2021-07-16 PROCEDURE — 99213 OFFICE O/P EST LOW 20 MIN: CPT | Performed by: FAMILY MEDICINE

## 2021-07-16 RX ORDER — METHYLPHENIDATE HYDROCHLORIDE 36 MG/1
TABLET ORAL
Qty: 60 TABLET | Refills: 0 | Status: SHIPPED | OUTPATIENT
Start: 2021-09-21 | End: 2021-12-20

## 2021-07-16 RX ORDER — CETIRIZINE HYDROCHLORIDE 10 MG/1
10 TABLET ORAL DAILY
COMMUNITY
End: 2021-10-18

## 2021-07-16 RX ORDER — METHYLPHENIDATE HYDROCHLORIDE 36 MG/1
TABLET ORAL
Qty: 60 TABLET | Refills: 0 | Status: SHIPPED | OUTPATIENT
Start: 2021-07-21 | End: 2021-10-18

## 2021-07-16 RX ORDER — METHYLPHENIDATE HYDROCHLORIDE 36 MG/1
TABLET ORAL
Qty: 60 TABLET | Refills: 0 | Status: SHIPPED | OUTPATIENT
Start: 2021-08-21 | End: 2021-10-18

## 2021-07-16 ASSESSMENT — ASTHMA QUESTIONNAIRES
QUESTION_4 LAST FOUR WEEKS HOW OFTEN HAVE YOU USED YOUR RESCUE INHALER OR NEBULIZER MEDICATION (SUCH AS ALBUTEROL): ONE OR TWO TIMES PER DAY
QUESTION_5 LAST FOUR WEEKS HOW WOULD YOU RATE YOUR ASTHMA CONTROL: WELL CONTROLLED
QUESTION_1 LAST FOUR WEEKS HOW MUCH OF THE TIME DID YOUR ASTHMA KEEP YOU FROM GETTING AS MUCH DONE AT WORK, SCHOOL OR AT HOME: SOME OF THE TIME
ACT_TOTALSCORE: 15
QUESTION_2 LAST FOUR WEEKS HOW OFTEN HAVE YOU HAD SHORTNESS OF BREATH: THREE TO SIX TIMES A WEEK
QUESTION_3 LAST FOUR WEEKS HOW OFTEN DID YOUR ASTHMA SYMPTOMS (WHEEZING, COUGHING, SHORTNESS OF BREATH, CHEST TIGHTNESS OR PAIN) WAKE YOU UP AT NIGHT OR EARLIER THAN USUAL IN THE MORNING: ONCE A WEEK

## 2021-07-16 ASSESSMENT — MIFFLIN-ST. JEOR: SCORE: 1776.26

## 2021-07-16 ASSESSMENT — PAIN SCALES - GENERAL: PAINLEVEL: MILD PAIN (2)

## 2021-07-16 NOTE — NURSING NOTE
"Chief Complaint   Patient presents with     A.D.H.D       Initial /74   Pulse 80   Temp 97.6  F (36.4  C)   Resp 17   Ht 1.753 m (5' 9\")   Wt 105.7 kg (233 lb)   SpO2 98%   BMI 34.41 kg/m   Estimated body mass index is 34.41 kg/m  as calculated from the following:    Height as of this encounter: 1.753 m (5' 9\").    Weight as of this encounter: 105.7 kg (233 lb).  Medication Reconciliation: complete  Grabiel Cantor LPN  "

## 2021-07-17 ASSESSMENT — ASTHMA QUESTIONNAIRES: ACT_TOTALSCORE: 15

## 2021-07-21 ENCOUNTER — OFFICE VISIT (OUTPATIENT)
Dept: OTOLARYNGOLOGY | Facility: OTHER | Age: 41
End: 2021-07-21
Attending: PHYSICIAN ASSISTANT
Payer: COMMERCIAL

## 2021-07-21 VITALS
SYSTOLIC BLOOD PRESSURE: 130 MMHG | OXYGEN SATURATION: 99 % | HEIGHT: 69 IN | DIASTOLIC BLOOD PRESSURE: 82 MMHG | WEIGHT: 233 LBS | BODY MASS INDEX: 34.51 KG/M2 | HEART RATE: 83 BPM | TEMPERATURE: 97.2 F

## 2021-07-21 DIAGNOSIS — H93.8X3 SENSATION OF FULLNESS IN EAR, BILATERAL: Primary | ICD-10-CM

## 2021-07-21 DIAGNOSIS — M26.609 TMJ (TEMPOROMANDIBULAR JOINT SYNDROME): ICD-10-CM

## 2021-07-21 DIAGNOSIS — J30.2 SEASONAL ALLERGIC RHINITIS, UNSPECIFIED TRIGGER: ICD-10-CM

## 2021-07-21 DIAGNOSIS — J34.3 NASAL TURBINATE HYPERTROPHY: ICD-10-CM

## 2021-07-21 DIAGNOSIS — H91.93 DECREASED HEARING OF BOTH EARS: ICD-10-CM

## 2021-07-21 PROCEDURE — 99213 OFFICE O/P EST LOW 20 MIN: CPT | Mod: 25 | Performed by: PHYSICIAN ASSISTANT

## 2021-07-21 PROCEDURE — G0463 HOSPITAL OUTPT CLINIC VISIT: HCPCS | Mod: 25

## 2021-07-21 PROCEDURE — 92504 EAR MICROSCOPY EXAMINATION: CPT | Performed by: PHYSICIAN ASSISTANT

## 2021-07-21 PROCEDURE — 92511 NASOPHARYNGOSCOPY: CPT | Performed by: PHYSICIAN ASSISTANT

## 2021-07-21 PROCEDURE — G0463 HOSPITAL OUTPT CLINIC VISIT: HCPCS

## 2021-07-21 RX ORDER — MOMETASONE FUROATE MONOHYDRATE 50 UG/1
2 SPRAY, METERED NASAL DAILY
Qty: 17 G | Refills: 11 | Status: SHIPPED | OUTPATIENT
Start: 2021-07-21 | End: 2022-08-10

## 2021-07-21 ASSESSMENT — PAIN SCALES - GENERAL: PAINLEVEL: MILD PAIN (3)

## 2021-07-21 ASSESSMENT — MIFFLIN-ST. JEOR: SCORE: 1776.26

## 2021-07-21 NOTE — PATIENT INSTRUCTIONS
Change Flonase to Nasonex.   Nasonex 2 sprays to each nostril daily.   Continue with Zyrtec daily.     Complete audiogram.   Normal ear exam. No fluid or infection.   Ear fullness- likely related to TMJ/ Clenching.   Work on TMJ precautions.   Consider physical therapy for TMJ.   Warm compresses.       Thank you for allowing Nadira Soliz PA-C and our ENT team to participate in your care.  If your medications are too expensive, please give the nurse a call.  We can possibly change this medication.  If you have a scheduling or an appointment question please contact our Health Unit Coordinator at [phone], Ext. [phone].    ALL nursing questions or concerns can be directed to your ENT nurse at: [phone] Tracey

## 2021-07-21 NOTE — NURSING NOTE
"Chief Complaint   Patient presents with     Consult     Pt has been referred by [facility] for left ETD.       Initial /82 (BP Location: Right arm, Cuff Size: Adult Large)   Pulse 83   Temp 97.2  F (36.2  C) (Tympanic)   Ht 1.753 m (5' 9\")   Wt 105.7 kg (233 lb)   SpO2 99%   BMI 34.41 kg/m   Estimated body mass index is 34.41 kg/m  as calculated from the following:    Height as of this encounter: 1.753 m (5' 9\").    Weight as of this encounter: 105.7 kg (233 lb).  Medication Reconciliation: complete  Tracey Anderson LPN    "

## 2021-07-21 NOTE — LETTER
2021         RE: Cristiana Aguila  [address] [place] [address]        Dear Colleague,    Thank you for referring your patient, Cristiana Aguila, to the [facility]. Please see a copy of my visit note below.    Otolaryngology Consultation    Patient: Cristiana Aguila  : 1980    Patient presents with:  Consult: Pt has been referred by Mouna Shanks for left ETD.      HPI:  Cristiana Aguila is a 41 year old female seen today for LEFT ear concerns.   Patient was seen in  on 21 for concerns of left ear otalgia. She had symptoms develop about 2 weeks prior to UC visit. Described left ear fullness and pain which radiated into her head/ temple resulting in headaches. No drainage from ears, fevers.   She had noted effusion at the UC visit, recommended continuing Flonase and adding decongestant.   Today, she has been doing the same.   Patient presents for continued symptoms, less pressure at times. She has sensitive to ears, noise- It could cause ear pain.   Intermittent tinnitus.   Symptoms ongoing for few months.   She has been using AH, Nasal sprays, Decongestants with some response.     +Aural fullness- L>R.   Denies COM or otologic surgeries.   Denies otorrhea. Mild otalgia related greater to sounds/ noises.   Intermitent tinnitus. Worsening over the last 1.5 months- occurs 3 times a day.   Denies fluctuating hearing loss or tinnitus.   Denies facial paraesthesia.   Denies dysphagia.   + Vertigo. Symptoms worsening lately. 1.5 months- 2 months. In the past, she had completed Vestibular therapy. She feels with quick movements triggers her symptoms.   +Family Hx- Maternal GF- HL/ HA.   +Seasonal allergies- rhinitis/ AC symptoms. Controlled with medications.     Audiogram- Patient declined.     Current Outpatient Rx   Medication Sig Dispense Refill     Acetaminophen (TYLENOL PO) Take by mouth every 6 hours as needed        albuterol (PROAIR HFA/PROVENTIL HFA/VENTOLIN  HFA) 108 (90 Base) MCG/ACT inhaler INHALE 2 PUFFS INTO LUNGS EVERY 4 HOURS AS NEEDED FOR SHORTNESS OF BREATH 8.5 g 3     Ascorbic Acid (VITAMIN C PO) Take 1,000 mg by mouth 2 times daily       cetirizine (ZYRTEC) 10 MG tablet Take 10 mg by mouth daily       citalopram (CELEXA) 40 MG tablet TAKE 1 TABLET BY MOUTH DAILY 90 tablet 3     Cyanocobalamin (VITAMIN B 12 PO) Take 500 mcg by mouth daily       docusate sodium (COLACE) 100 MG capsule Take 1 capsule (100 mg) by mouth 2 times daily 180 capsule 3     EPINEPHrine (ANY BX GENERIC EQUIV) 0.3 MG/0.3ML injection 2-pack INJECT 0.3 MLS INTO THE MUSCLE AS NEEDED FOR ANAPHYLAXIS 2 each 1     fluticasone (FLONASE) 50 MCG/ACT nasal spray SPRAY 2 SPRAYS INTO BOTH NOSTRILS DAILY 16 g 3     hydrOXYzine (ATARAX) 25 MG tablet TAKE 1 TO 2 TABLETS BY MOUTH 3 TIMES A DAY AS NEEDED 60 tablet 3     L-THEANINE PO        [START ON 9/21/2021] methylphenidate (CONCERTA) 36 MG CR tablet 2 tabs orally daily 60 tablet 0     [START ON 8/21/2021] methylphenidate (CONCERTA) 36 MG CR tablet 2 tabs orally daily 60 tablet 0     methylphenidate (CONCERTA) 36 MG CR tablet 2 tabs orally daily 60 tablet 0     methylphenidate (CONCERTA) 36 MG CR tablet 2 tablets orally daily 60 tablet 0     NONFORMULARY Digestive enzymes up to 3 per day.       NONFORMULARY Carb blocker 1-3 tablets per day       NONFORMULARY energy and focus       NONFORMULARY Clearguard 2 tablets 3 x per day       NONFORMULARY Melatonin and lavender 3 at hs       NONFORMULARY Vitamin c zinc, elderberry       Nutritional Supplements (MENOPAUSE FORMULA PO) Take 1 tablet by mouth 2 times daily       omeprazole (PRILOSEC) 40 MG DR capsule TAKE 1 CAPSULE BY MOUTH DAILY, TAKE 30 TO 60 MINUTES PRIOR TO A MEAL 90 capsule 3     traZODone (DESYREL) 100 MG tablet TAKE 2 TABLETS (200MG) BY MOUTH AT BEDTIME 60 tablet 1     TURMERIC CURCUMIN PO Take by mouth daily       vitamin B complex with vitamin C (VITAMIN  B COMPLEX) tablet Take 1 tablet by  mouth daily       Vitamin D, Cholecalciferol, 25 MCG (1000 UT) TABS Take 1 tablet by mouth daily         Allergies: Amoxicillin, Bee venom, Food, Sulfa drugs, and Latex     Past Medical History:   Diagnosis Date     Attention deficit disorder without mention of hyperactivity 2005     Dependent personality disorder (H) 2011     FH: breast cancer      DAFNE (generalised anxiety disorder)      Irritable bowel syndrome with both constipation and diarrhea 2017     MDD (major depressive disorder)      Migraine, unspecified, without mention of intractable migraine without mention of status migrainosus 3/13/2000     Tobacco abuse, in remission      Unspecified sinusitis (chronic) 3/8/2001       Past Surgical History:   Procedure Laterality Date     ENDOSCOPY UPPER, COLONOSCOPY, COMBINED N/A 2015    Procedure: COMBINED ENDOSCOPY UPPER, COLONOSCOPY;  Surgeon: Griffin Barrientos DO;  Location: [facility]     LAPAROSCOPIC CYSTECTOMY OVARIAN (BENIGN) Right 2016    Procedure: LAPAROSCOPIC CYSTECTOMY OVARIAN (BENIGN);  Surgeon: Kuldip España MD;  Location: [facility]     LAPAROSCOPIC SALPINGO-OOPHORECTOMY Left 2016    Procedure: LAPAROSCOPIC SALPINGO-OOPHORECTOMY;  Surgeon: Kuldip Espñaa MD;  Location: [facility]     LAPAROSCOPY DIAGNOSTIC (GYN) Left 2016    Procedure: LAPAROSCOPY DIAGNOSTIC (GYN);  Surgeon: Kuldip España MD;  Location: [facility]     TUBAL LIGATION  2006     wisdom teeth         ENT family history reviewed    Social History     Tobacco Use     Smoking status: Former Smoker     Packs/day: 0.50     Years: 13.00     Pack years: 6.50     Types: Cigarettes     Quit date: 2020     Years since quittin.5     Smokeless tobacco: Never Used   Substance Use Topics     Alcohol use: Yes     Alcohol/week: 0.0 standard drinks     Comment: frequency: rarely     Drug use: No       Review of Systems  ROS: 10 point ROS neg other than the symptoms noted above in the HPI     Physical Exam  /82 (BP  "Location: Right arm, Cuff Size: Adult Large)   Pulse 83   Temp 97.2  F (36.2  C) (Tympanic)   Ht 1.753 m (5' 9\")   Wt 105.7 kg (233 lb)   SpO2 99%   BMI 34.41 kg/m      General - The patient is well nourished and well developed, and appears to have good nutritional status.  Alert and oriented to person and place, answers questions and cooperates with examination appropriately.   Head and Face - Normocephalic and atraumatic, with no gross asymmetry noted.  The facial nerve is intact, with strong symmetric movements.  Voice and Breathing - The patient was breathing comfortably without the use of accessory muscles. There was no wheezing, stridor, or stertor.  The patients voice was clear and strong, and had appropriate pitch and quality.  Ears -  Ears examined with otoscope and under otologic microscopy. Ears examined with otoscope and under otologic microscopy. The external auditory canals are patent, the tympanic membranes are intact without effusion, retraction or mass.  Bony landmarks are intact.  Eyes - Extraocular movements intact, and the pupils were reactive to light.  Sclera were not icteric or injected, conjunctiva were pink and moist.  Mouth - Examination of the oral cavity showed pink, healthy oral mucosa. No lesions or ulcerations noted.  The tongue was mobile and midline, and the dentition were in good condition.    Throat - The walls of the oropharynx were smooth, pink, moist, symmetric, and had no lesions or ulcerations.  The tonsillar pillars and soft palate were symmetric.  The uvula was midline on elevation.    Neck - Normal midline excursion of the laryngotracheal complex during swallowing.  Full range of motion on passive movement.  Palpation of the occipital, submental, submandibular, internal jugular chain, and supraclavicular nodes did not demonstrate any abnormal lymph nodes or masses.  Palpation of the thyroid was soft and smooth, with no nodules or goiter appreciated.  The trachea was " mobile and midline.  Nose - External contour is symmetric, no gross deflection or scars.  Nasal mucosa is pink and moist with no abnormal mucus.    TMJ- Discomfort along bilateral jaw. Oral tenderness bilaterally, L>R.     After informed consent was obtained and the nose was anesthetized with topical neosynepherine/lidocaine, the scope was advanced into the nares.  There is no purulence and/ or excessive swelling.  Eustachian tubes are patent, no nasopharyngeal mass.  Nasal turbinate edematous.     ASSESSMENT:    ICD-10-CM    1. Sensation of fullness in ear, bilateral  H93.8X3 Adult Audiology Referral   2. Decreased hearing of both ears  H91.93    3. TMJ (temporomandibular joint syndrome)  M26.609    4. Seasonal allergic rhinitis, unspecified trigger  J30.2    5. Nasal turbinate hypertrophy  J34.3 mometasone (NASONEX) 50 MCG/ACT nasal spray       Reassured normal ear exam. No effusion or retraction present on examination today. Recommended audiogram to ensure no SNHL as a source of her ear fullness. However, she has noticed clenching/ high stress recently and jaw discomfort. Work on TMJ precautions, consider PT. Her recheck clenching has worsened her aural fullness symptoms.     Work on TMJ precautions.   Consider physical therapy for TMJ.   Warm compresses.     Change Flonase to Nasonex.   Nasonex 2 sprays to each nostril daily.   Continue with Zyrtec daily.   Consider allergy testing in future.     She agrees with this plan  Follow up in 1 month      Nadira Soliz PA-C  ENT  [facility] [place]          Again, thank you for allowing me to participate in the care of your patient.        Sincerely,        Nadira Soliz PA-C

## 2021-07-21 NOTE — PROGRESS NOTES
Otolaryngology Consultation    Patient: Cristiana Aguila  : 1980    Patient presents with:  Consult: Pt has been referred by Mouna Shanks for left ETD.      HPI:  Cristiana Aguila is a 41 year old female seen today for LEFT ear concerns.   Patient was seen in  on 21 for concerns of left ear otalgia. She had symptoms develop about 2 weeks prior to UC visit. Described left ear fullness and pain which radiated into her head/ temple resulting in headaches. No drainage from ears, fevers.   She had noted effusion at the UC visit, recommended continuing Flonase and adding decongestant.   Today, she has been doing the same.   Patient presents for continued symptoms, less pressure at times. She has sensitive to ears, noise- It could cause ear pain.   Intermittent tinnitus.   Symptoms ongoing for few months.   She has been using AH, Nasal sprays, Decongestants with some response.     +Aural fullness- L>R.   Denies COM or otologic surgeries.   Denies otorrhea. Mild otalgia related greater to sounds/ noises.   Intermitent tinnitus. Worsening over the last 1.5 months- occurs 3 times a day.   Denies fluctuating hearing loss or tinnitus.   Denies facial paraesthesia.   Denies dysphagia.   + Vertigo. Symptoms worsening lately. 1.5 months- 2 months. In the past, she had completed Vestibular therapy. She feels with quick movements triggers her symptoms.   +Family Hx- Maternal GF- HL/ HA.   +Seasonal allergies- rhinitis/ AC symptoms. Controlled with medications.     Audiogram- Patient declined.     Current Outpatient Rx   Medication Sig Dispense Refill     Acetaminophen (TYLENOL PO) Take by mouth every 6 hours as needed        albuterol (PROAIR HFA/PROVENTIL HFA/VENTOLIN HFA) 108 (90 Base) MCG/ACT inhaler INHALE 2 PUFFS INTO LUNGS EVERY 4 HOURS AS NEEDED FOR SHORTNESS OF BREATH 8.5 g 3     Ascorbic Acid (VITAMIN C PO) Take 1,000 mg by mouth 2 times daily       cetirizine (ZYRTEC) 10 MG tablet Take 10 mg by mouth daily        citalopram (CELEXA) 40 MG tablet TAKE 1 TABLET BY MOUTH DAILY 90 tablet 3     Cyanocobalamin (VITAMIN B 12 PO) Take 500 mcg by mouth daily       docusate sodium (COLACE) 100 MG capsule Take 1 capsule (100 mg) by mouth 2 times daily 180 capsule 3     EPINEPHrine (ANY BX GENERIC EQUIV) 0.3 MG/0.3ML injection 2-pack INJECT 0.3 MLS INTO THE MUSCLE AS NEEDED FOR ANAPHYLAXIS 2 each 1     fluticasone (FLONASE) 50 MCG/ACT nasal spray SPRAY 2 SPRAYS INTO BOTH NOSTRILS DAILY 16 g 3     hydrOXYzine (ATARAX) 25 MG tablet TAKE 1 TO 2 TABLETS BY MOUTH 3 TIMES A DAY AS NEEDED 60 tablet 3     L-THEANINE PO        [START ON 9/21/2021] methylphenidate (CONCERTA) 36 MG CR tablet 2 tabs orally daily 60 tablet 0     [START ON 8/21/2021] methylphenidate (CONCERTA) 36 MG CR tablet 2 tabs orally daily 60 tablet 0     methylphenidate (CONCERTA) 36 MG CR tablet 2 tabs orally daily 60 tablet 0     methylphenidate (CONCERTA) 36 MG CR tablet 2 tablets orally daily 60 tablet 0     NONFORMULARY Digestive enzymes up to 3 per day.       NONFORMULARY Carb blocker 1-3 tablets per day       NONFORMULARY energy and focus       NONFORMULARY Clearguard 2 tablets 3 x per day       NONFORMULARY Melatonin and lavender 3 at hs       NONFORMULARY Vitamin c zinc, elderberry       Nutritional Supplements (MENOPAUSE FORMULA PO) Take 1 tablet by mouth 2 times daily       omeprazole (PRILOSEC) 40 MG DR capsule TAKE 1 CAPSULE BY MOUTH DAILY, TAKE 30 TO 60 MINUTES PRIOR TO A MEAL 90 capsule 3     traZODone (DESYREL) 100 MG tablet TAKE 2 TABLETS (200MG) BY MOUTH AT BEDTIME 60 tablet 1     TURMERIC CURCUMIN PO Take by mouth daily       vitamin B complex with vitamin C (VITAMIN  B COMPLEX) tablet Take 1 tablet by mouth daily       Vitamin D, Cholecalciferol, 25 MCG (1000 UT) TABS Take 1 tablet by mouth daily         Allergies: Amoxicillin, Bee venom, Food, Sulfa drugs, and Latex     Past Medical History:   Diagnosis Date     Attention deficit disorder without  "mention of hyperactivity 2005     Dependent personality disorder (H) 2011     FH: breast cancer      DAFNE (generalised anxiety disorder)      Irritable bowel syndrome with both constipation and diarrhea 2017     MDD (major depressive disorder)      Migraine, unspecified, without mention of intractable migraine without mention of status migrainosus 3/13/2000     Tobacco abuse, in remission      Unspecified sinusitis (chronic) 3/8/2001       Past Surgical History:   Procedure Laterality Date     ENDOSCOPY UPPER, COLONOSCOPY, COMBINED N/A 2015    Procedure: COMBINED ENDOSCOPY UPPER, COLONOSCOPY;  Surgeon: Griffin Barrientos DO;  Location: [facility]     LAPAROSCOPIC CYSTECTOMY OVARIAN (BENIGN) Right 2016    Procedure: LAPAROSCOPIC CYSTECTOMY OVARIAN (BENIGN);  Surgeon: Kuldip España MD;  Location: [facility]     LAPAROSCOPIC SALPINGO-OOPHORECTOMY Left 2016    Procedure: LAPAROSCOPIC SALPINGO-OOPHORECTOMY;  Surgeon: Kuldip España MD;  [facility]     LAPAROSCOPY DIAGNOSTIC (GYN) Left 2016    Procedure: LAPAROSCOPY DIAGNOSTIC (GYN);  Surgeon: Kuldip España MD;  Location: [facility]     TUBAL LIGATION  2006     wisdom teeth         ENT family history reviewed    Social History     Tobacco Use     Smoking status: Former Smoker     Packs/day: 0.50     Years: 13.00     Pack years: 6.50     Types: Cigarettes     Quit date: 2020     Years since quittin.5     Smokeless tobacco: Never Used   Substance Use Topics     Alcohol use: Yes     Alcohol/week: 0.0 standard drinks     Comment: frequency: rarely     Drug use: No       Review of Systems  ROS: 10 point ROS neg other than the symptoms noted above in the HPI     Physical Exam  /82 (BP Location: Right arm, Cuff Size: Adult Large)   Pulse 83   Temp 97.2  F (36.2  C) (Tympanic)   Ht 1.753 m (5' 9\")   Wt 105.7 kg (233 lb)   SpO2 99%   BMI 34.41 kg/m      General - The patient is well nourished and well developed, and appears to have " good nutritional status.  Alert and oriented to person and place, answers questions and cooperates with examination appropriately.   Head and Face - Normocephalic and atraumatic, with no gross asymmetry noted.  The facial nerve is intact, with strong symmetric movements.  Voice and Breathing - The patient was breathing comfortably without the use of accessory muscles. There was no wheezing, stridor, or stertor.  The patients voice was clear and strong, and had appropriate pitch and quality.  Ears -  Ears examined with otoscope and under otologic microscopy. Ears examined with otoscope and under otologic microscopy. The external auditory canals are patent, the tympanic membranes are intact without effusion, retraction or mass.  Bony landmarks are intact.  Eyes - Extraocular movements intact, and the pupils were reactive to light.  Sclera were not icteric or injected, conjunctiva were pink and moist.  Mouth - Examination of the oral cavity showed pink, healthy oral mucosa. No lesions or ulcerations noted.  The tongue was mobile and midline, and the dentition were in good condition.    Throat - The walls of the oropharynx were smooth, pink, moist, symmetric, and had no lesions or ulcerations.  The tonsillar pillars and soft palate were symmetric.  The uvula was midline on elevation.    Neck - Normal midline excursion of the laryngotracheal complex during swallowing.  Full range of motion on passive movement.  Palpation of the occipital, submental, submandibular, internal jugular chain, and supraclavicular nodes did not demonstrate any abnormal lymph nodes or masses.  Palpation of the thyroid was soft and smooth, with no nodules or goiter appreciated.  The trachea was mobile and midline.  Nose - External contour is symmetric, no gross deflection or scars.  Nasal mucosa is pink and moist with no abnormal mucus.    TMJ- Discomfort along bilateral jaw. Oral tenderness bilaterally, L>R.     After informed consent was  obtained and the nose was anesthetized with topical neosynepherine/lidocaine, the scope was advanced into the nares.  There is no purulence and/ or excessive swelling.  Eustachian tubes are patent, no nasopharyngeal mass.  Nasal turbinate edematous.     ASSESSMENT:    ICD-10-CM    1. Sensation of fullness in ear, bilateral  H93.8X3 Adult Audiology Referral   2. Decreased hearing of both ears  H91.93    3. TMJ (temporomandibular joint syndrome)  M26.609    4. Seasonal allergic rhinitis, unspecified trigger  J30.2    5. Nasal turbinate hypertrophy  J34.3 mometasone (NASONEX) 50 MCG/ACT nasal spray       Reassured normal ear exam. No effusion or retraction present on examination today. Recommended audiogram to ensure no SNHL as a source of her ear fullness. However, she has noticed clenching/ high stress recently and jaw discomfort. Work on TMJ precautions, consider PT. Her recheck clenching has worsened her aural fullness symptoms.     Work on TMJ precautions.   Consider physical therapy for TMJ.   Warm compresses.     Change Flonase to Nasonex.   Nasonex 2 sprays to each nostril daily.   Continue with Zyrtec daily.   Consider allergy testing in future.     She agrees with this plan  Follow up in 1 month      Nadira Soliz PA-C  ENT  [facility], [place]

## 2021-08-26 NOTE — PROGRESS NOTES
Chief Complaint   Patient presents with     RECHECK     Pt is here for a f/u sensation of fullness in bilateral ears, TMJ, and NTH.       Patient returns to ENT for recheck of her ears. She was last seen on 7/21/21 for TMJ, ear fullness. She had a normal ear exam, but increase in TMJ concerns.   She was recommended TMJ precautions, consider PT>   She was started on Nasonex, Zyrtec and to consider MQT.       Today, she has felt continued nasal congestion since her last visit. Nasal spray improved symptoms short term but has noticed continued congestion.   She has been working on avoiding clenching. She has increase in otalgia related to TMJ, teeth clenching.  Her hearing has been stable.     She does not tolerate rinses.     Audiogram  Type A tympanograms  Thresholds are normal range with slight loss high frequency   SRT=PTA  WRS-  Right- 100%@60dB  Left- 100% @60 dB    Aural fullness- L>R.   Denies COM or otologic surgeries.   Denies otorrhea. Mild otalgia related greater to sounds/ noises.   Intermitent tinnitus. Worsening over the last 1.5 months- occurs 3 times a day.   Denies fluctuating hearing loss or tinnitus.   Denies facial paraesthesia.   Denies dysphagia.   + Vertigo. Symptoms worsening lately. 1.5 months- 2 months. In the past, she had completed Vestibular therapy. She feels with quick movements triggers her symptoms.   +Family Hx- Maternal GF- HL/ HA.   +Seasonal allergies- rhinitis/ AC symptoms. Controlled with medications.      Audiogram- Patient declined.     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     FH: breast cancer      DAFNE (generalised anxiety disorder)      Irritable bowel syndrome with both constipation and diarrhea 8/2/2017     MDD (major depressive disorder)      Migraine, unspecified, without mention of intractable migraine without mention of status migrainosus  3/13/2000     Tobacco abuse, in remission      Unspecified sinusitis (chronic) 3/8/2001        Allergies   Allergen Reactions     Amoxicillin Diarrhea     Bee Venom      Bee venom (honey bee)       Food      Artificial cinnamon      Sulfa Drugs      Sulfa (sulfonamide antibiotics)        Latex Rash     Current Outpatient Medications   Medication     Acetaminophen (TYLENOL PO)     albuterol (PROAIR HFA/PROVENTIL HFA/VENTOLIN HFA) 108 (90 Base) MCG/ACT inhaler     Ascorbic Acid (VITAMIN C PO)     cetirizine (ZYRTEC) 10 MG tablet     citalopram (CELEXA) 40 MG tablet     Cyanocobalamin (VITAMIN B 12 PO)     docusate sodium (COLACE) 100 MG capsule     EPINEPHrine (ANY BX GENERIC EQUIV) 0.3 MG/0.3ML injection 2-pack     fluticasone (FLONASE) 50 MCG/ACT nasal spray     hydrOXYzine (ATARAX) 25 MG tablet     L-THEANINE PO     [START ON 9/21/2021] methylphenidate (CONCERTA) 36 MG CR tablet     methylphenidate (CONCERTA) 36 MG CR tablet     methylphenidate (CONCERTA) 36 MG CR tablet     methylphenidate (CONCERTA) 36 MG CR tablet     mometasone (NASONEX) 50 MCG/ACT nasal spray     NONFORMULARY     NONFORMULARY     NONFORMULARY     NONFORMULARY     NONFORMULARY     NONFORMULARY     Nutritional Supplements (MENOPAUSE FORMULA PO)     omeprazole (PRILOSEC) 40 MG DR capsule     traZODone (DESYREL) 100 MG tablet     TURMERIC CURCUMIN PO     vitamin B complex with vitamin C (VITAMIN  B COMPLEX) tablet     Vitamin D, Cholecalciferol, 25 MCG (1000 UT) TABS     Current Facility-Administered Medications   Medication     levonorgestrel (MIRENA) 20 MCG/24HR IUD 20 mcg      ROS: 10 point ROS neg other than the symptoms noted above in the HPI.  /80 (BP Location: Right arm, Patient Position: Sitting, Cuff Size: Adult Large)   Pulse 100   Temp 97  F (36.1  C) (Tympanic)   Wt 105.7 kg (233 lb)   SpO2 98%   BMI 34.41 kg/m      General - The patient is well nourished and well developed, and appears to have good nutritional status.   Alert and oriented to person and place, answers questions and cooperates with examination appropriately.   Head and Face - Normocephalic and atraumatic, with no gross asymmetry noted.  The facial nerve is intact, with strong symmetric movements.  Voice and Breathing - The patient was breathing comfortably without the use of accessory muscles. There was no wheezing, stridor, or stertor.  The patients voice was clear and strong, and had appropriate pitch and quality.  Ears -  Ears examined with otoscope and under otologic microscopy. Ears examined with otoscope and under otologic microscopy. The external auditory canals are patent, the tympanic membranes are intact without effusion, retraction or mass.  Bony landmarks are intact.  Eyes - Extraocular movements intact, and the pupils were reactive to light.  Sclera were not icteric or injected, conjunctiva were pink and moist.  Mouth - Examination of the oral cavity showed pink, healthy oral mucosa. No lesions or ulcerations noted.  The tongue was mobile and midline, and the dentition, plate.   Throat - The walls of the oropharynx were smooth, pink, moist, symmetric, and had no lesions or ulcerations.  The tonsillar pillars and soft palate were symmetric.  The uvula was midline on elevation.    Neck - Normal midline excursion of the laryngotracheal complex during swallowing.  Full range of motion on passive movement.  Palpation of the occipital, submental, submandibular, internal jugular chain, and supraclavicular nodes did not demonstrate any abnormal lymph nodes or masses.  Palpation of the thyroid was soft and smooth, with no nodules or goiter appreciated.  The trachea was mobile and midline.  Nose - External contour is symmetric, no gross deflection or scars.  Nasal mucosa is pink and moist with no abnormal mucus.    TMJ- Discomfort along bilateral jaw. Oral tenderness bilaterally, L>R.        ASSESSMENT:    ICD-10-CM    1. TMJ (temporomandibular joint syndrome)   M26.609 Physical Therapy Referral   2. Sensation of fullness in ear, bilateral  H93.8X3    3. Seasonal allergic rhinitis, unspecified trigger  J30.2    4. Nasal turbinate hypertrophy  J34.3      Avoid teeth clenching.   Work on TMJ precautions.   Referral for physical therapy for TMJ.     Ears appear clear  Hearing normal. Hearing protection    Return for allergy skin testing.         Indications for allergy testing include:   1) Confirm suspicion of allergic rhinitis due to inhalant allergies  2) Identify the offending allergen to determine specific mode of treatment  3) In the case of chronic rhinosinusitis: when symptoms are not controlled by avoidance and pharmacotherapy  4) In the Asthma patient when exacerbations may be due to perennial allergen exposure  5) Suspect food allergy  6) Otitis Media, chronic rhinitis, atopic dermatitis, Meniere disease, headache, pharyngitis or eye symptoms    Due to allergies/ nasal congesiton, modified quantitative testing (MQT) will be performed.  Signed consent was obtained, and the risks of immunotherapy were discussed, including the potential for anaphylaxis.    If immunotherapy (IT) is recommended, there is continued risk of anaphylaxis.   Anaphylaxis can cause death. The patient will need to be monitored for 30 minutes post injection.  They must present their epinephrine pen prior to injection.  Subcutaneous as well as sublingual immunotherapy (SLIT) were discussed as potential treatment options.  The patient was told SLIT is not approved by the FDA and is cash pay.  The general time frame of immunotherapy was discussed (generally 3-5 years, sometimes longer), and the basic immunology behind IT was discussed.      TMJ disease can usually be managed conservatively.  Recommendations include resting the muscles and joints by eating soft foods, not chewing gum, avoiding clenching or tensing the jaw, and relaxing muscles with moist heat for 1/2 hour at least, twice daily.   Physical therapy can be helpful as well as non-steroidal anti-inflammatory drugs.  Oral splints or mouth guards are often necessary.  If these steps are not helpful an oral surgeon may need to be contacted.    Nadira Soliz PA-C  ENT  [facility]

## 2021-08-27 ENCOUNTER — OFFICE VISIT (OUTPATIENT)
Dept: OTOLARYNGOLOGY | Facility: OTHER | Age: 41
End: 2021-08-27
Attending: AUDIOLOGIST
Payer: COMMERCIAL

## 2021-08-27 ENCOUNTER — OFFICE VISIT (OUTPATIENT)
Dept: AUDIOLOGY | Facility: OTHER | Age: 41
End: 2021-08-27
Attending: AUDIOLOGIST
Payer: COMMERCIAL

## 2021-08-27 VITALS
DIASTOLIC BLOOD PRESSURE: 80 MMHG | TEMPERATURE: 97 F | HEART RATE: 100 BPM | WEIGHT: 233 LBS | SYSTOLIC BLOOD PRESSURE: 110 MMHG | OXYGEN SATURATION: 98 % | BODY MASS INDEX: 34.41 KG/M2

## 2021-08-27 DIAGNOSIS — H93.8X3 FULLNESS IN EAR, BILATERAL: ICD-10-CM

## 2021-08-27 DIAGNOSIS — H93.13 TINNITUS, BILATERAL: ICD-10-CM

## 2021-08-27 DIAGNOSIS — M26.609 TMJ (TEMPOROMANDIBULAR JOINT SYNDROME): Primary | ICD-10-CM

## 2021-08-27 DIAGNOSIS — R42 DIZZINESS: ICD-10-CM

## 2021-08-27 DIAGNOSIS — H90.3 SENSORINEURAL HEARING LOSS, ASYMMETRICAL: Primary | ICD-10-CM

## 2021-08-27 DIAGNOSIS — J30.2 SEASONAL ALLERGIC RHINITIS, UNSPECIFIED TRIGGER: ICD-10-CM

## 2021-08-27 DIAGNOSIS — H93.8X3 SENSATION OF FULLNESS IN EAR, BILATERAL: ICD-10-CM

## 2021-08-27 DIAGNOSIS — J34.3 NASAL TURBINATE HYPERTROPHY: ICD-10-CM

## 2021-08-27 PROBLEM — M25.562 CHRONIC PAIN OF LEFT KNEE: Status: ACTIVE | Noted: 2021-07-30

## 2021-08-27 PROBLEM — M25.662 KNEE STIFFNESS, LEFT: Status: ACTIVE | Noted: 2021-07-30

## 2021-08-27 PROBLEM — G89.29 CHRONIC PAIN OF LEFT KNEE: Status: ACTIVE | Noted: 2021-07-30

## 2021-08-27 PROBLEM — M62.81 MUSCLE WEAKNESS: Status: ACTIVE | Noted: 2021-07-30

## 2021-08-27 PROCEDURE — 99213 OFFICE O/P EST LOW 20 MIN: CPT | Mod: 25 | Performed by: PHYSICIAN ASSISTANT

## 2021-08-27 PROCEDURE — 92557 COMPREHENSIVE HEARING TEST: CPT | Performed by: AUDIOLOGIST

## 2021-08-27 PROCEDURE — G0463 HOSPITAL OUTPT CLINIC VISIT: HCPCS

## 2021-08-27 PROCEDURE — 92567 TYMPANOMETRY: CPT | Performed by: AUDIOLOGIST

## 2021-08-27 PROCEDURE — 92504 EAR MICROSCOPY EXAMINATION: CPT

## 2021-08-27 PROCEDURE — 92504 EAR MICROSCOPY EXAMINATION: CPT | Performed by: PHYSICIAN ASSISTANT

## 2021-08-27 ASSESSMENT — PAIN SCALES - GENERAL: PAINLEVEL: MILD PAIN (3)

## 2021-08-27 NOTE — LETTER
8/27/2021         RE: Cristiana Aguila  [address] [place] [address]        Dear Colleague,    Thank you for referring your patient, Cristiana Aguila, to the [facility]. Please see a copy of my visit note below.    Chief Complaint   Patient presents with     RECHECK     Pt is here for a f/u sensation of fullness in bilateral ears, TMJ, and NTH.       Patient returns to ENT for recheck of her ears. She was last seen on 7/21/21 for TMJ, ear fullness. She had a normal ear exam, but increase in TMJ concerns.   She was recommended TMJ precautions, consider PT>   She was started on Nasonex, Zyrtec and to consider MQT.       Today, she has felt continued nasal congestion since her last visit. Nasal spray improved symptoms short term but has noticed continued congestion.   She has been working on avoiding clenching. She has increase in otalgia related to TMJ, teeth clenching.  Her hearing has been stable.     She does not tolerate rinses.     Audiogram  Type A tympanograms  Thresholds are normal range with slight loss high frequency   SRT=PTA  WRS-  Right- 100%@60dB  Left- 100% @60 dB    Aural fullness- L>R.   Denies COM or otologic surgeries.   Denies otorrhea. Mild otalgia related greater to sounds/ noises.   Intermitent tinnitus. Worsening over the last 1.5 months- occurs 3 times a day.   Denies fluctuating hearing loss or tinnitus.   Denies facial paraesthesia.   Denies dysphagia.   + Vertigo. Symptoms worsening lately. 1.5 months- 2 months. In the past, she had completed Vestibular therapy. She feels with quick movements triggers her symptoms.   +Family Hx- Maternal GF- HL/ HA.   +Seasonal allergies- rhinitis/ AC symptoms. Controlled with medications.      Audiogram- Patient declined.     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     FH: breast cancer      DAFNE  (generalised anxiety disorder)      Irritable bowel syndrome with both constipation and diarrhea 8/2/2017     MDD (major depressive disorder)      Migraine, unspecified, without mention of intractable migraine without mention of status migrainosus 3/13/2000     Tobacco abuse, in remission      Unspecified sinusitis (chronic) 3/8/2001        Allergies   Allergen Reactions     Amoxicillin Diarrhea     Bee Venom      Bee venom (honey bee)       Food      Artificial cinnamon      Sulfa Drugs      Sulfa (sulfonamide antibiotics)        Latex Rash     Current Outpatient Medications   Medication     Acetaminophen (TYLENOL PO)     albuterol (PROAIR HFA/PROVENTIL HFA/VENTOLIN HFA) 108 (90 Base) MCG/ACT inhaler     Ascorbic Acid (VITAMIN C PO)     cetirizine (ZYRTEC) 10 MG tablet     citalopram (CELEXA) 40 MG tablet     Cyanocobalamin (VITAMIN B 12 PO)     docusate sodium (COLACE) 100 MG capsule     EPINEPHrine (ANY BX GENERIC EQUIV) 0.3 MG/0.3ML injection 2-pack     fluticasone (FLONASE) 50 MCG/ACT nasal spray     hydrOXYzine (ATARAX) 25 MG tablet     L-THEANINE PO     [START ON 9/21/2021] methylphenidate (CONCERTA) 36 MG CR tablet     methylphenidate (CONCERTA) 36 MG CR tablet     methylphenidate (CONCERTA) 36 MG CR tablet     methylphenidate (CONCERTA) 36 MG CR tablet     mometasone (NASONEX) 50 MCG/ACT nasal spray     NONFORMULARY     NONFORMULARY     NONFORMULARY     NONFORMULARY     NONFORMULARY     NONFORMULARY     Nutritional Supplements (MENOPAUSE FORMULA PO)     omeprazole (PRILOSEC) 40 MG DR capsule     traZODone (DESYREL) 100 MG tablet     TURMERIC CURCUMIN PO     vitamin B complex with vitamin C (VITAMIN  B COMPLEX) tablet     Vitamin D, Cholecalciferol, 25 MCG (1000 UT) TABS     Current Facility-Administered Medications   Medication     levonorgestrel (MIRENA) 20 MCG/24HR IUD 20 mcg      ROS: 10 point ROS neg other than the symptoms noted above in the HPI.  /80 (BP Location: Right arm, Patient Position:  Sitting, Cuff Size: Adult Large)   Pulse 100   Temp 97  F (36.1  C) (Tympanic)   Wt 105.7 kg (233 lb)   SpO2 98%   BMI 34.41 kg/m      General - The patient is well nourished and well developed, and appears to have good nutritional status.  Alert and oriented to person and place, answers questions and cooperates with examination appropriately.   Head and Face - Normocephalic and atraumatic, with no gross asymmetry noted.  The facial nerve is intact, with strong symmetric movements.  Voice and Breathing - The patient was breathing comfortably without the use of accessory muscles. There was no wheezing, stridor, or stertor.  The patients voice was clear and strong, and had appropriate pitch and quality.  Ears -  Ears examined with otoscope and under otologic microscopy. Ears examined with otoscope and under otologic microscopy. The external auditory canals are patent, the tympanic membranes are intact without effusion, retraction or mass.  Bony landmarks are intact.  Eyes - Extraocular movements intact, and the pupils were reactive to light.  Sclera were not icteric or injected, conjunctiva were pink and moist.  Mouth - Examination of the oral cavity showed pink, healthy oral mucosa. No lesions or ulcerations noted.  The tongue was mobile and midline, and the dentition, plate.   Throat - The walls of the oropharynx were smooth, pink, moist, symmetric, and had no lesions or ulcerations.  The tonsillar pillars and soft palate were symmetric.  The uvula was midline on elevation.    Neck - Normal midline excursion of the laryngotracheal complex during swallowing.  Full range of motion on passive movement.  Palpation of the occipital, submental, submandibular, internal jugular chain, and supraclavicular nodes did not demonstrate any abnormal lymph nodes or masses.  Palpation of the thyroid was soft and smooth, with no nodules or goiter appreciated.  The trachea was mobile and midline.  Nose - External contour is  symmetric, no gross deflection or scars.  Nasal mucosa is pink and moist with no abnormal mucus.    TMJ- Discomfort along bilateral jaw. Oral tenderness bilaterally, L>R.        ASSESSMENT:    ICD-10-CM    1. TMJ (temporomandibular joint syndrome)  M26.609 Physical Therapy Referral   2. Sensation of fullness in ear, bilateral  H93.8X3    3. Seasonal allergic rhinitis, unspecified trigger  J30.2    4. Nasal turbinate hypertrophy  J34.3      Avoid teeth clenching.   Work on TMJ precautions.   Referral for physical therapy for TMJ.     Ears appear clear  Hearing normal. Hearing protection    Return for allergy skin testing.         Indications for allergy testing include:   1) Confirm suspicion of allergic rhinitis due to inhalant allergies  2) Identify the offending allergen to determine specific mode of treatment  3) In the case of chronic rhinosinusitis: when symptoms are not controlled by avoidance and pharmacotherapy  4) In the Asthma patient when exacerbations may be due to perennial allergen exposure  5) Suspect food allergy  6) Otitis Media, chronic rhinitis, atopic dermatitis, Meniere disease, headache, pharyngitis or eye symptoms    Due to allergies/ nasal congesiton, modified quantitative testing (MQT) will be performed.  Signed consent was obtained, and the risks of immunotherapy were discussed, including the potential for anaphylaxis.    If immunotherapy (IT) is recommended, there is continued risk of anaphylaxis.   Anaphylaxis can cause death. The patient will need to be monitored for 30 minutes post injection.  They must present their epinephrine pen prior to injection.  Subcutaneous as well as sublingual immunotherapy (SLIT) were discussed as potential treatment options.  The patient was told SLIT is not approved by the FDA and is cash pay.  The general time frame of immunotherapy was discussed (generally 3-5 years, sometimes longer), and the basic immunology behind IT was discussed.      TMJ disease  can usually be managed conservatively.  Recommendations include resting the muscles and joints by eating soft foods, not chewing gum, avoiding clenching or tensing the jaw, and relaxing muscles with moist heat for 1/2 hour at least, twice daily.  Physical therapy can be helpful as well as non-steroidal anti-inflammatory drugs.  Oral splints or mouth guards are often necessary.  If these steps are not helpful an oral surgeon may need to be contacted.    Nadira Soliz PA-C  ENT  [facility] [place]          Again, thank you for allowing me to participate in the care of your patient.        Sincerely,        Nadira Soliz PA-C

## 2021-08-27 NOTE — PATIENT INSTRUCTIONS
Avoid teeth clenching.   Work on TMJ precautions.   Referral for physical therapy for TMJ.     Ears appear clear  Hearing normal. Hearing protection    Return for allergy skin testing.       Thank you for allowing Nadira Soliz PA-C and our ENT team to participate in your care.  If your medications are too expensive, please give the nurse a call.  We can possibly change this medication.  If you have a scheduling or an appointment question please contact our Health Unit Coordinator at [phone], Ext. [phone].    ALL nursing questions or concerns can be directed to your ENT nurse at: [phone] [place]

## 2021-08-27 NOTE — NURSING NOTE
"Chief Complaint   Patient presents with     RECHECK     Pt is here for a f/u sensation of fullness in bilateral ears, TMJ, and NTH.       Initial /80 (BP Location: Right arm, Patient Position: Sitting, Cuff Size: Adult Large)   Pulse 100   Temp 97  F (36.1  C) (Tympanic)   Wt 105.7 kg (233 lb)   SpO2 98%   BMI 34.41 kg/m   Estimated body mass index is 34.41 kg/m  as calculated from the following:    Height as of 7/21/21: 1.753 m (5' 9\").    Weight as of this encounter: 105.7 kg (233 lb).  Medication Reconciliation: complete  Dena Kim LPN  "

## 2021-08-27 NOTE — PROGRESS NOTES
Audiology Evaluation Completed. Please refer SCANNED AUDIOGRAM and/or TYMPANOGRAM for BACKGROUND, RESULTS, RECOMMENDATIONS.      Amber BIGGS, [facility]  Audiologist #[id number]

## 2021-08-30 ENCOUNTER — TELEPHONE (OUTPATIENT)
Dept: ALLERGY | Facility: OTHER | Age: 41
End: 2021-08-30

## 2021-08-30 NOTE — TELEPHONE ENCOUNTER
Went over instructions with patient for allergy skin testing.  Reviewed patients current medications and patient will avoid all contraindicated medications prior to MQT testing.  Patient verbalizes understanding.  Copy of allergy testing packet will be mailed to the patient.  Patient is aware that she will be called to schedule her testing.  She is advised to call if she has any questions.    Bernard Trotter RN on 8/30/2021 at 11:00 AM

## 2021-09-09 ENCOUNTER — HOSPITAL ENCOUNTER (OUTPATIENT)
Dept: PHYSICAL THERAPY | Facility: HOSPITAL | Age: 41
Setting detail: THERAPIES SERIES
End: 2021-09-09
Attending: PHYSICIAN ASSISTANT
Payer: COMMERCIAL

## 2021-09-09 DIAGNOSIS — M26.609 TMJ (TEMPOROMANDIBULAR JOINT SYNDROME): ICD-10-CM

## 2021-09-09 PROCEDURE — 97110 THERAPEUTIC EXERCISES: CPT | Mod: GP

## 2021-09-09 PROCEDURE — 97161 PT EVAL LOW COMPLEX 20 MIN: CPT | Mod: GP

## 2021-09-09 PROCEDURE — 97140 MANUAL THERAPY 1/> REGIONS: CPT | Mod: GP

## 2021-09-09 NOTE — PROGRESS NOTES
"Initial Physical Therapy Evaluations       Name: Cristiana Aguila MRN# [id number]   Age: 41 year old YOB: 1980     Date of Consultation: September 9, 2021  Primary care provider: Sudhakar Cuellar    Referring Physician: Nadira Soliz PA-C  Orders: Eval and Treat  Medical Diagnosis: TMJ syndrome  Onset of Illness/Injury: 3-4 months ago    Reason for PT Visit: Patient is a 40 y/o female who presents with TMJ pain and dysfunction. Patient has been having ear issues for the last 3-4 months that she was told are coming from her jaw. States that ears are painful, feel plugged (including issues with hearing), and sometimes ring with pain. Most of the time this occurs in her left ear but can be both when it's bad. Reports that while the ear issues come and go, her jaw pain is relatively constant. Patient states that she has had pain and locking/snapping in her jaw \"all her life\". Reports that she clenches her teeth \"all the time.\" Patient believes jaw pain could be stress related. Tried mouthguard in the past, not sure if it worked. Has had dentures for 8-9 years. Currently reports jaw is painful, which she attributes to increased stress with school starting and other life stressors. Reports 3-4/10 pain currently, with 6/10 being the worst it gets, usually at end of day. No prior jaw treatment. History of migraines and headaches, can't say if related to jaw or not. Some issues chewing chewy foods and foods that make mouth open more. Tried soft diet for a couple weeks about a month ago. Seemed to help but got tired of soft diet. [facility] for knee pain (baker cyst, fluid in knee from old injury, OA and cartilage degen).   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 No  Have you fallen and had an injury in the last year? No  Is patient a fall risk? 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     FH: breast cancer      DAFNE (generalised anxiety disorder)      Irritable bowel syndrome with both constipation and diarrhea 8/2/2017     MDD (major depressive disorder)      Migraine, unspecified, without mention of intractable migraine without mention of status migrainosus 3/13/2000     Tobacco abuse, in remission      Unspecified sinusitis (chronic) 3/8/2001       Past Surgical History:  Past Surgical History:   Procedure Laterality Date     ENDOSCOPY UPPER, COLONOSCOPY, COMBINED N/A 9/4/2015    Procedure: COMBINED ENDOSCOPY UPPER, COLONOSCOPY;  Surgeon: Griffin Barrientos DO;  Location: [facility]     LAPAROSCOPIC CYSTECTOMY OVARIAN (BENIGN) Right 1/27/2016    Procedure: LAPAROSCOPIC CYSTECTOMY OVARIAN (BENIGN);  Surgeon: Kuldip España MD;  Location: [facility]     LAPAROSCOPIC SALPINGO-OOPHORECTOMY Left 1/27/2016    Procedure: LAPAROSCOPIC SALPINGO-OOPHORECTOMY;  Surgeon: Kuldip España MD;  Location: [facility]     LAPAROSCOPY DIAGNOSTIC (GYN) Left 1/27/2016    Procedure: LAPAROSCOPY DIAGNOSTIC (GYN);  Surgeon: Kuldip España MD;  Location: [facility]     TUBAL LIGATION  2006     wisdom teeth         Medications:   Current Outpatient Medications   Medication Sig     Acetaminophen (TYLENOL PO) Take by mouth every 6 hours as needed      albuterol (PROAIR HFA/PROVENTIL HFA/VENTOLIN HFA) 108 (90 Base) MCG/ACT inhaler INHALE 2 PUFFS INTO LUNGS EVERY 4 HOURS AS NEEDED FOR SHORTNESS OF BREATH     Ascorbic Acid (VITAMIN C PO) Take 1,000 mg by mouth 2 times daily     cetirizine (ZYRTEC) 10 MG tablet Take 10 mg by mouth daily     citalopram (CELEXA) 40 MG tablet TAKE 1 TABLET BY MOUTH DAILY     Cyanocobalamin (VITAMIN B 12 PO) Take 500 mcg by mouth  daily     docusate sodium (COLACE) 100 MG capsule Take 1 capsule (100 mg) by mouth 2 times daily     EPINEPHrine (ANY BX GENERIC EQUIV) 0.3 MG/0.3ML injection 2-pack INJECT 0.3 MLS INTO THE MUSCLE AS NEEDED FOR ANAPHYLAXIS     fluticasone (FLONASE) 50 MCG/ACT nasal spray SPRAY 2 SPRAYS INTO BOTH NOSTRILS DAILY     hydrOXYzine (ATARAX) 25 MG tablet TAKE 1 TO 2 TABLETS BY MOUTH 3 TIMES A DAY AS NEEDED     L-THEANINE PO      [START ON 9/21/2021] methylphenidate (CONCERTA) 36 MG CR tablet 2 tabs orally daily     methylphenidate (CONCERTA) 36 MG CR tablet 2 tabs orally daily     methylphenidate (CONCERTA) 36 MG CR tablet 2 tabs orally daily     methylphenidate (CONCERTA) 36 MG CR tablet 2 tablets orally daily     mometasone (NASONEX) 50 MCG/ACT nasal spray Spray 2 sprays into both nostrils daily     NONFORMULARY Digestive enzymes up to 3 per day.     NONFORMULARY Carb blocker 1-3 tablets per day     NONFORMULARY energy and focus     NONFORMULARY Clearguard 2 tablets 3 x per day     NONFORMULARY Melatonin and lavender 3 at hs     NONFORMULARY Vitamin c zinc, elderberry     Nutritional Supplements (MENOPAUSE FORMULA PO) Take 1 tablet by mouth 2 times daily     omeprazole (PRILOSEC) 40 MG DR capsule TAKE 1 CAPSULE BY MOUTH DAILY, TAKE 30 TO 60 MINUTES PRIOR TO A MEAL     traZODone (DESYREL) 100 MG tablet TAKE 2 TABLETS (200MG) BY MOUTH AT BEDTIME     TURMERIC CURCUMIN PO Take by mouth daily     vitamin B complex with vitamin C (VITAMIN  B COMPLEX) tablet Take 1 tablet by mouth daily     Vitamin D, Cholecalciferol, 25 MCG (1000 UT) TABS Take 1 tablet by mouth daily     Current Facility-Administered Medications   Medication     levonorgestrel (MIRENA) 20 MCG/24HR IUD 20 mcg       Imaging: no recent imaging on file    Musculoskeletal Findings:     OBJECTIVE   Observation: Patient presents to department in no acute distress. Patient is limping heavily with an antalgic gait due to left knee pain that is being addressed by a  separate PT.    Palpation: TTP bilateral masseter and TMJ joint line. Stated that palpation and STM to the temporalis felt good    Posture: Forward head, protracted shoulders   Sitting Posture: Forward head, protracted shoulders, moderately stooped   Standing Posture: Forward head, protracted shoulders    Range of Motion/Strength:   TMJ Range of Motion - therabite (mm)  Openin  Left Lateral Deviation: 8  Right Lateral Deviation: 8    Cervical Spine Range of Motion   Active Motion   Flexion 43    Extension 55    Side Bend Right 20    Side Bend Left 20    Rotation Right 72    Rotation Left 60    Right upper extremity range of motion: mildly limited in all directions   Left upper extremity range of motion: mildly limited in all directions    Right upper extremity strength: WNL   Left upper extremity strength: WNL  Deep Neck Flexor Endurance Test: 24 seconds prior to substitution    Outcome Measures:   TMJ disability questionnaire: 34% (12/35, only 7 of 10 questions answered)    Prognosis/Plan of Care:   Appropriate for Physical Therapy Intervention: Yes     GOALS:   Short-term goals:  To be achieved in 2-3 weeks:    Instruct in home program  1.) Patient will be independent with a short-term home exercise program.  2.) Patient will understand and demonstrate improved posture and techniques such that patient places less strain over cervical spine.  3.) Patient will report a 25% or greater improvement in symptoms in order to allow for increased comfort with activities of daily living.        Long-term goals:  To be achieved in 6-8 weeks:    Self management of symptoms.  Pain free activities of daily living.  1.) Improve score on TMJ Disability Questionnaire by 50% to correlate with clinically significant change.  2.) Patient will report a 75% or greater improvement in symptoms in order to allow for increased comfort with activities of daily living.  3.) Patient will improve jaw opening to 30 mm to improve jaw  function.       Discharge goals: Patient will be independent with a home program for self-management of symptoms.      Treatment plan:  1.) Modalities including ultrasound, electrical stimulation, and mechanical traction as needed for pain management and increasing tissue extensibility  2.) Manual therapy to include cervical and thoracic spine joint and soft tissue mobilization for improved mobility and decreased pain  3.) Therapeutic exercise to consist of cervical stabilization and scapular strengthening and range of motion activities    Treatment Rendered/Intervention:   Evaluation completed as described above followed by discussion of exam findings and plan of care.    Manual therapy: STM of the bilateral masseter was performed    Therapeutic exercise: Patient instructed in and demonstrated proper performance of home exercise program consisting of:  Band pull aparts and scapular retraction with ER, both with green theraband. 2 sets of 10 reps, twice a day    Educated in posture principles and neutral spine positioning, as well as loose packed jaw position. Patient was instructed in STM techniques of the masseter.      Clinical Impressions:  Criteria for Skilled Therapeutic Intervention Met: Yes  PT Diagnosis: TMJ syndrome  Influenced by the following impairments: Stress, jaw clenching and grinding, forward posture of the head and shoulders  Functional limitations due to impairment: difficulty eating, pain throughout day  Clinical presentation: Stable/Uncomplicated  Clinical presentation rationale: clinical judgement  Clinical Decision making (complexity): Low Complexity  Predicted Duration of Therapy Intervention (days/wks): 2x/week for 4 weeks  Risks and Benefits of therapy have been explained: Yes  Patient, Family & other staff in agreement with plan of care: Yes  Comments: Patient presents today with signs and symptoms consistent of TMJ syndrome. Therapy today consisted of evaluation, patient education,  manual therapy, and therapeutic exercise. The patient was examined in the areas of neck, shoulder, and jaw ROM, UE strength, as well as the soft tissue of the the jaw musculature. The patient was educated on the importance of postural awareness of the trunk and jaw. Soft tissue mobilization of the masseter was performed and the patient was educated on how to perform the technique herself. The patient was also given postural strengthening exercises to help improve posutre. Patient would benefit from continued PT sessions addressing tightness of the jaw musculature, postural improvement, and overall TMJ pain.      Total Evaluation Time: 40     Date Range: 9/9/2021 to 12/8/21    I certify the need for these services furnished under this plan of treatment and while under my care. (Physician co-signature of this document indicates review and certification of the therapy plan).      _____________________________     __________________________    ____________  Physician's Signature                 Date               Time

## 2021-09-15 ENCOUNTER — HOSPITAL ENCOUNTER (OUTPATIENT)
Dept: PHYSICAL THERAPY | Facility: HOSPITAL | Age: 41
Setting detail: THERAPIES SERIES
End: 2021-09-15
Attending: PHYSICIAN ASSISTANT
Payer: COMMERCIAL

## 2021-09-15 PROCEDURE — 97110 THERAPEUTIC EXERCISES: CPT | Mod: GP

## 2021-09-15 PROCEDURE — 97140 MANUAL THERAPY 1/> REGIONS: CPT | Mod: GP

## 2021-09-17 ENCOUNTER — HOSPITAL ENCOUNTER (OUTPATIENT)
Dept: PHYSICAL THERAPY | Facility: HOSPITAL | Age: 41
Setting detail: THERAPIES SERIES
End: 2021-09-17
Attending: PHYSICIAN ASSISTANT
Payer: COMMERCIAL

## 2021-09-17 PROCEDURE — 97140 MANUAL THERAPY 1/> REGIONS: CPT | Mod: GP

## 2021-09-20 ENCOUNTER — NURSE TRIAGE (OUTPATIENT)
Dept: FAMILY MEDICINE | Facility: OTHER | Age: 41
End: 2021-09-20

## 2021-09-20 DIAGNOSIS — Z20.822 SUSPECTED 2019 NOVEL CORONAVIRUS INFECTION: Primary | ICD-10-CM

## 2021-09-20 NOTE — TELEPHONE ENCOUNTER
Reason for Disposition    [1] COVID-19 infection suspected by caller or triager AND [2] mild symptoms (cough, fever, or others) AND [3] no complications or SOB    Additional Information    Negative: SEVERE difficulty breathing (e.g., struggling for each breath, speaks in single words)    Negative: Difficult to awaken or acting confused (e.g., disoriented, slurred speech)    Negative: Bluish (or gray) lips or face now    Negative: Shock suspected (e.g., cold/pale/clammy skin, too weak to stand, low BP, rapid pulse)    Negative: Sounds like a life-threatening emergency to the triager    Negative: [1] COVID-19 exposure AND [2] has not completed COVID-19 vaccine series AND [3] no symptoms    Negative: [1] COVID-19 exposure AND [2] completed COVID-19 vaccine series (fully vaccinated) AND [3] no symptoms    Negative: COVID-19 vaccine reaction suspected (e.g., fever, headache, muscle aches) occurring during days 1-3 after getting vaccine    Negative: COVID-19 vaccine, questions about    Negative: [1] COVID-19 vaccine series completed (fully vaccinated) in past 3 months AND [2] new-onset of COVID-19 symptoms BUT [3] no known exposure    Negative: [1] Had lab test confirmed COVID-19 infection within last 3 monthsAND [2] new-onset of COVID-19 symptoms BUT [3] no known exposure    Negative: [1] Lives with someone known to have influenza (flu test positive) AND [2] flu-like symptoms (e.g., cough, runny nose, sore throat, SOB; with or without fever)    Negative: [1] Adult with possible COVID-19 symptoms AND [2] triager concerned about severity of symptoms or other causes    Negative: COVID-19 and breastfeeding, questions about    Negative: SEVERE or constant chest pain or pressure (Exception: mild central chest pain, present only when coughing)    Negative: MODERATE difficulty breathing (e.g., speaks in phrases, SOB even at rest, pulse 100-120)    Negative: [1] Headache AND [2] stiff neck (can't touch chin to chest)     "Negative: MILD difficulty breathing (e.g., minimal/no SOB at rest, SOB with walking, pulse <100)    Negative: Chest pain or pressure    Negative: Patient sounds very sick or weak to the triager    Negative: Fever > 103 F (39.4 C)    Negative: [1] Fever > 101 F (38.3 C) AND [2] age > 60 years    Negative: [1] Fever > 100.0 F (37.8 C) AND [2] bedridden (e.g., nursing home patient, CVA, chronic illness, recovering from surgery)    Negative: [1] HIGH RISK patient (e.g., age > 64 years, diabetes, heart or lung disease, weak immune system) AND [2] new or worsening symptoms    Negative: [1] HIGH RISK patient AND [2] influenza is widespread in the community AND [3] ONE OR MORE respiratory symptoms: cough, sore throat, runny or stuffy nose    Negative: [1] HIGH RISK patient AND [2] influenza exposure within the last 7 days AND [3] ONE OR MORE respiratory symptoms: cough, sore throat, runny or stuffy nose    Negative: Fever present > 3 days (72 hours)    Negative: [1] Fever returns after gone for over 24 hours AND [2] symptoms worse or not improved    Negative: [1] Continuous (nonstop) coughing interferes with work or school AND [2] no improvement using cough treatment per protocol    Answer Assessment - Initial Assessment Questions  1. COVID-19 DIAGNOSIS: \"Who made your Coronavirus (COVID-19) diagnosis?\" \"Was it confirmed by a positive lab test?\" If not diagnosed by a HCP, ask \"Are there lots of cases (community spread) where you live?\" (See public health department website, if unsure)      Not confirmed  2. COVID-19 EXPOSURE: \"Was there any known exposure to COVID before the symptoms began?\" CDC Definition of close contact: within 6 feet (2 meters) for a total of 15 minutes or more over a 24-hour period.       yes  3. ONSET: \"When did the COVID-19 symptoms start?\"       Over the weekend  4. WORST SYMPTOM: \"What is your worst symptom?\" (e.g., cough, fever, shortness of breath, muscle aches)      Chest tightness, loss of " "taste  5. COUGH: \"Do you have a cough?\" If so, ask: \"How bad is the cough?\"        yes  6. FEVER: \"Do you have a fever?\" If so, ask: \"What is your temperature, how was it measured, and when did it start?\"      no  7. RESPIRATORY STATUS: \"Describe your breathing?\" (e.g., shortness of breath, wheezing, unable to speak)       Slightly increased SOB/wheezing with cough  8. BETTER-SAME-WORSE: \"Are you getting better, staying the same or getting worse compared to yesterday?\"  If getting worse, ask, \"In what way?\"      same  9. HIGH RISK DISEASE: \"Do you have any chronic medical problems?\" (e.g., asthma, heart or lung disease, weak immune system, obesity, etc.)      asthma  10. PREGNANCY: \"Is there any chance you are pregnant?\" \"When was your last menstrual period?\"        no  11. OTHER SYMPTOMS: \"Do you have any other symptoms?\"  (e.g., chills, fatigue, headache, loss of smell or taste, muscle pain, sore throat; new loss of smell or taste especially support the diagnosis of COVID-19)        Loss of taste    Protocols used: CORONAVIRUS (COVID-19) DIAGNOSED OR [id number] 3.25      "

## 2021-09-21 ENCOUNTER — OFFICE VISIT (OUTPATIENT)
Dept: FAMILY MEDICINE | Facility: OTHER | Age: 41
End: 2021-09-21
Attending: FAMILY MEDICINE
Payer: COMMERCIAL

## 2021-09-21 DIAGNOSIS — Z20.822 SUSPECTED 2019 NOVEL CORONAVIRUS INFECTION: ICD-10-CM

## 2021-09-21 PROCEDURE — U0003 INFECTIOUS AGENT DETECTION BY NUCLEIC ACID (DNA OR RNA); SEVERE ACUTE RESPIRATORY SYNDROME CORONAVIRUS 2 (SARS-COV-2) (CORONAVIRUS DISEASE [COVID-19]), AMPLIFIED PROBE TECHNIQUE, MAKING USE OF HIGH THROUGHPUT TECHNOLOGIES AS DESCRIBED BY CMS-2020-01-R: HCPCS | Mod: ZL

## 2021-09-23 LAB — SARS-COV-2 RNA RESP QL NAA+PROBE: NEGATIVE

## 2021-09-30 ENCOUNTER — HOSPITAL ENCOUNTER (OUTPATIENT)
Dept: PHYSICAL THERAPY | Facility: HOSPITAL | Age: 41
Setting detail: THERAPIES SERIES
End: 2021-09-30
Attending: PHYSICIAN ASSISTANT
Payer: COMMERCIAL

## 2021-09-30 PROCEDURE — 97140 MANUAL THERAPY 1/> REGIONS: CPT | Mod: GP

## 2021-10-01 ENCOUNTER — OFFICE VISIT (OUTPATIENT)
Dept: OTOLARYNGOLOGY | Facility: OTHER | Age: 41
End: 2021-10-01
Attending: PHYSICIAN ASSISTANT
Payer: COMMERCIAL

## 2021-10-01 ENCOUNTER — OFFICE VISIT (OUTPATIENT)
Dept: ALLERGY | Facility: OTHER | Age: 41
End: 2021-10-01
Attending: PHYSICIAN ASSISTANT
Payer: COMMERCIAL

## 2021-10-01 VITALS
HEIGHT: 69 IN | HEART RATE: 93 BPM | DIASTOLIC BLOOD PRESSURE: 89 MMHG | BODY MASS INDEX: 34.51 KG/M2 | SYSTOLIC BLOOD PRESSURE: 128 MMHG | OXYGEN SATURATION: 97 % | TEMPERATURE: 98.2 F | WEIGHT: 233 LBS

## 2021-10-01 DIAGNOSIS — H93.8X3 SENSATION OF FULLNESS IN EAR, BILATERAL: ICD-10-CM

## 2021-10-01 DIAGNOSIS — J30.1 SEASONAL ALLERGIC RHINITIS DUE TO POLLEN: ICD-10-CM

## 2021-10-01 DIAGNOSIS — M26.609 TMJ (TEMPOROMANDIBULAR JOINT SYNDROME): ICD-10-CM

## 2021-10-01 DIAGNOSIS — J30.89 PERENNIAL ALLERGIC RHINITIS: ICD-10-CM

## 2021-10-01 DIAGNOSIS — J30.2 SEASONAL ALLERGIC RHINITIS, UNSPECIFIED TRIGGER: Primary | ICD-10-CM

## 2021-10-01 PROCEDURE — G0463 HOSPITAL OUTPT CLINIC VISIT: HCPCS | Mod: 25

## 2021-10-01 PROCEDURE — 95004 PERQ TESTS W/ALRGNC XTRCS: CPT

## 2021-10-01 PROCEDURE — 92504 EAR MICROSCOPY EXAMINATION: CPT

## 2021-10-01 PROCEDURE — 99214 OFFICE O/P EST MOD 30 MIN: CPT | Mod: 25 | Performed by: PHYSICIAN ASSISTANT

## 2021-10-01 PROCEDURE — 95024 IQ TESTS W/ALLERGENIC XTRCS: CPT

## 2021-10-01 PROCEDURE — 92504 EAR MICROSCOPY EXAMINATION: CPT | Performed by: PHYSICIAN ASSISTANT

## 2021-10-01 RX ORDER — FEXOFENADINE HCL 180 MG/1
180 TABLET ORAL DAILY
Qty: 90 TABLET | Refills: 4 | Status: SHIPPED | OUTPATIENT
Start: 2021-10-01 | End: 2022-11-01

## 2021-10-01 ASSESSMENT — ASTHMA QUESTIONNAIRES
QUESTION_2 LAST FOUR WEEKS HOW OFTEN HAVE YOU HAD SHORTNESS OF BREATH: MORE THAN ONCE A DAY
HOSPITALIZATION_OVERNIGHT_LAST_YEAR_TOTAL: ONE
ACT_TOTALSCORE: 12
QUESTION_4 LAST FOUR WEEKS HOW OFTEN HAVE YOU USED YOUR RESCUE INHALER OR NEBULIZER MEDICATION (SUCH AS ALBUTEROL): ONE OR TWO TIMES PER DAY
QUESTION_1 LAST FOUR WEEKS HOW MUCH OF THE TIME DID YOUR ASTHMA KEEP YOU FROM GETTING AS MUCH DONE AT WORK, SCHOOL OR AT HOME: SOME OF THE TIME
QUESTION_5 LAST FOUR WEEKS HOW WOULD YOU RATE YOUR ASTHMA CONTROL: SOMEWHAT CONTROLLED
QUESTION_3 LAST FOUR WEEKS HOW OFTEN DID YOUR ASTHMA SYMPTOMS (WHEEZING, COUGHING, SHORTNESS OF BREATH, CHEST TIGHTNESS OR PAIN) WAKE YOU UP AT NIGHT OR EARLIER THAN USUAL IN THE MORNING: ONCE A WEEK

## 2021-10-01 ASSESSMENT — MIFFLIN-ST. JEOR: SCORE: 1776.26

## 2021-10-01 ASSESSMENT — PAIN SCALES - GENERAL: PAINLEVEL: MILD PAIN (3)

## 2021-10-01 NOTE — PROGRESS NOTES
Cristiana was seen for allergy skin testing. Patient was seen by this nurse in conjunction with ENT provider. All encounter details are documented in ENT Provider's appointment from this same date. Please see referenced encounter for this visits documentation.   Ciera Quintero RN

## 2021-10-01 NOTE — PROGRESS NOTES
Chief Complaint   Patient presents with     Other     MQT allergy test follow-up       Patient presents for MQT  She was noted to have TMJ and was referred to PT. Her exam was normal at her last visit.   Audiogram was normal.   She was recommend PT and follow up.   Today, she has been doing fairly well. Reports her ear fullness has improved slightly. She has been in PT.   Past trials of medications Zyrtec, Claritin without relief.   She has ongoing allergies. She reports sneezing, nasal congestion, allergic conjunctivitis.           MQT-   Dilution #6- None  Dilution #5-Thistle, Grass, birch, elm, oak, mold, dust  Dilution #2- ragweed, pigweed, maple, pine, molds, cat.             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     FH: breast cancer      DAFNE (generalised anxiety disorder)      Irritable bowel syndrome with both constipation and diarrhea 8/2/2017     MDD (major depressive disorder)      Migraine, unspecified, without mention of intractable migraine without mention of status migrainosus 3/13/2000     Tobacco abuse, in remission      Unspecified sinusitis (chronic) 3/8/2001        Allergies   Allergen Reactions     Amoxicillin Diarrhea     Bee Venom      Bee venom (honey bee)       Food      Artificial cinnamon      Sulfa Drugs      Sulfa (sulfonamide antibiotics)        Latex Rash     Current Outpatient Medications   Medication     Acetaminophen (TYLENOL PO)     albuterol (PROAIR HFA/PROVENTIL HFA/VENTOLIN HFA) 108 (90 Base) MCG/ACT inhaler     Ascorbic Acid (VITAMIN C PO)     cetirizine (ZYRTEC) 10 MG tablet     citalopram (CELEXA) 40 MG tablet     Cyanocobalamin (VITAMIN B 12 PO)     docusate sodium (COLACE) 100 MG capsule      "EPINEPHrine (ANY BX GENERIC EQUIV) 0.3 MG/0.3ML injection 2-pack     fluticasone (FLONASE) 50 MCG/ACT nasal spray     hydrOXYzine (ATARAX) 25 MG tablet     L-THEANINE PO     methylphenidate (CONCERTA) 36 MG CR tablet     methylphenidate (CONCERTA) 36 MG CR tablet     methylphenidate (CONCERTA) 36 MG CR tablet     methylphenidate (CONCERTA) 36 MG CR tablet     mometasone (NASONEX) 50 MCG/ACT nasal spray     NONFORMULARY     NONFORMULARY     NONFORMULARY     NONFORMULARY     NONFORMULARY     NONFORMULARY     Nutritional Supplements (MENOPAUSE FORMULA PO)     omeprazole (PRILOSEC) 40 MG DR capsule     traZODone (DESYREL) 100 MG tablet     TURMERIC CURCUMIN PO     vitamin B complex with vitamin C (VITAMIN  B COMPLEX) tablet     Vitamin D, Cholecalciferol, 25 MCG (1000 UT) TABS     Current Facility-Administered Medications   Medication     levonorgestrel (MIRENA) 20 MCG/24HR IUD 20 mcg     ROS- SEE HPI  /89 (BP Location: Right arm, Patient Position: Chair, Cuff Size: Adult Large)   Pulse 93   Temp 98.2  F (36.8  C) (Oral)   Ht 1.753 m (5' 9\")   Wt 105.7 kg (233 lb)   SpO2 97%   BMI 34.41 kg/m      General - The patient is well nourished and well developed, and appears to have good nutritional status.  Alert and oriented to person and place, answers questions and cooperates with examination appropriately.   Head and Face - Normocephalic and atraumatic, with no gross asymmetry noted.  The facial nerve is intact, with strong symmetric movements.  Voice and Breathing - The patient was breathing comfortably without the use of accessory muscles. There was no wheezing, stridor, or stertor.  The patients voice was clear and strong, and had appropriate pitch and quality.  Ears -  Ears examined with otoscope and under otologic microscopy. Ears examined with otoscope and under otologic microscopy. The external auditory canals are patent, the tympanic membranes are intact without effusion, retraction or mass.  Bony " landmarks are intact.  Eyes - Extraocular movements intact, and the pupils were reactive to light.  Sclera were not icteric or injected, conjunctiva were pink and moist.  Mouth - Examination of the oral cavity showed pink, healthy oral mucosa. No lesions or ulcerations noted.  The tongue was mobile and midline, and the dentition, plate.   Throat - The walls of the oropharynx were smooth, pink, moist, symmetric, and had no lesions or ulcerations.  The tonsillar pillars and soft palate were symmetric.  The uvula was midline on elevation.    Neck - Normal midline excursion of the laryngotracheal complex during swallowing.  Full range of motion on passive movement.  Palpation of the occipital, submental, submandibular, internal jugular chain, and supraclavicular nodes did not demonstrate any abnormal lymph nodes or masses.  Palpation of the thyroid was soft and smooth, with no nodules or goiter appreciated.  The trachea was mobile and midline.  Nose - External contour is symmetric, no gross deflection or scars.  Nasal mucosa is pink and moist with no abnormal mucus.    TMJ- Discomfort along bilateral jaw. Oral tenderness bilaterally, L>R.        ASSESSMENT:    ICD-10-CM    1. Seasonal allergic rhinitis, unspecified trigger  J30.2 fexofenadine (ALLEGRA) 180 MG tablet   2. Seasonal allergic rhinitis due to pollen  J30.1 fexofenadine (ALLEGRA) 180 MG tablet   3. Perennial allergic rhinitis  J30.89 fexofenadine (ALLEGRA) 180 MG tablet   4. TMJ (temporomandibular joint syndrome)  M26.609 diclofenac (VOLTAREN) 1 % topical gel   5. Sensation of fullness in ear, bilateral  H93.8X3        Work on allergy avoidance.   Continue with nasal spray, nasonex.   Start Allegra 180 mg daily. Hold Zyrtec.     Continue with PT and exercises.   Use Voltaren gel as needed   Apply 2 g topically three times a day as needed for Pain . Apply gel to affected joint and rub into skin gently; apply to entire joint.    Follow up in 2-3 months.      Continue with prilosec. Follow reflux precautions.   Increase water. Information provided.     Nadira Soliz PA-C  [facility], [place]

## 2021-10-01 NOTE — LETTER
10/1/2021         RE: Cristiana Aguila  [address] [place] [address]        Dear Colleague,    Thank you for referring your patient, Cristiana Aguila, to the [facility]. Please see a copy of my visit note below.    Chief Complaint   Patient presents with     Other     MQT allergy test follow-up       Patient presents for MQT  She was noted to have TMJ and was referred to PT. Her exam was normal at her last visit.   Audiogram was normal.   She was recommend PT and follow up.   Today, she has been doing fairly well. Reports her ear fullness has improved slightly. She has been in PT.   Past trials of medications Zyrtec, Claritin without relief.   She has ongoing allergies. She reports sneezing, nasal congestion, allergic conjunctivitis.           MQT-   Dilution #6- None  Dilution #5-Thistle, Grass, birch, elm, oak, mold, dust  Dilution #2- ragweed, pigweed, maple, pine, molds, cat.             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     FH: breast cancer      DAFNE (generalised anxiety disorder)      Irritable bowel syndrome with both constipation and diarrhea 8/2/2017     MDD (major depressive disorder)      Migraine, unspecified, without mention of intractable migraine without mention of status migrainosus 3/13/2000     Tobacco abuse, in remission      Unspecified sinusitis (chronic) 3/8/2001        Allergies   Allergen Reactions     Amoxicillin Diarrhea     Bee Venom      Bee venom (honey bee)       Food      Artificial cinnamon      Sulfa Drugs      Sulfa (sulfonamide antibiotics)        Latex Rash     Current Outpatient Medications   Medication     Acetaminophen (TYLENOL PO)     albuterol (PROAIR  "HFA/PROVENTIL HFA/VENTOLIN HFA) 108 (90 Base) MCG/ACT inhaler     Ascorbic Acid (VITAMIN C PO)     cetirizine (ZYRTEC) 10 MG tablet     citalopram (CELEXA) 40 MG tablet     Cyanocobalamin (VITAMIN B 12 PO)     docusate sodium (COLACE) 100 MG capsule     EPINEPHrine (ANY BX GENERIC EQUIV) 0.3 MG/0.3ML injection 2-pack     fluticasone (FLONASE) 50 MCG/ACT nasal spray     hydrOXYzine (ATARAX) 25 MG tablet     L-THEANINE PO     methylphenidate (CONCERTA) 36 MG CR tablet     methylphenidate (CONCERTA) 36 MG CR tablet     methylphenidate (CONCERTA) 36 MG CR tablet     methylphenidate (CONCERTA) 36 MG CR tablet     mometasone (NASONEX) 50 MCG/ACT nasal spray     NONFORMULARY     NONFORMULARY     NONFORMULARY     NONFORMULARY     NONFORMULARY     NONFORMULARY     Nutritional Supplements (MENOPAUSE FORMULA PO)     omeprazole (PRILOSEC) 40 MG DR capsule     traZODone (DESYREL) 100 MG tablet     TURMERIC CURCUMIN PO     vitamin B complex with vitamin C (VITAMIN  B COMPLEX) tablet     Vitamin D, Cholecalciferol, 25 MCG (1000 UT) TABS     Current Facility-Administered Medications   Medication     levonorgestrel (MIRENA) 20 MCG/24HR IUD 20 mcg     ROS- SEE HPI  /89 (BP Location: Right arm, Patient Position: Chair, Cuff Size: Adult Large)   Pulse 93   Temp 98.2  F (36.8  C) (Oral)   Ht 1.753 m (5' 9\")   Wt 105.7 kg (233 lb)   SpO2 97%   BMI 34.41 kg/m      General - The patient is well nourished and well developed, and appears to have good nutritional status.  Alert and oriented to person and place, answers questions and cooperates with examination appropriately.   Head and Face - Normocephalic and atraumatic, with no gross asymmetry noted.  The facial nerve is intact, with strong symmetric movements.  Voice and Breathing - The patient was breathing comfortably without the use of accessory muscles. There was no wheezing, stridor, or stertor.  The patients voice was clear and strong, and had appropriate pitch and " quality.  Ears -  Ears examined with otoscope and under otologic microscopy. Ears examined with otoscope and under otologic microscopy. The external auditory canals are patent, the tympanic membranes are intact without effusion, retraction or mass.  Bony landmarks are intact.  Eyes - Extraocular movements intact, and the pupils were reactive to light.  Sclera were not icteric or injected, conjunctiva were pink and moist.  Mouth - Examination of the oral cavity showed pink, healthy oral mucosa. No lesions or ulcerations noted.  The tongue was mobile and midline, and the dentition, plate.   Throat - The walls of the oropharynx were smooth, pink, moist, symmetric, and had no lesions or ulcerations.  The tonsillar pillars and soft palate were symmetric.  The uvula was midline on elevation.    Neck - Normal midline excursion of the laryngotracheal complex during swallowing.  Full range of motion on passive movement.  Palpation of the occipital, submental, submandibular, internal jugular chain, and supraclavicular nodes did not demonstrate any abnormal lymph nodes or masses.  Palpation of the thyroid was soft and smooth, with no nodules or goiter appreciated.  The trachea was mobile and midline.  Nose - External contour is symmetric, no gross deflection or scars.  Nasal mucosa is pink and moist with no abnormal mucus.    TMJ- Discomfort along bilateral jaw. Oral tenderness bilaterally, L>R.        ASSESSMENT:    ICD-10-CM    1. Seasonal allergic rhinitis, unspecified trigger  J30.2 fexofenadine (ALLEGRA) 180 MG tablet   2. Seasonal allergic rhinitis due to pollen  J30.1 fexofenadine (ALLEGRA) 180 MG tablet   3. Perennial allergic rhinitis  J30.89 fexofenadine (ALLEGRA) 180 MG tablet   4. TMJ (temporomandibular joint syndrome)  M26.609 diclofenac (VOLTAREN) 1 % topical gel   5. Sensation of fullness in ear, bilateral  H93.8X3        Work on allergy avoidance.   Continue with nasal spray, nasonex.   Start Allegra 180 mg  daily. Hold Zyrtec.     Continue with PT and exercises.   Use Voltaren gel as needed   Apply 2 g topically three times a day as needed for Pain . Apply gel to affected joint and rub into skin gently; apply to entire joint.    Follow up in 2-3 months.     Continue with prilosec. Follow reflux precautions.   Increase water. Information provided.     Nadira Soliz PA-C  [facility], [place]          Again, thank you for allowing me to participate in the care of your patient.        Sincerely,        Nadira Soliz PA-C

## 2021-10-02 ASSESSMENT — ASTHMA QUESTIONNAIRES: ACT_TOTALSCORE: 12

## 2021-10-03 ENCOUNTER — HEALTH MAINTENANCE LETTER (OUTPATIENT)
Age: 41
End: 2021-10-03

## 2021-10-06 ENCOUNTER — HOSPITAL ENCOUNTER (OUTPATIENT)
Dept: PHYSICAL THERAPY | Facility: HOSPITAL | Age: 41
Setting detail: THERAPIES SERIES
End: 2021-10-06
Attending: PHYSICIAN ASSISTANT
Payer: COMMERCIAL

## 2021-10-06 PROCEDURE — 97140 MANUAL THERAPY 1/> REGIONS: CPT | Mod: GP

## 2021-10-07 NOTE — PROGRESS NOTES
"    Assessment & Plan     Attention deficit disorder (ADD) without hyperactivity  Will stay at same dose. Seem to helps some with her sx  - methylphenidate (CONCERTA) 36 MG CR tablet; Take 2 tablets (72 mg) by mouth daily  - methylphenidate (CONCERTA) 36 MG CR tablet; Take 2 tablets (72 mg) by mouth daily  - methylphenidate (CONCERTA) 36 MG CR tablet; Take 2 tablets (72 mg) by mouth daily  - TSH    DAFNE (generalized anxiety disorder)  Worse and worse s/s of depression . Will increase Effexor and consider going down on Celexa in future follow-up . Follow-up in 6-8 weeks. Will decrease trazadone some .   - venlafaxine (EFFEXOR-XR) 37.5 MG 24 hr capsule; Take 1 capsule (37.5 mg) by mouth daily In morning  - TSH    Major depressive disorder, recurrent episode, in full remission (H)  As above. Move celexa to night time and decrease Trazadone. Pt is on lots of sleepers. This is not helping her situation   - venlafaxine (EFFEXOR-XR) 37.5 MG 24 hr capsule; Take 1 capsule (37.5 mg) by mouth daily In morning  - TSH    Psychophysiological insomnia  As above.   - TSH             BMI:   Estimated body mass index is 36.18 kg/m  as calculated from the following:    Height as of this encounter: 1.753 m (5' 9\").    Weight as of this encounter: 111.1 kg (245 lb).           No follow-ups on file.    Sudhakar Cuellar MD  [facility] Zendejas is a 41 year old who presents for the following health issues  accompanied by her spouse:    HPI       Asthma Follow-Up    Was ACT completed today?    Yes    ACT Total Scores 10/18/2021   ACT TOTAL SCORE (Goal Greater than or Equal to 20) 16   In the past 12 months, how many times did you visit the emergency room for your asthma without being admitted to the hospital? 0   In the past 12 months, how many times were you hospitalized overnight because of your asthma? 0       How many days per week do you miss taking your asthma controller medication?  0    Please " "describe any recent triggers for your asthma: smoke and upper respiratory infections    Have you had any Emergency Room Visits, Urgent Care Visits, or Hospital Admissions since your last office visit?  No        Last 6 months noticed more brain fog/ decrease focus/ decrease short term memory/ forgets what she said/ decrease focus/ hard time concentrating or staying task     No head trauma or illness  No Covid she is aware of.     Still having poor sleep  Up in middle of night and can not fall asleep    Takes Celexa in am       Recently upped concerta to 72mg in June from 54 mg   Maybe noticed it helping for several hours in the morning       Was on Wellbutrin -- did not tolerate/ increase irritable   Was on Effexor - helped   Review of Systems   Constitutional, HEENT, cardiovascular, pulmonary, gi and gu systems are negative, except as otherwise noted.        [facility] Follow-up to PHQ 3/10/2021 6/23/2021 10/18/2021   PHQ-9 9. Suicide Ideation past 2 weeks Not at all Not at all Not at all     PHQ 3/10/2021 6/23/2021 10/18/2021   PHQ-9 Total Score 15 11 16   Q9: Thoughts of better off dead/self-harm past 2 weeks Not at all Not at all Not at all     DAFNE-7 SCORE 3/10/2021 6/23/2021 10/18/2021   Total Score 13 12 15           Objective    /72   Pulse 71   Temp 98.2  F (36.8  C)   Ht 1.753 m (5' 9\")   Wt 111.1 kg (245 lb)   SpO2 98%   BMI 36.18 kg/m    Body mass index is 36.18 kg/m .  Physical Exam   GENERAL: healthy, alert and no distress  PSYCH: mentation appears normal, affect normal/bright                "

## 2021-10-08 ENCOUNTER — HOSPITAL ENCOUNTER (OUTPATIENT)
Dept: PHYSICAL THERAPY | Facility: HOSPITAL | Age: 41
Setting detail: THERAPIES SERIES
End: 2021-10-08
Attending: PHYSICIAN ASSISTANT
Payer: COMMERCIAL

## 2021-10-08 PROCEDURE — 97140 MANUAL THERAPY 1/> REGIONS: CPT | Mod: GP

## 2021-10-12 ENCOUNTER — HOSPITAL ENCOUNTER (OUTPATIENT)
Dept: PHYSICAL THERAPY | Facility: HOSPITAL | Age: 41
Setting detail: THERAPIES SERIES
End: 2021-10-12
Attending: PHYSICIAN ASSISTANT
Payer: COMMERCIAL

## 2021-10-12 PROCEDURE — 97140 MANUAL THERAPY 1/> REGIONS: CPT | Mod: GP

## 2021-10-14 ENCOUNTER — HOSPITAL ENCOUNTER (OUTPATIENT)
Dept: PHYSICAL THERAPY | Facility: HOSPITAL | Age: 41
Setting detail: THERAPIES SERIES
End: 2021-10-14
Attending: PHYSICIAN ASSISTANT
Payer: COMMERCIAL

## 2021-10-14 PROCEDURE — 97140 MANUAL THERAPY 1/> REGIONS: CPT | Mod: GP

## 2021-10-18 ENCOUNTER — OFFICE VISIT (OUTPATIENT)
Dept: FAMILY MEDICINE | Facility: OTHER | Age: 41
End: 2021-10-18
Attending: FAMILY MEDICINE
Payer: COMMERCIAL

## 2021-10-18 VITALS
HEIGHT: 69 IN | DIASTOLIC BLOOD PRESSURE: 72 MMHG | HEART RATE: 71 BPM | OXYGEN SATURATION: 98 % | TEMPERATURE: 98.2 F | WEIGHT: 245 LBS | BODY MASS INDEX: 36.29 KG/M2 | SYSTOLIC BLOOD PRESSURE: 122 MMHG

## 2021-10-18 DIAGNOSIS — F41.1 GAD (GENERALIZED ANXIETY DISORDER): ICD-10-CM

## 2021-10-18 DIAGNOSIS — F98.8 ATTENTION DEFICIT DISORDER (ADD) WITHOUT HYPERACTIVITY: Primary | ICD-10-CM

## 2021-10-18 DIAGNOSIS — F33.42 MAJOR DEPRESSIVE DISORDER, RECURRENT EPISODE, IN FULL REMISSION (H): ICD-10-CM

## 2021-10-18 DIAGNOSIS — F51.04 PSYCHOPHYSIOLOGICAL INSOMNIA: ICD-10-CM

## 2021-10-18 LAB — TSH SERPL DL<=0.005 MIU/L-ACNC: 1.06 MU/L (ref 0.4–4)

## 2021-10-18 PROCEDURE — 84443 ASSAY THYROID STIM HORMONE: CPT | Mod: ZL | Performed by: FAMILY MEDICINE

## 2021-10-18 PROCEDURE — 36415 COLL VENOUS BLD VENIPUNCTURE: CPT | Mod: ZL | Performed by: FAMILY MEDICINE

## 2021-10-18 PROCEDURE — 99214 OFFICE O/P EST MOD 30 MIN: CPT | Performed by: FAMILY MEDICINE

## 2021-10-18 PROCEDURE — G0463 HOSPITAL OUTPT CLINIC VISIT: HCPCS

## 2021-10-18 RX ORDER — METHYLPHENIDATE HYDROCHLORIDE 36 MG/1
72 TABLET ORAL DAILY
Qty: 60 TABLET | Refills: 0 | Status: SHIPPED | OUTPATIENT
Start: 2021-10-18 | End: 2021-12-20

## 2021-10-18 RX ORDER — METHYLPHENIDATE HYDROCHLORIDE 36 MG/1
72 TABLET ORAL DAILY
Qty: 60 TABLET | Refills: 0 | Status: SHIPPED | OUTPATIENT
Start: 2021-11-18 | End: 2022-03-21

## 2021-10-18 RX ORDER — VENLAFAXINE HYDROCHLORIDE 37.5 MG/1
37.5 CAPSULE, EXTENDED RELEASE ORAL DAILY
Qty: 30 CAPSULE | Refills: 1 | Status: SHIPPED | OUTPATIENT
Start: 2021-10-18 | End: 2021-12-13

## 2021-10-18 RX ORDER — METHYLPHENIDATE HYDROCHLORIDE 36 MG/1
72 TABLET ORAL DAILY
Qty: 60 TABLET | Refills: 0 | Status: SHIPPED | OUTPATIENT
Start: 2021-12-19 | End: 2021-12-20

## 2021-10-18 ASSESSMENT — ANXIETY QUESTIONNAIRES
6. BECOMING EASILY ANNOYED OR IRRITABLE: NEARLY EVERY DAY
GAD7 TOTAL SCORE: 15
3. WORRYING TOO MUCH ABOUT DIFFERENT THINGS: MORE THAN HALF THE DAYS
IF YOU CHECKED OFF ANY PROBLEMS ON THIS QUESTIONNAIRE, HOW DIFFICULT HAVE THESE PROBLEMS MADE IT FOR YOU TO DO YOUR WORK, TAKE CARE OF THINGS AT HOME, OR GET ALONG WITH OTHER PEOPLE: SOMEWHAT DIFFICULT
1. FEELING NERVOUS, ANXIOUS, OR ON EDGE: MORE THAN HALF THE DAYS
7. FEELING AFRAID AS IF SOMETHING AWFUL MIGHT HAPPEN: MORE THAN HALF THE DAYS
5. BEING SO RESTLESS THAT IT IS HARD TO SIT STILL: MORE THAN HALF THE DAYS
2. NOT BEING ABLE TO STOP OR CONTROL WORRYING: MORE THAN HALF THE DAYS
4. TROUBLE RELAXING: MORE THAN HALF THE DAYS

## 2021-10-18 ASSESSMENT — PATIENT HEALTH QUESTIONNAIRE - PHQ9: SUM OF ALL RESPONSES TO PHQ QUESTIONS 1-9: 16

## 2021-10-18 ASSESSMENT — ASTHMA QUESTIONNAIRES
QUESTION_4 LAST FOUR WEEKS HOW OFTEN HAVE YOU USED YOUR RESCUE INHALER OR NEBULIZER MEDICATION (SUCH AS ALBUTEROL): TWO OR THREE TIMES PER WEEK
QUESTION_5 LAST FOUR WEEKS HOW WOULD YOU RATE YOUR ASTHMA CONTROL: SOMEWHAT CONTROLLED
QUESTION_3 LAST FOUR WEEKS HOW OFTEN DID YOUR ASTHMA SYMPTOMS (WHEEZING, COUGHING, SHORTNESS OF BREATH, CHEST TIGHTNESS OR PAIN) WAKE YOU UP AT NIGHT OR EARLIER THAN USUAL IN THE MORNING: ONCE OR TWICE
ACT_TOTALSCORE: 16
QUESTION_1 LAST FOUR WEEKS HOW MUCH OF THE TIME DID YOUR ASTHMA KEEP YOU FROM GETTING AS MUCH DONE AT WORK, SCHOOL OR AT HOME: SOME OF THE TIME
QUESTION_2 LAST FOUR WEEKS HOW OFTEN HAVE YOU HAD SHORTNESS OF BREATH: THREE TO SIX TIMES A WEEK

## 2021-10-18 ASSESSMENT — MIFFLIN-ST. JEOR: SCORE: 1830.69

## 2021-10-18 ASSESSMENT — PAIN SCALES - GENERAL: PAINLEVEL: NO PAIN (0)

## 2021-10-18 NOTE — PATIENT INSTRUCTIONS
ADD EFFEXOR - TAKE IN MORNING     SAME DOSE OF CONCERTA    CHANGE CELEXA OR CITALOPRAM IN EVENINGS     TRY TO GO TO HALF OR 1 TAB OF TRAZADONE FOR AT LEAST A WEEK WHILE DOING ABOVE CHANGES

## 2021-10-18 NOTE — NURSING NOTE
"Chief Complaint   Patient presents with     A.D.H.D       Initial /72   Pulse 71   Temp 98.2  F (36.8  C)   Ht 1.753 m (5' 9\")   Wt 111.1 kg (245 lb)   SpO2 98%   BMI 36.18 kg/m   Estimated body mass index is 36.18 kg/m  as calculated from the following:    Height as of this encounter: 1.753 m (5' 9\").    Weight as of this encounter: 111.1 kg (245 lb).  Medication Reconciliation: complete  Grabiel Cantor LPN  "

## 2021-10-19 ENCOUNTER — HOSPITAL ENCOUNTER (EMERGENCY)
Facility: HOSPITAL | Age: 41
Discharge: HOME | End: 2021-10-19
Attending: NURSE PRACTITIONER | Admitting: NURSE PRACTITIONER
Payer: COMMERCIAL

## 2021-10-19 VITALS
DIASTOLIC BLOOD PRESSURE: 93 MMHG | OXYGEN SATURATION: 97 % | RESPIRATION RATE: 18 BRPM | HEART RATE: 79 BPM | SYSTOLIC BLOOD PRESSURE: 141 MMHG | TEMPERATURE: 98.9 F

## 2021-10-19 DIAGNOSIS — J06.9 URI (UPPER RESPIRATORY INFECTION): ICD-10-CM

## 2021-10-19 DIAGNOSIS — K12.0 CANKER SORES ORAL: ICD-10-CM

## 2021-10-19 LAB — GROUP A STREP BY PCR: NOT DETECTED

## 2021-10-19 PROCEDURE — 99213 OFFICE O/P EST LOW 20 MIN: CPT | Performed by: NURSE PRACTITIONER

## 2021-10-19 PROCEDURE — 87651 STREP A DNA AMP PROBE: CPT | Performed by: NURSE PRACTITIONER

## 2021-10-19 PROCEDURE — G0463 HOSPITAL OUTPT CLINIC VISIT: HCPCS

## 2021-10-19 ASSESSMENT — ENCOUNTER SYMPTOMS
CHILLS: 0
FATIGUE: 1
NAUSEA: 1
ACTIVITY CHANGE: 1
VOMITING: 0
EYES NEGATIVE: 1
RHINORRHEA: 0
SHORTNESS OF BREATH: 0
TROUBLE SWALLOWING: 1
SINUS PRESSURE: 0
MYALGIAS: 0
SINUS PAIN: 0
COUGH: 0
DIARRHEA: 0
FEVER: 0
HEADACHES: 1
SORE THROAT: 1
APPETITE CHANGE: 1

## 2021-10-19 ASSESSMENT — ASTHMA QUESTIONNAIRES: ACT_TOTALSCORE: 16

## 2021-10-19 ASSESSMENT — ANXIETY QUESTIONNAIRES: GAD7 TOTAL SCORE: 15

## 2021-10-19 NOTE — ED PROVIDER NOTES
He states  History   No chief complaint on file.    [facility]  Cristiana Aguila is a 41 year old female who presents with three to four day history of decreased appetite, fatigue, left ear pain, canker sores, sore throat and painful swallowing on the left side, and nausea.  Has chronic headaches.  Has taken Tylenol and ibuprofen.does decrease her symptoms slightly.  Last dose of Tylenol at 1:00 today.  Is being treated at ENT for ear pain, TMJ, and allergies.  No known sick contacts.  Has not received the Covid vaccination.  Denies fevers, chills, vomiting, diarrhea, and shortness of breath.    Allergies:  Allergies   Allergen Reactions     Amoxicillin Diarrhea     Bee Venom      Bee venom (honey bee)       Food      Artificial cinnamon      Other Environmental Allergy      Sulfa Drugs      Sulfa (sulfonamide antibiotics)        Latex Rash       Problem List:    Patient Active Problem List    Diagnosis Date Noted     Chronic pain of left knee 07/30/2021     Priority: Medium     Knee stiffness, left 07/30/2021     Priority: Medium     Muscle weakness 07/30/2021     Priority: Medium     Encounter for initial prescription of implantable subdermal contraceptive 03/11/2021     Priority: Medium     Psychophysiological insomnia 12/21/2020     Priority: Medium     Mild intermittent reactive airway disease with wheezing without complication 12/21/2020     Priority: Medium     Encounter for insertion of intrauterine contraceptive device (IUD) 04/29/2020     Priority: Medium     Well woman exam with routine gynecological exam 02/14/2018     Priority: Medium     Irritable bowel syndrome with both constipation and diarrhea 08/02/2017     Priority: Medium     Recurrent major depressive disorder, in partial remission (H) 08/02/2017     Priority: Medium     ACP (advance care planning) 03/02/2016     Priority: Medium     Advance Care Planning 3/2/2016: Receipt of ACP document:  Received: Health Care Directive which was witnessed or  notarized on 1-25-16.  Document previously scanned on 2-5-16.  Validation form completed and sent to be scanned.  Code Status needs to be updated to reflect choices in most recent ACP document.  Confirmed/documented designated decision maker(s).  Added by Rachel Barber RN Advance Care Planning Liaison with [employer]             FH: breast cancer      Priority: Medium     Generalized anxiety disorder      Priority: Medium     Diagnosis updated by automated process. Provider to review and confirm.       Attention deficit disorder 05/31/2005     Priority: Medium     Problem list name updated by automated process. 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     FH: breast cancer      DAFNE (generalised anxiety disorder)      Irritable bowel syndrome with both constipation and diarrhea 8/2/2017     MDD (major depressive disorder)      Migraine, unspecified, without mention of intractable migraine without mention of status migrainosus 3/13/2000     Tobacco abuse, in remission      Unspecified sinusitis (chronic) 3/8/2001       Past Surgical History:    Past Surgical History:   Procedure Laterality Date     ENDOSCOPY UPPER, COLONOSCOPY, COMBINED N/A 9/4/2015    Procedure: COMBINED ENDOSCOPY UPPER, COLONOSCOPY;  Surgeon: Griffin Barrientos DO;  Location: [facility]     LAPAROSCOPIC CYSTECTOMY OVARIAN (BENIGN) Right 1/27/2016    Procedure: LAPAROSCOPIC CYSTECTOMY OVARIAN (BENIGN);  Surgeon: Kuldip España MD;  Location: [facility]     LAPAROSCOPIC SALPINGO-OOPHORECTOMY Left 1/27/2016    Procedure: LAPAROSCOPIC SALPINGO-OOPHORECTOMY;  Surgeon: Kuldip España MD;  Location: [facility]     LAPAROSCOPY DIAGNOSTIC (GYN) Left 1/27/2016    Procedure: LAPAROSCOPY DIAGNOSTIC (GYN);  Surgeon: Kuldip España MD;  Location: [facility]     TUBAL LIGATION  2006     wisdom teeth          Family History:    Family History   Problem Relation Age of Onset     Breast Cancer Maternal Grandmother      Hypertension Maternal Grandfather      Hyperlipidemia Maternal Grandfather      Diabetes Paternal Grandmother      Asthma No family hx of        Social History:  Marital Status:   [2]  Social History     Tobacco Use     Smoking status: Former Smoker     Packs/day: 0.50     Years: 13.00     Pack years: 6.50     Types: Cigarettes     Quit date: 2020     Years since quittin.7     Smokeless tobacco: Never Used   Substance Use Topics     Alcohol use: Yes     Alcohol/week: 0.0 standard drinks     Comment: frequency: rarely     Drug use: No        Medications:    magic mouthwash suspension (diphenhydrAMINE, lidocaine, aluminum-magnesium & simethicone)  Acetaminophen (TYLENOL PO)  albuterol (PROAIR HFA/PROVENTIL HFA/VENTOLIN HFA) 108 (90 Base) MCG/ACT inhaler  Ascorbic Acid (VITAMIN C PO)  citalopram (CELEXA) 40 MG tablet  Cyanocobalamin (VITAMIN B 12 PO)  diclofenac (VOLTAREN) 1 % topical gel  docusate sodium (COLACE) 100 MG capsule  EPINEPHrine (ANY BX GENERIC EQUIV) 0.3 MG/0.3ML injection 2-pack  fexofenadine (ALLEGRA) 180 MG tablet  hydrOXYzine (ATARAX) 25 MG tablet  L-THEANINE PO  methylphenidate (CONCERTA) 36 MG CR tablet  [START ON 2021] methylphenidate (CONCERTA) 36 MG CR tablet  [START ON 2021] methylphenidate (CONCERTA) 36 MG CR tablet  methylphenidate (CONCERTA) 36 MG CR tablet  mometasone (NASONEX) 50 MCG/ACT nasal spray  NONFORMULARY  NONFORMULARY  Nutritional Supplements (MENOPAUSE FORMULA PO)  omeprazole (PRILOSEC) 40 MG DR capsule  traZODone (DESYREL) 100 MG tablet  TURMERIC CURCUMIN PO  venlafaxine (EFFEXOR-XR) 37.5 MG 24 hr capsule  vitamin B complex with vitamin C (VITAMIN  B COMPLEX) tablet  Vitamin D, Cholecalciferol, 25 MCG (1000 UT) TABS          Review of Systems   Constitutional: Positive for activity change, appetite change (decreased fluid intake) and  fatigue. Negative for chills and fever.   HENT: Positive for ear pain (left), mouth sores (canker sores on lips), sore throat and trouble swallowing. Negative for rhinorrhea, sinus pressure, sinus pain and sneezing.    Eyes: Negative.    Respiratory: Negative for cough and shortness of breath.    Gastrointestinal: Positive for nausea. Negative for diarrhea and vomiting.   Genitourinary: Negative.    Musculoskeletal: Negative for myalgias.   Skin: Negative.    Neurological: Positive for headaches (chronic ).       Physical Exam   BP: 141/93  Pulse: 79  Temp: 98.9  F (37.2  C)  Resp: 18  SpO2: 97 %      Physical Exam  Vitals and nursing note reviewed.   Constitutional:       General: She is in acute distress (mild).   HENT:      Head: Normocephalic.      Right Ear: Tympanic membrane and ear canal normal.      Left Ear: Tympanic membrane and ear canal normal.      Nose: Rhinorrhea present. Rhinorrhea is clear.      Right Turbinates: Swollen.      Left Turbinates: Swollen.      Right Sinus: No maxillary sinus tenderness or frontal sinus tenderness.      Left Sinus: No maxillary sinus tenderness or frontal sinus tenderness.      Mouth/Throat:      Lips: Pink.      Mouth: Mucous membranes are moist.      Pharynx: Uvula midline. Posterior oropharyngeal erythema (Mild) present.   Eyes:      Conjunctiva/sclera: Conjunctivae normal.   Cardiovascular:      Rate and Rhythm: Normal rate and regular rhythm.      Heart sounds: Normal heart sounds. No murmur heard.     Pulmonary:      Effort: Pulmonary effort is normal. No respiratory distress.      Breath sounds: Normal breath sounds. No wheezing.   Lymphadenopathy:      Cervical: Cervical adenopathy (small ) present.      Right cervical: Superficial cervical adenopathy present.      Left cervical: Superficial cervical adenopathy present.   Skin:     General: Skin is warm and dry.   Neurological:      Mental Status: She is alert and oriented to person, place, and time.    Psychiatric:         Behavior: Behavior normal.         ED Course        Procedures             Results for orders placed or performed during the hospital encounter of 10/19/21 (from the past 24 hour(s))   Group A Streptococcus PCR Throat Swab    Specimen: Throat; Swab   Result Value Ref Range    Group A strep by PCR Not Detected Not Detected    Narrative    The Xpert Xpress Strep A test, performed on the [employer], is a rapid, qualitative in vitro diagnostic test for the detection of Streptococcus pyogenes (Group A ß-hemolytic Streptococcus, Strep A) in throat swab specimens from patients with signs and symptoms of pharyngitis. The Xpert Xpress Strep A test can be used as an aid in the diagnosis of Group A Streptococcal pharyngitis. The assay is not intended to monitor treatment for Group A Streptococcus infections. The Xpert Xpress Strep A test utilizes an automated real-time polymerase chain reaction (PCR) to detect Streptococcus pyogenes DNA.       Medications - No data to display    Assessments & Plan (with Medical Decision Making)     I have reviewed the nursing notes.    I have reviewed the findings, diagnosis, plan and need for follow up with the patient.  (K12.0) Canker sores oral    (J06.9) URI (upper respiratory infection)  Comment: 41 year old female who presents with three to four day history of decreased appetite, fatigue, left ear pain, canker sores, sore throat and painful swallowing on the left side, and nausea.  Has chronic headaches.  Has taken Tylenol and ibuprofen.does decrease her symptoms slightly.  Last dose of Tylenol at 1:00 today.  Is being treated at ENT for ear pain, TMJ, and allergies.  No known sick contacts.  Has not received the Covid vaccination.  Denies fevers, chills, vomiting, diarrhea, and shortness of breath.    MDM: NHT. Lungs CTA    Strep screen negative.  She was notified per phone her LPN    Plan: Magic mouthwash for canker sores.  Education provided  for canker sores.  Treat symptoms conservatively with acetaminophen and  ibuprofen (if applicable) for fevers, body aches, and headaches, guaifenesin and/or honey for cough. May use chest rubs for sore throat and congestion, hot and cold liquids may help decrease sore throat and help you feel better. Increase fluids. You may utilize pseudoephedrine for congestion. Return to be reevaluated by ER/UC or your primary care provider if symptoms worsen, you develop breathing difficulties, or you do not improve in a reasonable time frame. It can take several days for a cough to resolve. It can take ten to fourteen days for upper respiratory symptoms to resolve.   These discharge instructions and medications were reviewed with her and understanding verbalized.    This document was prepared using a combination of typing and voice generated software.  While every attempt was made for accuracy, spelling and grammatical errors may exist.    Discharge Medication List as of 10/19/2021  5:01 PM      START taking these medications    Details   magic mouthwash suspension (diphenhydrAMINE, lidocaine, aluminum-magnesium & simethicone) Swish and spit 10 mLs in mouth every 6 hours as needed for mouth sores, Disp-120 mL, R-0, E-Prescribe             Final diagnoses:   Canker sores oral   URI (upper respiratory infection)       10/19/2021   [facility]       Padmini Olvera, CNP  10/19/21 [id number]

## 2021-10-19 NOTE — DISCHARGE INSTRUCTIONS
Will notify you with results of strep test.      Increase oral intake, cool mist vaporizer as needed, rest, avoid sharing utensils, practice good hand washing techniques, cover mouth when you cough and sneeze. Throw toothbursh away 24 hours after starting antibiotics.  Over the counter medications such as ibuprofen and/or acetaminophen for fever and generalized aches and pains. Ibuprofen 400 to 800 mg (2 - 4 tabs of over the counter med) every six to eight hours as needed;not to exceed maximum amount of 3200 mg in 24 hours.Tylenol 650 to 1000 mg every four to six hours as needed (not to exceed more than 4000 mg in a 24 hour period). May use interchangeably. Robitussin (guaifenesin) for cough. Chest rubs such as Andreas's or Mentholatum may help reduce sore throat symptoms.  Chloraseptic spray for sore throat or menthol lozenges may be helpful for sore throat. Be reevaluated if symptoms persist longer than 10 - 14 days or worsen and if there is no improvement in 72 hours or worsening of symptoms.  Increase fluids. Complete all antibiotics even if feeling better. Taking antibiotics with food may decrease the stomach upset that can occur when taking antibiotics. Antibiotics frequently cause diarrhea. Probiotics or yogurt may help prevent or decrease these symptoms.     OTC decongestants (oral or topical).  Decongestants (oral or topical) cause vasoconstriction of the nasal mucosa.  We prefer oral pseudoephedrine to phenylephrine and other oral OTC nasal decongestants. Side effects of oral decongestants may include tachycardia, elevated diastolic blood pressure, and palpitations. Pseudoephedrine 30 to 60 mg every four to six hours as needed for congestion. (Maximum dose is 240 mg in 24 hours). Do not use longer than 72 hours.    Commonly used topical decongestants include oxymetazoline, xylometazoline, and phenylephrine. Side effects of topical decongestants include nosebleeds and drying of the nasal membranes. Topical  decongestants should only be used for two to three days; longer use may result in rebound nasal congestion after discontinuation.    Fluids, herbs, and foods for sore throat relief -- Adjusting the temperature and texture of foods and beverages may provide local relief of sore throat pain. While data showing benefit are quite limited, these approaches are intuitive. We typically advise these measures since they are likely to be safe with minimal adverse effect, and patients often describe relief of symptoms.  We suggest hydration with frozen (eg, ice or popsicles) or heated liquids (eg, teas, soups), rather than room temperature or refrigerated fluids in patient with significant sore throat pain. Very cold foods can have a numbing-like effect that temporarily reduces or alleviates the pain of swallowing. Ice cubes or frozen popsicles facilitate hydration; ice cream and frozen yogurt provide caloric intake.  Warm fluids and foods, including teas, soups, and soft non-irritating foods, may be better tolerated by patients with throat pain than irritating foods (eg, rough-textured or spicy foods) or fluids at room temperatures. Foods that coat the throat, including honey and hard candies, can facilitate intake of calories while temporarily relieving throat pain.

## 2021-10-22 ENCOUNTER — HOSPITAL ENCOUNTER (OUTPATIENT)
Dept: PHYSICAL THERAPY | Facility: HOSPITAL | Age: 41
Setting detail: THERAPIES SERIES
End: 2021-10-22
Attending: PHYSICIAN ASSISTANT
Payer: COMMERCIAL

## 2021-10-22 PROCEDURE — 97140 MANUAL THERAPY 1/> REGIONS: CPT | Mod: GP

## 2021-10-26 ENCOUNTER — HOSPITAL ENCOUNTER (OUTPATIENT)
Dept: PHYSICAL THERAPY | Facility: HOSPITAL | Age: 41
Setting detail: THERAPIES SERIES
End: 2021-10-26
Attending: PHYSICIAN ASSISTANT
Payer: COMMERCIAL

## 2021-10-26 PROCEDURE — 97140 MANUAL THERAPY 1/> REGIONS: CPT | Mod: GP

## 2021-10-26 NOTE — PROGRESS NOTES
Outpatient Physical Therapy Discharge Note     Patient: Cristiana Aguila  : 1980    Beginning/End Dates of Reporting Period:  21 to 10/26/21    Referring Provider: Nadira Soliz PA-C    Therapy Diagnosis: TMJ sydndrome     Client Self Report: Patient reports today for TMJ rehab. States that jaw pain is at a 1/10. Puts self at roughly 80% improved today as compared to first day.      Outcome Measures (most recent score):  TMJ disability questionnaire: 8% disability     Goals:  Short-term goals:  To be achieved in 2-3 weeks:     Instruct in home program  1.) Patient will be independent with a short-term home exercise program. MET  2.) Patient will understand and demonstrate improved posture and techniques such that patient places less strain over cervical spine. MET  3.) Patient will report a 25% or greater improvement in symptoms in order to allow for increased comfort with activities of daily living. MET        Long-term goals:  To be achieved in 6-8 weeks:     Self management of symptoms.  Pain free activities of daily living.  1.) Improve score on TMJ Disability Questionnaire by 50% to correlate with clinically significant change. MET  2.) Patient will report a 75% or greater improvement in symptoms in order to allow for increased comfort with activities of daily living. MET  3.) Patient will improve jaw opening to 30 mm to improve jaw function. NOT MET    Plan:  Discharge from therapy.    Discharge:    Reason for Discharge: Patient chooses to discontinue therapy.    Equipment Issued: none    Discharge Plan: Patient to continue home program.

## 2021-12-10 ENCOUNTER — NURSE TRIAGE (OUTPATIENT)
Dept: FAMILY MEDICINE | Facility: OTHER | Age: 41
End: 2021-12-10
Payer: COMMERCIAL

## 2021-12-10 ENCOUNTER — HOSPITAL ENCOUNTER (EMERGENCY)
Facility: HOSPITAL | Age: 41
Discharge: HOME | End: 2021-12-10
Attending: NURSE PRACTITIONER | Admitting: NURSE PRACTITIONER
Payer: COMMERCIAL

## 2021-12-10 VITALS
HEART RATE: 70 BPM | OXYGEN SATURATION: 98 % | RESPIRATION RATE: 18 BRPM | TEMPERATURE: 97.8 F | SYSTOLIC BLOOD PRESSURE: 162 MMHG | DIASTOLIC BLOOD PRESSURE: 94 MMHG

## 2021-12-10 DIAGNOSIS — R11.2 NAUSEA WITH VOMITING: ICD-10-CM

## 2021-12-10 DIAGNOSIS — H60.91 RIGHT OTITIS EXTERNA: ICD-10-CM

## 2021-12-10 DIAGNOSIS — Z20.822 ENCOUNTER FOR LABORATORY TESTING FOR COVID-19 VIRUS: ICD-10-CM

## 2021-12-10 LAB
FLUAV RNA SPEC QL NAA+PROBE: NEGATIVE
FLUBV RNA RESP QL NAA+PROBE: NEGATIVE
RSV RNA SPEC NAA+PROBE: NEGATIVE
SARS-COV-2 RNA RESP QL NAA+PROBE: NEGATIVE

## 2021-12-10 PROCEDURE — 99213 OFFICE O/P EST LOW 20 MIN: CPT | Performed by: NURSE PRACTITIONER

## 2021-12-10 PROCEDURE — 250N000013 HC RX MED GY IP 250 OP 250 PS 637: Performed by: NURSE PRACTITIONER

## 2021-12-10 PROCEDURE — G0463 HOSPITAL OUTPT CLINIC VISIT: HCPCS

## 2021-12-10 PROCEDURE — 87637 SARSCOV2&INF A&B&RSV AMP PRB: CPT | Performed by: NURSE PRACTITIONER

## 2021-12-10 PROCEDURE — C9803 HOPD COVID-19 SPEC COLLECT: HCPCS

## 2021-12-10 PROCEDURE — 250N000011 HC RX IP 250 OP 636: Performed by: NURSE PRACTITIONER

## 2021-12-10 RX ORDER — ONDANSETRON 4 MG/1
4 TABLET, ORALLY DISINTEGRATING ORAL ONCE
Status: COMPLETED | OUTPATIENT
Start: 2021-12-10 | End: 2021-12-10

## 2021-12-10 RX ORDER — ONDANSETRON 4 MG/1
4 TABLET, FILM COATED ORAL EVERY 8 HOURS PRN
Qty: 6 TABLET | Refills: 0 | Status: SHIPPED | OUTPATIENT
Start: 2021-12-10 | End: 2023-04-13

## 2021-12-10 RX ORDER — NEOMYCIN SULFATE, POLYMYXIN B SULFATE, HYDROCORTISONE 3.5; 10000; 1 MG/ML; [USP'U]/ML; MG/ML
3 SOLUTION/ DROPS AURICULAR (OTIC) 4 TIMES DAILY
Qty: 5 ML | Refills: 0 | Status: SHIPPED | OUTPATIENT
Start: 2021-12-10 | End: 2021-12-17

## 2021-12-10 RX ORDER — ACETAMINOPHEN 325 MG/1
650 TABLET ORAL ONCE
Status: COMPLETED | OUTPATIENT
Start: 2021-12-10 | End: 2021-12-10

## 2021-12-10 RX ADMIN — ACETAMINOPHEN 650 MG: 325 TABLET, FILM COATED ORAL at 11:31

## 2021-12-10 RX ADMIN — ONDANSETRON 4 MG: 4 TABLET, ORALLY DISINTEGRATING ORAL at 11:31

## 2021-12-10 ASSESSMENT — ENCOUNTER SYMPTOMS
SORE THROAT: 1
EYES NEGATIVE: 1
COUGH: 1
DIARRHEA: 1
SHORTNESS OF BREATH: 0
APPETITE CHANGE: 0
HEADACHES: 1
SINUS PRESSURE: 1
MYALGIAS: 1
DIZZINESS: 0
ACTIVITY CHANGE: 1
CHILLS: 1
FEVER: 1
TROUBLE SWALLOWING: 0
LIGHT-HEADEDNESS: 0
NAUSEA: 1
SINUS PAIN: 1
RHINORRHEA: 1
VOMITING: 1

## 2021-12-10 NOTE — TELEPHONE ENCOUNTER
"Patient called stating she has been experiencing fever, vomiting, diarrhea, congestion. Patient also reports chest pressure. Patient speaking in full sentences and is able to take deep breath if she needs to. Per protocol patient advised to be seen in ED. Patient verbalized understanding.     Reason for Disposition    Chest pain or pressure    Additional Information    Negative: SEVERE difficulty breathing (e.g., struggling for each breath, speaks in single words)    Negative: Difficult to awaken or acting confused (e.g., disoriented, slurred speech)    Negative: Bluish (or gray) lips or face now    Negative: Shock suspected (e.g., cold/pale/clammy skin, too weak to stand, low BP, rapid pulse)    Negative: Sounds like a life-threatening emergency to the triager    Negative: [1] COVID-19 exposure AND [2] no symptoms    Negative: COVID-19 vaccine reaction suspected (e.g., fever, headache, muscle aches) occurring 1 to 3 days after getting vaccine    Negative: COVID-19 vaccine, questions about    Negative: [1] Lives with someone known to have influenza (flu test positive) AND [2] flu-like symptoms (e.g., cough, runny nose, sore throat, SOB; with or without fever)    Negative: [1] Adult with possible COVID-19 symptoms AND [2] triager concerned about severity of symptoms or other causes    Negative: COVID-19 and breastfeeding, questions about    Negative: SEVERE or constant chest pain or pressure (Exception: mild central chest pain, present only when coughing)    Negative: MODERATE difficulty breathing (e.g., speaks in phrases, SOB even at rest, pulse 100-120)    Negative: [1] Headache AND [2] stiff neck (can't touch chin to chest)    Negative: MILD difficulty breathing (e.g., minimal/no SOB at rest, SOB with walking, pulse <100)    Answer Assessment - Initial Assessment Questions  1. COVID-19 DIAGNOSIS: \"Who made your Coronavirus (COVID-19) diagnosis?\" \"Was it confirmed by a positive lab test?\" If not diagnosed by a HCP, " "ask \"Are there lots of cases (community spread) where you live?\" (See public health department website, if unsure)      No diagnosis  2. COVID-19 EXPOSURE: \"Was there any known exposure to COVID before the symptoms began?\" [facility] [place] Definition of close contact: within 6 feet (2 meters) for a total of 15 minutes or more over a 24-hour period.       No known exposure  3. ONSET: \"When did the COVID-19 symptoms start?\"       Three days ago  4. WORST SYMPTOM: \"What is your worst symptom?\" (e.g., cough, fever, shortness of breath, muscle aches)      Vomiting and fever  5. COUGH: \"Do you have a cough?\" If Yes, ask: \"How bad is the cough?\"        Yes, slight  6. FEVER: \"Do you have a fever?\" If Yes, ask: \"What is your temperature, how was it measured, and when did it start?\"      Yes, highest temp was 101.7  7. RESPIRATORY STATUS: \"Describe your breathing?\" (e.g., shortness of breath, wheezing, unable to speak)       Chest tightness, patient states she is able to take a deep breath if she needs to. Patient speaking in full sentences.  8. BETTER-SAME-WORSE: \"Are you getting better, staying the same or getting worse compared to yesterday?\"  If getting worse, ask, \"In what way?\"      same  9. HIGH RISK DISEASE: \"Do you have any chronic medical problems?\" (e.g., asthma, heart or lung disease, weak immune system, obesity, etc.)      asthma  10. PREGNANCY: \"Is there any chance you are pregnant?\" \"When was your last menstrual period?\"        no  11. OTHER SYMPTOMS: \"Do you have any other symptoms?\"  (e.g., chills, fatigue, headache, loss of smell or taste, muscle pain, sore throat; new loss of smell or taste especially support the diagnosis of COVID-19)        Chills, fatigue, headache, nasal congestion, slight loss of taste, diarrhea, body aches, vomiting, slight sore throat    Protocols used: CORONAVIRUS (COVID-19) DIAGNOSED OR [id number] 8.25.2021      "

## 2021-12-10 NOTE — ED PROVIDER NOTES
History     Chief Complaint   Patient presents with     Covid 19 Testing     HPI  Cristiana Aguila is a 41 year old female who presents with a 3-day history of chills, fever, nasal congestion, runny nose, sinus pain and pressure, intermittent sore throat, cough, nausea, vomiting 12 times in the past 24 hours, body aches, and headaches.  Has taken ibuprofen and acetaminophen.  Last dose of ibuprofen this morning that does help decrease her symptoms.  No known sick contacts.  Quit smoking 11 months ago.  Denies shortness of breath, lightheadedness, and dizziness.    Allergies:  Allergies   Allergen Reactions     Amoxicillin Diarrhea     Bee Venom      Bee venom (honey bee)       Food      Artificial cinnamon      Other Environmental Allergy      Sulfa Drugs      Sulfa (sulfonamide antibiotics)        Latex Rash       Problem List:    Patient Active Problem List    Diagnosis Date Noted     Chronic pain of left knee 07/30/2021     Priority: Medium     Knee stiffness, left 07/30/2021     Priority: Medium     Muscle weakness 07/30/2021     Priority: Medium     Encounter for initial prescription of implantable subdermal contraceptive 03/11/2021     Priority: Medium     Psychophysiological insomnia 12/21/2020     Priority: Medium     Mild intermittent reactive airway disease with wheezing without complication 12/21/2020     Priority: Medium     Encounter for insertion of intrauterine contraceptive device (IUD) 04/29/2020     Priority: Medium     Well woman exam with routine gynecological exam 02/14/2018     Priority: Medium     Irritable bowel syndrome with both constipation and diarrhea 08/02/2017     Priority: Medium     Recurrent major depressive disorder, in partial remission (H) 08/02/2017     Priority: Medium     ACP (advance care planning) 03/02/2016     Priority: Medium     Advance Care Planning 3/2/2016: Receipt of ACP document:  Received: Health Care Directive which was witnessed or notarized on 1-25-16.   Document previously scanned on 2-5-16.  Validation form completed and sent to be scanned.  Code Status needs to be updated to reflect choices in most recent ACP document.  Confirmed/documented designated decision maker(s).  Added by Rachel Barber RN Advance Care Planning Liaison with [employer]             FH: breast cancer      Priority: Medium     Generalized anxiety disorder      Priority: Medium     Diagnosis updated by automated process. Provider to review and confirm.       Attention deficit disorder 05/31/2005     Priority: Medium     Problem list name updated by automated process. 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     FH: breast cancer      DAFNE (generalised anxiety disorder)      Irritable bowel syndrome with both constipation and diarrhea 8/2/2017     MDD (major depressive disorder)      Migraine, unspecified, without mention of intractable migraine without mention of status migrainosus 3/13/2000     Tobacco abuse, in remission      Unspecified sinusitis (chronic) 3/8/2001       Past Surgical History:    Past Surgical History:   Procedure Laterality Date     ENDOSCOPY UPPER, COLONOSCOPY, COMBINED N/A 9/4/2015    Procedure: COMBINED ENDOSCOPY UPPER, COLONOSCOPY;  Surgeon: Griffin Barrientos DO;  Location: [facility]     LAPAROSCOPIC CYSTECTOMY OVARIAN (BENIGN) Right 1/27/2016    Procedure: LAPAROSCOPIC CYSTECTOMY OVARIAN (BENIGN);  Surgeon: Kuldip España MD;  Location: [facility]     LAPAROSCOPIC SALPINGO-OOPHORECTOMY Left 1/27/2016    Procedure: LAPAROSCOPIC SALPINGO-OOPHORECTOMY;  Surgeon: Kuldip España MD;  Location: [facility]     LAPAROSCOPY DIAGNOSTIC (GYN) Left 1/27/2016    Procedure: LAPAROSCOPY DIAGNOSTIC (GYN);  Surgeon: Kuldip España MD;  Location: [facility]     TUBAL LIGATION  2006     wisdom teeth         Family History:     Family History   Problem Relation Age of Onset     Breast Cancer Maternal Grandmother      Hypertension Maternal Grandfather      Hyperlipidemia Maternal Grandfather      Diabetes Paternal Grandmother      Asthma No family hx of        Social History:  Marital Status:   [2]  Social History     Tobacco Use     Smoking status: Former Smoker     Packs/day: 0.50     Years: 13.00     Pack years: 6.50     Types: Cigarettes     Quit date: 2020     Years since quittin.9     Smokeless tobacco: Never Used   Substance Use Topics     Alcohol use: Yes     Alcohol/week: 0.0 standard drinks     Comment: frequency: rarely     Drug use: No        Medications:    neomycin-polymyxin-hydrocortisone (CORTISPORIN) 3.5-25666-9 otic solution  ondansetron (ZOFRAN) 4 MG tablet  Acetaminophen (TYLENOL PO)  albuterol (PROAIR HFA/PROVENTIL HFA/VENTOLIN HFA) 108 (90 Base) MCG/ACT inhaler  Ascorbic Acid (VITAMIN C PO)  citalopram (CELEXA) 40 MG tablet  Cyanocobalamin (VITAMIN B 12 PO)  diclofenac (VOLTAREN) 1 % topical gel  docusate sodium (COLACE) 100 MG capsule  EPINEPHrine (ANY BX GENERIC EQUIV) 0.3 MG/0.3ML injection 2-pack  fexofenadine (ALLEGRA) 180 MG tablet  hydrOXYzine (ATARAX) 25 MG tablet  L-THEANINE PO  magic mouthwash suspension (diphenhydrAMINE, lidocaine, aluminum-magnesium & simethicone)  methylphenidate (CONCERTA) 36 MG CR tablet  methylphenidate (CONCERTA) 36 MG CR tablet  [START ON 2021] methylphenidate (CONCERTA) 36 MG CR tablet  methylphenidate (CONCERTA) 36 MG CR tablet  mometasone (NASONEX) 50 MCG/ACT nasal spray  NONFORMULARY  NONFORMULARY  Nutritional Supplements (MENOPAUSE FORMULA PO)  omeprazole (PRILOSEC) 40 MG DR capsule  traZODone (DESYREL) 100 MG tablet  TURMERIC CURCUMIN PO  venlafaxine (EFFEXOR-XR) 37.5 MG 24 hr capsule  vitamin B complex with vitamin C (VITAMIN  B COMPLEX) tablet  Vitamin D, Cholecalciferol, 25 MCG (1000 UT) TABS          Review of Systems   Constitutional: Positive for  activity change, chills and fever. Negative for appetite change.   HENT: Positive for congestion (nasal), rhinorrhea, sinus pressure, sinus pain and sore throat (off and on). Negative for ear pain and trouble swallowing.    Eyes: Negative.         Eyelids Feel like they are on fire   Respiratory: Positive for cough. Negative for shortness of breath.    Gastrointestinal: Positive for diarrhea (none in past 24 hours), nausea and vomiting (12 times in past 24 hours).   Genitourinary: Negative.    Musculoskeletal: Positive for myalgias.   Skin: Negative.    Neurological: Positive for headaches. Negative for dizziness and light-headedness.       Physical Exam   BP: 162/94  Pulse: 70  Temp: 97.8  F (36.6  C)  Resp: 18  SpO2: 98 %      Physical Exam  Vitals and nursing note reviewed.   Constitutional:       General: She is in acute distress (Mild to moderate).   HENT:      Head: Normocephalic.      Right Ear: Tympanic membrane and ear canal normal.      Left Ear: Tympanic membrane and ear canal normal.      Ears:      Comments: Right ear canal erythematous and slightly swollen      Nose: Nose normal.      Right Sinus: No maxillary sinus tenderness or frontal sinus tenderness.      Left Sinus: No maxillary sinus tenderness or frontal sinus tenderness.      Mouth/Throat:      Lips: Pink.      Mouth: Mucous membranes are moist.      Pharynx: Uvula midline. No posterior oropharyngeal erythema.   Eyes:      Conjunctiva/sclera: Conjunctivae normal.   Cardiovascular:      Rate and Rhythm: Normal rate and regular rhythm.      Heart sounds: Normal heart sounds. No murmur heard.      Pulmonary:      Effort: Pulmonary effort is normal. No respiratory distress.      Breath sounds: Normal breath sounds. No wheezing.   Abdominal:      General: Abdomen is flat. Bowel sounds are normal. There is no distension.      Palpations: There is no hepatomegaly or splenomegaly.      Tenderness: There is no abdominal tenderness. There is no right  CVA tenderness or left CVA tenderness.   Lymphadenopathy:      Cervical: Cervical adenopathy (Small) present.      Right cervical: Superficial cervical adenopathy present.      Left cervical: Superficial cervical adenopathy present.   Skin:     General: Skin is warm and dry.   Neurological:      Mental Status: She is alert and oriented to person, place, and time.   Psychiatric:         Behavior: Behavior normal.         ED Course                 Procedures             No results found for this or any previous visit (from the past 24 hour(s)).    Medications   ondansetron (ZOFRAN-ODT) ODT tab 4 mg (4 mg Oral Given 12/10/21 1131)   acetaminophen (TYLENOL) tablet 650 mg (650 mg Oral Given 12/10/21 1131)       Assessments & Plan (with Medical Decision Making)     I have reviewed the nursing notes.    I have reviewed the findings, diagnosis, plan and need for follow up with the patient.  (Z31.821) Encounter for laboratory testing for COVID-19 virus    (R11.2) Nausea with vomiting    (H60.91) Right otitis externa  Comment:  41 year old female who presents with a 3-day history of chills, fever, nasal congestion, runny nose, sinus pain and pressure, intermittent sore throat, cough, nausea, vomiting 12 times in the past 24 hours, body aches, and headaches.  Has taken ibuprofen and acetaminophen.  Last dose of ibuprofen this morning that does help decrease her symptoms.  No known sick contacts.  Quit smoking 11 months ago.  Denies shortness of breath, lightheadedness, and dizziness.    MDM: NHT. Lungs CTA    Covid test pending (negative)    Zofran 4 mg ODT and acetaminophen orally did help decrease her symptoms during this encounter    Plan: Cortisporin eardrops and Zofran ODT.  Education provided and/or discussed for this/these medications. Education provided for diet for vomiting and diarrhea and external ear infection.   Treat symptoms conservatively with acetaminophen and  ibuprofen (if applicable) for fevers, body aches,  and headaches, guaifenesin and/or honey for cough. May use chest rubs for sore throat and congestion, hot and cold liquids may help decrease sore throat and help you feel better. Increase fluids.  Return to be reevaluated by ER/UC or your primary care provider if symptoms worsen, you develop breathing difficulties, or you do not improve in a reasonable time frame. It can take several days for a cough to resolve. It can take ten to fourteen days for upper respiratory symptoms to resolve.     These discharge instructions and medications were reviewed with her and understanding verbalized.    This document was prepared using a combination of typing and voice generated software.  While every attempt was made for accuracy, spelling and grammatical errors may exist.    Discharge Medication List as of 12/10/2021 11:52 AM      START taking these medications    Details   neomycin-polymyxin-hydrocortisone (CORTISPORIN) 3.5-69924-9 otic solution Place 3 drops into the right ear 4 times daily for 7 days, Disp-5 mL, R-0, E-Prescribe      ondansetron (ZOFRAN) 4 MG tablet Take 1 tablet (4 mg) by mouth every 8 hours as needed for nausea, Disp-6 tablet, R-0, E-Prescribe             Final diagnoses:   Encounter for laboratory testing for COVID-19 virus   Nausea with vomiting   Right otitis externa       12/10/2021   [facility]       Padmini Olvera, CNP  12/13/21 1925

## 2021-12-10 NOTE — DISCHARGE INSTRUCTIONS
Increase oral intake, cool mist vaporizer as needed, rest, avoid sharing utensils, practice good hand washing techniques, cover mouth when you cough and sneeze. Throw toothbursh away 24 hours after starting antibiotics.  Over the counter medications such as ibuprofen and/or acetaminophen for fever and generalized aches and pains. Ibuprofen 400 to 800 mg (2 - 4 tabs of over the counter med) every six to eight hours as needed;not to exceed maximum amount of 3200 mg in 24 hours.Tylenol 650 to 1000 mg every four to six hours as needed (not to exceed more than 4000 mg in a 24 hour period). May use interchangeably. Robitussin (guaifenesin) for cough. Chest rubs such as Andreas's or Mentholatum may help reduce sore throat symptoms.  Chloraseptic spray for sore throat or menthol lozenges may be helpful for sore throat. Be reevaluated if symptoms persist longer than 10 - 14 days or worsen and if there is no improvement in 72 hours or worsening of symptoms.  Increase fluids.       Fluids, herbs, and foods for sore throat relief -- Adjusting the temperature and texture of foods and beverages may provide local relief of sore throat pain. While data showing benefit are quite limited, these approaches are intuitive. We typically advise these measures since they are likely to be safe with minimal adverse effect, and patients often describe relief of symptoms.  We suggest hydration with frozen (eg, ice or popsicles) or heated liquids (eg, teas, soups), rather than room temperature or refrigerated fluids in patient with significant sore throat pain. Very cold foods can have a numbing-like effect that temporarily reduces or alleviates the pain of swallowing. Ice cubes or frozen popsicles facilitate hydration; ice cream and frozen yogurt provide caloric intake.  Warm fluids and foods, including teas, soups, and soft non-irritating foods, may be better tolerated by patients with throat pain than irritating foods (eg, rough-textured or  spicy foods) or fluids at room temperatures. Foods that coat the throat, including honey and hard candies, can facilitate intake of calories while temporarily relieving throat pain.

## 2021-12-10 NOTE — ED TRIAGE NOTES
Pt PAL junior requesting a covid test states tried to make an larissa at the [place] testing site and was told to com in here for slight right and left rib pain  Pt notes this pain is from laying down x 3 days

## 2021-12-13 NOTE — PROGRESS NOTES
Assessment & Plan     DAFNE (generalized anxiety disorder)  Good control with meds. Continue current medications and behavioral changes. Follow-up in 3 months.  - CBC with Platelets & Differential; Future  - Comprehensive metabolic panel; Future  - Drug Confirmation Panel Urine with Creat; Future    Major depressive disorder, recurrent episode, in full remission (H)  Good control. Continue current medications and behavioral changes.   - CBC with Platelets & Differential; Future  - Comprehensive metabolic panel; Future  - Drug Confirmation Panel Urine with Creat; Future    Attention deficit disorder (ADD) without hyperactivity  Good conrtrol-- labs done and some still pending. Follow-up in 3 months. Continue current medications and behavioral changes.   - CBC with Platelets & Differential; Future  - Comprehensive metabolic panel; Future  - Drug Confirmation Panel Urine with Creat; Future  - methylphenidate (CONCERTA) 36 MG CR tablet; Take 2 tablets (72 mg) by mouth daily  - methylphenidate (CONCERTA) 36 MG CR tablet; Take 2 tablets (72 mg) by mouth daily    Psychophysiological insomnia  Some worse- discussed going up on Trazodone but would then have to go down on Celexa to 20mg .  Pt deferred for now.  Give it another several months to see if improves. Will discuss on follow-up.   - CBC with Platelets & Differential; Future  - Comprehensive metabolic panel; Future  - Drug Confirmation Panel Urine with Creat; Future  - traZODone (DESYREL) 100 MG tablet; 1.5 tablets orally at bedtime.    Attention deficit hyperactivity disorder (ADHD), predominantly inattentive type  As above RF one   - methylphenidate (CONCERTA) 36 MG CR tablet; 2 tabs orally daily    Dysfunction of right eustachian tube  Discussed. ET exercises discussed. Follow-up with ENT if not better. Has been seeing JAY JAY Soliz               BMI:   Estimated body mass index is 35.29 kg/m  as calculated from the following:    Height as of this encounter: 1.753 m (5'  "9\").    Weight as of this encounter: 108.4 kg (239 lb).           No follow-ups on file.    Sudhakar Cuellar MD  [facility] - KEN Zendejas is a 41 year old who presents for the following health issues  accompanied by her spouse.    HPI     Depression and Anxiety Follow-Up    How are you doing with your depression since your last visit? Improved     How are you doing with your anxiety since your last visit?  Improved     Are you having other symptoms that might be associated with depression or anxiety? No    Have you had a significant life event? No     Do you have any concerns with your use of alcohol or other drugs? No    Added low dose effexor and decrease trazodone and moved Celexa at night   Doing much better but sleep is worse since decrease trazodone.  Social History     Tobacco Use     Smoking status: Former Smoker     Packs/day: 0.50     Years: 13.00     Pack years: 6.50     Types: Cigarettes     Quit date: 2020     Years since quittin.9     Smokeless tobacco: Never Used   Substance Use Topics     Alcohol use: Yes     Alcohol/week: 0.0 standard drinks     Comment: frequency: rarely     Drug use: No     PHQ 3/10/2021 2021 10/18/2021   PHQ-9 Total Score 15 11 16   Q9: Thoughts of better off dead/self-harm past 2 weeks Not at all Not at all Not at all     DAFNE-7 SCORE 3/10/2021 2021 10/18/2021   Total Score 13 12 15     Last PHQ-9 2021   1.  Little interest or pleasure in doing things 2   2.  Feeling down, depressed, or hopeless 2   3.  Trouble falling or staying asleep, or sleeping too much 2   4.  Feeling tired or having little energy 2   5.  Poor appetite or overeating 2   6.  Feeling bad about yourself 1   7.  Trouble concentrating 2   8.  Moving slowly or restless 2   Q9: Thoughts of better off dead/self-harm past 2 weeks 0   PHQ-9 Total Score 15   Difficulty at work, home, or with people Somewhat difficult     DAFNE-7  2021   1. Feeling nervous, " "anxious, or on edge 2   2. Not being able to stop or control worrying 2   3. Worrying too much about different things 2   4. Trouble relaxing 2   5. Being so restless that it is hard to sit still 2   6. Becoming easily annoyed or irritable 3   7. Feeling afraid, as if something awful might happen 1   DAFNE-7 Total Score 14   If you checked any problems, how difficult have they made it for you to do your work, take care of things at home, or get along with other people? Somewhat difficult       Suicide Assessment Five-step Evaluation and Treatment (SAFE-T)        Medication Followup of concerta    Taking Medication as prescribed: yes    Side Effects:  None    Medication Helping Symptoms:  Yes    Doing well.,      ED/UC Followup:    [place]  [facility] [place]  Date of visit: 12/10/21  Reason for visit: headache  Current Status: still having headaches and ear pain         Review of Systems   Constitutional, HEENT, cardiovascular, pulmonary, gi and gu systems are negative, except as otherwise noted.      Objective    /84   Pulse 78   Temp 98.3  F (36.8  C)   Ht 1.753 m (5' 9\")   Wt 108.4 kg (239 lb)   SpO2 96%   BMI 35.29 kg/m    Body mass index is 35.29 kg/m .  Physical Exam   GENERAL: healthy, alert and no distress  HENT: ear canals and TM's normal, nose and mouth without ulcers or lesions  PSYCH: mentation appears normal, affect normal/bright    Results for orders placed or performed in visit on 12/20/21   Comprehensive metabolic panel     Status: Abnormal   Result Value Ref Range    Sodium 138 133 - 144 mmol/L    Potassium 3.6 3.4 - 5.3 mmol/L    Chloride 109 94 - 109 mmol/L    Carbon Dioxide (CO2) 23 20 - 32 mmol/L    Anion Gap 6 3 - 14 mmol/L    Urea Nitrogen 11 7 - 30 mg/dL    Creatinine 0.64 0.52 - 1.04 mg/dL    Calcium 8.9 8.5 - 10.1 mg/dL    Glucose 117 (H) 70 - 99 mg/dL    Alkaline Phosphatase 108 40 - 150 U/L    AST 15 0 - 45 U/L    ALT 24 0 - 50 U/L    Protein Total 7.3 6.8 - 8.8 g/dL    Albumin 3.6 3.4 - " 5.0 g/dL    Bilirubin Total 0.3 0.2 - 1.3 mg/dL    GFR Estimate >90 >60 mL/min/1.73m2   CBC with platelets and differential     Status: None   Result Value Ref Range    WBC Count 7.7 4.0 - 11.0 10e3/uL    RBC Count 4.58 3.80 - 5.20 10e6/uL    Hemoglobin 13.6 11.7 - 15.7 g/dL    Hematocrit 39.5 35.0 - 47.0 %    MCV 86 78 - 100 fL    MCH 29.7 26.5 - 33.0 pg    MCHC 34.4 31.5 - 36.5 g/dL    RDW 12.7 10.0 - 15.0 %    Platelet Count 279 150 - 450 10e3/uL    % Neutrophils 59 %    % Lymphocytes 32 %    % Monocytes 6 %    % Eosinophils 2 %    % Basophils 1 %    % Immature Granulocytes 0 %    NRBCs per 100 WBC 0 <1 /100    Absolute Neutrophils 4.6 1.6 - 8.3 10e3/uL    Absolute Lymphocytes 2.5 0.8 - 5.3 10e3/uL    Absolute Monocytes 0.5 0.0 - 1.3 10e3/uL    Absolute Eosinophils 0.1 0.0 - 0.7 10e3/uL    Absolute Basophils 0.1 0.0 - 0.2 10e3/uL    Absolute Immature Granulocytes 0.0 <=0.4 10e3/uL    Absolute NRBCs 0.0 10e3/uL   CBC with Platelets & Differential     Status: None    Narrative    The following orders were created for panel order CBC with Platelets & Differential.  Procedure                               Abnormality         Status                     ---------                               -----------         ------                     CBC with platelets and d...[[id number]]                      Final result                 Please view results for these tests on the individual orders.   Drug Confirmation Panel Urine with Creat     Status: None (In process)    Narrative    The following orders were created for panel order Drug Confirmation Panel Urine with Creat.  Procedure                               Abnormality         Status                     ---------                               -----------         ------                     Urine Drug Confirmation ...[[id number]]                      In process                 Urine Creatinine for Artemio...[[id number]]                      In process                   Please  view results for these tests on the individual orders.

## 2021-12-20 ENCOUNTER — LAB (OUTPATIENT)
Dept: LAB | Facility: OTHER | Age: 41
End: 2021-12-20
Payer: COMMERCIAL

## 2021-12-20 ENCOUNTER — OFFICE VISIT (OUTPATIENT)
Dept: FAMILY MEDICINE | Facility: OTHER | Age: 41
End: 2021-12-20
Attending: FAMILY MEDICINE
Payer: COMMERCIAL

## 2021-12-20 VITALS
OXYGEN SATURATION: 96 % | SYSTOLIC BLOOD PRESSURE: 120 MMHG | BODY MASS INDEX: 35.4 KG/M2 | HEART RATE: 78 BPM | DIASTOLIC BLOOD PRESSURE: 84 MMHG | HEIGHT: 69 IN | TEMPERATURE: 98.3 F | WEIGHT: 239 LBS

## 2021-12-20 DIAGNOSIS — F51.04 PSYCHOPHYSIOLOGICAL INSOMNIA: ICD-10-CM

## 2021-12-20 DIAGNOSIS — F98.8 ATTENTION DEFICIT DISORDER (ADD) WITHOUT HYPERACTIVITY: ICD-10-CM

## 2021-12-20 DIAGNOSIS — F90.0 ATTENTION DEFICIT HYPERACTIVITY DISORDER (ADHD), PREDOMINANTLY INATTENTIVE TYPE: ICD-10-CM

## 2021-12-20 DIAGNOSIS — F33.42 MAJOR DEPRESSIVE DISORDER, RECURRENT EPISODE, IN FULL REMISSION (H): ICD-10-CM

## 2021-12-20 DIAGNOSIS — F41.1 GAD (GENERALIZED ANXIETY DISORDER): Primary | ICD-10-CM

## 2021-12-20 DIAGNOSIS — F41.1 GAD (GENERALIZED ANXIETY DISORDER): ICD-10-CM

## 2021-12-20 DIAGNOSIS — H69.91 DYSFUNCTION OF RIGHT EUSTACHIAN TUBE: ICD-10-CM

## 2021-12-20 LAB
ALBUMIN SERPL-MCNC: 3.6 G/DL (ref 3.4–5)
ALP SERPL-CCNC: 108 U/L (ref 40–150)
ALT SERPL W P-5'-P-CCNC: 24 U/L (ref 0–50)
ANION GAP SERPL CALCULATED.3IONS-SCNC: 6 MMOL/L (ref 3–14)
AST SERPL W P-5'-P-CCNC: 15 U/L (ref 0–45)
BASOPHILS # BLD AUTO: 0.1 10E3/UL (ref 0–0.2)
BASOPHILS NFR BLD AUTO: 1 %
BILIRUB SERPL-MCNC: 0.3 MG/DL (ref 0.2–1.3)
BUN SERPL-MCNC: 11 MG/DL (ref 7–30)
CALCIUM SERPL-MCNC: 8.9 MG/DL (ref 8.5–10.1)
CHLORIDE BLD-SCNC: 109 MMOL/L (ref 94–109)
CO2 SERPL-SCNC: 23 MMOL/L (ref 20–32)
CREAT SERPL-MCNC: 0.64 MG/DL (ref 0.52–1.04)
CREAT UR-MCNC: 176 MG/DL
EOSINOPHIL # BLD AUTO: 0.1 10E3/UL (ref 0–0.7)
EOSINOPHIL NFR BLD AUTO: 2 %
ERYTHROCYTE [DISTWIDTH] IN BLOOD BY AUTOMATED COUNT: 12.7 % (ref 10–15)
GFR SERPL CREATININE-BSD FRML MDRD: >90 ML/MIN/1.73M2
GLUCOSE BLD-MCNC: 117 MG/DL (ref 70–99)
HCT VFR BLD AUTO: 39.5 % (ref 35–47)
HGB BLD-MCNC: 13.6 G/DL (ref 11.7–15.7)
IMM GRANULOCYTES # BLD: 0 10E3/UL
IMM GRANULOCYTES NFR BLD: 0 %
LYMPHOCYTES # BLD AUTO: 2.5 10E3/UL (ref 0.8–5.3)
LYMPHOCYTES NFR BLD AUTO: 32 %
MCH RBC QN AUTO: 29.7 PG (ref 26.5–33)
MCHC RBC AUTO-ENTMCNC: 34.4 G/DL (ref 31.5–36.5)
MCV RBC AUTO: 86 FL (ref 78–100)
MONOCYTES # BLD AUTO: 0.5 10E3/UL (ref 0–1.3)
MONOCYTES NFR BLD AUTO: 6 %
NEUTROPHILS # BLD AUTO: 4.6 10E3/UL (ref 1.6–8.3)
NEUTROPHILS NFR BLD AUTO: 59 %
NRBC # BLD AUTO: 0 10E3/UL
NRBC BLD AUTO-RTO: 0 /100
PLATELET # BLD AUTO: 279 10E3/UL (ref 150–450)
POTASSIUM BLD-SCNC: 3.6 MMOL/L (ref 3.4–5.3)
PROT SERPL-MCNC: 7.3 G/DL (ref 6.8–8.8)
RBC # BLD AUTO: 4.58 10E6/UL (ref 3.8–5.2)
SODIUM SERPL-SCNC: 138 MMOL/L (ref 133–144)
WBC # BLD AUTO: 7.7 10E3/UL (ref 4–11)

## 2021-12-20 PROCEDURE — 36415 COLL VENOUS BLD VENIPUNCTURE: CPT | Mod: ZL

## 2021-12-20 PROCEDURE — 80053 COMPREHEN METABOLIC PANEL: CPT | Mod: ZL

## 2021-12-20 PROCEDURE — 80307 DRUG TEST PRSMV CHEM ANLYZR: CPT | Mod: ZL

## 2021-12-20 PROCEDURE — G0463 HOSPITAL OUTPT CLINIC VISIT: HCPCS

## 2021-12-20 PROCEDURE — 85025 COMPLETE CBC W/AUTO DIFF WBC: CPT | Mod: ZL

## 2021-12-20 PROCEDURE — 99214 OFFICE O/P EST MOD 30 MIN: CPT | Performed by: FAMILY MEDICINE

## 2021-12-20 RX ORDER — METHYLPHENIDATE HYDROCHLORIDE 36 MG/1
TABLET ORAL
Qty: 60 TABLET | Refills: 0 | Status: SHIPPED | OUTPATIENT
Start: 2022-02-18 | End: 2022-03-21

## 2021-12-20 RX ORDER — TRAZODONE HYDROCHLORIDE 100 MG/1
TABLET ORAL
Qty: 60 TABLET | Refills: 11 | Status: SHIPPED | OUTPATIENT
Start: 2021-12-20 | End: 2022-06-07

## 2021-12-20 RX ORDER — METHYLPHENIDATE HYDROCHLORIDE 36 MG/1
72 TABLET ORAL DAILY
Qty: 60 TABLET | Refills: 0 | Status: SHIPPED | OUTPATIENT
Start: 2021-12-20 | End: 2022-03-21

## 2021-12-20 RX ORDER — METHYLPHENIDATE HYDROCHLORIDE 36 MG/1
72 TABLET ORAL DAILY
Qty: 60 TABLET | Refills: 0 | Status: SHIPPED | OUTPATIENT
Start: 2022-01-19 | End: 2022-03-21

## 2021-12-20 ASSESSMENT — ANXIETY QUESTIONNAIRES
1. FEELING NERVOUS, ANXIOUS, OR ON EDGE: MORE THAN HALF THE DAYS
3. WORRYING TOO MUCH ABOUT DIFFERENT THINGS: MORE THAN HALF THE DAYS
6. BECOMING EASILY ANNOYED OR IRRITABLE: NEARLY EVERY DAY
4. TROUBLE RELAXING: MORE THAN HALF THE DAYS
IF YOU CHECKED OFF ANY PROBLEMS ON THIS QUESTIONNAIRE, HOW DIFFICULT HAVE THESE PROBLEMS MADE IT FOR YOU TO DO YOUR WORK, TAKE CARE OF THINGS AT HOME, OR GET ALONG WITH OTHER PEOPLE: SOMEWHAT DIFFICULT
2. NOT BEING ABLE TO STOP OR CONTROL WORRYING: MORE THAN HALF THE DAYS
7. FEELING AFRAID AS IF SOMETHING AWFUL MIGHT HAPPEN: SEVERAL DAYS
GAD7 TOTAL SCORE: 14
5. BEING SO RESTLESS THAT IT IS HARD TO SIT STILL: MORE THAN HALF THE DAYS

## 2021-12-20 ASSESSMENT — PATIENT HEALTH QUESTIONNAIRE - PHQ9: SUM OF ALL RESPONSES TO PHQ QUESTIONS 1-9: 15

## 2021-12-20 ASSESSMENT — PAIN SCALES - GENERAL: PAINLEVEL: MILD PAIN (3)

## 2021-12-20 ASSESSMENT — MIFFLIN-ST. JEOR: SCORE: 1803.48

## 2021-12-20 NOTE — NURSING NOTE
"Chief Complaint   Patient presents with     Anxiety     A.D.H.D       Initial /84   Pulse 78   Temp 98.3  F (36.8  C)   Ht 1.753 m (5' 9\")   Wt 108.4 kg (239 lb)   SpO2 96%   BMI 35.29 kg/m   Estimated body mass index is 35.29 kg/m  as calculated from the following:    Height as of this encounter: 1.753 m (5' 9\").    Weight as of this encounter: 108.4 kg (239 lb).  Medication Reconciliation: complete  Grabiel Cantor LPN  " ER physician

## 2021-12-21 ASSESSMENT — ANXIETY QUESTIONNAIRES: GAD7 TOTAL SCORE: 14

## 2021-12-22 LAB
ME-PHENIDATE UR CFM-MCNC: 306 NG/ML
ME-PHENIDATE UR CFM-MCNC: ABNORMAL NG/ML
ME-PHENIDATE/CREAT UR: 174 NG/MG {CREAT}
ME-PHENIDATE/CREAT UR: ABNORMAL

## 2022-01-04 ENCOUNTER — NURSE TRIAGE (OUTPATIENT)
Dept: FAMILY MEDICINE | Facility: OTHER | Age: 42
End: 2022-01-04
Payer: COMMERCIAL

## 2022-01-04 DIAGNOSIS — Z20.822 SUSPECTED 2019 NOVEL CORONAVIRUS INFECTION: Primary | ICD-10-CM

## 2022-01-04 DIAGNOSIS — F41.1 GAD (GENERALIZED ANXIETY DISORDER): ICD-10-CM

## 2022-01-04 DIAGNOSIS — F33.42 MAJOR DEPRESSIVE DISORDER, RECURRENT EPISODE, IN FULL REMISSION (H): ICD-10-CM

## 2022-01-04 NOTE — TELEPHONE ENCOUNTER
"COVID 19 Nurse Triage Plan/Patient Instructions    Please be aware that novel coronavirus (COVID-19) may be circulating in the community. If you develop symptoms such as fever, cough, or SOB or if you have concerns about the presence of another infection including coronavirus (COVID-19), please contact your health care provider or visit [email or web address].     Disposition/Instructions    Additional COVID19 information to add for patients.   How can I protect others?  If you have symptoms (fever, cough, body aches or trouble breathing): Stay home and away from others (self-isolate) until:    At least 10 days have passed since your symptoms started, And     You ve had no fever--and no medicine that reduces fever--for 1 full day (24 hours), And      Your other symptoms have resolved (gotten better).     If you don t have symptoms, but a test showed that you have COVID-19 (you tested positive):    Stay home and away from others (self-isolate). Follow the tips under \"How do I self-isolate?\" below for 10 days (20 days if you have a weak immune system).    You don't need to be retested for COVID-19 before going back to school or work. As long as you're fever-free and feeling better, you can go back to school, work and other activities after waiting the 10 or 20 days.     How do I self-isolate?    Stay in your own room, even for meals. Use your own bathroom if you can.     Stay away from others in your home. No hugging, kissing or shaking hands. No visitors.    Don t go to work, school or anywhere else.     Clean  high touch  surfaces often (doorknobs, counters, handles, etc.). Use a household cleaning spray or wipes. You ll find a full list on the EPA website:  www.epa.gov/pesticide-registration/list-n-disinfectants-use-against-sars-cov-2.    Cover your mouth and nose with a mask, tissue or washcloth to avoid spreading germs.    Wash your hands and face often. Use soap and water.    Caregivers in these groups are " at risk for severe illness due to COVID-19:  o People 65 years and older  o People who live in a nursing home or long-term care facility  o People with chronic disease (lung, heart, cancer, diabetes, kidney, liver, immunologic)  o People who have a weakened immune system, including those who:  - Are in cancer treatment  - Take medicine that weakens the immune system, such as corticosteroids  - Had a bone marrow or organ transplant  - Have an immune deficiency  - Have poorly controlled HIV or AIDS  - Are obese (body mass index of 40 or higher)  - Smoke regularly    Caregivers should wear gloves while washing dishes, handling laundry and cleaning bedrooms and bathrooms.    Use caution when washing and drying laundry: Don t shake dirty laundry, and use the warmest water setting that you can.    For more tips, go to www.cdc.gov/coronavirus/2019-ncov/downloads/10Things.pdf.    How can I take care of myself?  1. Get lots of rest. Drink extra fluids (unless a doctor has told you not to).     2. Take Tylenol (acetaminophen) for fever or pain. If you have liver or kidney problems, ask your family doctor if it s okay to take Tylenol.     Adults can take either:     650 mg (two 325 mg pills) every 4 to 6 hours, or     1,000 mg (two 500 mg pills) every 8 hours as needed.     Note: Don t take more than 3,000 mg in one day.   Acetaminophen is found in many medicines (both prescribed and over-the-counter medicines). Read all labels to be sure you don t take too much.     For children, check the Tylenol bottle for the right dose. The dose is based on the child s age or weight.    3. If you have other health problems (like cancer, heart failure, an organ transplant or severe kidney disease): Call your specialty clinic if you don t feel better in the next 2 days.    4. Know when to call 911: Emergency warning signs include:    Trouble breathing or shortness of breath    Pain or pressure in the chest that doesn t go away    Feeling  confused like you haven t felt before, or not being able to wake up    Bluish-colored lips or face    What are the symptoms of COVID-19?     The most common symptoms are cough, fever and trouble breathing.     Less common symptoms include body aches, chills, diarrhea (loose, watery poops), fatigue (feeling very tired), headache, runny nose, sore throat and loss of smell.    COVID-19 can cause severe coughing (bronchitis) and lung infection (pneumonia).    How does it spread?     The virus may spread when a person coughs or sneezes into the air. The virus can travel about 6 feet this way, and it can live on surfaces.      Common  (household disinfectants) will kill the virus.    Who is at risk?  Anyone can catch COVID-19 if they re around someone who has the virus.    How can others protect themselves?     Stay away from people who have COVID-19 (or symptoms of COVID-19).    Wash hands often with soap and water. Or, use hand  with at least 60% alcohol.    Avoid touching the eyes, nose or mouth.     Wear a face mask when you go out in public, when sick or when caring for a sick person.    Where can I get more information?     [facility]: About COVID-19: [email or web address]    CDC: What to Do If You re Sick: www.cdc.gov/coronavirus/2019-ncov/about/steps-when-sick.html    CDC: Ending Home Isolation: www.cdc.gov/coronavirus/2019-ncov/hcp/disposition-in-home-patients.html     CDC: Caring for Someone: www.cdc.gov/coronavirus/2019-ncov/if-you-are-sick/care-for-someone.html     [facility]: Interim Guidance for Hospital Discharge to Home: www.health.[facility].[place].us/diseases/coronavirus/hcp/hospdischarge.pdf    [facility] clinical trials (COVID-19 research studies): clinicalaffairs.[facility]/umn-clinical-trials     Below are the COVID-19 hotlines at the [place] Department of Health ([facility]). Interpreters are available.   o For health questions: Call 035-738-9524 or 1-435.500.3701 (7 a.m. to 7  p.m.)  o For questions about schools and childcare: Call 278-202-3766 or 1-597.956.7373 (7 a.m. to 7 p.m.)          Thank you for taking steps to prevent the spread of this virus.  o Limit your contact with others.  o Wear a simple mask to cover your cough.  o Wash your hands well and often.    Resources    [facility]: About COVID-19: [email or web address]    CDC: What to Do If You're Sick: www.cdc.gov/coronavirus/2019-ncov/about/steps-when-sick.html    CDC: Ending Home Isolation: www.cdc.gov/coronavirus/2019-ncov/hcp/disposition-in-home-patients.html     CDC: Caring for Someone: www.cdc.gov/coronavirus/2019-ncov/if-you-are-sick/care-for-someone.html     [facility]: Interim Guidance for Hospital Discharge to Home: www.[facility].mn./diseases/coronavirus/hcp/hospdischarge.pdf    [facility] clinical trials (COVID-19 research studies): clinicalaffairs.[facility]-clinical-trials     Below are the COVID-19 hotlines at the [place] Department of Health ([facility]). Interpreters are available.   o For health questions: Call 071-393-1910 or 1-899.403.1781 (7 a.m. to 7 p.m.)  o For questions about schools and childcare: Call 840-573-5846 or 1-311.147.6656 (7 a.m. to 7 p.m.)                       Reason for Disposition    COVID-19 Home Isolation, questions about    COVID-19 Testing, questions about    Additional Information    Negative: SEVERE difficulty breathing (e.g., struggling for each breath, speaks in single words)    Negative: Difficult to awaken or acting confused (e.g., disoriented, slurred speech)    Negative: Bluish (or gray) lips or face now    Negative: Shock suspected (e.g., cold/pale/clammy skin, too weak to stand, low BP, rapid pulse)    Negative: Sounds like a life-threatening emergency to the triager    Negative: [1] COVID-19 exposure AND [2] no symptoms    Negative: COVID-19 vaccine reaction suspected (e.g., fever, headache, muscle aches) occurring 1 to 3 days after getting  vaccine    Negative: COVID-19 vaccine, questions about    Negative: [1] Lives with someone known to have influenza (flu test positive) AND [2] flu-like symptoms (e.g., cough, runny nose, sore throat, SOB; with or without fever)    Negative: [1] Adult with possible COVID-19 symptoms AND [2] triager concerned about severity of symptoms or other causes    Negative: COVID-19 and breastfeeding, questions about    Negative: SEVERE or constant chest pain or pressure (Exception: mild central chest pain, present only when coughing)    Negative: MODERATE difficulty breathing (e.g., speaks in phrases, SOB even at rest, pulse 100-120)    Negative: [1] Headache AND [2] stiff neck (can't touch chin to chest)    Negative: MILD difficulty breathing (e.g., minimal/no SOB at rest, SOB with walking, pulse <100)    Negative: Chest pain or pressure    Negative: Patient sounds very sick or weak to the triager    Negative: Fever > 103 F (39.4 C)    Negative: [1] Fever > 101 F (38.3 C) AND [2] age > 60 years    Negative: [1] Fever > 100.0 F (37.8 C) AND [2] bedridden (e.g., nursing home patient, CVA, chronic illness, recovering from surgery)    Negative: HIGH RISK for severe COVID complications (e.g., age > 64 years, obesity with BMI > 25, pregnant, chronic lung disease or other chronic medical condition)  (Exception: Already seen by PCP and no new or worsening symptoms.)    Negative: [1] HIGH RISK patient AND [2] influenza is widespread in the community AND [3] ONE OR MORE respiratory symptoms: cough, sore throat, runny or stuffy nose    Negative: [1] HIGH RISK patient AND [2] influenza exposure within the last 7 days AND [3] ONE OR MORE respiratory symptoms: cough, sore throat, runny or stuffy nose    Negative: [1] COVID-19 infection suspected by caller or triager AND [2] mild symptoms (cough, fever, or others) AND [3] negative COVID-19 rapid test    Negative: Fever present > 3 days (72 hours)    Negative: [1] Fever returns after gone  "for over 24 hours AND [2] symptoms worse or not improved    Negative: [1] Continuous (nonstop) coughing interferes with work or school AND [2] no improvement using cough treatment per protocol    Negative: Cough present > 3 weeks    Negative: [1] COVID-19 diagnosed by positive lab test AND [2] NO symptoms (e.g., cough, fever, others)    Negative: [1] COVID-19 diagnosed by positive lab test AND [2] mild symptoms (e.g., cough, fever, others) AND [3] no complications or SOB    Negative: [1] COVID-19 diagnosed by doctor (or NP/PA) AND [2] mild symptoms (e.g., cough, fever, others) AND [3] no complications or SOB    Negative: [1] COVID-19 diagnosed AND [2] has mild nausea, vomiting or diarrhea    Answer Assessment - Initial Assessment Questions  1. COVID-19 DIAGNOSIS: \"Who made your Coronavirus (COVID-19) diagnosis?\" \"Was it confirmed by a positive lab test?\" If not diagnosed by a HCP, ask \"Are there lots of cases (community spread) where you live?\" (See public health department website, if unsure)      Not diagnosed  2. COVID-19 EXPOSURE: \"Was there any known exposure to COVID before the symptoms began?\" CDC Definition of close contact: within 6 feet (2 meters) for a total of 15 minutes or more over a 24-hour period.       yes  3. ONSET: \"When did the COVID-19 symptoms start?\"       3 days ago  4. WORST SYMPTOM: \"What is your worst symptom?\" (e.g., cough, fever, shortness of breath, muscle aches)       Sore throat  5. COUGH: \"Do you have a cough?\" If Yes, ask: \"How bad is the cough?\"        Yes. Ok not real bad  6. FEVER: \"Do you have a fever?\" If Yes, ask: \"What is your temperature, how was it measured, and when did it start?\"      100.1 temporal, started yesterday  7. RESPIRATORY STATUS: \"Describe your breathing?\" (e.g., shortness of breath, wheezing, unable to speak)       good  8. BETTER-SAME-WORSE: \"Are you getting better, staying the same or getting worse compared to yesterday?\"  If getting worse, ask, \"In what " "way?\"      same  9. HIGH RISK DISEASE: \"Do you have any chronic medical problems?\" (e.g., asthma, heart or lung disease, weak immune system, obesity, etc.)      asthma  10. PREGNANCY: \"Is there any chance you are pregnant?\" \"When was your last menstrual period?\"        no  11. OTHER SYMPTOMS: \"Do you have any other symptoms?\"  (e.g., chills, fatigue, headache, loss of smell or taste, muscle pain, sore throat; new loss of smell or taste especially support the diagnosis of COVID-19)        Headache, loss of taste    Protocols used: CORONAVIRUS (COVID-19) DIAGNOSED OR [id number] 8.25.2021      "

## 2022-01-05 ENCOUNTER — OFFICE VISIT (OUTPATIENT)
Dept: FAMILY MEDICINE | Facility: OTHER | Age: 42
End: 2022-01-05
Attending: FAMILY MEDICINE
Payer: COMMERCIAL

## 2022-01-05 DIAGNOSIS — Z20.822 SUSPECTED 2019 NOVEL CORONAVIRUS INFECTION: ICD-10-CM

## 2022-01-05 PROCEDURE — U0003 INFECTIOUS AGENT DETECTION BY NUCLEIC ACID (DNA OR RNA); SEVERE ACUTE RESPIRATORY SYNDROME CORONAVIRUS 2 (SARS-COV-2) (CORONAVIRUS DISEASE [COVID-19]), AMPLIFIED PROBE TECHNIQUE, MAKING USE OF HIGH THROUGHPUT TECHNOLOGIES AS DESCRIBED BY CMS-2020-01-R: HCPCS | Mod: ZL

## 2022-01-06 LAB — SARS-COV-2 RNA RESP QL NAA+PROBE: NEGATIVE

## 2022-01-06 NOTE — NURSING NOTE
"Chief Complaint   Patient presents with     Other     MQT allergy test follow-up       Initial /89 (BP Location: Right arm, Patient Position: Chair, Cuff Size: Adult Large)   Pulse 93   Temp 98.2  F (36.8  C) (Oral)   Ht 1.753 m (5' 9\")   Wt 105.7 kg (233 lb)   SpO2 97%   BMI 34.41 kg/m   Estimated body mass index is 34.41 kg/m  as calculated from the following:    Height as of this encounter: 1.753 m (5' 9\").    Weight as of this encounter: 105.7 kg (233 lb).  Medication Reconciliation: complete  Ciera Quintero RN    This patient presents today for allergy skin testing.      Symptoms have included nasal congestion, runny nose, right ear pain, and left ear fullness.  She has a history of TMJ.  She has intermittent tinnitus, which has slightly improved.  She notes vertigo symptoms 1-2 times per day.  She has reactive airway disease.  Her ACT score today is 13.  She feels she has been using her rescue inhaler more recently, which she uses for activity induced SOB.  She notes she gets frequent sinus infections, but she does home treatment with Mucinex and OTC sinus medication.  She has a couple of areas near her eyes and on her eyelids that are red and itchy.  No past tubes in her ears or recent tonsil/adenoid issues.  Symptoms have been worse in the spring and summer, but this past year have just been worsening in general.  Overall, she notes having allergy symptoms the past 20 years.     This patient lives in a trailer with an addition  There is no basement.  This patient does suspect mold and water  issues in the home.  She states there is a leak between the addition on the trailer, which goes into her bedroom. She can smell mold.  There is carpet in the bedrooms of the home.  Home has forced air, natural gas heat and does have numerous window air conditioning units.      This patient has 4 dogs and 4 cats for pets.  They are all inside.  Some of the pets do sleep in bed with the patient.    This " Call for Humira refill but too soon until 1/7 on insurance. Patient say he is due 1/13.Inform patient we will call back tomorrow to set up refill with him.    FAVIAN   patient has not had allergy testing in the past.    This patient's medications have been reviewed prior to testing and all appropriate medications have been stopped.    Consent is signed by patient and signature is verified.     MQT/ID test is performed per protocol.  The patient tolerated testing well.  Benadryl gel was applied to testing sites on patient's skin, and patient received Claritin 10 mg, both for post test itch.  All findings are recorded on the paper flow sheet. Results are reviewed with this patient.  They are given written information regarding allergy.       The patient will follow-up with ODILIA Parrish, for treatment plan.      Ciera Quintero RN

## 2022-01-07 RX ORDER — CITALOPRAM HYDROBROMIDE 40 MG/1
TABLET ORAL
Qty: 30 TABLET | Refills: 0 | Status: SHIPPED | OUTPATIENT
Start: 2022-01-07 | End: 2022-03-08

## 2022-01-07 NOTE — TELEPHONE ENCOUNTER
celexa      Last Written Prescription Date:  9/10/21  Last Fill Quantity: 90,   # refills: 3  Last Office Visit: 10/28/21  Future Office visit:    Next 5 appointments (look out 90 days)    Mar 17, 2022 10:00 AM  (Arrive by 9:45 AM)  PHYSICAL with Rebecca Baltazar NP  [facility] [place] ([facility] [place] ) [address] [place] [address]  [phone]   Mar 21, 2022 10:45 AM  (Arrive by 10:30 AM)  SHORT with Sudhakar Cuellar MD  [facility] [place] ([facility] [place] ) [address]  [place] [address]  [phone]

## 2022-03-17 ENCOUNTER — OFFICE VISIT (OUTPATIENT)
Dept: OBGYN | Facility: OTHER | Age: 42
End: 2022-03-17
Attending: NURSE PRACTITIONER
Payer: COMMERCIAL

## 2022-03-17 VITALS
BODY MASS INDEX: 36.88 KG/M2 | OXYGEN SATURATION: 99 % | SYSTOLIC BLOOD PRESSURE: 119 MMHG | WEIGHT: 249 LBS | HEART RATE: 87 BPM | HEIGHT: 69 IN | DIASTOLIC BLOOD PRESSURE: 68 MMHG

## 2022-03-17 DIAGNOSIS — K59.04 CHRONIC IDIOPATHIC CONSTIPATION: ICD-10-CM

## 2022-03-17 DIAGNOSIS — Z01.419 WELL WOMAN EXAM WITH ROUTINE GYNECOLOGICAL EXAM: ICD-10-CM

## 2022-03-17 DIAGNOSIS — N63.13 BREAST LUMP ON RIGHT SIDE AT 7 O'CLOCK POSITION: ICD-10-CM

## 2022-03-17 DIAGNOSIS — Z12.31 ENCOUNTER FOR SCREENING MAMMOGRAM FOR BREAST CANCER: Primary | ICD-10-CM

## 2022-03-17 PROCEDURE — 99396 PREV VISIT EST AGE 40-64: CPT | Performed by: NURSE PRACTITIONER

## 2022-03-17 RX ORDER — DOCUSATE SODIUM 100 MG/1
100 CAPSULE, LIQUID FILLED ORAL 2 TIMES DAILY
Qty: 180 CAPSULE | Refills: 3 | Status: SHIPPED | OUTPATIENT
Start: 2022-03-17 | End: 2023-04-13

## 2022-03-17 ASSESSMENT — PATIENT HEALTH QUESTIONNAIRE - PHQ9: SUM OF ALL RESPONSES TO PHQ QUESTIONS 1-9: 13

## 2022-03-17 ASSESSMENT — PAIN SCALES - GENERAL: PAINLEVEL: NO PAIN (0)

## 2022-03-17 NOTE — PROGRESS NOTES
"S: Cristiana Aguila is a pleasant 41 year old female who is accompanied by her  and presents for her annual exam. PCP is Dr. Cuellar whom Cristiana is up to date with; she reports she is seeing him Monday for ADHD management. Also will discuss MDD/DAFNE at this time. PHQ-9 done today; score of 13. Denies thoughts of harming self.    Pt denies any concerns. ; hx of 3 . Oldest is 23 and youngest is 15. Tried breastfeeding middle child though predominantly bottlefed. Healthy pregnancies; no GDM or HTN.     In , pt had ASCUS though negative HPV. Repeat pap/HPV 3/10/2021 negative. Not due for a pap smear.    LMP pt reports is a couple weeks ago, Pt reports a couple days of light spotting every month. No pain with menses. Pt currently has nexplanon in her L arm. States she preferred the Mirena IUD, however, it caused her to get frequent migraines. Since switching to nexplanon (3/11/2021), she has not had this issue. She does note that occasionally the site of insertion gets itchy so she will apply hydrocortisone cream to it. Notes it frequently rubs on clothes. She does note she has been consistently more black since starting the nexplanon and she also notes she has been having more hot flashes (both during the day and waking up at night drenched in sweat). She states this has been going on for about 2 years and improved after the Mirena was inserted, but then the last couple months has been happening more frequently.     Pt reports no changes to breasts that she has noticed.  She does however note occasionally her right inner breast (points to the outer aspect of the 7:00 part of this breast) has a little \"numb spot.\" Pt due for mammogram; is aware and will schedule when called. Maternal grandmother had breast cancer.    No vaginal dryness, postcoital spotting, dyspareunia, or abnormal discharge. Declines STI screenings. Declines any sexual concerns.    Pt reports drinking plenty of milk/obtaining sources of " "calcium through diet. Declines regular exercise due to L knee; sees PT. Pt states she does not currently smoke.    O: Pt alert, oriented, and not in acute distress.  /68 (BP Location: Right arm, Patient Position: Sitting, Cuff Size: Adult Large)   Pulse 87   Ht 1.753 m (5' 9\")   Wt 112.9 kg (249 lb)   SpO2 99%   BMI 36.77 kg/m    HEENT: Thyroid symmetrical and normal in size to palpation.  CVV: RRR.  Lungs: Clear to auscultation.  Breasts: Nontender to palpation; no nipple discharge  R breast: small (about 1 cm in diameter), movable, nontender lump noted at the 7 o'clock position  L arm nexplanon site: no redness or irritation.   GYN:  Vulva: Normal female hair distribution; no lesions.  Vagina: Moist, pink, no lesions; no abnormal discharge  Cervix: moist, pink; no lesions or abnormal discharge    A/P: Pt to follow-up with PCP on Monday for ADHD follow-up; will discuss anxiety management at this time as well.  --Refilled colace for pt hx of chronic idiopathic constipation  --Encouraged pt to apply a bandage to nexplanon site if it becomes itchy; suspect the irritation is due to rubbing on clothing or scar tissue from the insertion. Pt to call if no improvement or with other concerns.  --Encouraged pt to eat diet rich in fruits and vegetables.  --Discussed hot flashes as possibly related to perimenopause, however, discussed how hot flashes can be related to certain medications as well. Pt notes that 2 months ago she started taking Effexor and her hot flashes increased following this. Pt to discuss this with PCP for further evaluation.  --Diagnostic mammogram and breast US ordered for lump in R breast  --Pt to follow-up for next well woman exam or for any concerns in interim.  "

## 2022-03-17 NOTE — PROGRESS NOTES
"  Assessment & Plan     Attention deficit disorder (ADD) without hyperactivity  RF done. Overall stable on meds.   - methylphenidate (CONCERTA) 36 MG CR tablet; Take 2 tablets (72 mg) by mouth daily  - methylphenidate (CONCERTA) 36 MG CR tablet; Take 2 tablets (72 mg) by mouth daily  - methylphenidate (CONCERTA) 36 MG CR tablet; Take 2 tablets (72 mg) by mouth daily    Generalized anxiety disorder  Stable overall but feels like having Side effects of Effexor.  Will try off effexor and see if feels better and less angry or less hot flashes.     Major depressive disorder, recurrent episode, in full remission (H)  As above.     Hot flashes  Etiology ? But came around time added Effexor. Will stop effexor. Recent labs ok. Follow-up in 2-3 months                BMI:   Estimated body mass index is 36.92 kg/m  as calculated from the following:    Height as of 3/17/22: 1.753 m (5' 9\").    Weight as of this encounter: 113.4 kg (250 lb).           No follow-ups on file.    Sudhakar Cuellar MD  [facility] Zendejas is a 41 year old who presents for the following health issues  accompanied by her spouse.    HPI     Depression and Anxiety Follow-Up    How are you doing with your depression since your last visit? No change    How are you doing with your anxiety since your last visit?  No change    Are you having other symptoms that might be associated with depression or anxiety? Yes:  agression, tired    Have you had a significant life event? Financial Concerns     Do you have any concerns with your use of alcohol or other drugs? No     Increase anger with effexor  Remember she has had this before with effexor  Pt may be in perimenopausal state   Does have hot flashes at night for several months -- ?? Effexor related vs perimenopausal      Social History     Tobacco Use     Smoking status: Former Smoker     Packs/day: 0.50     Years: 13.00     Pack years: 6.50     Types: Cigarettes     Quit " date: 2021     Years since quittin.2     Smokeless tobacco: Never Used   Vaping Use     Vaping Use: Never used   Substance Use Topics     Alcohol use: Yes     Alcohol/week: 0.0 standard drinks     Comment: frequency: rarely     Drug use: No     PHQ 2021 3/17/2022 3/21/2022   PHQ-9 Total Score 15 13 14   Q9: Thoughts of better off dead/self-harm past 2 weeks Not at all Not at all Not at all     DAFNE-7 SCORE 10/18/2021 2021 3/21/2022   Total Score 15 14 12     Last PHQ-9 3/21/2022   1.  Little interest or pleasure in doing things 1   2.  Feeling down, depressed, or hopeless 1   3.  Trouble falling or staying asleep, or sleeping too much 3   4.  Feeling tired or having little energy 2   5.  Poor appetite or overeating 2   6.  Feeling bad about yourself 1   7.  Trouble concentrating 2   8.  Moving slowly or restless 2   Q9: Thoughts of better off dead/self-harm past 2 weeks 0   PHQ-9 Total Score 14   Difficulty at work, home, or with people Somewhat difficult     DAFNE-7  3/21/2022   1. Feeling nervous, anxious, or on edge 2   2. Not being able to stop or control worrying 1   3. Worrying too much about different things 1   4. Trouble relaxing 2   5. Being so restless that it is hard to sit still 2   6. Becoming easily annoyed or irritable 3   7. Feeling afraid, as if something awful might happen 1   DAFNE-7 Total Score 12   If you checked any problems, how difficult have they made it for you to do your work, take care of things at home, or get along with other people? Somewhat difficult       Suicide Assessment Five-step Evaluation and Treatment (SAFE-T)    Asthma Follow-Up    Was ACT completed today?    Yes    ACT Total Scores 3/21/2022   ACT TOTAL SCORE (Goal Greater than or Equal to 20) 16   In the past 12 months, how many times did you visit the emergency room for your asthma without being admitted to the hospital? 0   In the past 12 months, how many times were you hospitalized overnight because of  your asthma? 0       How many days per week do you miss taking your asthma controller medication?  I do not have an asthma controller medication    Please describe any recent triggers for your asthma: smoke, upper respiratory infections, dust mites, pollens, animal dander, mold, humidity, strong odors and fumes, exercise or sports, emotions and cold air    Have you had any Emergency Room Visits, Urgent Care Visits, or Hospital Admissions since your last office visit?  No        Medication Followup of methylphenidate (CONCERTA) 36mgx2    Taking Medication as prescribed: yes    Side Effects:  None    Medication Helping Symptoms:  yes       Review of Systems   Constitutional, HEENT, cardiovascular, pulmonary, gi and gu systems are negative, except as otherwise noted.      Objective    Pulse 84   Temp 97.7  F (36.5  C) (Tympanic)   Resp 18   Wt 113.4 kg (250 lb)   SpO2 99%   BMI 36.92 kg/m    Body mass index is 36.92 kg/m .  Physical Exam   GENERAL: healthy, alert and no distress  PSYCH: mentation appears normal, affect normal/bright

## 2022-03-21 ENCOUNTER — OFFICE VISIT (OUTPATIENT)
Dept: FAMILY MEDICINE | Facility: OTHER | Age: 42
End: 2022-03-21
Attending: FAMILY MEDICINE
Payer: COMMERCIAL

## 2022-03-21 VITALS
TEMPERATURE: 97.7 F | DIASTOLIC BLOOD PRESSURE: 78 MMHG | WEIGHT: 250 LBS | HEART RATE: 84 BPM | SYSTOLIC BLOOD PRESSURE: 126 MMHG | OXYGEN SATURATION: 99 % | RESPIRATION RATE: 18 BRPM | BODY MASS INDEX: 36.92 KG/M2

## 2022-03-21 DIAGNOSIS — F33.42 MAJOR DEPRESSIVE DISORDER, RECURRENT EPISODE, IN FULL REMISSION (H): ICD-10-CM

## 2022-03-21 DIAGNOSIS — F98.8 ATTENTION DEFICIT DISORDER (ADD) WITHOUT HYPERACTIVITY: Primary | ICD-10-CM

## 2022-03-21 DIAGNOSIS — R23.2 HOT FLASHES: ICD-10-CM

## 2022-03-21 DIAGNOSIS — F41.1 GENERALIZED ANXIETY DISORDER: ICD-10-CM

## 2022-03-21 PROCEDURE — 99214 OFFICE O/P EST MOD 30 MIN: CPT | Performed by: FAMILY MEDICINE

## 2022-03-21 RX ORDER — METHYLPHENIDATE HYDROCHLORIDE 36 MG/1
72 TABLET ORAL DAILY
Qty: 60 TABLET | Refills: 0 | Status: SHIPPED | OUTPATIENT
Start: 2022-03-21 | End: 2022-06-07

## 2022-03-21 RX ORDER — METHYLPHENIDATE HYDROCHLORIDE 36 MG/1
72 TABLET ORAL DAILY
Qty: 60 TABLET | Refills: 0 | Status: SHIPPED | OUTPATIENT
Start: 2022-04-21 | End: 2022-06-07

## 2022-03-21 RX ORDER — METHYLPHENIDATE HYDROCHLORIDE 36 MG/1
72 TABLET ORAL DAILY
Qty: 60 TABLET | Refills: 0 | Status: SHIPPED | OUTPATIENT
Start: 2022-05-22 | End: 2022-10-13

## 2022-03-21 ASSESSMENT — PATIENT HEALTH QUESTIONNAIRE - PHQ9: SUM OF ALL RESPONSES TO PHQ QUESTIONS 1-9: 14

## 2022-03-21 ASSESSMENT — ANXIETY QUESTIONNAIRES
GAD7 TOTAL SCORE: 12
1. FEELING NERVOUS, ANXIOUS, OR ON EDGE: MORE THAN HALF THE DAYS
3. WORRYING TOO MUCH ABOUT DIFFERENT THINGS: SEVERAL DAYS
6. BECOMING EASILY ANNOYED OR IRRITABLE: NEARLY EVERY DAY
5. BEING SO RESTLESS THAT IT IS HARD TO SIT STILL: MORE THAN HALF THE DAYS
IF YOU CHECKED OFF ANY PROBLEMS ON THIS QUESTIONNAIRE, HOW DIFFICULT HAVE THESE PROBLEMS MADE IT FOR YOU TO DO YOUR WORK, TAKE CARE OF THINGS AT HOME, OR GET ALONG WITH OTHER PEOPLE: SOMEWHAT DIFFICULT
4. TROUBLE RELAXING: MORE THAN HALF THE DAYS
2. NOT BEING ABLE TO STOP OR CONTROL WORRYING: SEVERAL DAYS
7. FEELING AFRAID AS IF SOMETHING AWFUL MIGHT HAPPEN: SEVERAL DAYS

## 2022-03-21 ASSESSMENT — ASTHMA QUESTIONNAIRES: ACT_TOTALSCORE: 16

## 2022-03-21 ASSESSMENT — PAIN SCALES - GENERAL: PAINLEVEL: NO PAIN (0)

## 2022-03-21 NOTE — NURSING NOTE
"Chief Complaint   Patient presents with     Recheck Medication       Initial /78 (BP Location: Left arm, Patient Position: Sitting, Cuff Size: Adult Large)   Pulse 84   Temp 97.7  F (36.5  C) (Tympanic)   Resp 18   Wt 113.4 kg (250 lb)   SpO2 99%   BMI 36.92 kg/m   Estimated body mass index is 36.92 kg/m  as calculated from the following:    Height as of 3/17/22: 1.753 m (5' 9\").    Weight as of this encounter: 113.4 kg (250 lb).  Medication Reconciliation: complete  Steve Person LPN  "

## 2022-03-22 ASSESSMENT — ANXIETY QUESTIONNAIRES: GAD7 TOTAL SCORE: 12

## 2022-04-07 ENCOUNTER — HOSPITAL ENCOUNTER (EMERGENCY)
Facility: HOSPITAL | Age: 42
Discharge: HOME | End: 2022-04-07
Attending: INTERNAL MEDICINE | Admitting: INTERNAL MEDICINE
Payer: COMMERCIAL

## 2022-04-07 ENCOUNTER — NURSE TRIAGE (OUTPATIENT)
Dept: NURSING | Facility: CLINIC | Age: 42
End: 2022-04-07

## 2022-04-07 VITALS
RESPIRATION RATE: 23 BRPM | BODY MASS INDEX: 37.03 KG/M2 | OXYGEN SATURATION: 98 % | WEIGHT: 250 LBS | TEMPERATURE: 96.7 F | SYSTOLIC BLOOD PRESSURE: 133 MMHG | HEIGHT: 69 IN | DIASTOLIC BLOOD PRESSURE: 95 MMHG | HEART RATE: 67 BPM

## 2022-04-07 DIAGNOSIS — T50.901A OVERDOSE, ACCIDENTAL OR UNINTENTIONAL, INITIAL ENCOUNTER: ICD-10-CM

## 2022-04-07 PROCEDURE — 250N000013 HC RX MED GY IP 250 OP 250 PS 637: Performed by: INTERNAL MEDICINE

## 2022-04-07 PROCEDURE — 99283 EMERGENCY DEPT VISIT LOW MDM: CPT

## 2022-04-07 PROCEDURE — 99282 EMERGENCY DEPT VISIT SF MDM: CPT | Performed by: INTERNAL MEDICINE

## 2022-04-07 PROCEDURE — 93010 ELECTROCARDIOGRAM REPORT: CPT | Performed by: INTERNAL MEDICINE

## 2022-04-07 RX ORDER — DIAZEPAM 5 MG
5 TABLET ORAL ONCE
Status: COMPLETED | OUTPATIENT
Start: 2022-04-07 | End: 2022-04-07

## 2022-04-07 RX ADMIN — DIAZEPAM 5 MG: 5 TABLET ORAL at 03:14

## 2022-04-07 NOTE — ED TRIAGE NOTES
Pt takes Concerta 72mg this evening by mistake. Took in AM as scheduled. Feeling chest tightness 3/10.

## 2022-04-07 NOTE — TELEPHONE ENCOUNTER
Pt called stating she takes concerta 72 for her ADHD and accidentally too  Another 72 mg dose this evening accidentally at 8 pm. Pt stated she was trying to take her evening med sand accidentally retook her morning medication. Pt reported she is having chest tightness rated her pain level 3 or 4/10.     RN consulted pt to poison control and was advised to go tot  emergency department to be seen. RN relay ed pt to go [facility] emergency department and she stated she is on her war to the ER.        Juan Brennan RN  [facility]       COVID 19 Nurse Triage Plan/Patient Instructions    Please be aware that novel coronavirus (COVID-19) may be circulating in the community. If you develop symptoms such as fever, cough, or SOB or if you have concerns about the presence of another infection including coronavirus (COVID-19), please contact your health care provider or visit [email or web address].     Disposition/Instructions    ED Visit recommended. Follow protocol based instructions.     Bring Your Own Device:  Please also bring your smart device(s) (smart phones, tablets, laptops) and their charging cables for your personal use and to communicate with your care team during your visit.    Thank you for taking steps to prevent the spread of this virus.  o Limit your contact with others.  o Wear a simple mask to cover your cough.  o Wash your hands well and often.    Resources    [facility]: About COVID-19: [email or web address]    CDC: What to Do If You're Sick: www.cdc.gov/coronavirus/2019-ncov/about/steps-when-sick.html    CDC: Ending Home Isolation: www.cdc.gov/coronavirus/2019-ncov/hcp/disposition-in-home-patients.html     CDC: Caring for Someone: www.cdc.gov/coronavirus/2019-ncov/if-you-are-sick/care-for-someone.html     [facility]: Interim Guidance for Hospital Discharge to Home: www.[email or web address].[email or web address].[place]/diseases/coronavirus/hcp/hospdischarge.pdf    [facility]  "trials (COVID-19 research studies): [email or web address]-clinical-trials     Below are the COVID-19 hotlines at the [place] Department of Health ([facility]). Interpreters are available.   o For health questions: Call 134-105-9575 or 1-420.502.2002 (7 a.m. to 7 p.m.)  o For questions about schools and childcare: Call 016-808-8446 or 1-152.752.5311 (7 a.m. to 7 p.m.)         Reason for Disposition    Chest pain(s) lasting a few seconds from coughing    [1] DOUBLE DOSE (an extra dose or lesser amount) of prescription drug AND [2] any symptoms (e.g., dizziness, nausea, pain, sleepiness)    Additional Information    Negative: SEVERE difficulty breathing (e.g., struggling for each breath, speaks in single words)    Negative: Difficult to awaken or acting confused (e.g., disoriented, slurred speech)    Negative: Shock suspected (e.g., cold/pale/clammy skin, too weak to stand, low BP, rapid pulse)    Negative: Passed out (i.e., fainted, collapsed and was not responding)    Negative: [1] Chest pain lasts > 5 minutes AND [2] age > 44    Negative: [1] Chest pain lasts > 5 minutes AND [2] age > 30 AND [3] one or more cardiac risk factors (e.g., diabetes, high blood pressure, high cholesterol, smoker, or strong family history of heart disease)    Negative: [1] Chest pain lasts > 5 minutes AND [2] history of heart disease (i.e., angina, heart attack, heart failure, bypass surgery, takes nitroglycerin)    Negative: [1] Chest pain lasts > 5 minutes AND [2] described as crushing, pressure-like, or heavy    Negative: Followed a chest injury    Negative: SEVERE chest pain    Negative: [1] Chest pain (or \"angina\") comes and goes AND [2] is happening more often (increasing in frequency) or getting worse (increasing in severity) (Exception: chest pains that last only a few seconds)    Negative: Pain also in shoulder(s) or arm(s) or jaw (Exception: pain is clearly made worse by movement)    Negative: Difficulty breathing    Negative: " "Dizziness or lightheadedness    Negative: Coughing up blood    Negative: Cocaine use within last 3 days    Negative: Major surgery in past month    Negative: Hip or leg fracture (broken bone) in past month (or had cast on leg or ankle in past month)    Negative: Illness requiring prolonged bedrest in past month (e.g., immobilization, long hospital stay)    Negative: Long-distance travel in past month (e.g., car, bus, train, plane; with trip lasting 6 or more hours)    Negative: History of prior \"blood clot\" in leg or lungs (i.e., deep vein thrombosis, pulmonary embolism)    Negative: History of inherited increased risk of blood clots (e.g., Factor 5 Leiden, Anti-thrombin 3, Protein C or Protein S deficiency, Prothrombin mutation)    Negative: Cancer treatment in past six months (or has cancer now)    Negative: [1] Chest pain lasts > 5 minutes AND [2] occurred in past 3 days (72 hours) (Exception: feels exactly the same as previously diagnosed heartburn and has accompanying sour taste in mouth)    Negative: Taking a deep breath makes pain worse    Negative: Patient sounds very sick or weak to the triager    Negative: [1] Chest pain lasts > 5 minutes AND [2] occurred > 3 days ago (72 hours) AND [3] NO chest pain or cardiac symptoms now    Negative: [1] Chest pain lasting < 5 minutes AND [2] NO chest pain or cardiac symptoms (e.g., breathing difficulty, sweating) now (Exception: chest pains that last only a few seconds)    Negative: Fever > 100.4 F (38.0 C)    Negative: Rash in same area as pain (may be described as \"small blisters\")    Negative: [1] Patient says chest pain feels exactly the same as previously diagnosed \"heartburn\" AND [2] describes burning in chest AND [3] accompanying sour taste in mouth    Negative: [1] Chest pain lasting < 5 minutes AND [2] has not taken prescribed nitroglycerin    Negative: [1] Chest pain lasting < 5 minutes AND [2] completely gone after taking nitroglycerin    Negative: [1] Chest " pain from known angina comes and goes AND [2] is NOT happening more often (increasing in frequency) or getting worse (increasing in severity)    Negative: [1] Chest pain(s) lasting a few seconds from coughing AND [2] persists > 3 days    Negative: [1] Chest pain(s) lasting a few seconds AND [2] persists > 3 days    Commented on: Heart beating < 50 beats per minute OR > 140 beats per minute     Doesn't know    Protocols used: CHEST PAIN-A-AH, MEDICATION QUESTION CALL-A-AH

## 2022-04-08 ENCOUNTER — HOSPITAL ENCOUNTER (OUTPATIENT)
Dept: ULTRASOUND IMAGING | Facility: HOSPITAL | Age: 42
Discharge: HOME | End: 2022-04-08
Attending: NURSE PRACTITIONER
Payer: COMMERCIAL

## 2022-04-08 ENCOUNTER — HOSPITAL ENCOUNTER (OUTPATIENT)
Dept: MAMMOGRAPHY | Facility: OTHER | Age: 42
Discharge: HOME | End: 2022-04-08
Attending: NURSE PRACTITIONER
Payer: COMMERCIAL

## 2022-04-08 DIAGNOSIS — Z12.31 ENCOUNTER FOR SCREENING MAMMOGRAM FOR BREAST CANCER: ICD-10-CM

## 2022-04-08 DIAGNOSIS — N63.13 BREAST LUMP ON RIGHT SIDE AT 7 O'CLOCK POSITION: ICD-10-CM

## 2022-04-08 PROCEDURE — 76642 ULTRASOUND BREAST LIMITED: CPT | Mod: RT

## 2022-04-08 PROCEDURE — 77062 BREAST TOMOSYNTHESIS BI: CPT | Mod: TC

## 2022-04-09 ASSESSMENT — ENCOUNTER SYMPTOMS
HEMATURIA: 0
WHEEZING: 0
WOUND: 0
ANAL BLEEDING: 0
CHILLS: 0
CONFUSION: 0
FLANK PAIN: 0
ABDOMINAL DISTENTION: 0
SLEEP DISTURBANCE: 1
NERVOUS/ANXIOUS: 1
NAUSEA: 0
DIAPHORESIS: 0
CHEST TIGHTNESS: 0
HEADACHES: 0
BLOOD IN STOOL: 0
MYALGIAS: 0
ARTHRALGIAS: 0
VOMITING: 0
NECK PAIN: 0
ABDOMINAL PAIN: 0
NUMBNESS: 0
COLOR CHANGE: 0
SHORTNESS OF BREATH: 0
DIZZINESS: 0
VOICE CHANGE: 0
COUGH: 0
NECK STIFFNESS: 0
PALPITATIONS: 0
DYSURIA: 0
FEVER: 0
LIGHT-HEADEDNESS: 0
BACK PAIN: 0

## 2022-04-10 NOTE — ED PROVIDER NOTES
History     Chief Complaint   Patient presents with     Drug Overdose     The history is provided by the patient.     Cristiana Aguila is a 41 year old female who came for for taking AM dose of methylphenidate 72 mg at evening by mistake. Denies any self harm intension. Because of extra dose feels more anxious, denies any other symptoms.     Allergies:  Allergies   Allergen Reactions     Amoxicillin Diarrhea     Bee Venom      Bee venom (honey bee)       Food      Artificial cinnamon      Other Environmental Allergy      Sulfa Drugs      Sulfa (sulfonamide antibiotics)        Latex Rash       Problem List:    Patient Active Problem List    Diagnosis Date Noted     Chronic pain of left knee 07/30/2021     Priority: Medium     Knee stiffness, left 07/30/2021     Priority: Medium     Muscle weakness 07/30/2021     Priority: Medium     Encounter for initial prescription of implantable subdermal contraceptive 03/11/2021     Priority: Medium     Psychophysiological insomnia 12/21/2020     Priority: Medium     Mild intermittent reactive airway disease with wheezing without complication 12/21/2020     Priority: Medium     Encounter for insertion of intrauterine contraceptive device (IUD) 04/29/2020     Priority: Medium     Well woman exam with routine gynecological exam 02/14/2018     Priority: Medium     Irritable bowel syndrome with both constipation and diarrhea 08/02/2017     Priority: Medium     Recurrent major depressive disorder, in partial remission (H) 08/02/2017     Priority: Medium     ACP (advance care planning) 03/02/2016     Priority: Medium     Advance Care Planning 3/2/2016: Receipt of ACP document:  Received: Health Care Directive which was witnessed or notarized on 1-25-16.  Document previously scanned on 2-5-16.  Validation form completed and sent to be scanned.  Code Status needs to be updated to reflect choices in most recent ACP document.  Confirmed/documented designated decision maker(s).  Added by  Rachel Barber RN Advance Care Planning Liaison with [employer]             FH: breast cancer      Priority: Medium     Generalized anxiety disorder      Priority: Medium     Diagnosis updated by automated process. Provider to review and confirm.       Attention deficit disorder 05/31/2005     Priority: Medium     Problem list name updated by automated process. 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     FH: breast cancer      DAFNE (generalised anxiety disorder)      Irritable bowel syndrome with both constipation and diarrhea 8/2/2017     MDD (major depressive disorder)      Migraine, unspecified, without mention of intractable migraine without mention of status migrainosus 3/13/2000     Tobacco abuse, in remission      Unspecified sinusitis (chronic) 3/8/2001       Past Surgical History:    Past Surgical History:   Procedure Laterality Date     ENDOSCOPY UPPER, COLONOSCOPY, COMBINED N/A 9/4/2015    Procedure: COMBINED ENDOSCOPY UPPER, COLONOSCOPY;  Surgeon: Griffin Barrientos DO;  [facility]     LAPAROSCOPIC CYSTECTOMY OVARIAN (BENIGN) Right 1/27/2016    Procedure: LAPAROSCOPIC CYSTECTOMY OVARIAN (BENIGN);  Surgeon: Kuldip España MD;  [facility]     LAPAROSCOPIC SALPINGO-OOPHORECTOMY Left 1/27/2016    Procedure: LAPAROSCOPIC SALPINGO-OOPHORECTOMY;  Surgeon: Kuldip España MD;  [facility]     LAPAROSCOPY DIAGNOSTIC (GYN) Left 1/27/2016    Procedure: LAPAROSCOPY DIAGNOSTIC (GYN);  Surgeon: Kuldip España MD;  [facility]     TUBAL LIGATION  2006     wisdom teeth         Family History:    Family History   Problem Relation Age of Onset     Breast Cancer Maternal Grandmother      Hypertension Maternal Grandfather      Hyperlipidemia Maternal Grandfather      Diabetes Paternal Grandmother      Asthma No family hx of         Social History:  Marital Status:   [2]  Social History     Tobacco Use     Smoking status: Former Smoker     Packs/day: 0.50     Years: 13.00     Pack years: 6.50     Types: Cigarettes     Quit date: 2021     Years since quittin.2     Smokeless tobacco: Never Used   Vaping Use     Vaping Use: Never used   Substance Use Topics     Alcohol use: Yes     Alcohol/week: 0.0 standard drinks     Comment: frequency: rarely     Drug use: No        Medications:    Acetaminophen (TYLENOL PO)  albuterol (PROAIR HFA/PROVENTIL HFA/VENTOLIN HFA) 108 (90 Base) MCG/ACT inhaler  Ascorbic Acid (VITAMIN C PO)  citalopram (CELEXA) 40 MG tablet  Cyanocobalamin (VITAMIN B 12 PO)  diclofenac (VOLTAREN) 1 % topical gel  docusate sodium (COLACE) 100 MG capsule  EPINEPHrine (ANY BX GENERIC EQUIV) 0.3 MG/0.3ML injection 2-pack  etonogestrel (NEXPLANON) 68 MG IMPL  fexofenadine (ALLEGRA) 180 MG tablet  hydrOXYzine (ATARAX) 25 MG tablet  L-THEANINE PO  methylphenidate (CONCERTA) 36 MG CR tablet  [START ON 2022] methylphenidate (CONCERTA) 36 MG CR tablet  [START ON 2022] methylphenidate (CONCERTA) 36 MG CR tablet  mometasone (NASONEX) 50 MCG/ACT nasal spray  NONFORMULARY  NONFORMULARY  Nutritional Supplements (MENOPAUSE FORMULA PO)  omeprazole (PRILOSEC) 40 MG DR capsule  ondansetron (ZOFRAN) 4 MG tablet  traZODone (DESYREL) 100 MG tablet  TURMERIC CURCUMIN PO  vitamin B complex with vitamin C (VITAMIN  B COMPLEX) tablet  Vitamin D, Cholecalciferol, 25 MCG (1000 UT) TABS          Review of Systems   Constitutional: Negative for chills, diaphoresis and fever.   HENT: Negative for voice change.    Eyes: Negative for visual disturbance.   Respiratory: Negative for cough, chest tightness, shortness of breath and wheezing.    Cardiovascular: Negative for chest pain, palpitations and leg swelling.   Gastrointestinal: Negative for abdominal distention, abdominal pain, anal bleeding, blood in stool, nausea and vomiting.  "  Genitourinary: Negative for decreased urine volume, dysuria, flank pain and hematuria.   Musculoskeletal: Negative for arthralgias, back pain, gait problem, myalgias, neck pain and neck stiffness.   Skin: Negative for color change, pallor, rash and wound.   Neurological: Negative for dizziness, syncope, light-headedness, numbness and headaches.   Psychiatric/Behavioral: Positive for sleep disturbance. Negative for confusion and suicidal ideas. The patient is nervous/anxious.        Physical Exam   BP: 148/90  Pulse: 67  Temp: (!) 96.7  F (35.9  C)  Resp: 18  Height: 175.3 cm (5' 9\")  Weight: 113.4 kg (250 lb)  SpO2: 97 %      Physical Exam  Constitutional:       General: She is not in acute distress.     Appearance: She is not diaphoretic.   HENT:      Head: Atraumatic.      Mouth/Throat:      Pharynx: No oropharyngeal exudate.   Eyes:      General: No scleral icterus.     Pupils: Pupils are equal, round, and reactive to light.   Cardiovascular:      Heart sounds: Normal heart sounds.   Pulmonary:      Effort: No respiratory distress.      Breath sounds: Normal breath sounds.   Abdominal:      General: Bowel sounds are normal.      Palpations: Abdomen is soft.      Tenderness: There is no abdominal tenderness.   Musculoskeletal:         General: No tenderness.   Skin:     General: Skin is warm.      Findings: No rash.   Psychiatric:         Attention and Perception: Attention normal.         Mood and Affect: Mood is anxious.         Speech: Speech normal.         Behavior: Behavior normal.         Thought Content: Thought content is not paranoid or delusional. Thought content does not include homicidal or suicidal ideation. Thought content does not include homicidal or suicidal plan.         Cognition and Memory: Cognition normal.         Judgment: Judgment normal.         ED Course                 Procedures                No results found for this or any previous visit (from the past 24 hour(s)).    Medications "   diazepam (VALIUM) tablet 5 mg (5 mg Oral Given 4/7/22 0314)       Assessments & Plan (with Medical Decision Making)   Anxiety and sleepless due to side effect of methylphenidate  5 mg diazepam given  D C home  I have reviewed the nursing notes.    I have reviewed the findings, diagnosis, plan and need for follow up with the patient.      Discharge Medication List as of 4/7/2022  3:28 AM          Final diagnoses:   Overdose, accidental or unintentional, initial encounter       4/7/2022   [facility]     Vasu Mathur MD  04/09/22 2005

## 2022-05-14 ENCOUNTER — HEALTH MAINTENANCE LETTER (OUTPATIENT)
Age: 42
End: 2022-05-14

## 2022-06-07 ENCOUNTER — TELEPHONE (OUTPATIENT)
Dept: FAMILY MEDICINE | Facility: OTHER | Age: 42
End: 2022-06-07

## 2022-06-07 ENCOUNTER — OFFICE VISIT (OUTPATIENT)
Dept: FAMILY MEDICINE | Facility: OTHER | Age: 42
End: 2022-06-07
Attending: FAMILY MEDICINE
Payer: COMMERCIAL

## 2022-06-07 VITALS
SYSTOLIC BLOOD PRESSURE: 112 MMHG | WEIGHT: 239 LBS | TEMPERATURE: 97.6 F | DIASTOLIC BLOOD PRESSURE: 84 MMHG | RESPIRATION RATE: 18 BRPM | OXYGEN SATURATION: 98 % | BODY MASS INDEX: 35.29 KG/M2 | HEART RATE: 71 BPM

## 2022-06-07 DIAGNOSIS — F98.8 ATTENTION DEFICIT DISORDER (ADD) WITHOUT HYPERACTIVITY: Primary | ICD-10-CM

## 2022-06-07 DIAGNOSIS — F33.42 MAJOR DEPRESSIVE DISORDER, RECURRENT EPISODE, IN FULL REMISSION (H): ICD-10-CM

## 2022-06-07 DIAGNOSIS — R23.2 HOT FLASHES: ICD-10-CM

## 2022-06-07 DIAGNOSIS — F51.04 PSYCHOPHYSIOLOGICAL INSOMNIA: ICD-10-CM

## 2022-06-07 DIAGNOSIS — M25.579 PAIN IN JOINT, ANKLE AND FOOT, UNSPECIFIED LATERALITY: ICD-10-CM

## 2022-06-07 DIAGNOSIS — F41.1 GENERALIZED ANXIETY DISORDER: ICD-10-CM

## 2022-06-07 PROCEDURE — 99214 OFFICE O/P EST MOD 30 MIN: CPT | Performed by: FAMILY MEDICINE

## 2022-06-07 PROCEDURE — G0463 HOSPITAL OUTPT CLINIC VISIT: HCPCS

## 2022-06-07 RX ORDER — METHYLPHENIDATE HYDROCHLORIDE 36 MG/1
36 TABLET ORAL 2 TIMES DAILY
Qty: 60 TABLET | Refills: 0 | Status: SHIPPED | OUTPATIENT
Start: 2022-08-08 | End: 2022-06-28

## 2022-06-07 RX ORDER — METHYLPHENIDATE HYDROCHLORIDE 36 MG/1
36 TABLET ORAL 2 TIMES DAILY
Qty: 60 TABLET | Refills: 0 | Status: SHIPPED | OUTPATIENT
Start: 2022-07-08 | End: 2022-06-28

## 2022-06-07 RX ORDER — TRAZODONE HYDROCHLORIDE 100 MG/1
TABLET ORAL
Qty: 90 TABLET | Refills: 11 | Status: SHIPPED | OUTPATIENT
Start: 2022-06-07 | End: 2023-07-07

## 2022-06-07 RX ORDER — METHYLPHENIDATE HYDROCHLORIDE 36 MG/1
36 TABLET ORAL DAILY
Qty: 30 TABLET | Refills: 0 | Status: SHIPPED | OUTPATIENT
Start: 2022-07-08 | End: 2022-06-28

## 2022-06-07 RX ORDER — METHYLPHENIDATE HYDROCHLORIDE 36 MG/1
36 TABLET ORAL DAILY
Qty: 30 TABLET | Refills: 0 | Status: SHIPPED | OUTPATIENT
Start: 2022-08-08 | End: 2022-06-28

## 2022-06-07 RX ORDER — METHYLPHENIDATE HYDROCHLORIDE 36 MG/1
36 TABLET ORAL 2 TIMES DAILY
Qty: 60 TABLET | Refills: 0 | Status: SHIPPED | OUTPATIENT
Start: 2022-06-07 | End: 2022-06-28

## 2022-06-07 RX ORDER — METHYLPHENIDATE HYDROCHLORIDE 36 MG/1
36 TABLET ORAL DAILY
Qty: 30 TABLET | Refills: 0 | Status: SHIPPED | OUTPATIENT
Start: 2022-06-07 | End: 2022-10-13

## 2022-06-07 ASSESSMENT — ANXIETY QUESTIONNAIRES
5. BEING SO RESTLESS THAT IT IS HARD TO SIT STILL: MORE THAN HALF THE DAYS
IF YOU CHECKED OFF ANY PROBLEMS ON THIS QUESTIONNAIRE, HOW DIFFICULT HAVE THESE PROBLEMS MADE IT FOR YOU TO DO YOUR WORK, TAKE CARE OF THINGS AT HOME, OR GET ALONG WITH OTHER PEOPLE: SOMEWHAT DIFFICULT
4. TROUBLE RELAXING: SEVERAL DAYS
7. FEELING AFRAID AS IF SOMETHING AWFUL MIGHT HAPPEN: SEVERAL DAYS
6. BECOMING EASILY ANNOYED OR IRRITABLE: NEARLY EVERY DAY
GAD7 TOTAL SCORE: 13
3. WORRYING TOO MUCH ABOUT DIFFERENT THINGS: SEVERAL DAYS
GAD7 TOTAL SCORE: 13
1. FEELING NERVOUS, ANXIOUS, OR ON EDGE: NEARLY EVERY DAY
2. NOT BEING ABLE TO STOP OR CONTROL WORRYING: MORE THAN HALF THE DAYS

## 2022-06-07 ASSESSMENT — PAIN SCALES - GENERAL: PAINLEVEL: MODERATE PAIN (4)

## 2022-06-07 ASSESSMENT — PATIENT HEALTH QUESTIONNAIRE - PHQ9: SUM OF ALL RESPONSES TO PHQ QUESTIONS 1-9: 14

## 2022-06-07 NOTE — COMMUNITY RESOURCES LIST (ENGLISH)
06/07/2022   [facility]  Steve Person  For questions about this resource list or additional care needs, please contact your primary care clinic or care manager.  Phone: [phone]   Email: N/A   Address: [address] [place] [id number]   Hours: N/A        Mental Health       Individual counseling  1  [facility] - [place] Distance: 2.54 miles      COVID-19 Status: Regular Operations   [address] [place] [address]  Language: English, Hmong, Mandarin, Georgian  Hours: Thu 5:30 PM - 7:30 PM  Fees: Free   Phone: [phone]) [phone] Email: [email or web address] Website: [email or web address]     2  [facility] - [place] Distance: 2.66 miles      COVID-19 Status: Regular Operations   [address] [place] [address]  Language: English  Hours: Mon - Fri 8:00 AM - 4:30 PM  Fees: Insurance, Self Pay   Phone: [phone]) [phone] Email: [email or web address] Website: [email or web address]          Important Numbers & Websites       Emergency Services   911  [facility]   311  Poison Control   ([phone] 855-9118  Suicide Prevention Lifeline   (152) 455-6817 (TALK)  Child Abuse Hotline   (324) 885-7628 (4-A-Child)  Sexual Assault Hotline   (637) 333-3503 (HOPE)  National Runaway Safeline   (506) 871-5762 (RUNAWAY)  All-Options Talkline   (829) 642-1810  Substance Abuse Referral   (835) 698-9533 (HELP)

## 2022-06-07 NOTE — PROGRESS NOTES
"  Assessment & Plan     Attention deficit disorder (ADD) without hyperactivity  Stable for years. No labs needed. Rf done and see back in 6 months   - methylphenidate (CONCERTA) 36 MG CR tablet; Take 1 tablet (36 mg) by mouth daily  - methylphenidate (CONCERTA) 36 MG CR tablet; Take 1 tablet (36 mg) by mouth daily  - methylphenidate (CONCERTA) 36 MG CR tablet; Take 1 tablet (36 mg) by mouth daily    Generalized anxiety disorder  Stable overall. Stay on Celexa     Major depressive disorder, recurrent episode, in full remission (H)  Up and down some. No change in meds needed. Stay on Celexa. Work on sleep    Hot flashes  Improved     Psychophysiological insomnia  Worse.  Will increase Trazdone-- call if not help  - traZODone (DESYREL) 100 MG tablet; 2-3  tablets orally at bedtime.    Pain in joint, ankle and foot, unspecified laterality  Working with PT and they want her to see Podiatry -- she needs referral. Will place one.   - Orthopedic  Referral; Future             Tobacco Cessation:   reports that she has been smoking cigarettes. She has a 6.50 pack-year smoking history. She has never used smokeless tobacco.      BMI:   Estimated body mass index is 35.29 kg/m  as calculated from the following:    Height as of 4/7/22: 1.753 m (5' 9\").    Weight as of this encounter: 108.4 kg (239 lb).           No follow-ups on file.    Sudhakar Cuellar MD  [facility] - KEN Zendejas is a 42 year old who presents for the following health issues     HPI     Depression and Anxiety Follow-Up    How are you doing with your depression since your last visit? Better and worse    How are you doing with your anxiety since your last visit?  Improved     Are you having other symptoms that might be associated with depression or anxiety? No    Have you had a significant life event? No     Do you have any concerns with your use of alcohol or other drugs? No     Going off Effexor -- less anger/ hot flashes " better /     Have MSK issues -- feels down because of that     Social History     Tobacco Use     Smoking status: Current Every Day Smoker     Packs/day: 0.50     Years: 13.00     Pack years: 6.50     Types: Cigarettes     Last attempt to quit: 2021     Years since quittin.4     Smokeless tobacco: Never Used   Vaping Use     Vaping Use: Never used   Substance Use Topics     Alcohol use: Yes     Alcohol/week: 0.0 standard drinks     Comment: frequency: rarely     Drug use: No     PHQ 3/17/2022 3/21/2022 2022   PHQ-9 Total Score 13 14 14   Q9: Thoughts of better off dead/self-harm past 2 weeks Not at all Not at all Not at all     DAFNE-7 SCORE 2021 3/21/2022 2022   Total Score 14 12 13     Last PHQ-9 2022   1.  Little interest or pleasure in doing things 1   2.  Feeling down, depressed, or hopeless 2   3.  Trouble falling or staying asleep, or sleeping too much 3   4.  Feeling tired or having little energy 2   5.  Poor appetite or overeating 3   6.  Feeling bad about yourself 1   7.  Trouble concentrating 2   8.  Moving slowly or restless 0   Q9: Thoughts of better off dead/self-harm past 2 weeks 0   PHQ-9 Total Score 14   Difficulty at work, home, or with people Somewhat difficult     DAFNE-7  2022   1. Feeling nervous, anxious, or on edge 3   2. Not being able to stop or control worrying 2   3. Worrying too much about different things 1   4. Trouble relaxing 1   5. Being so restless that it is hard to sit still 2   6. Becoming easily annoyed or irritable 3   7. Feeling afraid, as if something awful might happen 1   DAFNE-7 Total Score 13   If you checked any problems, how difficult have they made it for you to do your work, take care of things at home, or get along with other people? Somewhat difficult       Suicide Assessment Five-step Evaluation and Treatment (SAFE-T)        Medication Followup of methylphenidate for ADHD    Taking Medication as prescribed: yes    Side Effects:   None    Medication Helping Symptoms:  Yes    Stable for years - decade      Hot flashes      Duration: couple years    Description (location/character/radiation): feeling warm    Intensity:  mild    Accompanying signs and symptoms: none    History (similar episodes/previous evaluation): None    Precipitating or alleviating factors: None    Therapies tried and outcome: menopause ease          Review of Systems   Constitutional, HEENT, cardiovascular, pulmonary, GI, , musculoskeletal, neuro, skin, endocrine and psych systems are negative, except as otherwise noted.      Objective    /84 (BP Location: Left arm, Patient Position: Sitting, Cuff Size: Adult Large)   Pulse 71   Temp 97.6  F (36.4  C) (Tympanic)   Resp 18   Wt 108.4 kg (239 lb)   SpO2 98%   BMI 35.29 kg/m    Body mass index is 35.29 kg/m .  Physical Exam   GENERAL: healthy, alert and no distress  PSYCH: mentation appears normal, affect normal/bright

## 2022-06-07 NOTE — NURSING NOTE
"Chief Complaint   Patient presents with     A.D.H.D     Depression       Initial /84 (BP Location: Left arm, Patient Position: Sitting, Cuff Size: Adult Large)   Pulse 71   Temp 97.6  F (36.4  C) (Tympanic)   Resp 18   Wt 108.4 kg (239 lb)   SpO2 98%   BMI 35.29 kg/m   Estimated body mass index is 35.29 kg/m  as calculated from the following:    Height as of 4/7/22: 1.753 m (5' 9\").    Weight as of this encounter: 108.4 kg (239 lb).  Medication Reconciliation: complete  Steve Person LPN  "

## 2022-06-07 NOTE — TELEPHONE ENCOUNTER
Pharmacy called and stated meds were ordered for 1 tab daily when she was previously on 2 a day. Please advise.

## 2022-06-28 ENCOUNTER — OFFICE VISIT (OUTPATIENT)
Dept: PODIATRY | Facility: OTHER | Age: 42
End: 2022-06-28
Attending: FAMILY MEDICINE
Payer: COMMERCIAL

## 2022-06-28 VITALS
OXYGEN SATURATION: 98 % | SYSTOLIC BLOOD PRESSURE: 118 MMHG | HEART RATE: 107 BPM | DIASTOLIC BLOOD PRESSURE: 85 MMHG | TEMPERATURE: 97.5 F

## 2022-06-28 DIAGNOSIS — Z71.6 TOBACCO ABUSE COUNSELING: ICD-10-CM

## 2022-06-28 DIAGNOSIS — M62.461 GASTROCNEMIUS EQUINUS OF RIGHT LOWER EXTREMITY: ICD-10-CM

## 2022-06-28 DIAGNOSIS — F17.210 CIGARETTE NICOTINE DEPENDENCE WITHOUT COMPLICATION: ICD-10-CM

## 2022-06-28 DIAGNOSIS — M62.462 GASTROCNEMIUS EQUINUS OF LEFT LOWER EXTREMITY: ICD-10-CM

## 2022-06-28 DIAGNOSIS — M24.271 LIGAMENTOUS LAXITY OF RIGHT ANKLE: Primary | ICD-10-CM

## 2022-06-28 DIAGNOSIS — M24.272 LIGAMENTOUS LAXITY OF LEFT ANKLE: ICD-10-CM

## 2022-06-28 DIAGNOSIS — G89.29 CHRONIC PAIN OF RIGHT ANKLE: ICD-10-CM

## 2022-06-28 DIAGNOSIS — M25.571 CHRONIC PAIN OF RIGHT ANKLE: ICD-10-CM

## 2022-06-28 PROCEDURE — G0463 HOSPITAL OUTPT CLINIC VISIT: HCPCS

## 2022-06-28 PROCEDURE — G0463 HOSPITAL OUTPT CLINIC VISIT: HCPCS | Mod: 25

## 2022-06-28 PROCEDURE — 99203 OFFICE O/P NEW LOW 30 MIN: CPT | Performed by: PODIATRIST

## 2022-06-28 ASSESSMENT — PAIN SCALES - GENERAL: PAINLEVEL: MODERATE PAIN (5)

## 2022-06-28 NOTE — LETTER
6/28/2022         RE: Cristiana Aguila  [address] [place] [address]        Dear Colleague,    Thank you for referring your patient, Cristiana Aguila, to the [facility]. Please see a copy of my visit note below.    Chief complaint: Patient presents with:  Musculoskeletal Problem: Foot pain      History of Present Illness: This 42 year old female is seen at the request of Dr. Cuellar for evaluation and suggestions of management of RIGHT ankle pain. She developed a LEFT Baker's cyst around one year ago, then her RIGHT ankle pain started shortly after that. The LEFT ankle started to hurt about six months ago, but the RIGHT one is always the worst for pain.    She wears an ankle brace on her RIGHT ankle which does reduce pain and make it easier for her to walk. She wears a black lace up ankle brace. She wears rigid tennis shoes both inside and outside the house ([employer]ia shoes). She has not tried compression socks.     She has been going through PT for her knee and they have also been working on her RIGHT ankle for several months. She has not noticed any improvement from her ankle pain since starting PT. Only offloading improves her pain. She has been going to [facility] in [place].    No further pedal complaints today.     She is smoking less 1/2 ppd and she is not interested in the Quit Plan referral today.       /85 (BP Location: Left arm, Patient Position: Sitting, Cuff Size: Adult Regular)   Pulse 107   Temp 97.5  F (36.4  C) (Tympanic)   SpO2 98%     Patient Active Problem List   Diagnosis     Generalized anxiety disorder     Attention deficit disorder     FH: breast cancer     ACP (advance care planning)     Irritable bowel syndrome with both constipation and diarrhea     Recurrent major depressive disorder, in partial remission (H)     Well woman exam with routine gynecological exam     Encounter for insertion of intrauterine contraceptive device (IUD)     Psychophysiological  insomnia     Mild intermittent reactive airway disease with wheezing without complication     Encounter for initial prescription of implantable subdermal contraceptive     Chronic pain of left knee     Knee stiffness, left     Muscle weakness       Past Surgical History:   Procedure Laterality Date     ENDOSCOPY UPPER, COLONOSCOPY, COMBINED N/A 9/4/2015    Procedure: COMBINED ENDOSCOPY UPPER, COLONOSCOPY;  Surgeon: Griffin Barrientos DO;  [facility]     LAPAROSCOPIC CYSTECTOMY OVARIAN (BENIGN) Right 1/27/2016    Procedure: LAPAROSCOPIC CYSTECTOMY OVARIAN (BENIGN);  Surgeon: Kuldip España MD;  [facility]     LAPAROSCOPIC SALPINGO-OOPHORECTOMY Left 1/27/2016    Procedure: LAPAROSCOPIC SALPINGO-OOPHORECTOMY;  Surgeon: Kuldip España MD;  [facility]     LAPAROSCOPY DIAGNOSTIC (GYN) Left 1/27/2016    Procedure: LAPAROSCOPY DIAGNOSTIC (GYN);  Surgeon: Kuldip España MD;  [facility]: [facility]     TUBAL LIGATION  2006     wisdom teeth         Current Outpatient Medications   Medication     Acetaminophen (TYLENOL PO)     Ascorbic Acid (VITAMIN C PO)     citalopram (CELEXA) 40 MG tablet     Cyanocobalamin (VITAMIN B 12 PO)     diclofenac (VOLTAREN) 1 % topical gel     docusate sodium (COLACE) 100 MG capsule     EPINEPHrine (ANY BX GENERIC EQUIV) 0.3 MG/0.3ML injection 2-pack     etonogestrel (NEXPLANON) 68 MG IMPL     fexofenadine (ALLEGRA) 180 MG tablet     hydrOXYzine (ATARAX) 25 MG tablet     L-THEANINE PO     methylphenidate (CONCERTA) 36 MG CR tablet     mometasone (NASONEX) 50 MCG/ACT nasal spray     NONFORMULARY     Nutritional Supplements (MENOPAUSE FORMULA PO)     omeprazole (PRILOSEC) 40 MG DR capsule     ondansetron (ZOFRAN) 4 MG tablet     PROAIR  (90 Base) MCG/ACT inhaler     traZODone (DESYREL) 100 MG tablet     vitamin B complex with vitamin C (STRESS TAB) tablet     Vitamin D, Cholecalciferol, 25 MCG (1000 UT) TABS     methylphenidate (CONCERTA) 36 MG CR tablet     No current  facility-administered medications for this visit.          Allergies   Allergen Reactions     Amoxicillin Diarrhea     Bee Venom      Bee venom (honey bee)       Food      Artificial cinnamon      Other Environmental Allergy      Sulfa Drugs      Sulfa (sulfonamide antibiotics)        Latex Rash       Family History   Problem Relation Age of Onset     Breast Cancer Maternal Grandmother      Hypertension Maternal Grandfather      Hyperlipidemia Maternal Grandfather      Diabetes Paternal Grandmother      Asthma No family hx of        Social History     Socioeconomic History     Marital status:      Spouse name: None     Number of children: None     Years of education: None     Highest education level: None   Tobacco Use     Smoking status: Current Some Day Smoker     Packs/day: 0.50     Years: 13.00     Pack years: 6.50     Types: Cigarettes     Last attempt to quit: 2021     Years since quittin.4     Smokeless tobacco: Never Used     Tobacco comment: pt denies quit plan 2022   Vaping Use     Vaping Use: Never used   Substance and Sexual Activity     Alcohol use: Yes     Alcohol/week: 0.0 standard drinks     Comment: frequency: rarely     Drug use: No     Sexual activity: Yes     Partners: Male   Other Topics Concern      Service No     Blood Transfusions Yes     Comment: OPermits if needed     Caffeine Concern Yes     Comment: soda > 32 oz daily     Occupational Exposure No     Hobby Hazards No     Sleep Concern No     Stress Concern Yes     Weight Concern Yes     Special Diet No     Back Care No     Exercise Yes     Comment: daily     Seat Belt Yes     Parent/sibling w/ CABG, MI or angioplasty before 65F 55M? No       ROS: 10 point ROS neg other than the symptoms noted above in the HPI.  EXAM  Constitutional: healthy, alert and no distress    Psychiatric: mentation appears normal and affect normal/bright    VASCULAR:  -Dorsalis pedis pulse +2/4 b/l  -Posterior tibial pulse +2/4  b/l  -Capillary refill time < 3 seconds to b/l hallux  NEURO:  -Light touch sensation intact to b/l plantar forefoot  DERM:  -Skin temperature, texture and turgor WNL b/l  MSK:  -Pain on palpation to RIGHT lateral ankle gutter  ---Excessive inversion up to 45 degrees on RIGHT and 40 degrees on LEFT with less than 5 degrees eversion bilaterally   -Muscle strength of ankles +5/5 for dorsiflexion, plantarflexion, ABDUction and ADDuction b/l but restricted ability to ABDuct the ankle bilaterally   -Ankle joint passive ROM within normal limits except for dorsiflexion:    Dorsiflexion, RIGHT Straight knee 0 degrees    Dorsiflexion, LEFT Straight knee 0 degrees    ============================================================    ASSESSMENT:  (M24.271) Ligamentous laxity of right ankle  (primary encounter diagnosis)    (M24.272) Ligamentous laxity of left ankle    (M25.571,  G89.29) Chronic pain of right ankle    (M21.6X1) Gastrocnemius equinus of right lower extremity    (M21.6X2) Gastrocnemius equinus of left lower extremity      PLAN:  -Patient evaluated and examined. Treatment options discussed with no educational barriers noted.  -Patient has excessive laxity to the bilateral ankle (RIGHT more than LEFT). The lateral ankle structures are stretched and weakened. She has limited eversion strength. This is from a history of ankle sprains. She would benefit from either an AFO to stabilize the ankle or from a lateral ankle stabilization surgery.  -Patient does not want to wear any other braces. She has been seeing physical therapist, Stacia Schumacher, at [facility]. She has worked on lateral ankle stretches and strengthening, but it is not helping. Patient would like to have surgery as soon as possible. She is opting for a lateral ankle stabilization. She is advised to have this performed by a provider that specializes more in this ankle procedure. She wants to be seen in the local area. She was a provided with a list of providers  and she would like to be seen by Dr. Marte at [facility]. The referral was placed on 06/28/2022.  -Tobacco cessation discussed with patient including the benefits of quitting and the risks of not quitting. The Quit Plan referral was offered and the patient is not interested in the referral today. Patient is not interested in quitting today.  -Patient in agreement with the above treatment plan and all of patient's questions were answered.      RTC as needed         Iman Cabrera DPM, BREN      Again, thank you for allowing me to participate in the care of your patient.        Sincerely,        Iman Cabrera DPM

## 2022-06-28 NOTE — NURSING NOTE
"Chief Complaint   Patient presents with     Musculoskeletal Problem     Foot pain       Initial /85 (BP Location: Left arm, Patient Position: Sitting, Cuff Size: Adult Regular)   Pulse 107   Temp 97.5  F (36.4  C) (Tympanic)   SpO2 98%  Estimated body mass index is 35.29 kg/m  as calculated from the following:    Height as of 4/7/22: 1.753 m (5' 9\").    Weight as of 6/7/22: 108.4 kg (239 lb).  Medication Reconciliation: hossein Jordan  "

## 2022-06-28 NOTE — PROGRESS NOTES
Chief complaint: Patient presents with:  Musculoskeletal Problem: Foot pain      History of Present Illness: This 42 year old female is seen at the request of Dr. Cuellar for evaluation and suggestions of management of RIGHT ankle pain. She developed a LEFT Baker's cyst around one year ago, then her RIGHT ankle pain started shortly after that. The LEFT ankle started to hurt about six months ago, but the RIGHT one is always the worst for pain.    She wears an ankle brace on her RIGHT ankle which does reduce pain and make it easier for her to walk. She wears a black lace up ankle brace. She wears rigid tennis shoes both inside and outside the house (Avia shoes). She has not tried compression socks.     She has been going through PT for her knee and they have also been working on her RIGHT ankle for several months. She has not noticed any improvement from her ankle pain since starting PT. Only offloading improves her pain. She has been going to [facility] in [place].    No further pedal complaints today.     She is smoking less 1/2 ppd and she is not interested in the Quit Plan referral today.       /85 (BP Location: Left arm, Patient Position: Sitting, Cuff Size: Adult Regular)   Pulse 107   Temp 97.5  F (36.4  C) (Tympanic)   SpO2 98%     Patient Active Problem List   Diagnosis     Generalized anxiety disorder     Attention deficit disorder     FH: breast cancer     ACP (advance care planning)     Irritable bowel syndrome with both constipation and diarrhea     Recurrent major depressive disorder, in partial remission (H)     Well woman exam with routine gynecological exam     Encounter for insertion of intrauterine contraceptive device (IUD)     Psychophysiological insomnia     Mild intermittent reactive airway disease with wheezing without complication     Encounter for initial prescription of implantable subdermal contraceptive     Chronic pain of left knee     Knee stiffness, left     Muscle weakness        Past Surgical History:   Procedure Laterality Date     ENDOSCOPY UPPER, COLONOSCOPY, COMBINED N/A 9/4/2015    Procedure: COMBINED ENDOSCOPY UPPER, COLONOSCOPY;  Surgeon: Griffin Barrientos DO;  [facility]     LAPAROSCOPIC CYSTECTOMY OVARIAN (BENIGN) Right 1/27/2016    Procedure: LAPAROSCOPIC CYSTECTOMY OVARIAN (BENIGN);  Surgeon: Kuldip España MD;  [facility]     LAPAROSCOPIC SALPINGO-OOPHORECTOMY Left 1/27/2016    Procedure: LAPAROSCOPIC SALPINGO-OOPHORECTOMY;  Surgeon: Kuldip España MD;  [facility]     LAPAROSCOPY DIAGNOSTIC (GYN) Left 1/27/2016    Procedure: LAPAROSCOPY DIAGNOSTIC (GYN);  Surgeon: Kuldip España MD;  [facility]     TUBAL LIGATION  2006     wisdom teeth         Current Outpatient Medications   Medication     Acetaminophen (TYLENOL PO)     Ascorbic Acid (VITAMIN C PO)     citalopram (CELEXA) 40 MG tablet     Cyanocobalamin (VITAMIN B 12 PO)     diclofenac (VOLTAREN) 1 % topical gel     docusate sodium (COLACE) 100 MG capsule     EPINEPHrine (ANY BX GENERIC EQUIV) 0.3 MG/0.3ML injection 2-pack     etonogestrel (NEXPLANON) 68 MG IMPL     fexofenadine (ALLEGRA) 180 MG tablet     hydrOXYzine (ATARAX) 25 MG tablet     L-THEANINE PO     methylphenidate (CONCERTA) 36 MG CR tablet     mometasone (NASONEX) 50 MCG/ACT nasal spray     NONFORMULARY     Nutritional Supplements (MENOPAUSE FORMULA PO)     omeprazole (PRILOSEC) 40 MG DR capsule     ondansetron (ZOFRAN) 4 MG tablet     PROAIR  (90 Base) MCG/ACT inhaler     traZODone (DESYREL) 100 MG tablet     vitamin B complex with vitamin C (STRESS TAB) tablet     Vitamin D, Cholecalciferol, 25 MCG (1000 UT) TABS     methylphenidate (CONCERTA) 36 MG CR tablet     No current facility-administered medications for this visit.          Allergies   Allergen Reactions     Amoxicillin Diarrhea     Bee Venom      Bee venom (honey bee)       Food      Artificial cinnamon      Other Environmental Allergy      Sulfa Drugs      Sulfa  (sulfonamide antibiotics)        Latex Rash       Family History   Problem Relation Age of Onset     Breast Cancer Maternal Grandmother      Hypertension Maternal Grandfather      Hyperlipidemia Maternal Grandfather      Diabetes Paternal Grandmother      Asthma No family hx of        Social History     Socioeconomic History     Marital status:      Spouse name: None     Number of children: None     Years of education: None     Highest education level: None   Tobacco Use     Smoking status: Current Some Day Smoker     Packs/day: 0.50     Years: 13.00     Pack years: 6.50     Types: Cigarettes     Last attempt to quit: 2021     Years since quittin.4     Smokeless tobacco: Never Used     Tobacco comment: pt denies quit plan 2022   Vaping Use     Vaping Use: Never used   Substance and Sexual Activity     Alcohol use: Yes     Alcohol/week: 0.0 standard drinks     Comment: frequency: rarely     Drug use: No     Sexual activity: Yes     Partners: Male   Other Topics Concern      Service No     Blood Transfusions Yes     Comment: OPermits if needed     Caffeine Concern Yes     Comment: soda > 32 oz daily     Occupational Exposure No     Hobby Hazards No     Sleep Concern No     Stress Concern Yes     Weight Concern Yes     Special Diet No     Back Care No     Exercise Yes     Comment: daily     Seat Belt Yes     Parent/sibling w/ CABG, MI or angioplasty before 65F 55M? No       ROS: 10 point ROS neg other than the symptoms noted above in the HPI.  EXAM  Constitutional: healthy, alert and no distress    Psychiatric: mentation appears normal and affect normal/bright    VASCULAR:  -Dorsalis pedis pulse +2/4 b/l  -Posterior tibial pulse +2/4 b/l  -Capillary refill time < 3 seconds to b/l hallux  NEURO:  -Light touch sensation intact to b/l plantar forefoot  DERM:  -Skin temperature, texture and turgor WNL b/l  MSK:  -Pain on palpation to RIGHT lateral ankle gutter  ---Excessive inversion up to 45  degrees on RIGHT and 40 degrees on LEFT with less than 5 degrees eversion bilaterally   -Muscle strength of ankles +5/5 for dorsiflexion, plantarflexion, ABDUction and ADDuction b/l but restricted ability to ABDuct the ankle bilaterally   -Ankle joint passive ROM within normal limits except for dorsiflexion:    Dorsiflexion, RIGHT Straight knee 0 degrees    Dorsiflexion, LEFT Straight knee 0 degrees    ============================================================    ASSESSMENT:  (M24.271) Ligamentous laxity of right ankle  (primary encounter diagnosis)    (M24.272) Ligamentous laxity of left ankle    (M25.571,  G89.29) Chronic pain of right ankle    (M21.6X1) Gastrocnemius equinus of right lower extremity    (M21.6X2) Gastrocnemius equinus of left lower extremity      PLAN:  -Patient evaluated and examined. Treatment options discussed with no educational barriers noted.  -Patient has excessive laxity to the bilateral ankle (RIGHT more than LEFT). The lateral ankle structures are stretched and weakened. She has limited eversion strength. This is from a history of ankle sprains. She would benefit from either an AFO to stabilize the ankle or from a lateral ankle stabilization surgery.  -Patient does not want to wear any other braces. She has been seeing physical therapist, Stacia Schumacher, at [facility]. She has worked on lateral ankle stretches and strengthening, but it is not helping. Patient would like to have surgery as soon as possible. She is opting for a lateral ankle stabilization. She is advised to have this performed by a provider that specializes more in this ankle procedure. She wants to be seen in the local area. She was a provided with a list of providers and she would like to be seen by Dr. Marte at [facility]. The referral was placed on 06/28/2022.  -Tobacco cessation discussed with patient including the benefits of quitting and the risks of not quitting. The Quit Plan referral was offered and the patient is  not interested in the referral today. Patient is not interested in quitting today.  -Patient in agreement with the above treatment plan and all of patient's questions were answered.      RTC as needed         Iman Cabrera DPM, JUSTINM

## 2022-08-10 DIAGNOSIS — J34.3 NASAL TURBINATE HYPERTROPHY: ICD-10-CM

## 2022-08-10 RX ORDER — MOMETASONE FUROATE MONOHYDRATE 50 UG/1
SPRAY, METERED NASAL
Qty: 17 G | Refills: 0 | Status: SHIPPED | OUTPATIENT
Start: 2022-08-10 | End: 2022-09-13

## 2022-08-10 NOTE — TELEPHONE ENCOUNTER
Nasonex       Last Written Prescription Date:  7/21/21  Last Fill Quantity: 17g,   # refills: 11  Last Office Visit: 6/7/22  Future Office visit:

## 2022-09-04 ENCOUNTER — HEALTH MAINTENANCE LETTER (OUTPATIENT)
Age: 42
End: 2022-09-04

## 2022-09-12 DIAGNOSIS — M26.609 TMJ (TEMPOROMANDIBULAR JOINT SYNDROME): ICD-10-CM

## 2022-10-31 DIAGNOSIS — J30.89 PERENNIAL ALLERGIC RHINITIS: ICD-10-CM

## 2022-10-31 DIAGNOSIS — J30.1 SEASONAL ALLERGIC RHINITIS DUE TO POLLEN: ICD-10-CM

## 2022-10-31 DIAGNOSIS — J30.2 SEASONAL ALLERGIC RHINITIS, UNSPECIFIED TRIGGER: ICD-10-CM

## 2022-11-01 RX ORDER — FEXOFENADINE HCL 180 MG/1
TABLET ORAL
Qty: 30 TABLET | Refills: 0 | Status: SHIPPED | OUTPATIENT
Start: 2022-11-01 | End: 2023-02-01

## 2022-11-01 NOTE — TELEPHONE ENCOUNTER
fexofenadine (ALLEGRA) 180 MG tablet    Last Written Prescription Date:  10-1-2021  Last Fill Quantity: 90,   # refills: 4  Last Office Visit: 6-7-2022  Future Office visit:    Next 5 appointments (look out 90 days)    Dec 07, 2022  9:45 AM  (Arrive by 9:30 AM)  SHORT with Sudhakar Cuellar MD  [facility] ([facility] ) [address]  [place] [address]  [phone]           Routing refill request to provider for review/approval because:  Drug not on the [facility], [facility] or [facility] refill protocol or controlled substance

## 2022-12-07 ENCOUNTER — OFFICE VISIT (OUTPATIENT)
Dept: FAMILY MEDICINE | Facility: OTHER | Age: 42
End: 2022-12-07
Attending: FAMILY MEDICINE
Payer: COMMERCIAL

## 2022-12-07 ENCOUNTER — TELEPHONE (OUTPATIENT)
Dept: FAMILY MEDICINE | Facility: OTHER | Age: 42
End: 2022-12-07

## 2022-12-07 VITALS
SYSTOLIC BLOOD PRESSURE: 124 MMHG | WEIGHT: 231.4 LBS | OXYGEN SATURATION: 98 % | HEART RATE: 93 BPM | DIASTOLIC BLOOD PRESSURE: 80 MMHG | BODY MASS INDEX: 34.27 KG/M2 | TEMPERATURE: 98.4 F | HEIGHT: 69 IN

## 2022-12-07 DIAGNOSIS — F41.1 GAD (GENERALIZED ANXIETY DISORDER): ICD-10-CM

## 2022-12-07 DIAGNOSIS — B34.9 VIRAL SYNDROME: ICD-10-CM

## 2022-12-07 DIAGNOSIS — F33.42 MAJOR DEPRESSIVE DISORDER, RECURRENT EPISODE, IN FULL REMISSION (H): Primary | ICD-10-CM

## 2022-12-07 DIAGNOSIS — F98.8 ATTENTION DEFICIT DISORDER (ADD) WITHOUT HYPERACTIVITY: ICD-10-CM

## 2022-12-07 PROBLEM — M25.579 ANKLE PAIN: Status: ACTIVE | Noted: 2021-07-30

## 2022-12-07 PROBLEM — M25.373 ANKLE INSTABILITY, UNSPECIFIED LATERALITY: Status: ACTIVE | Noted: 2022-09-15

## 2022-12-07 PROCEDURE — 99214 OFFICE O/P EST MOD 30 MIN: CPT | Performed by: FAMILY MEDICINE

## 2022-12-07 PROCEDURE — G0463 HOSPITAL OUTPT CLINIC VISIT: HCPCS

## 2022-12-07 RX ORDER — METHYLPHENIDATE HYDROCHLORIDE 36 MG/1
72 TABLET ORAL DAILY
Qty: 60 TABLET | Refills: 0 | Status: SHIPPED | OUTPATIENT
Start: 2023-02-07 | End: 2023-03-29

## 2022-12-07 RX ORDER — METHYLPHENIDATE HYDROCHLORIDE 36 MG/1
72 TABLET ORAL DAILY
Qty: 60 TABLET | Refills: 0 | Status: SHIPPED | OUTPATIENT
Start: 2023-01-07 | End: 2023-03-29

## 2022-12-07 RX ORDER — METHYLPHENIDATE HYDROCHLORIDE 36 MG/1
36 TABLET, EXTENDED RELEASE ORAL 2 TIMES DAILY
Qty: 60 TABLET | Refills: 0 | Status: CANCELLED | OUTPATIENT
Start: 2022-12-07

## 2022-12-07 RX ORDER — METHYLPHENIDATE HYDROCHLORIDE 36 MG/1
72 TABLET ORAL DAILY
Qty: 60 TABLET | Refills: 0 | Status: SHIPPED | OUTPATIENT
Start: 2022-12-07 | End: 2023-03-29

## 2022-12-07 ASSESSMENT — ANXIETY QUESTIONNAIRES
5. BEING SO RESTLESS THAT IT IS HARD TO SIT STILL: SEVERAL DAYS
6. BECOMING EASILY ANNOYED OR IRRITABLE: MORE THAN HALF THE DAYS
2. NOT BEING ABLE TO STOP OR CONTROL WORRYING: SEVERAL DAYS
GAD7 TOTAL SCORE: 9
IF YOU CHECKED OFF ANY PROBLEMS ON THIS QUESTIONNAIRE, HOW DIFFICULT HAVE THESE PROBLEMS MADE IT FOR YOU TO DO YOUR WORK, TAKE CARE OF THINGS AT HOME, OR GET ALONG WITH OTHER PEOPLE: SOMEWHAT DIFFICULT
7. FEELING AFRAID AS IF SOMETHING AWFUL MIGHT HAPPEN: MORE THAN HALF THE DAYS
1. FEELING NERVOUS, ANXIOUS, OR ON EDGE: SEVERAL DAYS
3. WORRYING TOO MUCH ABOUT DIFFERENT THINGS: SEVERAL DAYS
GAD7 TOTAL SCORE: 9

## 2022-12-07 ASSESSMENT — PATIENT HEALTH QUESTIONNAIRE - PHQ9
5. POOR APPETITE OR OVEREATING: SEVERAL DAYS
SUM OF ALL RESPONSES TO PHQ QUESTIONS 1-9: 10

## 2022-12-07 ASSESSMENT — PAIN SCALES - GENERAL: PAINLEVEL: MILD PAIN (3)

## 2022-12-07 NOTE — PROGRESS NOTES
"  Assessment & Plan     Attention deficit disorder (ADD) without hyperactivity  Stable for decades or many years. No issues. Continue current medications and behavioral changes.   Follow-up in 6 months   - methylphenidate (CONCERTA) 36 MG CR tablet; Take 2 tablets (72 mg) by mouth daily for 30 days  - methylphenidate (CONCERTA) 36 MG CR tablet; Take 2 tablets (72 mg) by mouth daily for 30 days  - methylphenidate (CONCERTA) 36 MG CR tablet; Take 2 tablets (72 mg) by mouth daily for 30 days    Major depressive disorder, recurrent episode, in full remission (H)  Improving - worse due to situation -- will watch. Continue current medications and behavioral changes.     DAFNE (generalized anxiety disorder)  As above.     Viral syndrome  Sounds to have Influenza A. Discussed. Symptomatic treatment was discussed along when patient should call and/or come back into the clinic or go to ER/Urgent care. All questions answered.                Nicotine/Tobacco Cessation:  She reports that she has been smoking cigarettes. She has a 6.50 pack-year smoking history. She has never used smokeless tobacco.  Nicotine/Tobacco Cessation Plan:   Information offered: Patient not interested at this time      BMI:   Estimated body mass index is 34.17 kg/m  as calculated from the following:    Height as of this encounter: 1.753 m (5' 9\").    Weight as of this encounter: 105 kg (231 lb 6.4 oz).           No follow-ups on file.    Sudhakar Cuellar MD  [facility] - KEN Zendejas is a 42 year old, presenting for the following health issues:  No chief complaint on file.      HPI      Depression and Anxiety Follow-Up    How are you doing with your depression since your last visit? Worsened due to financial issues    How are you doing with your anxiety since your last visit?  Worsened     Are you having other symptoms that might be associated with depression or anxiety? No    Have you had a significant life event? Financial " "Concerns     Do you have any concerns with your use of alcohol or other drugs? No     SITUATIONAL- it is improving . Both her and SO had health issues and got behind financially     Improving slowly     Social History     Tobacco Use     Smoking status: Some Days     Packs/day: 0.50     Years: 13.00     Pack years: 6.50     Types: Cigarettes     Last attempt to quit: 2021     Years since quittin.9     Smokeless tobacco: Never     Tobacco comments:     pt denies quit plan 2022   Vaping Use     Vaping Use: Never used   Substance Use Topics     Alcohol use: Yes     Alcohol/week: 0.0 standard drinks     Comment: frequency: rarely     Drug use: No     PHQ 3/17/2022 3/21/2022 2022   PHQ-9 Total Score 13 14 14   Q9: Thoughts of better off dead/self-harm past 2 weeks Not at all Not at all Not at all     DAFNE-7 SCORE 2021 3/21/2022 2022   Total Score 14 12 13         Suicide Assessment Five-step Evaluation and Treatment (SAFE-T)         Medication Followup of methylphenidate for ADHD    Taking Medication as prescribed: yes    Side Effects:  None    Medication Helping Symptoms:  Yes    Stable for years - decade          Acute Illness  Acute illness concerns: 2-3 days  Onset/Duration: above  Symptoms:  Fever: No  Chills/Sweats: YES  Headache (location?): YES  Sinus Pressure: YES  Conjunctivitis:  YES, right eye  Ear Pain: no  Rhinorrhea: No  Congestion: YES  Sore Throat: YES  Cough: YES-productive of clear sputum  Wheeze: No  Decreased Appetite: YES  Nausea: YES, shaky  Vomiting: No  Diarrhea: YES  Dysuria/Freq.: No  Dysuria or Hematuria: No  Fatigue/Achiness: YES  Sick/Strep Exposure: YES- family members  Therapies tried and outcome: ibuprofen  NO flu shot  Family is ill     Review of Systems   Constitutional, HEENT, cardiovascular, pulmonary, gi and gu systems are negative, except as otherwise noted.      Objective    /80   Pulse 93   Temp 98.4  F (36.9  C)   Ht 1.753 m (5' 9\")   Wt 105 kg " (231 lb 6.4 oz)   LMP 11/28/2022   SpO2 98%   BMI 34.17 kg/m    Body mass index is 34.17 kg/m .  Physical Exam   GENERAL: healthy, alert and no distress  EYES: Eyes grossly normal to inspection, PERRL and conjunctivae and sclerae normal  HENT: ear canals and TM's normal, nose and mouth without ulcers or lesions- nasal congestion   NECK: no adenopathy, no asymmetry, masses, or scars and thyroid normal to palpation  RESP: lungs clear to auscultation - no rales, rhonchi or wheezes  CV: regular rate and rhythm, normal S1 S2, no S3 or S4, no murmur, click or rub, no peripheral edema and peripheral pulses strong  Psych - normal mood

## 2022-12-07 NOTE — TELEPHONE ENCOUNTER
8:07 AM    Reason for Call: Phone Call    Description: Patient called in stating she has an appointment this morning @ 9:45 w/ Dr Cuellar for a medication check, woke up sick (Chills, cough, nasal congestion, sore throat, no fever), and is wonder if she should still go to the appointment. Please call patient back.    Was an appointment offered for this call? No  If yes : Appointment type              Date    Preferred method for responding to this message: Telephone Call  What is your phone number ? [phone]    If we cannot reach you directly, may we leave a detailed response at the number you provided? Yes    Can this message wait until your PCP/provider returns, if available today? Not applicable, provider in clinic today.    Shahnaz Garner

## 2022-12-07 NOTE — TELEPHONE ENCOUNTER
Per the nurse I called patient to notify here that she is still okay to come to clinic today for her appointment with Dr. Cuellar

## 2023-02-23 ENCOUNTER — PATIENT OUTREACH (OUTPATIENT)
Dept: OTHER | Facility: HOSPITAL | Age: 43
End: 2023-02-23

## 2023-02-23 NOTE — TELEPHONE ENCOUNTER
Patient Quality Outreach    Patient is due for the following:   Depression  -  PHQ-9 needed    Next Steps:   Patient was assigned appropriate questionnaire to complete    Type of outreach:    Sent [employer] message.      Questions for provider review:    None     Patsy Singleton RN

## 2023-03-29 DIAGNOSIS — F98.8 ATTENTION DEFICIT DISORDER (ADD) WITHOUT HYPERACTIVITY: Primary | ICD-10-CM

## 2023-03-29 RX ORDER — METHYLPHENIDATE HYDROCHLORIDE 36 MG/1
72 TABLET ORAL DAILY
Qty: 60 TABLET | Refills: 0 | Status: SHIPPED | OUTPATIENT
Start: 2023-05-30 | End: 2023-09-30

## 2023-03-29 RX ORDER — METHYLPHENIDATE HYDROCHLORIDE 36 MG/1
72 TABLET ORAL DAILY
Qty: 60 TABLET | Refills: 0 | Status: SHIPPED | OUTPATIENT
Start: 2023-03-29 | End: 2023-09-30

## 2023-03-29 RX ORDER — METHYLPHENIDATE HYDROCHLORIDE 36 MG/1
72 TABLET ORAL DAILY
Qty: 60 TABLET | Refills: 0 | Status: SHIPPED | OUTPATIENT
Start: 2023-04-29 | End: 2023-09-30

## 2023-04-13 ENCOUNTER — PATIENT OUTREACH (OUTPATIENT)
Dept: OTHER | Facility: HOSPITAL | Age: 43
End: 2023-04-13

## 2023-04-13 ENCOUNTER — OFFICE VISIT (OUTPATIENT)
Dept: OBGYN | Facility: OTHER | Age: 43
End: 2023-04-13
Attending: NURSE PRACTITIONER
Payer: COMMERCIAL

## 2023-04-13 VITALS
OXYGEN SATURATION: 99 % | HEIGHT: 69 IN | RESPIRATION RATE: 14 BRPM | WEIGHT: 219.3 LBS | SYSTOLIC BLOOD PRESSURE: 120 MMHG | DIASTOLIC BLOOD PRESSURE: 78 MMHG | HEART RATE: 88 BPM | BODY MASS INDEX: 32.48 KG/M2

## 2023-04-13 DIAGNOSIS — F41.9 ANXIETY: ICD-10-CM

## 2023-04-13 DIAGNOSIS — K59.04 CHRONIC IDIOPATHIC CONSTIPATION: ICD-10-CM

## 2023-04-13 DIAGNOSIS — R61 NIGHT SWEATS: ICD-10-CM

## 2023-04-13 LAB
T4 FREE SERPL-MCNC: 1.31 NG/DL (ref 0.9–1.7)
TSH SERPL DL<=0.005 MIU/L-ACNC: 1.48 UIU/ML (ref 0.3–4.2)

## 2023-04-13 PROCEDURE — 82306 VITAMIN D 25 HYDROXY: CPT | Mod: ZL | Performed by: NURSE PRACTITIONER

## 2023-04-13 PROCEDURE — 84443 ASSAY THYROID STIM HORMONE: CPT | Mod: ZL | Performed by: NURSE PRACTITIONER

## 2023-04-13 PROCEDURE — 84439 ASSAY OF FREE THYROXINE: CPT | Mod: ZL | Performed by: NURSE PRACTITIONER

## 2023-04-13 PROCEDURE — 99396 PREV VISIT EST AGE 40-64: CPT | Performed by: NURSE PRACTITIONER

## 2023-04-13 PROCEDURE — 36415 COLL VENOUS BLD VENIPUNCTURE: CPT | Mod: ZL | Performed by: NURSE PRACTITIONER

## 2023-04-13 RX ORDER — DOCUSATE SODIUM 100 MG/1
100 CAPSULE, LIQUID FILLED ORAL 2 TIMES DAILY
Qty: 180 CAPSULE | Refills: 3 | Status: SHIPPED | OUTPATIENT
Start: 2023-04-13 | End: 2024-04-19

## 2023-04-13 ASSESSMENT — PATIENT HEALTH QUESTIONNAIRE - PHQ9: SUM OF ALL RESPONSES TO PHQ QUESTIONS 1-9: 12

## 2023-04-13 ASSESSMENT — PAIN SCALES - GENERAL: PAINLEVEL: NO PAIN (0)

## 2023-04-13 NOTE — TELEPHONE ENCOUNTER
Patient Quality Outreach    Patient is due for the following:   Depression  -  PHQ-9 needed    Next Steps:   Patient was assigned appropriate questionnaire to complete    Type of outreach:    Sent itravel message.      Questions for provider review:    None     Patsy Singleton RN

## 2023-04-13 NOTE — PROGRESS NOTES
S: Cristiana Aguila is a 42 year old  female who presents with her  for her annual exam. Her children are ages 24, 19, 16; 3 , healthy pregnancies.     1. She reports she is having night sweats every night and occasional hot flashes during the day. She currently has nexplanon in L arm placed 2021, no concerns or irritation. Negative HPV/Pap , history of ASCUS , she is not due for a pap today. LMP: spotting approximately 2023. No pain with menses. Patient does not smoke. Previous TSH wnl. Denies vaginal dryness, postcoital spotting, dyspareunia, abnormal discharge, or odors. Declines STI screening. No changes or concerns with urination.    2. She does report one episode of left breast tenderness a few months ago, not associated with menstruation. Pain was worse with movement and laying down. She has not had the tenderness since. Mammogram scheduled for 2023, last mammogram 2022, negative.     3. Chronic constipation, currently taking docusate sodium and reports improvement with medication.     4. DAFNE, patient reports having a hard time balancing anxiety. She is taking celexa at this time, prescribed by her PCP, Dr. Cuellar. She does take hydroxyzine as needed but does not take it if she needs to drive, as the medication makes her drowsy. Recommended that patient work with PCP in regards to DAFNE, MDD, ADHD medication management. PHQ 9 completed today; score 12.    Patient is not currently taking calcium supplementation or obtaining dietary sources. She is not exercising as much due to knee pain, she used to work with PT, but reports her physical therapist is no longer working.     PMHx:  Past Medical History:   Diagnosis Date     Attention deficit disorder without mention of hyperactivity 2005     Dependent personality disorder (H) 2011     FH: breast cancer      DAFNE (generalised anxiety disorder)      Irritable bowel syndrome with both constipation and diarrhea  8/2/2017     MDD (major depressive disorder)      Migraine, unspecified, without mention of intractable migraine without mention of status migrainosus 3/13/2000     Tobacco abuse, in remission      Unspecified sinusitis (chronic) 3/8/2001     PSHx:  Past Surgical History:   Procedure Laterality Date     ENDOSCOPY UPPER, COLONOSCOPY, COMBINED N/A 9/4/2015    Procedure: COMBINED ENDOSCOPY UPPER, COLONOSCOPY;  Surgeon: Griffin Barrientos DO;  [facility]     LAPAROSCOPIC CYSTECTOMY OVARIAN (BENIGN) Right 1/27/2016    Procedure: LAPAROSCOPIC CYSTECTOMY OVARIAN (BENIGN);  Surgeon: Kuldip España MD;  [facility]: [facility]     LAPAROSCOPIC SALPINGO-OOPHORECTOMY Left 1/27/2016    Procedure: LAPAROSCOPIC SALPINGO-OOPHORECTOMY;  Surgeon: Kuldip España MD;  [facility]     LAPAROSCOPY DIAGNOSTIC (GYN) Left 1/27/2016    Procedure: LAPAROSCOPY DIAGNOSTIC (GYN);  Surgeon: Kuldip España MD;  [facility]     TUBAL LIGATION  2006     wisdom teeth       Medications:   Current Outpatient Medications   Medication     Acetaminophen (TYLENOL PO)     Ascorbic Acid (VITAMIN C PO)     citalopram (CELEXA) 40 MG tablet     CONCERTA 36 MG CR tablet     diclofenac (VOLTAREN) 1 % topical gel     docusate sodium (COLACE) 100 MG capsule     EPINEPHrine (ANY BX GENERIC EQUIV) 0.3 MG/0.3ML injection 2-pack     etonogestrel (NEXPLANON) 68 MG IMPL     fexofenadine (ALLEGRA) 180 MG tablet     hydrOXYzine (ATARAX) 25 MG tablet     methylphenidate (CONCERTA) 36 MG CR tablet     [START ON 4/29/2023] methylphenidate (CONCERTA) 36 MG CR tablet     [START ON 5/30/2023] methylphenidate (CONCERTA) 36 MG CR tablet     mometasone (NASONEX) 50 MCG/ACT nasal spray     omeprazole (PRILOSEC) 40 MG DR capsule     PROAIR  (90 Base) MCG/ACT inhaler     traZODone (DESYREL) 100 MG tablet     No current facility-administered medications for this visit.     Allergies:  Allergies   Allergen Reactions     Food Anaphylaxis     Artificial cinnamon  "  Artificial cinnamon      Bee Venom      Bee venom (honey bee)       Other Environmental Allergy      Sulfa Drugs      Sulfa (sulfonamide antibiotics)        Amoxicillin Diarrhea     Latex Rash     ROS:  5 point ROS negative except as noted above in HPI.    O:  /78 (BP Location: Right arm, Patient Position: Sitting, Cuff Size: Adult Regular)   Pulse 88   Resp 14   Ht 1.753 m (5' 9\")   Wt 99.5 kg (219 lb 4.8 oz)   LMP 03/13/2023 (Approximate)   SpO2 99%   BMI 32.38 kg/m      Consitutional: Patient is alert, orientated x 3, and not in acute distress.   CV: No lower extremity edema.   Lungs: No respiratory distress or use of accessory muscles.   Breasts: Declined exam.   GYN: Declined exam.   Psych: Appropriate affect. Denies SI/HI.     Assessment and Plan:  Night Sweats  Plan: TSH, T4, free, Vitamin D Deficiency  - Counseled patient that she is most likely experiencing perimenopausal symptoms. Patient should discuss current medications with PCP. Patient is not due until next year for Nexplanon removal. Recommended patient schedule annual gyn exam for next year and at that time Nexplanon can be removed and pap will be due.   - Counseled patient on monthly self breast-exams. Upcoming mammogram scheduled.     (F41.9) Anxiety  Plan: TSH, T4, free, Vitamin D Deficiency  Recommended patient follow up with her PCP for medication management and evaluation. Patient has upcoming appointment scheduled.     (K59.04) Chronic idiopathic constipation  Refilled Colace. Encouraged water intake and to increase dietary fiber as able.     Patient will be notified lab results once complete.     Counseled patient on importance of regular exercise and diet rich in lean meats, fruits, vegetables, and calcium.      Follow up for well woman exam annually, sooner if any concerns or questions.     Adilia AGUILLON, PA student, from [employer] [facility] [facility] performed the history of present illness, exam, and medical decision " making of today's visit with preceptor guidance from Rebecca Baltazar CNP.

## 2023-04-14 LAB — DEPRECATED CALCIDIOL+CALCIFEROL SERPL-MC: 36 UG/L (ref 20–75)

## 2023-04-18 ENCOUNTER — MYC MEDICAL ADVICE (OUTPATIENT)
Dept: FAMILY MEDICINE | Facility: OTHER | Age: 43
End: 2023-04-18

## 2023-04-18 ASSESSMENT — ASTHMA QUESTIONNAIRES
QUESTION_1 LAST FOUR WEEKS HOW MUCH OF THE TIME DID YOUR ASTHMA KEEP YOU FROM GETTING AS MUCH DONE AT WORK, SCHOOL OR AT HOME: A LITTLE OF THE TIME
ACT_TOTALSCORE: 21
ACT_TOTALSCORE: 21
QUESTION_5 LAST FOUR WEEKS HOW WOULD YOU RATE YOUR ASTHMA CONTROL: WELL CONTROLLED
QUESTION_4 LAST FOUR WEEKS HOW OFTEN HAVE YOU USED YOUR RESCUE INHALER OR NEBULIZER MEDICATION (SUCH AS ALBUTEROL): ONCE A WEEK OR LESS
QUESTION_2 LAST FOUR WEEKS HOW OFTEN HAVE YOU HAD SHORTNESS OF BREATH: ONCE OR TWICE A WEEK
QUESTION_3 LAST FOUR WEEKS HOW OFTEN DID YOUR ASTHMA SYMPTOMS (WHEEZING, COUGHING, SHORTNESS OF BREATH, CHEST TIGHTNESS OR PAIN) WAKE YOU UP AT NIGHT OR EARLIER THAN USUAL IN THE MORNING: NOT AT ALL

## 2023-04-18 NOTE — TELEPHONE ENCOUNTER
Patient completed MyChart ACT per [facility] quality measure patient outreach. This writer notes no concerns at present with patient currently in [facility] Range >/=20.          10/18/2021    10:00 AM 3/21/2022    10:00 AM 4/18/2023     8:55 AM   ACT Total Scores   ACT TOTAL SCORE (Goal Greater than or Equal to 20) 16 16 21   In the past 12 months, how many times did you visit the emergency room for your asthma without being admitted to the hospital? 0 0 0   In the past 12 months, how many times were you hospitalized overnight because of your asthma? 0 0 0      Patsy Singleton RN

## 2023-04-25 RX ORDER — DOCUSATE SODIUM 100 MG/1
CAPSULE, LIQUID FILLED ORAL
Qty: 60 CAPSULE | Refills: 0 | OUTPATIENT
Start: 2023-04-25

## 2023-05-11 ENCOUNTER — ANCILLARY PROCEDURE (OUTPATIENT)
Dept: MAMMOGRAPHY | Facility: OTHER | Age: 43
End: 2023-05-11
Attending: FAMILY MEDICINE
Payer: COMMERCIAL

## 2023-05-11 ENCOUNTER — TELEPHONE (OUTPATIENT)
Dept: MAMMOGRAPHY | Facility: OTHER | Age: 43
End: 2023-05-11

## 2023-05-11 DIAGNOSIS — Z12.31 VISIT FOR SCREENING MAMMOGRAM: ICD-10-CM

## 2023-05-11 PROCEDURE — 77067 SCR MAMMO BI INCL CAD: CPT | Mod: TC

## 2023-06-06 NOTE — PROGRESS NOTES
"  Assessment & Plan     Attention deficit disorder (ADD) without hyperactivity  stabel on current regimen for years if not decade. Continue current medications and behavioral changes.   Follow-up in 6 monhs. Labs /UDS done  - Comprehensive metabolic panel; Future  - CBC with Platelets & Differential; Future  - Drug Confirmation Panel Urine with Creatinine; Future  - methylphenidate (CONCERTA) 36 MG CR tablet; Take 1 tablet (36 mg) by mouth 2 times daily for 30 days  - methylphenidate (CONCERTA) 36 MG CR tablet; Take 1 tablet (36 mg) by mouth 2 times daily for 30 days  - methylphenidate (CONCERTA) 36 MG CR tablet; Take 1 tablet (36 mg) by mouth daily for 30 days    Major depressive disorder, recurrent episode, in full remission (H)  Stable overall. Continue current medications and behavioral changes.   - Comprehensive metabolic panel; Future  - CBC with Platelets & Differential; Future  - Drug Confirmation Panel Urine with Creatinine; Future    DAFNE (generalized anxiety disorder)  Up and down some. No change in meds needed.   - Comprehensive metabolic panel; Future  - CBC with Platelets & Differential; Future  - Drug Confirmation Panel Urine with Creatinine; Future    TMJ (temporomandibular joint syndrome)  RF needed   - diclofenac (VOLTAREN) 1 % topical gel; APPLY 2G TOPICALLY 3 TIMES DAILY AS NEEDED FOR PAIN. APPLY TO AFFECTED JOINT AND RUB INTO SKIN GENTLY.    RAD- stable even with significant allergies this year         BMI:   Estimated body mass index is 32.9 kg/m  as calculated from the following:    Height as of this encounter: 1.753 m (5' 9\").    Weight as of this encounter: 101.1 kg (222 lb 12.8 oz).         No follow-ups on file.    Sudhakar Cuellar MD  [facility] [place] Z[place]jas is a 43 year old, presenting for the following health issues:  A.D.H.D, Asthma, and Depression         View : No data to display.              HPI     Answers for HPI/ROS submitted by the patient on " 2023  If you checked off any problems, how difficult have these problems made it for you to do your work, take care of things at home, or get along with other people?: Somewhat difficult  PHQ9 TOTAL SCORE: 11  DAFNE 7 TOTAL SCORE: 7      Depression Followup    How are you doing with your depression since your last visit? No change- comes and goes     Are you having other symptoms that might be associated with depression? No    Have you had a significant life event?  No     Are you feeling anxious or having panic attacks?   Yes:  anxious     Do you have any concerns with your use of alcohol or other drugs? No    Social History     Tobacco Use     Smoking status: Former     Packs/day: 0.50     Years: 13.00     Pack years: 6.50     Types: Cigarettes     Quit date: 2021     Years since quittin.4     Smokeless tobacco: Never     Tobacco comments:     pt denies quit plan 2022   Vaping Use     Vaping status: Never Used   Substance Use Topics     Alcohol use: Yes     Alcohol/week: 0.0 standard drinks of alcohol     Comment: frequency: rarely     Drug use: No         2022     9:00 AM 2023    11:43 AM 2023     9:35 AM   PHQ   PHQ-9 Total Score 10 12 11   Q9: Thoughts of better off dead/self-harm past 2 weeks Not at all Not at all Not at all         2022     9:00 AM 2022     9:00 AM 2023     9:36 AM   DAFNE-7 SCORE   Total Score   7 (mild anxiety)   Total Score 13 9 7         2023     9:35 AM   Last PHQ-9   1.  Little interest or pleasure in doing things 1   2.  Feeling down, depressed, or hopeless 1   3.  Trouble falling or staying asleep, or sleeping too much 2   4.  Feeling tired or having little energy 1   5.  Poor appetite or overeating 2   6.  Feeling bad about yourself 1   7.  Trouble concentrating 2   8.  Moving slowly or restless 1   Q9: Thoughts of better off dead/self-harm past 2 weeks 0   PHQ-9 Total Score 11         2023     9:36 AM   DAFNE-7    1. Feeling nervous,  "anxious, or on edge 1   2. Not being able to stop or control worrying 1   3. Worrying too much about different things 1   4. Trouble relaxing 1   5. Being so restless that it is hard to sit still 1   6. Becoming easily annoyed or irritable 1   7. Feeling afraid, as if something awful might happen 1   DAFNE-7 Total Score 7   If you checked any problems, how difficult have they made it for you to do your work, take care of things at home, or get along with other people? Somewhat difficult       Suicide Assessment Five-step Evaluation and Treatment (SAFE-T)    Asthma Follow-Up    Was ACT completed today?  Yes        6/7/2023     9:56 AM   ACT Total Scores   ACT TOTAL SCORE (Goal Greater than or Equal to 20) 21   In the past 12 months, how many times did you visit the emergency room for your asthma without being admitted to the hospital? 0   In the past 12 months, how many times were you hospitalized overnight because of your asthma? 0          How many days per week do you miss taking your asthma controller medication?  0    Please describe any recent triggers for your asthma: pollens and grass, dust    Have you had any Emergency Room Visits, Urgent Care Visits, or Hospital Admissions since your last office visit?  No                Review of Systems   Constitutional, HEENT, cardiovascular, pulmonary, gi and gu systems are negative, except as otherwise noted.      Objective    BP (!) 149/97 (BP Location: Right arm, Patient Position: Sitting, Cuff Size: Adult Large)   Pulse 93   Temp 97.4  F (36.3  C) (Tympanic)   Ht 1.753 m (5' 9\")   Wt 101.1 kg (222 lb 12.8 oz)   LMP 03/13/2023 (Approximate)   SpO2 98%   BMI 32.90 kg/m    Body mass index is 32.9 kg/m .  Physical Exam   GENERAL: healthy, alert and no distress  PSYCH: mentation appears normal, affect normal/bright        Labs pending             "

## 2023-06-07 ENCOUNTER — OFFICE VISIT (OUTPATIENT)
Dept: FAMILY MEDICINE | Facility: OTHER | Age: 43
End: 2023-06-07
Attending: FAMILY MEDICINE
Payer: COMMERCIAL

## 2023-06-07 ENCOUNTER — LAB (OUTPATIENT)
Dept: LAB | Facility: OTHER | Age: 43
End: 2023-06-07
Attending: FAMILY MEDICINE
Payer: COMMERCIAL

## 2023-06-07 VITALS
WEIGHT: 222.8 LBS | BODY MASS INDEX: 33 KG/M2 | DIASTOLIC BLOOD PRESSURE: 97 MMHG | SYSTOLIC BLOOD PRESSURE: 149 MMHG | TEMPERATURE: 97.4 F | HEART RATE: 93 BPM | HEIGHT: 69 IN | OXYGEN SATURATION: 98 %

## 2023-06-07 DIAGNOSIS — M26.609 TMJ (TEMPOROMANDIBULAR JOINT SYNDROME): ICD-10-CM

## 2023-06-07 DIAGNOSIS — F33.42 MAJOR DEPRESSIVE DISORDER, RECURRENT EPISODE, IN FULL REMISSION (H): ICD-10-CM

## 2023-06-07 DIAGNOSIS — F41.1 GAD (GENERALIZED ANXIETY DISORDER): ICD-10-CM

## 2023-06-07 DIAGNOSIS — J45.20 MILD INTERMITTENT REACTIVE AIRWAY DISEASE WITH WHEEZING WITHOUT COMPLICATION: ICD-10-CM

## 2023-06-07 DIAGNOSIS — F98.8 ATTENTION DEFICIT DISORDER (ADD) WITHOUT HYPERACTIVITY: ICD-10-CM

## 2023-06-07 DIAGNOSIS — F98.8 ATTENTION DEFICIT DISORDER (ADD) WITHOUT HYPERACTIVITY: Primary | ICD-10-CM

## 2023-06-07 LAB
ALBUMIN SERPL BCG-MCNC: 4 G/DL (ref 3.5–5.2)
ALP SERPL-CCNC: 119 U/L (ref 35–104)
ALT SERPL W P-5'-P-CCNC: 17 U/L (ref 10–35)
ANION GAP SERPL CALCULATED.3IONS-SCNC: 9 MMOL/L (ref 7–15)
AST SERPL W P-5'-P-CCNC: 19 U/L (ref 10–35)
BASOPHILS # BLD AUTO: 0.1 10E3/UL (ref 0–0.2)
BASOPHILS NFR BLD AUTO: 1 %
BILIRUB SERPL-MCNC: 0.3 MG/DL
BUN SERPL-MCNC: 7.2 MG/DL (ref 6–20)
CALCIUM SERPL-MCNC: 9 MG/DL (ref 8.6–10)
CHLORIDE SERPL-SCNC: 103 MMOL/L (ref 98–107)
CREAT SERPL-MCNC: 0.75 MG/DL (ref 0.51–0.95)
CREAT UR-MCNC: 128 MG/DL
DEPRECATED HCO3 PLAS-SCNC: 24 MMOL/L (ref 22–29)
EOSINOPHIL # BLD AUTO: 0.1 10E3/UL (ref 0–0.7)
EOSINOPHIL NFR BLD AUTO: 1 %
ERYTHROCYTE [DISTWIDTH] IN BLOOD BY AUTOMATED COUNT: 12.6 % (ref 10–15)
GFR SERPL CREATININE-BSD FRML MDRD: >90 ML/MIN/1.73M2
GLUCOSE SERPL-MCNC: 133 MG/DL (ref 70–99)
HCT VFR BLD AUTO: 41.8 % (ref 35–47)
HGB BLD-MCNC: 14.5 G/DL (ref 11.7–15.7)
IMM GRANULOCYTES # BLD: 0 10E3/UL
IMM GRANULOCYTES NFR BLD: 0 %
LYMPHOCYTES # BLD AUTO: 2.1 10E3/UL (ref 0.8–5.3)
LYMPHOCYTES NFR BLD AUTO: 29 %
MCH RBC QN AUTO: 29 PG (ref 26.5–33)
MCHC RBC AUTO-ENTMCNC: 34.7 G/DL (ref 31.5–36.5)
MCV RBC AUTO: 84 FL (ref 78–100)
MONOCYTES # BLD AUTO: 0.5 10E3/UL (ref 0–1.3)
MONOCYTES NFR BLD AUTO: 7 %
NEUTROPHILS # BLD AUTO: 4.5 10E3/UL (ref 1.6–8.3)
NEUTROPHILS NFR BLD AUTO: 62 %
NRBC # BLD AUTO: 0 10E3/UL
NRBC BLD AUTO-RTO: 0 /100
PLATELET # BLD AUTO: 247 10E3/UL (ref 150–450)
POTASSIUM SERPL-SCNC: 3.5 MMOL/L (ref 3.4–5.3)
PROT SERPL-MCNC: 6.8 G/DL (ref 6.4–8.3)
RBC # BLD AUTO: 5 10E6/UL (ref 3.8–5.2)
SODIUM SERPL-SCNC: 136 MMOL/L (ref 136–145)
WBC # BLD AUTO: 7.2 10E3/UL (ref 4–11)

## 2023-06-07 PROCEDURE — 82947 ASSAY GLUCOSE BLOOD QUANT: CPT | Mod: ZL

## 2023-06-07 PROCEDURE — 36415 COLL VENOUS BLD VENIPUNCTURE: CPT | Mod: ZL

## 2023-06-07 PROCEDURE — 80363 OPIOIDS & OPIATE ANALOGS 3/4: CPT | Mod: ZL

## 2023-06-07 PROCEDURE — 85014 HEMATOCRIT: CPT | Mod: ZL

## 2023-06-07 PROCEDURE — 80346 BENZODIAZEPINES1-12: CPT | Mod: ZL

## 2023-06-07 PROCEDURE — 99214 OFFICE O/P EST MOD 30 MIN: CPT | Performed by: FAMILY MEDICINE

## 2023-06-07 RX ORDER — METHYLPHENIDATE HYDROCHLORIDE 36 MG/1
36 TABLET ORAL DAILY
Qty: 60 TABLET | Refills: 0 | Status: SHIPPED | OUTPATIENT
Start: 2023-08-28 | End: 2023-09-30

## 2023-06-07 RX ORDER — METHYLPHENIDATE HYDROCHLORIDE 36 MG/1
36 TABLET ORAL 2 TIMES DAILY
Qty: 60 TABLET | Refills: 0 | Status: SHIPPED | OUTPATIENT
Start: 2023-07-27 | End: 2023-09-30

## 2023-06-07 RX ORDER — ALBUTEROL SULFATE 90 UG/1
AEROSOL, METERED RESPIRATORY (INHALATION)
Qty: 18 G | Refills: 3 | Status: SHIPPED | OUTPATIENT
Start: 2023-06-07 | End: 2024-07-16

## 2023-06-07 RX ORDER — METHYLPHENIDATE HYDROCHLORIDE 36 MG/1
36 TABLET ORAL 2 TIMES DAILY
Qty: 60 TABLET | Refills: 0 | Status: SHIPPED | OUTPATIENT
Start: 2023-06-28 | End: 2023-09-30

## 2023-06-07 ASSESSMENT — ANXIETY QUESTIONNAIRES
6. BECOMING EASILY ANNOYED OR IRRITABLE: SEVERAL DAYS
GAD7 TOTAL SCORE: 7
IF YOU CHECKED OFF ANY PROBLEMS ON THIS QUESTIONNAIRE, HOW DIFFICULT HAVE THESE PROBLEMS MADE IT FOR YOU TO DO YOUR WORK, TAKE CARE OF THINGS AT HOME, OR GET ALONG WITH OTHER PEOPLE: SOMEWHAT DIFFICULT
5. BEING SO RESTLESS THAT IT IS HARD TO SIT STILL: SEVERAL DAYS
7. FEELING AFRAID AS IF SOMETHING AWFUL MIGHT HAPPEN: SEVERAL DAYS
7. FEELING AFRAID AS IF SOMETHING AWFUL MIGHT HAPPEN: SEVERAL DAYS
GAD7 TOTAL SCORE: 7
3. WORRYING TOO MUCH ABOUT DIFFERENT THINGS: SEVERAL DAYS
GAD7 TOTAL SCORE: 7
4. TROUBLE RELAXING: SEVERAL DAYS
8. IF YOU CHECKED OFF ANY PROBLEMS, HOW DIFFICULT HAVE THESE MADE IT FOR YOU TO DO YOUR WORK, TAKE CARE OF THINGS AT HOME, OR GET ALONG WITH OTHER PEOPLE?: SOMEWHAT DIFFICULT
2. NOT BEING ABLE TO STOP OR CONTROL WORRYING: SEVERAL DAYS
1. FEELING NERVOUS, ANXIOUS, OR ON EDGE: SEVERAL DAYS

## 2023-06-07 ASSESSMENT — ASTHMA QUESTIONNAIRES: ACT_TOTALSCORE: 21

## 2023-06-07 ASSESSMENT — PAIN SCALES - GENERAL: PAINLEVEL: NO PAIN (0)

## 2023-06-07 ASSESSMENT — PATIENT HEALTH QUESTIONNAIRE - PHQ9
SUM OF ALL RESPONSES TO PHQ QUESTIONS 1-9: 11
10. IF YOU CHECKED OFF ANY PROBLEMS, HOW DIFFICULT HAVE THESE PROBLEMS MADE IT FOR YOU TO DO YOUR WORK, TAKE CARE OF THINGS AT HOME, OR GET ALONG WITH OTHER PEOPLE: SOMEWHAT DIFFICULT
SUM OF ALL RESPONSES TO PHQ QUESTIONS 1-9: 11

## 2023-06-07 NOTE — TELEPHONE ENCOUNTER
proair hfa      Last Written Prescription Date: 6/8/22  Last Fill Quantity: 8.5g  # refills: 4  Last Office Visit: 6/7/23  Future Office visit:       Routing refill request to provider for review/approval because:

## 2023-06-13 LAB
ME-PHENIDATE UR CFM-MCNC: 1246 NG/ML
ME-PHENIDATE UR CFM-MCNC: ABNORMAL NG/ML
ME-PHENIDATE/CREAT UR: 973 NG/MG {CREAT}
ME-PHENIDATE/CREAT UR: ABNORMAL

## 2023-07-05 DIAGNOSIS — F41.1 GAD (GENERALIZED ANXIETY DISORDER): ICD-10-CM

## 2023-07-05 DIAGNOSIS — F41.1 GENERALIZED ANXIETY DISORDER: ICD-10-CM

## 2023-07-05 DIAGNOSIS — Z91.030 BEE STING ALLERGY: ICD-10-CM

## 2023-07-05 DIAGNOSIS — F51.04 PSYCHOPHYSIOLOGICAL INSOMNIA: ICD-10-CM

## 2023-07-05 DIAGNOSIS — F33.42 MAJOR DEPRESSIVE DISORDER, RECURRENT EPISODE, IN FULL REMISSION (H): ICD-10-CM

## 2023-07-06 NOTE — TELEPHONE ENCOUNTER
traZODone (DESYREL) 100 MG tablet 90 tablet 11 6/7/2022     citalopram (CELEXA) 40 MG tablet 90 tablet 3 7/13/2022     hydrOXYzine (ATARAX) 25 MG tablet 60 tablet 3 1/25/2022     EPINEPHrine (ANY BX GENERIC EQUIV) 0.3 MG/0.3ML injection 2-pack 2 each 1 5/28/2021     Last Office Visit: 6/7/23  Future Office visit:       Routing refill request to provider for review/approval because:

## 2023-07-07 RX ORDER — TRAZODONE HYDROCHLORIDE 100 MG/1
TABLET ORAL
Qty: 90 TABLET | Refills: 5 | Status: SHIPPED | OUTPATIENT
Start: 2023-07-07 | End: 2024-01-17

## 2023-07-07 RX ORDER — EPINEPHRINE 0.3 MG/.3ML
INJECTION SUBCUTANEOUS
Qty: 2 EACH | Refills: 2 | Status: SHIPPED | OUTPATIENT
Start: 2023-07-07

## 2023-07-07 RX ORDER — HYDROXYZINE HYDROCHLORIDE 25 MG/1
TABLET, FILM COATED ORAL
Qty: 60 TABLET | Refills: 5 | Status: SHIPPED | OUTPATIENT
Start: 2023-07-07 | End: 2024-06-10

## 2023-07-07 RX ORDER — CITALOPRAM HYDROBROMIDE 40 MG/1
TABLET ORAL
Qty: 30 TABLET | Refills: 0 | Status: SHIPPED | OUTPATIENT
Start: 2023-07-07 | End: 2023-08-09

## 2023-08-07 DIAGNOSIS — F41.1 GAD (GENERALIZED ANXIETY DISORDER): ICD-10-CM

## 2023-08-07 DIAGNOSIS — K21.9 GASTROESOPHAGEAL REFLUX DISEASE WITHOUT ESOPHAGITIS: ICD-10-CM

## 2023-08-07 DIAGNOSIS — F33.42 MAJOR DEPRESSIVE DISORDER, RECURRENT EPISODE, IN FULL REMISSION (H): ICD-10-CM

## 2023-08-08 RX ORDER — OMEPRAZOLE 40 MG/1
CAPSULE, DELAYED RELEASE ORAL
Qty: 90 CAPSULE | Refills: 3 | Status: SHIPPED | OUTPATIENT
Start: 2023-08-08 | End: 2024-06-10

## 2023-08-08 NOTE — TELEPHONE ENCOUNTER
omeprazole      Last Written Prescription Date:  8/10/22  Last Fill Quantity: 90,   # refills: 3  Last Office Visit: 6/7/23  Future Office visit:

## 2023-08-09 RX ORDER — CITALOPRAM HYDROBROMIDE 40 MG/1
TABLET ORAL
Qty: 90 TABLET | Refills: 3 | Status: SHIPPED | OUTPATIENT
Start: 2023-08-09 | End: 2023-10-20

## 2023-08-09 NOTE — TELEPHONE ENCOUNTER
Citalopram (CELEXA) 40 MG tablet       Last Written Prescription Date:  07/07/23  Last Fill Quantity: 30,   # refills: 0  Last Office Visit: 06/07/23  Future Office visit:       Routing refill request to provider for review/approval because:

## 2023-09-30 ENCOUNTER — APPOINTMENT (OUTPATIENT)
Dept: ULTRASOUND IMAGING | Facility: HOSPITAL | Age: 43
End: 2023-09-30
Attending: STUDENT IN AN ORGANIZED HEALTH CARE EDUCATION/TRAINING PROGRAM
Payer: COMMERCIAL

## 2023-09-30 ENCOUNTER — HOSPITAL ENCOUNTER (EMERGENCY)
Facility: HOSPITAL | Age: 43
Discharge: HOME | End: 2023-10-01
Attending: STUDENT IN AN ORGANIZED HEALTH CARE EDUCATION/TRAINING PROGRAM | Admitting: STUDENT IN AN ORGANIZED HEALTH CARE EDUCATION/TRAINING PROGRAM
Payer: COMMERCIAL

## 2023-09-30 ENCOUNTER — APPOINTMENT (OUTPATIENT)
Dept: CT IMAGING | Facility: HOSPITAL | Age: 43
End: 2023-09-30
Attending: STUDENT IN AN ORGANIZED HEALTH CARE EDUCATION/TRAINING PROGRAM
Payer: COMMERCIAL

## 2023-09-30 DIAGNOSIS — N83.201 RIGHT OVARIAN CYST: ICD-10-CM

## 2023-09-30 DIAGNOSIS — R11.11 VOMITING WITHOUT NAUSEA, UNSPECIFIED VOMITING TYPE: ICD-10-CM

## 2023-09-30 DIAGNOSIS — R18.8 FREE FLUID IN PELVIS: ICD-10-CM

## 2023-09-30 DIAGNOSIS — R10.32 ABDOMINAL PAIN, LEFT LOWER QUADRANT: ICD-10-CM

## 2023-09-30 LAB
ALBUMIN SERPL BCG-MCNC: 4.1 G/DL (ref 3.5–5.2)
ALBUMIN UR-MCNC: 20 MG/DL
ALP SERPL-CCNC: 114 U/L (ref 35–104)
ALT SERPL W P-5'-P-CCNC: 13 U/L (ref 0–50)
ANION GAP SERPL CALCULATED.3IONS-SCNC: 12 MMOL/L (ref 7–15)
APPEARANCE UR: CLEAR
AST SERPL W P-5'-P-CCNC: 18 U/L (ref 0–45)
BACTERIA #/AREA URNS HPF: ABNORMAL /HPF
BILIRUB DIRECT SERPL-MCNC: <0.2 MG/DL (ref 0–0.3)
BILIRUB SERPL-MCNC: 0.3 MG/DL
BILIRUB UR QL STRIP: NEGATIVE
BUN SERPL-MCNC: 7.5 MG/DL (ref 6–20)
CALCIUM SERPL-MCNC: 9 MG/DL (ref 8.6–10)
CHLORIDE SERPL-SCNC: 102 MMOL/L (ref 98–107)
COLOR UR AUTO: YELLOW
CREAT SERPL-MCNC: 0.68 MG/DL (ref 0.51–0.95)
DEPRECATED HCO3 PLAS-SCNC: 22 MMOL/L (ref 22–29)
EGFRCR SERPLBLD CKD-EPI 2021: >90 ML/MIN/1.73M2
ERYTHROCYTE [DISTWIDTH] IN BLOOD BY AUTOMATED COUNT: 12.5 % (ref 10–15)
GLUCOSE SERPL-MCNC: 102 MG/DL (ref 70–99)
GLUCOSE UR STRIP-MCNC: NEGATIVE MG/DL
HCG UR QL: NEGATIVE
HCT VFR BLD AUTO: 40.9 % (ref 35–47)
HGB BLD-MCNC: 14.1 G/DL (ref 11.7–15.7)
HGB UR QL STRIP: ABNORMAL
HOLD SPECIMEN: NORMAL
KETONES UR STRIP-MCNC: NEGATIVE MG/DL
LACTATE SERPL-SCNC: 1.6 MMOL/L (ref 0.7–2)
LEUKOCYTE ESTERASE UR QL STRIP: ABNORMAL
LIPASE SERPL-CCNC: 11 U/L (ref 13–60)
MCH RBC QN AUTO: 29.2 PG (ref 26.5–33)
MCHC RBC AUTO-ENTMCNC: 34.5 G/DL (ref 31.5–36.5)
MCV RBC AUTO: 85 FL (ref 78–100)
MUCOUS THREADS #/AREA URNS LPF: PRESENT /LPF
NITRATE UR QL: NEGATIVE
PH UR STRIP: 6 [PH] (ref 4.7–8)
PLATELET # BLD AUTO: 276 10E3/UL (ref 150–450)
POTASSIUM SERPL-SCNC: 3.5 MMOL/L (ref 3.4–5.3)
PROT SERPL-MCNC: 7.1 G/DL (ref 6.4–8.3)
RBC # BLD AUTO: 4.83 10E6/UL (ref 3.8–5.2)
RBC URINE: 8 /HPF
SODIUM SERPL-SCNC: 136 MMOL/L (ref 135–145)
SP GR UR STRIP: 1.03 (ref 1–1.03)
SQUAMOUS EPITHELIAL: 6 /HPF
UROBILINOGEN UR STRIP-MCNC: NORMAL MG/DL
WBC # BLD AUTO: 21.7 10E3/UL (ref 4–11)
WBC URINE: 11 /HPF

## 2023-09-30 PROCEDURE — 36415 COLL VENOUS BLD VENIPUNCTURE: CPT | Performed by: STUDENT IN AN ORGANIZED HEALTH CARE EDUCATION/TRAINING PROGRAM

## 2023-09-30 PROCEDURE — 96375 TX/PRO/DX INJ NEW DRUG ADDON: CPT

## 2023-09-30 PROCEDURE — 85027 COMPLETE CBC AUTOMATED: CPT | Performed by: STUDENT IN AN ORGANIZED HEALTH CARE EDUCATION/TRAINING PROGRAM

## 2023-09-30 PROCEDURE — 82248 BILIRUBIN DIRECT: CPT | Performed by: STUDENT IN AN ORGANIZED HEALTH CARE EDUCATION/TRAINING PROGRAM

## 2023-09-30 PROCEDURE — 81001 URINALYSIS AUTO W/SCOPE: CPT | Performed by: STUDENT IN AN ORGANIZED HEALTH CARE EDUCATION/TRAINING PROGRAM

## 2023-09-30 PROCEDURE — 83690 ASSAY OF LIPASE: CPT | Performed by: STUDENT IN AN ORGANIZED HEALTH CARE EDUCATION/TRAINING PROGRAM

## 2023-09-30 PROCEDURE — 96376 TX/PRO/DX INJ SAME DRUG ADON: CPT

## 2023-09-30 PROCEDURE — 81025 URINE PREGNANCY TEST: CPT | Performed by: STUDENT IN AN ORGANIZED HEALTH CARE EDUCATION/TRAINING PROGRAM

## 2023-09-30 PROCEDURE — 96374 THER/PROPH/DIAG INJ IV PUSH: CPT

## 2023-09-30 PROCEDURE — 99284 EMERGENCY DEPT VISIT MOD MDM: CPT | Performed by: STUDENT IN AN ORGANIZED HEALTH CARE EDUCATION/TRAINING PROGRAM

## 2023-09-30 PROCEDURE — 250N000011 HC RX IP 250 OP 636: Mod: JZ | Performed by: STUDENT IN AN ORGANIZED HEALTH CARE EDUCATION/TRAINING PROGRAM

## 2023-09-30 PROCEDURE — 83605 ASSAY OF LACTIC ACID: CPT | Performed by: STUDENT IN AN ORGANIZED HEALTH CARE EDUCATION/TRAINING PROGRAM

## 2023-09-30 PROCEDURE — 74177 CT ABD & PELVIS W/CONTRAST: CPT

## 2023-09-30 PROCEDURE — 76830 TRANSVAGINAL US NON-OB: CPT

## 2023-09-30 PROCEDURE — 99285 EMERGENCY DEPT VISIT HI MDM: CPT | Mod: 25

## 2023-09-30 PROCEDURE — 87086 URINE CULTURE/COLONY COUNT: CPT | Performed by: STUDENT IN AN ORGANIZED HEALTH CARE EDUCATION/TRAINING PROGRAM

## 2023-09-30 PROCEDURE — 250N000011 HC RX IP 250 OP 636: Performed by: STUDENT IN AN ORGANIZED HEALTH CARE EDUCATION/TRAINING PROGRAM

## 2023-09-30 RX ORDER — IOPAMIDOL 755 MG/ML
109 INJECTION, SOLUTION INTRAVASCULAR ONCE
Status: COMPLETED | OUTPATIENT
Start: 2023-09-30 | End: 2023-09-30

## 2023-09-30 RX ORDER — HYDROMORPHONE HYDROCHLORIDE 1 MG/ML
0.5 INJECTION, SOLUTION INTRAMUSCULAR; INTRAVENOUS; SUBCUTANEOUS ONCE
Status: COMPLETED | OUTPATIENT
Start: 2023-09-30 | End: 2023-09-30

## 2023-09-30 RX ORDER — KETOROLAC TROMETHAMINE 15 MG/ML
15 INJECTION, SOLUTION INTRAMUSCULAR; INTRAVENOUS ONCE
Status: COMPLETED | OUTPATIENT
Start: 2023-09-30 | End: 2023-09-30

## 2023-09-30 RX ADMIN — KETOROLAC TROMETHAMINE 15 MG: 15 INJECTION, SOLUTION INTRAMUSCULAR; INTRAVENOUS at 23:45

## 2023-09-30 RX ADMIN — HYDROMORPHONE HYDROCHLORIDE 0.5 MG: 1 INJECTION, SOLUTION INTRAMUSCULAR; INTRAVENOUS; SUBCUTANEOUS at 21:45

## 2023-09-30 RX ADMIN — IOPAMIDOL 109 ML: 755 INJECTION, SOLUTION INTRAVENOUS at 22:02

## 2023-09-30 RX ADMIN — HYDROMORPHONE HYDROCHLORIDE 1 MG: 1 INJECTION, SOLUTION INTRAMUSCULAR; INTRAVENOUS; SUBCUTANEOUS at 23:46

## 2023-09-30 ASSESSMENT — ACTIVITIES OF DAILY LIVING (ADL)
ADLS_ACUITY_SCORE: 35
ADLS_ACUITY_SCORE: 33

## 2023-10-01 VITALS
SYSTOLIC BLOOD PRESSURE: 150 MMHG | TEMPERATURE: 98.9 F | OXYGEN SATURATION: 96 % | DIASTOLIC BLOOD PRESSURE: 88 MMHG | HEART RATE: 74 BPM | RESPIRATION RATE: 18 BRPM

## 2023-10-01 ASSESSMENT — ACTIVITIES OF DAILY LIVING (ADL): ADLS_ACUITY_SCORE: 35

## 2023-10-01 NOTE — DISCHARGE INSTRUCTIONS
It was a pleasure taking care of you today. We saw you for a ruptured ovarian cyst. We recommend that you take acetaminophen and ibuprofen for your symptoms. You may also want to apply a hot pack to the area of discomfort.    You may take 1 gram of acetaminophen every 6 hours. Do not exceed 4 grams in 24 hours. If you continue to have pain, you may want to take ibuprofen. You may take 600 milligrams every 6 hours. It may cause an upset stomach. Take it with food to help prevent this side effect.    Please follow up with your primary care provider in 1 week for a recheck. Please return to the emergency department if you develop symptoms like worsening pain, vomiting, fever, black stools, shortness of breath, or if your symptoms persist or get worse. Take care. Feel better.

## 2023-10-01 NOTE — ED TRIAGE NOTES
"Had a \"nice healthy bowel movement\"  this evening had dirrhea and around 1600 started to have abdominal and getting more painful.  No fevers. No more BM after that  hx of IBS.    Has gallbladder and appendix.     No dysuria    "

## 2023-10-01 NOTE — ED PROVIDER NOTES
[facility]  ED Provider Note    Chief Complaint   Patient presents with    Abdominal Pain     History:  Cristiana Aguila is a 43 year old female with generalized anxiety disorder, recurrent depression, and mild reactive airway disease with wheezing with concerns regarding sudden abrupt onset of abdominal pain.  Patient reports that she has been having loose stools earlier today secondary to IBS.  She reports that she is not taking any medications for this.  Earlier today she was attempting to have a bowel movement, and felt sudden and abrupt onset of pain.    She reports the pain is in her lower abdomen, but she does have pain when she sits down, notices pain in her bottom.  She denies any bowel movement since then.  She denies any black or bloody stools.  She denies any pain with urination.  No recent fevers or chills.  No nausea or vomiting.  She reports the pain is mostly in her left lower abdomen.  It is present throughout otherwise.  No history of abdominal surgeries otherwise.    Review of Systems   Performed; see HPI for pertinent positives and negatives.     Medical history, surgical history, and social history was reviewed.  Nursing documentation, triage note, and vitals were reviewed.    Vitals:  BP: 121/84  Pulse: 91  Temp: 98.9  F (37.2  C)  Resp: 16  SpO2: 98 %    Physical Exam:  General: Well-appearing, nontoxic  Head: No trauma.  Eyes: No conjunctival pallor.  Sclera anicteric  ENT: Moist mucosa.  Edentulous.  Neck: No rigidity.  Spontaneous range of motion.  Cardiovascular: Regular rate and rhythm.  No murmur.  2+ pulses in all 4 extremities.  Pulmonary: Unlabored respirations, clear to auscultation.  Abdomen: Soft, but with tenderness to palpation in the left lower quadrant, and lower abdomen.  Difficulty with movement, but no peritonitic findings.  No rebound, guarding, or rigidity.  No pain with shaking of the bed.  Back: No costovertebral angle tenderness.  Skin: Warm, dry,  without diaphoresis.  Extremities: No peripheral edema.  Neuro: Alert and appropriate.  Moves all 4 extremity spontaneously and equally.  Answers questions appropriately.      MDM:  In summary, this a 43-year-old female who is presenting for concerns regarding sudden abrupt onset of abdominal pain earlier tonight.  She reports a pattern with stooling.  Vital signs are reassuring at the time of initial evaluation.    Discussed with the patient the concern for fecal impaction, or constipation as a precipitating etiology for the patient's pain.  Given her tenderness, and the acuity of her pain though, we will plan for further evaluation with cross-sectional imaging.    Discussed the patient's plan and she was agreeable.  Hydromorphone for pain control, but anticipate that disimpaction will be warranted given the patient's severity of pain if noted on CT.  Patient understood and agreed with this plan.  All question concerns otherwise addressed and answered.    ED Course as of 10/01/23 0217   Sat Sep 30, 2023   2201 WBC(!): 21.7  Raises a concern for viscous perforation or obstruction. Still awaiting CT. Patient otherwise without any other SIRS criteria.   2213 CT Abdomen Pelvis w Contrast  Stranding noted in the LLQ. No free air or apparent obstruction.  Still awaiting formal radiology read.   2321 CT Abdomen Pelvis w Contrast  IMPRESSION:  1. Suggestion of small free fluid in the right adnexa extending to the inferior right paracolic gutter  presumably physiologic. Small right ovarian cyst measuring 1.9 cm.  2. No additional acute abdominal/pelvic CT findings.   2327 Re-evaluated patient.  Pain  was improved but currently severe at 8-9/10.   Discussed with patient results. Previous ovarian surgeries. Given past medical history, will plan for further evaluation with ultrasound.   Sun Oct 01, 2023   0059 US Pelvic Complete with Transvaginal  IMPRESSION:  1. No ovarian torsion.  2. Right ovarian physiologic cyst measuring  2.3 cm.  3. Mild hemorrhagic free fluid right adnexa presumably physiologic.  4. Status post left oophorectomy and salpingectomy.   0105 Revaluated the patient.   Discussed with patient test results.   No evidence of torsion on ultrasound.  Patient feels improved.  Reassurance provided.  Will plan for discharge.   Advised acetaminophen and ibuprofen for pain control.  Advised patient the to follow up with primary care in 1 week  Return precautions reviewed in detail and provided in AVS.  Patient understood and agreed with this plan.  All questions and concerns otherwise addressed and answered.       Impression:  Final diagnoses:   Right ovarian cyst   Free fluid in pelvis   Abdominal pain, left lower quadrant   Vomiting without nausea, unspecified vomiting type          Serg Vallejo MD  09/30/23 4812       Serg Vallejo MD  10/01/23 7624

## 2023-10-02 ENCOUNTER — TELEPHONE (OUTPATIENT)
Dept: FAMILY MEDICINE | Facility: OTHER | Age: 43
End: 2023-10-02

## 2023-10-02 LAB — BACTERIA UR CULT: NO GROWTH

## 2023-10-02 NOTE — TELEPHONE ENCOUNTER
9:02 AM    Reason for Call: OVERBOOK    Patient is having the following symptoms:     rCistiana was in the ER on Saturday they told her to make an appointment with her primary doctor. Cristiana doesn't have a ride today or tomorrow  for appointment but she is able on Wednesday or the rest of the week to see Dr Cuellar.    The patient is requesting an appointment for the middle of this week with Dr Cuellar.    Was an appointment offered for this call? No  If yes : Appointment type              Date    Preferred method for responding to this message: Telephone Call  What is your phone number ? [phone]    If we cannot reach you directly, may we leave a detailed response at the number you provided? Yes    Can this message wait until your PCP/provider returns, if unavailable today? No,

## 2023-10-02 NOTE — TELEPHONE ENCOUNTER
I already sent a msg to [facility] - needs appt but can have in 10-14 days.  I am not here after tomorrow and does not need to be seen that soon .

## 2023-10-09 DIAGNOSIS — J34.3 NASAL TURBINATE HYPERTROPHY: ICD-10-CM

## 2023-10-10 RX ORDER — MOMETASONE FUROATE MONOHYDRATE 50 UG/1
SPRAY, METERED NASAL
Qty: 17 G | Refills: 7 | Status: SHIPPED | OUTPATIENT
Start: 2023-10-10 | End: 2023-10-11

## 2023-10-10 NOTE — TELEPHONE ENCOUNTER
mometasone (NASONEX) 50 MCG/ACT nasal spray 17 g 11 9/13/2022     Last Office Visit: 06/07/23     Future Office visit:    Next 5 appointments (look out 90 days)      Oct 11, 2023  9:15 AM  (Arrive by 9:00 AM)  SHORT with Sudhakar Cuellar MD  [facility] [place] ([facility] [place] ) [address]  [place] [address]  [phone]     Dec 07, 2023  9:45 AM  (Arrive by 9:30 AM)  SHORT with Sudhakar Cuellar MD  [facility] [place] ([facility] [place] ) [address]  [place] [address]  [phone]             Routing refill request to provider for review/approval because:

## 2023-10-11 ENCOUNTER — LAB (OUTPATIENT)
Dept: LAB | Facility: OTHER | Age: 43
End: 2023-10-11
Payer: COMMERCIAL

## 2023-10-11 ENCOUNTER — OFFICE VISIT (OUTPATIENT)
Dept: FAMILY MEDICINE | Facility: OTHER | Age: 43
End: 2023-10-11
Attending: FAMILY MEDICINE
Payer: COMMERCIAL

## 2023-10-11 VITALS
SYSTOLIC BLOOD PRESSURE: 120 MMHG | BODY MASS INDEX: 32.75 KG/M2 | WEIGHT: 221.8 LBS | HEART RATE: 61 BPM | TEMPERATURE: 97.4 F | OXYGEN SATURATION: 96 % | DIASTOLIC BLOOD PRESSURE: 82 MMHG

## 2023-10-11 DIAGNOSIS — D72.829 LEUKOCYTOSIS, UNSPECIFIED TYPE: ICD-10-CM

## 2023-10-11 DIAGNOSIS — R10.2 PELVIC PAIN IN FEMALE: Primary | ICD-10-CM

## 2023-10-11 DIAGNOSIS — J34.3 NASAL TURBINATE HYPERTROPHY: ICD-10-CM

## 2023-10-11 DIAGNOSIS — R10.2 PELVIC PAIN IN FEMALE: ICD-10-CM

## 2023-10-11 DIAGNOSIS — N83.201 RUPTURE OF CYST OF RIGHT OVARY: ICD-10-CM

## 2023-10-11 DIAGNOSIS — F98.8 ATTENTION DEFICIT DISORDER (ADD) WITHOUT HYPERACTIVITY: ICD-10-CM

## 2023-10-11 DIAGNOSIS — N93.9 ABNORMAL UTERINE BLEEDING (AUB): ICD-10-CM

## 2023-10-11 LAB
BASO+EOS+MONOS # BLD AUTO: NORMAL 10*3/UL
BASO+EOS+MONOS NFR BLD AUTO: NORMAL %
BASOPHILS # BLD AUTO: 0.1 10E3/UL (ref 0–0.2)
BASOPHILS NFR BLD AUTO: 1 %
EOSINOPHIL # BLD AUTO: 0.1 10E3/UL (ref 0–0.7)
EOSINOPHIL NFR BLD AUTO: 2 %
ERYTHROCYTE [DISTWIDTH] IN BLOOD BY AUTOMATED COUNT: 12.6 % (ref 10–15)
HCT VFR BLD AUTO: 37 % (ref 35–47)
HGB BLD-MCNC: 12.7 G/DL (ref 11.7–15.7)
IMM GRANULOCYTES # BLD: 0 10E3/UL
IMM GRANULOCYTES NFR BLD: 0 %
LYMPHOCYTES # BLD AUTO: 2.1 10E3/UL (ref 0.8–5.3)
LYMPHOCYTES NFR BLD AUTO: 27 %
MCH RBC QN AUTO: 29.2 PG (ref 26.5–33)
MCHC RBC AUTO-ENTMCNC: 34.3 G/DL (ref 31.5–36.5)
MCV RBC AUTO: 85 FL (ref 78–100)
MONOCYTES # BLD AUTO: 0.7 10E3/UL (ref 0–1.3)
MONOCYTES NFR BLD AUTO: 9 %
NEUTROPHILS # BLD AUTO: 4.9 10E3/UL (ref 1.6–8.3)
NEUTROPHILS NFR BLD AUTO: 61 %
NRBC # BLD AUTO: 0 10E3/UL
NRBC BLD AUTO-RTO: 0 /100
PLATELET # BLD AUTO: 416 10E3/UL (ref 150–450)
RBC # BLD AUTO: 4.35 10E6/UL (ref 3.8–5.2)
WBC # BLD AUTO: 7.9 10E3/UL (ref 4–11)

## 2023-10-11 PROCEDURE — 85025 COMPLETE CBC W/AUTO DIFF WBC: CPT | Mod: ZL

## 2023-10-11 PROCEDURE — G0463 HOSPITAL OUTPT CLINIC VISIT: HCPCS

## 2023-10-11 PROCEDURE — 36415 COLL VENOUS BLD VENIPUNCTURE: CPT | Mod: ZL

## 2023-10-11 PROCEDURE — 99214 OFFICE O/P EST MOD 30 MIN: CPT | Performed by: FAMILY MEDICINE

## 2023-10-11 RX ORDER — METHYLPHENIDATE HYDROCHLORIDE 36 MG/1
TABLET, EXTENDED RELEASE ORAL
Status: CANCELLED | OUTPATIENT
Start: 2023-10-11

## 2023-10-11 RX ORDER — METHYLPHENIDATE HYDROCHLORIDE 36 MG/1
36 TABLET ORAL 2 TIMES DAILY
Qty: 60 TABLET | Refills: 0 | Status: SHIPPED | OUTPATIENT
Start: 2023-11-11 | End: 2023-11-15

## 2023-10-11 RX ORDER — METHYLPHENIDATE HYDROCHLORIDE 36 MG/1
36 TABLET ORAL 2 TIMES DAILY
Qty: 60 TABLET | Refills: 0 | Status: SHIPPED | OUTPATIENT
Start: 2023-10-11 | End: 2023-11-15

## 2023-10-11 RX ORDER — MOMETASONE FUROATE MONOHYDRATE 50 UG/1
SPRAY, METERED NASAL
Qty: 17 G | Refills: 11 | Status: SHIPPED | OUTPATIENT
Start: 2023-10-11

## 2023-10-11 RX ORDER — METHYLPHENIDATE HYDROCHLORIDE 36 MG/1
36 TABLET ORAL 2 TIMES DAILY
Qty: 60 TABLET | Refills: 0 | Status: SHIPPED | OUTPATIENT
Start: 2023-12-12 | End: 2024-04-19

## 2023-10-11 RX ORDER — METHYLPHENIDATE HYDROCHLORIDE 36 MG/1
TABLET, EXTENDED RELEASE ORAL
COMMUNITY
Start: 2023-09-06 | End: 2023-11-15

## 2023-10-11 ASSESSMENT — PATIENT HEALTH QUESTIONNAIRE - PHQ9
SUM OF ALL RESPONSES TO PHQ QUESTIONS 1-9: 10
10. IF YOU CHECKED OFF ANY PROBLEMS, HOW DIFFICULT HAVE THESE PROBLEMS MADE IT FOR YOU TO DO YOUR WORK, TAKE CARE OF THINGS AT HOME, OR GET ALONG WITH OTHER PEOPLE: SOMEWHAT DIFFICULT
SUM OF ALL RESPONSES TO PHQ QUESTIONS 1-9: 10

## 2023-10-11 ASSESSMENT — ANXIETY QUESTIONNAIRES
5. BEING SO RESTLESS THAT IT IS HARD TO SIT STILL: SEVERAL DAYS
GAD7 TOTAL SCORE: 10
4. TROUBLE RELAXING: SEVERAL DAYS
1. FEELING NERVOUS, ANXIOUS, OR ON EDGE: MORE THAN HALF THE DAYS
3. WORRYING TOO MUCH ABOUT DIFFERENT THINGS: SEVERAL DAYS
GAD7 TOTAL SCORE: 10
6. BECOMING EASILY ANNOYED OR IRRITABLE: NEARLY EVERY DAY
2. NOT BEING ABLE TO STOP OR CONTROL WORRYING: SEVERAL DAYS
7. FEELING AFRAID AS IF SOMETHING AWFUL MIGHT HAPPEN: SEVERAL DAYS
IF YOU CHECKED OFF ANY PROBLEMS ON THIS QUESTIONNAIRE, HOW DIFFICULT HAVE THESE PROBLEMS MADE IT FOR YOU TO DO YOUR WORK, TAKE CARE OF THINGS AT HOME, OR GET ALONG WITH OTHER PEOPLE: SOMEWHAT DIFFICULT

## 2023-10-11 ASSESSMENT — PAIN SCALES - GENERAL: PAINLEVEL: MILD PAIN (3)

## 2023-10-11 NOTE — LETTER
My Asthma Action Plan    Name: Cristiana Aguila   YOB: 1980  Date: 10/10/2023   My doctor: Sudhakar Cuellar MD   [facility]        My Rescue Medicine:   Albuterol inhaler (Proair/Ventolin/Proventil HFA)  2-4 puffs EVERY 4 HOURS as needed. Use a spacer if recommended by your provider.   My Asthma Severity:   Mild intermittent  Know your asthma triggers:   pollens  grass, dust          GREEN ZONE   Good Control  I feel good  No cough or wheeze  Can work, sleep and play without asthma symptoms       Take your asthma control medicine every day.     If exercise triggers your asthma, take your rescue medication  15 minutes before exercise or sports, and  During exercise if you have asthma symptoms  Spacer to use with inhaler: If you have a spacer, make sure to use it with your inhaler             YELLOW ZONE Getting Worse  I have ANY of these:  I do not feel good  Cough or wheeze  Chest feels tight  Wake up at night   Keep taking your Green Zone medications  Start taking your rescue medicine:  every 20 minutes for up to 1 hour. Then every 4 hours for 24-48 hours.  If you stay in the Yellow Zone for more than 12-24 hours, contact your doctor.  If you do not return to the Green Zone in 12-24 hours or you get worse, start taking your oral steroid medicine if prescribed by your provider.           RED ZONE Medical Alert - Get Help  I have ANY of these:  I feel awful  Medicine is not helping  Breathing getting harder  Trouble walking or talking  Nose opens wide to breathe       Take your rescue medicine NOW  If your provider has prescribed an oral steroid medicine, start taking it NOW  Call your doctor NOW  If you are still in the Red Zone after 20 minutes and you have not reached your doctor:  Take your rescue medicine again and  Call 911 or go to the emergency room right away    See your regular doctor within 2 weeks of an Emergency Room or Urgent Care visit for follow-up  treatment.          Annual Reminders:  Meet with Asthma Educator,  Flu Shot in the Fall, consider Pneumonia Vaccination for patients with asthma (aged 19 and older).    Pharmacy:    [facility] - [place], [place] - [address]  [facility] [place] [place] - [address] [address]    Electronically signed by Sudhakar Cuellar MD   Date: 10/10/23                    Asthma Triggers  How To Control Things That Make Your Asthma Worse    Triggers are things that make your asthma worse.  Look at the list below to help you find your triggers and   what you can do about them. You can help prevent asthma flare-ups by staying away from your triggers.      Trigger                                                          What you can do   Cigarette Smoke  Tobacco smoke can make asthma worse. Do not allow smoking in your home, car or around you.  Be sure no one smokes at a child s day care or school.  If you smoke, ask your health care provider for ways to help you quit.  Ask family members to quit too.  Ask your health care provider for a referral to Quit Plan to help you quit smoking, or call 1-260-988PLAN.     Colds, Flu, Bronchitis  These are common triggers of asthma. Wash your hands often.  Don t touch your eyes, nose or mouth.  Get a flu shot every year.     Dust Mites  These are tiny bugs that live in cloth or carpet. They are too small to see. Wash sheets and blankets in hot water every week.   Encase pillows and mattress in dust mite proof covers.  Avoid having carpet if you can. If you have carpet, vacuum weekly.   Use a dust mask and HEPA vacuum.   Pollen and Outdoor Mold  Some people are allergic to trees, grass, or weed pollen, or molds. Try to keep your windows closed.  Limit time out doors when pollen count is high.   Ask you health care provider about taking medicine during allergy season.     Animal Dander  Some people are allergic to skin flakes, urine or saliva from pets with fur or feathers. Keep  pets with fur or feathers out of your home.    If you can t keep the pet outdoors, then keep the pet out of your bedroom.  Keep the bedroom door closed.  Keep pets off cloth furniture and away from stuffed toys.     Mice, Rats, and Cockroaches  Some people are allergic to the waste from these pests.   Cover food and garbage.  Clean up spills and food crumbs.  Store grease in the refrigerator.   Keep food out of the bedroom.   Indoor Mold  This can be a trigger if your home has high moisture. Fix leaking faucets, pipes, or other sources of water.   Clean moldy surfaces.  Dehumidify basement if it is damp and smelly.   Smoke, Strong Odors, and Sprays  These can reduce air quality. Stay away from strong odors and sprays, such as perfume, powder, hair spray, paints, smoke incense, paint, cleaning products, candles and new carpet.   Exercise or Sports  Some people with asthma have this trigger. Be active!  Ask your doctor about taking medicine before sports or exercise to prevent symptoms.    Warm up for 5-10 minutes before and after sports or exercise.     Other Triggers of Asthma  Cold air:  Cover your nose and mouth with a scarf.  Sometimes laughing or crying can be a trigger.  Some medicines and food can trigger asthma.

## 2023-10-11 NOTE — PROGRESS NOTES
"  Assessment & Plan     Pelvic pain in female  Rupture of cyst of right ovary  Slowly improving. Discussed. Symptomatic treatment was discussed along when patient should call and/or come back into the clinic or go to ER/Urgent care. All questions answered.   - CBC with Platelets & Differential; Future  - LABELS; Future    Leukocytosis, unspecified type  Resolved   - CBC with Platelets & Differential; Future  - LABELS; Future    Nasal turbinate hypertrophy  Needs RF   - mometasone (NASONEX) 50 MCG/ACT nasal spray; PLACE 2 SPRAYS INTO EACH NOSTRIL DAILY    Abnormal uterine bleeding (AUB)  Discussed. Pt wants something done. Options given. Pt wants to discuss Uterine Ablation with Dr España  - Ob/Gyn  Referral    Attention deficit disorder (ADD) without hyperactivity  Overall doing well. Needed follow-up . This was done today . RF given . Follow-up in 6-9 months   - methylphenidate HCl ER, OSM, (CONCERTA) 36 MG CR tablet; Take 1 tablet (36 mg) by mouth 2 times daily for 30 days  - methylphenidate HCl ER, OSM, (CONCERTA) 36 MG CR tablet; Take 1 tablet (36 mg) by mouth 2 times daily for 30 days  - methylphenidate HCl ER, OSM, (CONCERTA) 36 MG CR tablet; Take 1 tablet (36 mg) by mouth 2 times daily for 30 days             BMI:   Estimated body mass index is 32.75 kg/m  as calculated from the following:    Height as of 6/7/23: 1.753 m (5' 9\").    Weight as of this encounter: 100.6 kg (221 lb 12.8 oz).           No follow-ups on file.    Sudhakar Cuellar MD  [facility] - KEN Zendejas is a 43 year old, presenting for the following health issues:  Follow Up (ED follow up)        10/11/2023     9:34 AM   Additional Questions   Roomed by Sebas Maldonado   Accompanied by          10/11/2023     9:34 AM   Patient Reported Additional Medications   Patient reports taking the following new medications None       HPI     ED/UC Followup:    Facility:  [facility]   Date of visit: " 9/30/2023  Reason for visit: Right ovarian cyst; Free fluid in pelvis; Abdominal pain; Vomiting without nausea,unspecified vomiting type  Current Status: States she still has some pain, but is better. Has pain on her left side that has been there since 2016, but has given up on it. States the pain has gotten worse since everything happened. States that when she urinates it hurts in the lower abdominal. States that she has been very sleepy.     ER note reviewed and work up all reviewed     Pt having also prolong menses and AUB - has implanon      Review of Systems   Constitutional, HEENT, cardiovascular, pulmonary, gi and gu systems are negative, except as otherwise noted.      Objective    /82 (BP Location: Right arm, Patient Position: Sitting, Cuff Size: Adult Large)   Pulse 61   Temp 97.4  F (36.3  C) (Tympanic)   Wt 100.6 kg (221 lb 12.8 oz)   SpO2 96%   BMI 32.75 kg/m    Body mass index is 32.75 kg/m .  Physical Exam   GENERAL: healthy, alert and no distress  ABDOMEN: soft, tender still in LLQ - some guarding , no hepatosplenomegaly, no masses and bowel sounds normal        Results for orders placed or performed in visit on 10/11/23   CBC with platelets and differential     Status: None   Result Value Ref Range    WBC Count 7.9 4.0 - 11.0 10e3/uL    RBC Count 4.35 3.80 - 5.20 10e6/uL    Hemoglobin 12.7 11.7 - 15.7 g/dL    Hematocrit 37.0 35.0 - 47.0 %    MCV 85 78 - 100 fL    MCH 29.2 26.5 - 33.0 pg    MCHC 34.3 31.5 - 36.5 g/dL    RDW 12.6 10.0 - 15.0 %    Platelet Count 416 150 - 450 10e3/uL    % Neutrophils 61 %    % Lymphocytes 27 %    % Monocytes 9 %    Mids % (Monos, Eos, Basos)      % Eosinophils 2 %    % Basophils 1 %    % Immature Granulocytes 0 %    NRBCs per 100 WBC 0 <1 /100    Absolute Neutrophils 4.9 1.6 - 8.3 10e3/uL    Absolute Lymphocytes 2.1 0.8 - 5.3 10e3/uL    Absolute Monocytes 0.7 0.0 - 1.3 10e3/uL    Mids Abs (Monos, Eos, Basos)      Absolute Eosinophils 0.1 0.0 - 0.7 10e3/uL     Absolute Basophils 0.1 0.0 - 0.2 10e3/uL    Absolute Immature Granulocytes 0.0 <=0.4 10e3/uL    Absolute NRBCs 0.0 10e3/uL   CBC with Platelets & Differential     Status: None    Narrative    The following orders were created for panel order CBC with Platelets & Differential.  Procedure                               Abnormality         Status                     ---------                               -----------         ------                     CBC with platelets and d...[[id number]]                      Final result                 Please view results for these tests on the individual orders.                   Answers submitted by the patient for this visit:  Patient Health Questionnaire (Submitted on 10/11/2023)  If you checked off any problems, how difficult have these problems made it for you to do your work, take care of things at home, or get along with other people?: Somewhat difficult  PHQ9 TOTAL SCORE: 10  DAFNE-7 (Submitted on 10/11/2023)  DAFNE 7 TOTAL SCORE: 10

## 2023-10-12 RX ORDER — METHYLPHENIDATE HYDROCHLORIDE 36 MG/1
TABLET, EXTENDED RELEASE ORAL
Qty: 60 TABLET | Refills: 0 | OUTPATIENT
Start: 2023-10-12

## 2023-10-20 ENCOUNTER — PREP FOR PROCEDURE (OUTPATIENT)
Dept: OBGYN | Facility: OTHER | Age: 43
End: 2023-10-20

## 2023-10-20 ENCOUNTER — OFFICE VISIT (OUTPATIENT)
Dept: OBGYN | Facility: OTHER | Age: 43
End: 2023-10-20
Attending: OBSTETRICS & GYNECOLOGY
Payer: COMMERCIAL

## 2023-10-20 VITALS
SYSTOLIC BLOOD PRESSURE: 138 MMHG | OXYGEN SATURATION: 99 % | WEIGHT: 221 LBS | BODY MASS INDEX: 32.73 KG/M2 | DIASTOLIC BLOOD PRESSURE: 80 MMHG | HEART RATE: 99 BPM | HEIGHT: 69 IN

## 2023-10-20 DIAGNOSIS — N93.9 ABNORMAL UTERINE BLEEDING (AUB): ICD-10-CM

## 2023-10-20 DIAGNOSIS — N92.1 MENOMETRORRHAGIA: ICD-10-CM

## 2023-10-20 DIAGNOSIS — N93.9 ABNORMAL UTERINE BLEEDING (AUB): Primary | ICD-10-CM

## 2023-10-20 DIAGNOSIS — N93.9 ABNORMAL UTERINE BLEEDING: Primary | ICD-10-CM

## 2023-10-20 DIAGNOSIS — N92.0 MENORRHAGIA: ICD-10-CM

## 2023-10-20 DIAGNOSIS — N92.0 MENORRHAGIA: Primary | ICD-10-CM

## 2023-10-20 PROCEDURE — G0463 HOSPITAL OUTPT CLINIC VISIT: HCPCS | Mod: 25

## 2023-10-20 PROCEDURE — 58100 BIOPSY OF UTERUS LINING: CPT | Performed by: OBSTETRICS & GYNECOLOGY

## 2023-10-20 PROCEDURE — 88305 TISSUE EXAM BY PATHOLOGIST: CPT | Mod: TC | Performed by: OBSTETRICS & GYNECOLOGY

## 2023-10-20 PROCEDURE — 99214 OFFICE O/P EST MOD 30 MIN: CPT | Mod: 25 | Performed by: OBSTETRICS & GYNECOLOGY

## 2023-10-20 PROCEDURE — 88305 TISSUE EXAM BY PATHOLOGIST: CPT | Mod: 26 | Performed by: STUDENT IN AN ORGANIZED HEALTH CARE EDUCATION/TRAINING PROGRAM

## 2023-10-20 ASSESSMENT — PAIN SCALES - GENERAL: PAINLEVEL: MILD PAIN (2)

## 2023-10-25 LAB
PATH REPORT.COMMENTS IMP SPEC: NORMAL
PATH REPORT.COMMENTS IMP SPEC: NORMAL
PATH REPORT.FINAL DX SPEC: NORMAL
PATH REPORT.GROSS SPEC: NORMAL
PATH REPORT.MICROSCOPIC SPEC OTHER STN: NORMAL
PATH REPORT.RELEVANT HX SPEC: NORMAL
PHOTO IMAGE: NORMAL

## 2023-11-14 NOTE — H&P (VIEW-ONLY)
[facility] - [place]  [address]  [place] [address]  Phone: [phone]  Primary Provider: Sudhakar Cuellar  Pre-op Performing Provider: QUIRINO REYNOLDS      PREOPERATIVE EVALUATION:  Today's date: 11/15/2023    Cristiana is a 43 year old female who presents for a preoperative evaluation.      11/15/2023     8:48 AM   Additional Questions   Roomed by Elen Mills CMA   Accompanied by Self         11/15/2023     8:48 AM   Patient Reported Additional Medications   Patient reports taking the following new medications None       Surgical Information:  Surgery/Procedure: Hysteroscopy, Endometrial Ablation  Sur[place] Location: [facility] [place]  Surgeon: Dr. España  Surgery Date: 11/22/23  Time of Surgery: TBD  Where patient plans to recover: At home with family  Fax number for surgical facility: Note does not need to be faxed, will be available electronically in Epic.    Assessment & Plan     The proposed surgical procedure is considered INTERMEDIATE risk.    Preop general physical exam  Abnormal uterine bleeding (AUB)  Cleared for procedure   - CBC with platelets and differential; Future  - Basic metabolic panel; Future  - EKG 12-lead complete w/read - (Clinic Performed)  - HCG Qual, Urine ([id number]); Future  - CBC with platelets and differential  - Basic metabolic panel  - HCG Qual, Urine ([id number])           Risks and Recommendations:  The patient has the following additional risks and recommendations for perioperative complications:  Social and Substance:    - Active nicotine user, advised smoking cessation    Antiplatelet or Anticoagulation Medication Instructions:   - Patient is on no antiplatelet or anticoagulation medications.    Additional Medication Instructions:  Patient is to take all scheduled medications on the day of surgery EXCEPT for modifications listed below:   - Psychostimulants: Hold the day of surgery   - SSRIs, SNRIs, TCAs, Antipsychotics: Continue without modification.    - rescue Inhaler:  Continue PRN. Bring to hospital on the day of surgery.    RECOMMENDATION:  APPROVAL GIVEN to proceed with proposed procedure, without further diagnostic evaluation.    Ordering of each unique test  Prescription drug management      Subjective       HPI related to upcoming procedure: history of abnormal uterine bleeding with unsuccessful medical management          11/15/2023     8:56 AM   Preop Questions   1. Have you ever had a heart attack or stroke? No   2. Have you ever had surgery on your heart or blood vessels, such as a stent placement, a coronary artery bypass, or surgery on an artery in your head, neck, heart, or legs? No   3. Do you have chest pain with activity? No   4. Do you have a history of  heart failure? No   5. Do you currently have a cold, bronchitis or symptoms of other infection? No   6. Do you have a cough, shortness of breath, or wheezing? No   7. Do you or anyone in your family have previous history of blood clots? No   8. Do you or does anyone in your family have a serious bleeding problem such as prolonged bleeding following surgeries or cuts? No   9. Have you ever had problems with anemia or been told to take iron pills? YES - during pregnancy    10. Have you had any abnormal blood loss such as black, tarry or bloody stools, or abnormal vaginal bleeding? YES - abnormal vaginal bleeding    11. Have you ever had a blood transfusion? No   12. Are you willing to have a blood transfusion if it is medically needed before, during, or after your surgery? Yes   13. Have you or any of your relatives ever had problems with anesthesia? No   14. Do you have sleep apnea, excessive snoring or daytime drowsiness? No   15. Do you have any artifical heart valves or other implanted medical devices like a pacemaker, defibrillator, or continuous glucose monitor? No   16. Do you have artificial joints? No   17. Are you allergic to latex? YES: rash    18. Is there any chance that you may be pregnant? No      Health Care Directive:  Patient does not have a Health Care Directive or Living Will: Advance Directive received and scanned. Click on Code in the patient header to view.    Preoperative Review of :   reviewed - controlled substances reflected in medication list.      Status of Chronic Conditions:  See problem list for active medical problems.  Problems all longstanding and stable, except as noted/documented.  See ROS for pertinent symptoms related to these conditions.    DEPRESSION - Patient has a long history of Depression of moderate severity requiring medication for control with recent symptoms being situational up and down..Current symptoms of depression include fatigue, anxiety, irritablility.     Review of Systems  CONSTITUTIONAL: NEGATIVE for fever, chills, change in weight  INTEGUMENTARY/SKIN: NEGATIVE for worrisome rashes, moles or lesions  EYES: NEGATIVE for vision changes or irritation  ENT/MOUTH: NEGATIVE for ear, mouth and throat problems  RESP: NEGATIVE for significant cough or SOB  CV: NEGATIVE for chest pain, palpitations or peripheral edema  GI: NEGATIVE for nausea, abdominal pain, heartburn, or change in bowel habits   female: chronic vaginal bleeding   MUSCULOSKELETAL: NEGATIVE for significant arthralgias or myalgia  NEURO: NEGATIVE for weakness, dizziness or paresthesias  ENDOCRINE: NEGATIVE for temperature intolerance, skin/hair changes  HEME: NEGATIVE for bleeding problems  PSYCHIATRIC: HX anxiety and HX depression    Patient Active Problem List    Diagnosis Date Noted    Ankle instability, unspecified laterality 09/15/2022     Priority: Medium     Formatting of this note might be different from the original.  Added automatically from request for surgery [id number]      Ankle pain 07/30/2021     Priority: Medium     Formatting of this note might be different from the original.  Added automatically from request for surgery [id number]  Formatting of this note might be different from the  original.  Added automatically from request for surgery [id number]      Knee stiffness, left 07/30/2021     Priority: Medium    Muscle weakness 07/30/2021     Priority: Medium    Encounter for initial prescription of implantable subdermal contraceptive 03/11/2021     Priority: Medium    Psychophysiological insomnia 12/21/2020     Priority: Medium    Mild intermittent reactive airway disease with wheezing without complication 12/21/2020     Priority: Medium    Encounter for insertion of intrauterine contraceptive device (IUD) 04/29/2020     Priority: Medium    Well woman exam with routine gynecological exam 02/14/2018     Priority: Medium    Irritable bowel syndrome with both constipation and diarrhea 08/02/2017     Priority: Medium    Recurrent major depressive disorder, in partial remission (H24) 08/02/2017     Priority: Medium    ACP (advance care planning) 03/02/2016     Priority: Medium     Advance Care Planning 3/2/2016: Receipt of ACP document:  Received: Health Care Directive which was witnessed or notarized on 1-25-16.  Document previously scanned on 2-5-16.  Validation form completed and sent to be scanned.  Code Status needs to be updated to reflect choices in most recent ACP document.  Confirmed/documented designated decision maker(s).  Added by Rachel Barber RN Advance Care Planning Liaison with [employer]            FH: breast cancer      Priority: Medium    Generalized anxiety disorder      Priority: Medium     Diagnosis updated by automated process. Provider to review and confirm.      Attention deficit disorder 05/31/2005     Priority: Medium     Problem list name updated by automated process. 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    FH: breast cancer     DAFNE (generalised anxiety disorder)     Irritable bowel syndrome with both constipation and  diarrhea 08/02/2017    MDD (major depressive disorder)     Migraine, unspecified, without mention of intractable migraine without mention of status migrainosus 03/13/2000    Tobacco abuse, in remission     Unspecified sinusitis (chronic) 03/08/2001     Past Surgical History:   Procedure Laterality Date    ENDOSCOPY UPPER, COLONOSCOPY, COMBINED N/A 9/4/2015    Procedure: COMBINED ENDOSCOPY UPPER, COLONOSCOPY;  Surgeon: Griffin Barrientos DO;  Location: [facility]    LAPAROSCOPIC CYSTECTOMY OVARIAN (BENIGN) Right 1/27/2016    Procedure: LAPAROSCOPIC CYSTECTOMY OVARIAN (BENIGN);  Surgeon: Kuldip España MD;  Location: [facility]    LAPAROSCOPIC SALPINGO-OOPHORECTOMY Left 1/27/2016    Procedure: LAPAROSCOPIC SALPINGO-OOPHORECTOMY;  Surgeon: Kuldip España MD;  Location: [facility]    LAPAROSCOPY DIAGNOSTIC (GYN) Left 1/27/2016    Procedure: LAPAROSCOPY DIAGNOSTIC (GYN);  Surgeon: Kuldip España MD;  Location: [facility]    TUBAL LIGATION  2006    wisdom teeth       Current Outpatient Medications   Medication Sig Dispense Refill    citalopram (CELEXA) 40 MG tablet       CONCERTA 36 MG CR tablet Take 1 tablet (36 mg) by mouth 2 times daily 60 tablet 0    diclofenac (VOLTAREN) 1 % topical gel APPLY 2G TOPICALLY 3 TIMES DAILY AS NEEDED FOR PAIN. APPLY TO AFFECTED JOINT AND RUB INTO SKIN GENTLY. 100 g 4    docusate sodium (COLACE) 100 MG capsule Take 1 capsule (100 mg) by mouth 2 times daily 180 capsule 3    etonogestrel (NEXPLANON) 68 MG IMPL 1 each by Subdermal route once Placed in 3/11/2021      fexofenadine (ALLEGRA) 180 MG tablet TAKE 1 TABLET BY MOUTH DAILY 30 tablet 11    hydrOXYzine (ATARAX) 25 MG tablet TAKE 1 TO 2 TABLETS BY MOUTH 3 TIMES A DAY AS NEEDED 60 tablet 5    [START ON 12/12/2023] methylphenidate HCl ER, OSM, (CONCERTA) 36 MG CR tablet Take 1 tablet (36 mg) by mouth 2 times daily for 30 days 60 tablet 0    mometasone (NASONEX) 50 MCG/ACT nasal spray PLACE 2 SPRAYS INTO EACH NOSTRIL DAILY 17 g 11    omeprazole (PRILOSEC) 40  "MG DR capsule TAKE 1 CAPSULE BY MOUTH DAILY, TAKE 30 TO 60 MINUTES PRIOR TO A MEAL 90 capsule 3    traZODone (DESYREL) 100 MG tablet TAKE 2-3 TABLETS BY MOUTH DAILY AT BEDTIME 90 tablet 5    VENTOLIN  (90 Base) MCG/ACT inhaler INHALE 2 PUFFS INTO LUNGS EVERY 4 HOURS AS NEEDED FOR SHORTNESS OF BREATH 18 g 3    Acetaminophen (TYLENOL PO) Take by mouth every 6 hours as needed  (Patient not taking: Reported on 11/15/2023)      EPINEPHrine (ANY BX GENERIC EQUIV) 0.3 MG/0.3ML injection 2-pack INJECT 1 SYRINGE INTO THE MUSCLE AS NEEDED FOR ANAPHYLAXIS (Patient not taking: Reported on 11/15/2023) 2 each 2    methylphenidate HCl ER, OSM, (CONCERTA) 36 MG CR tablet Take 1 tablet (36 mg) by mouth 2 times daily for 30 days 60 tablet 0       Allergies   Allergen Reactions    Food Anaphylaxis     Artificial cinnamon   Artificial cinnamon     Bee Venom      Bee venom (honey bee)      Other Environmental Allergy     Sulfa Antibiotics      Sulfa (sulfonamide antibiotics)       Amoxicillin Diarrhea    Latex Rash        Social History     Tobacco Use    Smoking status: Former     Packs/day: 0.50     Years: 13.00     Additional pack years: 0.00     Total pack years: 6.50     Types: Cigarettes     Quit date: 2021     Years since quittin.8     Passive exposure: Past    Smokeless tobacco: Never    Tobacco comments:     pt denies quit plan 2022   Substance Use Topics    Alcohol use: Yes     Alcohol/week: 0.0 standard drinks of alcohol     Comment: frequency: rarely     Family History   Problem Relation Age of Onset    Breast Cancer Maternal Grandmother     Hypertension Maternal Grandfather     Hyperlipidemia Maternal Grandfather     Diabetes Paternal Grandmother     Asthma No family hx of      History   Drug Use No         Objective     /82   Pulse 90   Temp 96.9  F (36.1  C) (Tympanic)   Ht 1.753 m (5' 9\")   Wt 98.9 kg (218 lb)   SpO2 99%   BMI 32.19 kg/m      Physical Exam    GENERAL APPEARANCE: alert, " active, and no distress     EYES: EOMI, PERRL     HENT: ear canals and TM's normal and nose and mouth without ulcers or lesions     NECK: no adenopathy, no asymmetry, masses, or scars and thyroid normal to palpation     RESP: lungs clear to auscultation - no rales, rhonchi or wheezes     CV: regular rates and rhythm, normal S1 S2, no S3 or S4 and no murmur, click or rub     ABDOMEN: LLQ mild discomfort - no change      MS: extremities normal- no gross deformities noted, no evidence of inflammation in joints, FROM in all extremities.     SKIN: no suspicious lesions or rashes     NEURO: Normal strength and tone, sensory exam grossly normal, mentation intact and speech normal     PSYCH: mentation appears normal and anxious     LYMPHATICS: No cervical adenopathy    Recent Labs   Lab Test 10/11/23  0914 09/30/23 2126 06/07/23  0950   HGB 12.7 14.1 14.5    276 247   NA  --  136 136   POTASSIUM  --  3.5 3.5   CR  --  0.68 0.75        Diagnostics:  Recent Results (from the past 24 hour(s))   Basic metabolic panel    Collection Time: 11/15/23  8:48 AM   Result Value Ref Range    Sodium 136 135 - 145 mmol/L    Potassium 3.7 3.4 - 5.3 mmol/L    Chloride 102 98 - 107 mmol/L    Carbon Dioxide (CO2) 26 22 - 29 mmol/L    Anion Gap 8 7 - 15 mmol/L    Urea Nitrogen 10.1 6.0 - 20.0 mg/dL    Creatinine 0.75 0.51 - 0.95 mg/dL    GFR Estimate >90 >60 mL/min/1.73m2    Calcium 9.0 8.6 - 10.0 mg/dL    Glucose 137 (H) 70 - 99 mg/dL   HCG Qual, Urine ([id number])    Collection Time: 11/15/23  8:48 AM   Result Value Ref Range    hCG Urine Qualitative Negative Negative   CBC with platelets and differential    Collection Time: 11/15/23  8:48 AM   Result Value Ref Range    WBC Count 8.6 4.0 - 11.0 10e3/uL    RBC Count 4.93 3.80 - 5.20 10e6/uL    Hemoglobin 14.5 11.7 - 15.7 g/dL    Hematocrit 42.2 35.0 - 47.0 %    MCV 86 78 - 100 fL    MCH 29.4 26.5 - 33.0 pg    MCHC 34.4 31.5 - 36.5 g/dL    RDW 12.9 10.0 - 15.0 %    Platelet Count 282 150 -  450 10e3/uL    % Neutrophils 56 %    % Lymphocytes 35 %    % Monocytes 6 %    % Eosinophils 2 %    % Basophils 1 %    % Immature Granulocytes 0 %    NRBCs per 100 WBC 0 <1 /100    Absolute Neutrophils 4.9 1.6 - 8.3 10e3/uL    Absolute Lymphocytes 3.0 0.8 - 5.3 10e3/uL    Absolute Monocytes 0.5 0.0 - 1.3 10e3/uL    Absolute Eosinophils 0.2 0.0 - 0.7 10e3/uL    Absolute Basophils 0.1 0.0 - 0.2 10e3/uL    Absolute Immature Granulocytes 0.0 <=0.4 10e3/uL    Absolute NRBCs 0.0 10e3/uL   EKG 12-lead complete w/read - (Clinic Performed)    Collection Time: 11/15/23  9:21 AM   Result Value Ref Range    Systolic Blood Pressure  mmHg    Diastolic Blood Pressure  mmHg    Ventricular Rate 74 BPM    Atrial Rate 74 BPM    PA Interval 134 ms    QRS Duration 76 ms     ms    QTc 424 ms    P Axis 33 degrees    R AXIS 51 degrees    T Axis 38 degrees    Interpretation ECG       Sinus rhythm  Normal ECG  No previous ECGs available        EKG: appears normal, NSR, normal axis, normal intervals, no acute ST/T changes c/w ischemia, no LVH by voltage criteria    Revised Cardiac Risk Index (RCRI):  The patient has the following serious cardiovascular risks for perioperative complications:   - No serious cardiac risks = 0 points     RCRI Interpretation: 1 point: Class II (low risk - 0.9% complication rate)         Signed Electronically by: WINNIE Arroyo CNP  Copy of this evaluation report is provided to requesting physician.

## 2023-11-14 NOTE — PROGRESS NOTES
[facility] - [place]  [address]  [place] [address]  Phone: [phone]  Primary Provider: Sudhakar Cuellar  Pre-op Performing Provider: QUIRINO REYNOLDS      PREOPERATIVE EVALUATION:  Today's date: 11/15/2023    Cristiana is a 43 year old female who presents for a preoperative evaluation.      11/15/2023     8:48 AM   Additional Questions   Roomed by Elen Mills CMA   Accompanied by Self         11/15/2023     8:48 AM   Patient Reported Additional Medications   Patient reports taking the following new medications None       Surgical Information:  Surgery/Procedure: Hysteroscopy, Endometrial Ablation  [place] [facility] [place] Dr. España  Surgery Date: 11/22/23  Time of Surgery: TBD  Where patient plans to recover: At home with family  Fax number for surgical facility: Note does not need to be faxed, will be available electronically in Epic.    Assessment & Plan     The proposed surgical procedure is considered INTERMEDIATE risk.    Preop general physical exam  Abnormal uterine bleeding (AUB)  Cleared for procedure   - CBC with platelets and differential; Future  - Basic metabolic panel; Future  - EKG 12-lead complete w/read - (Clinic Performed)  - HCG Qual, Urine ([id number]); Future  - CBC with platelets and differential  - Basic metabolic panel  - HCG Qual, Urine ([id number])           Risks and Recommendations:  The patient has the following additional risks and recommendations for perioperative complications:  Social and Substance:    - Active nicotine user, advised smoking cessation    Antiplatelet or Anticoagulation Medication Instructions:   - Patient is on no antiplatelet or anticoagulation medications.    Additional Medication Instructions:  Patient is to take all scheduled medications on the day of surgery EXCEPT for modifications listed below:   - Psychostimulants: Hold the day of surgery   - SSRIs, SNRIs, TCAs, Antipsychotics: Continue without modification.    - rescue Inhaler:  Continue PRN. Bring to hospital on the day of surgery.    RECOMMENDATION:  APPROVAL GIVEN to proceed with proposed procedure, without further diagnostic evaluation.    Ordering of each unique test  Prescription drug management      Subjective       HPI related to upcoming procedure: history of abnormal uterine bleeding with unsuccessful medical management          11/15/2023     8:56 AM   Preop Questions   1. Have you ever had a heart attack or stroke? No   2. Have you ever had surgery on your heart or blood vessels, such as a stent placement, a coronary artery bypass, or surgery on an artery in your head, neck, heart, or legs? No   3. Do you have chest pain with activity? No   4. Do you have a history of  heart failure? No   5. Do you currently have a cold, bronchitis or symptoms of other infection? No   6. Do you have a cough, shortness of breath, or wheezing? No   7. Do you or anyone in your family have previous history of blood clots? No   8. Do you or does anyone in your family have a serious bleeding problem such as prolonged bleeding following surgeries or cuts? No   9. Have you ever had problems with anemia or been told to take iron pills? YES - during pregnancy    10. Have you had any abnormal blood loss such as black, tarry or bloody stools, or abnormal vaginal bleeding? YES - abnormal vaginal bleeding    11. Have you ever had a blood transfusion? No   12. Are you willing to have a blood transfusion if it is medically needed before, during, or after your surgery? Yes   13. Have you or any of your relatives ever had problems with anesthesia? No   14. Do you have sleep apnea, excessive snoring or daytime drowsiness? No   15. Do you have any artifical heart valves or other implanted medical devices like a pacemaker, defibrillator, or continuous glucose monitor? No   16. Do you have artificial joints? No   17. Are you allergic to latex? YES: rash    18. Is there any chance that you may be pregnant? No      Health Care Directive:  Patient does not have a Health Care Directive or Living Will: Advance Directive received and scanned. Click on Code in the patient header to view.    Preoperative Review of :   reviewed - controlled substances reflected in medication list.      Status of Chronic Conditions:  See problem list for active medical problems.  Problems all longstanding and stable, except as noted/documented.  See ROS for pertinent symptoms related to these conditions.    DEPRESSION - Patient has a long history of Depression of moderate severity requiring medication for control with recent symptoms being situational up and down..Current symptoms of depression include fatigue, anxiety, irritablility.     Review of Systems  CONSTITUTIONAL: NEGATIVE for fever, chills, change in weight  INTEGUMENTARY/SKIN: NEGATIVE for worrisome rashes, moles or lesions  EYES: NEGATIVE for vision changes or irritation  ENT/MOUTH: NEGATIVE for ear, mouth and throat problems  RESP: NEGATIVE for significant cough or SOB  CV: NEGATIVE for chest pain, palpitations or peripheral edema  GI: NEGATIVE for nausea, abdominal pain, heartburn, or change in bowel habits   female: chronic vaginal bleeding   MUSCULOSKELETAL: NEGATIVE for significant arthralgias or myalgia  NEURO: NEGATIVE for weakness, dizziness or paresthesias  ENDOCRINE: NEGATIVE for temperature intolerance, skin/hair changes  HEME: NEGATIVE for bleeding problems  PSYCHIATRIC: HX anxiety and HX depression    Patient Active Problem List    Diagnosis Date Noted    Ankle instability, unspecified laterality 09/15/2022     Priority: Medium     Formatting of this note might be different from the original.  Added automatically from request for surgery [id number]      Ankle pain 07/30/2021     Priority: Medium     Formatting of this note might be different from the original.  Added automatically from request for surgery [id number]  Formatting of this note might be different from the  original.  Added automatically from request for surgery [id number]      Knee stiffness, left 07/30/2021     Priority: Medium    Muscle weakness 07/30/2021     Priority: Medium    Encounter for initial prescription of implantable subdermal contraceptive 03/11/2021     Priority: Medium    Psychophysiological insomnia 12/21/2020     Priority: Medium    Mild intermittent reactive airway disease with wheezing without complication 12/21/2020     Priority: Medium    Encounter for insertion of intrauterine contraceptive device (IUD) 04/29/2020     Priority: Medium    Well woman exam with routine gynecological exam 02/14/2018     Priority: Medium    Irritable bowel syndrome with both constipation and diarrhea 08/02/2017     Priority: Medium    Recurrent major depressive disorder, in partial remission (H24) 08/02/2017     Priority: Medium    ACP (advance care planning) 03/02/2016     Priority: Medium     Advance Care Planning 3/2/2016: Receipt of ACP document:  Received: Health Care Directive which was witnessed or notarized on 1-25-16.  Document previously scanned on 2-5-16.  Validation form completed and sent to be scanned.  Code Status needs to be updated to reflect choices in most recent ACP document.  Confirmed/documented designated decision maker(s).  Added by Rachel Barber RN Advance Care Planning Liaison with [employer]            FH: breast cancer      Priority: Medium    Generalized anxiety disorder      Priority: Medium     Diagnosis updated by automated process. Provider to review and confirm.      Attention deficit disorder 05/31/2005     Priority: Medium     Problem list name updated by automated process. 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    FH: breast cancer     DAFNE (generalised anxiety disorder)     Irritable bowel syndrome with both constipation and  diarrhea 08/02/2017    MDD (major depressive disorder)     Migraine, unspecified, without mention of intractable migraine without mention of status migrainosus 03/13/2000    Tobacco abuse, in remission     Unspecified sinusitis (chronic) 03/08/2001     Past Surgical History:   Procedure Laterality Date    ENDOSCOPY UPPER, COLONOSCOPY, COMBINED N/A 9/4/2015    Procedure: COMBINED ENDOSCOPY UPPER, COLONOSCOPY;  Surgeon: Griffin Barrientos DO;  Location: [facility]    LAPAROSCOPIC CYSTECTOMY OVARIAN (BENIGN) Right 1/27/2016    Procedure: LAPAROSCOPIC CYSTECTOMY OVARIAN (BENIGN);  Surgeon: Kuldip España MD;  Location: [facility]    LAPAROSCOPIC SALPINGO-OOPHORECTOMY Left 1/27/2016    Procedure: LAPAROSCOPIC SALPINGO-OOPHORECTOMY;  Surgeon: Kuldip España MD;  Location: [facility]    LAPAROSCOPY DIAGNOSTIC (GYN) Left 1/27/2016    Procedure: LAPAROSCOPY DIAGNOSTIC (GYN);  Surgeon: Kuldip España MD;  Location: [facility]    TUBAL LIGATION  2006    wisdom teeth       Current Outpatient Medications   Medication Sig Dispense Refill    citalopram (CELEXA) 40 MG tablet       CONCERTA 36 MG CR tablet Take 1 tablet (36 mg) by mouth 2 times daily 60 tablet 0    diclofenac (VOLTAREN) 1 % topical gel APPLY 2G TOPICALLY 3 TIMES DAILY AS NEEDED FOR PAIN. APPLY TO AFFECTED JOINT AND RUB INTO SKIN GENTLY. 100 g 4    docusate sodium (COLACE) 100 MG capsule Take 1 capsule (100 mg) by mouth 2 times daily 180 capsule 3    etonogestrel (NEXPLANON) 68 MG IMPL 1 each by Subdermal route once Placed in 3/11/2021      fexofenadine (ALLEGRA) 180 MG tablet TAKE 1 TABLET BY MOUTH DAILY 30 tablet 11    hydrOXYzine (ATARAX) 25 MG tablet TAKE 1 TO 2 TABLETS BY MOUTH 3 TIMES A DAY AS NEEDED 60 tablet 5    [START ON 12/12/2023] methylphenidate HCl ER, OSM, (CONCERTA) 36 MG CR tablet Take 1 tablet (36 mg) by mouth 2 times daily for 30 days 60 tablet 0    mometasone (NASONEX) 50 MCG/ACT nasal spray PLACE 2 SPRAYS INTO EACH NOSTRIL DAILY 17 g 11    omeprazole (PRILOSEC) 40  "MG DR capsule TAKE 1 CAPSULE BY MOUTH DAILY, TAKE 30 TO 60 MINUTES PRIOR TO A MEAL 90 capsule 3    traZODone (DESYREL) 100 MG tablet TAKE 2-3 TABLETS BY MOUTH DAILY AT BEDTIME 90 tablet 5    VENTOLIN  (90 Base) MCG/ACT inhaler INHALE 2 PUFFS INTO LUNGS EVERY 4 HOURS AS NEEDED FOR SHORTNESS OF BREATH 18 g 3    Acetaminophen (TYLENOL PO) Take by mouth every 6 hours as needed  (Patient not taking: Reported on 11/15/2023)      EPINEPHrine (ANY BX GENERIC EQUIV) 0.3 MG/0.3ML injection 2-pack INJECT 1 SYRINGE INTO THE MUSCLE AS NEEDED FOR ANAPHYLAXIS (Patient not taking: Reported on 11/15/2023) 2 each 2    methylphenidate HCl ER, OSM, (CONCERTA) 36 MG CR tablet Take 1 tablet (36 mg) by mouth 2 times daily for 30 days 60 tablet 0       Allergies   Allergen Reactions    Food Anaphylaxis     Artificial cinnamon   Artificial cinnamon     Bee Venom      Bee venom (honey bee)      Other Environmental Allergy     Sulfa Antibiotics      Sulfa (sulfonamide antibiotics)       Amoxicillin Diarrhea    Latex Rash        Social History     Tobacco Use    Smoking status: Former     Packs/day: 0.50     Years: 13.00     Additional pack years: 0.00     Total pack years: 6.50     Types: Cigarettes     Quit date: 2021     Years since quittin.8     Passive exposure: Past    Smokeless tobacco: Never    Tobacco comments:     pt denies quit plan 2022   Substance Use Topics    Alcohol use: Yes     Alcohol/week: 0.0 standard drinks of alcohol     Comment: frequency: rarely     Family History   Problem Relation Age of Onset    Breast Cancer Maternal Grandmother     Hypertension Maternal Grandfather     Hyperlipidemia Maternal Grandfather     Diabetes Paternal Grandmother     Asthma No family hx of      History   Drug Use No         Objective     /82   Pulse 90   Temp 96.9  F (36.1  C) (Tympanic)   Ht 1.753 m (5' 9\")   Wt 98.9 kg (218 lb)   SpO2 99%   BMI 32.19 kg/m      Physical Exam    GENERAL APPEARANCE: alert, " active, and no distress     EYES: EOMI, PERRL     HENT: ear canals and TM's normal and nose and mouth without ulcers or lesions     NECK: no adenopathy, no asymmetry, masses, or scars and thyroid normal to palpation     RESP: lungs clear to auscultation - no rales, rhonchi or wheezes     CV: regular rates and rhythm, normal S1 S2, no S3 or S4 and no murmur, click or rub     ABDOMEN: LLQ mild discomfort - no change      MS: extremities normal- no gross deformities noted, no evidence of inflammation in joints, FROM in all extremities.     SKIN: no suspicious lesions or rashes     NEURO: Normal strength and tone, sensory exam grossly normal, mentation intact and speech normal     PSYCH: mentation appears normal and anxious     LYMPHATICS: No cervical adenopathy    Recent Labs   Lab Test 10/11/23  0914 09/30/23 2126 06/07/23  0950   HGB 12.7 14.1 14.5    276 247   NA  --  136 136   POTASSIUM  --  3.5 3.5   CR  --  0.68 0.75        Diagnostics:  Recent Results (from the past 24 hour(s))   Basic metabolic panel    Collection Time: 11/15/23  8:48 AM   Result Value Ref Range    Sodium 136 135 - 145 mmol/L    Potassium 3.7 3.4 - 5.3 mmol/L    Chloride 102 98 - 107 mmol/L    Carbon Dioxide (CO2) 26 22 - 29 mmol/L    Anion Gap 8 7 - 15 mmol/L    Urea Nitrogen 10.1 6.0 - 20.0 mg/dL    Creatinine 0.75 0.51 - 0.95 mg/dL    GFR Estimate >90 >60 mL/min/1.73m2    Calcium 9.0 8.6 - 10.0 mg/dL    Glucose 137 (H) 70 - 99 mg/dL   HCG Qual, Urine ([id number])    Collection Time: 11/15/23  8:48 AM   Result Value Ref Range    hCG Urine Qualitative Negative Negative   CBC with platelets and differential    Collection Time: 11/15/23  8:48 AM   Result Value Ref Range    WBC Count 8.6 4.0 - 11.0 10e3/uL    RBC Count 4.93 3.80 - 5.20 10e6/uL    Hemoglobin 14.5 11.7 - 15.7 g/dL    Hematocrit 42.2 35.0 - 47.0 %    MCV 86 78 - 100 fL    MCH 29.4 26.5 - 33.0 pg    MCHC 34.4 31.5 - 36.5 g/dL    RDW 12.9 10.0 - 15.0 %    Platelet Count 282 150 -  450 10e3/uL    % Neutrophils 56 %    % Lymphocytes 35 %    % Monocytes 6 %    % Eosinophils 2 %    % Basophils 1 %    % Immature Granulocytes 0 %    NRBCs per 100 WBC 0 <1 /100    Absolute Neutrophils 4.9 1.6 - 8.3 10e3/uL    Absolute Lymphocytes 3.0 0.8 - 5.3 10e3/uL    Absolute Monocytes 0.5 0.0 - 1.3 10e3/uL    Absolute Eosinophils 0.2 0.0 - 0.7 10e3/uL    Absolute Basophils 0.1 0.0 - 0.2 10e3/uL    Absolute Immature Granulocytes 0.0 <=0.4 10e3/uL    Absolute NRBCs 0.0 10e3/uL   EKG 12-lead complete w/read - (Clinic Performed)    Collection Time: 11/15/23  9:21 AM   Result Value Ref Range    Systolic Blood Pressure  mmHg    Diastolic Blood Pressure  mmHg    Ventricular Rate 74 BPM    Atrial Rate 74 BPM    LA Interval 134 ms    QRS Duration 76 ms     ms    QTc 424 ms    P Axis 33 degrees    R AXIS 51 degrees    T Axis 38 degrees    Interpretation ECG       Sinus rhythm  Normal ECG  No previous ECGs available        EKG: appears normal, NSR, normal axis, normal intervals, no acute ST/T changes c/w ischemia, no LVH by voltage criteria    Revised Cardiac Risk Index (RCRI):  The patient has the following serious cardiovascular risks for perioperative complications:   - No serious cardiac risks = 0 points     RCRI Interpretation: 1 point: Class II (low risk - 0.9% complication rate)         Signed Electronically by: WINNIE Arroyo CNP  Copy of this evaluation report is provided to requesting physician.

## 2023-11-15 ENCOUNTER — APPOINTMENT (OUTPATIENT)
Dept: LAB | Facility: OTHER | Age: 43
End: 2023-11-15
Payer: COMMERCIAL

## 2023-11-15 ENCOUNTER — OFFICE VISIT (OUTPATIENT)
Dept: FAMILY MEDICINE | Facility: OTHER | Age: 43
End: 2023-11-15
Attending: NURSE PRACTITIONER
Payer: COMMERCIAL

## 2023-11-15 VITALS
TEMPERATURE: 96.9 F | OXYGEN SATURATION: 99 % | BODY MASS INDEX: 32.29 KG/M2 | HEART RATE: 90 BPM | SYSTOLIC BLOOD PRESSURE: 134 MMHG | DIASTOLIC BLOOD PRESSURE: 82 MMHG | HEIGHT: 69 IN | WEIGHT: 218 LBS

## 2023-11-15 DIAGNOSIS — N93.9 ABNORMAL UTERINE BLEEDING (AUB): ICD-10-CM

## 2023-11-15 DIAGNOSIS — Z01.818 PREOP GENERAL PHYSICAL EXAM: Primary | ICD-10-CM

## 2023-11-15 LAB
ANION GAP SERPL CALCULATED.3IONS-SCNC: 8 MMOL/L (ref 7–15)
BASOPHILS # BLD AUTO: 0.1 10E3/UL (ref 0–0.2)
BASOPHILS NFR BLD AUTO: 1 %
BUN SERPL-MCNC: 10.1 MG/DL (ref 6–20)
CALCIUM SERPL-MCNC: 9 MG/DL (ref 8.6–10)
CHLORIDE SERPL-SCNC: 102 MMOL/L (ref 98–107)
CREAT SERPL-MCNC: 0.75 MG/DL (ref 0.51–0.95)
DEPRECATED HCO3 PLAS-SCNC: 26 MMOL/L (ref 22–29)
EGFRCR SERPLBLD CKD-EPI 2021: >90 ML/MIN/1.73M2
EOSINOPHIL # BLD AUTO: 0.2 10E3/UL (ref 0–0.7)
EOSINOPHIL NFR BLD AUTO: 2 %
ERYTHROCYTE [DISTWIDTH] IN BLOOD BY AUTOMATED COUNT: 12.9 % (ref 10–15)
GLUCOSE SERPL-MCNC: 137 MG/DL (ref 70–99)
HCG UR QL: NEGATIVE
HCT VFR BLD AUTO: 42.2 % (ref 35–47)
HGB BLD-MCNC: 14.5 G/DL (ref 11.7–15.7)
IMM GRANULOCYTES # BLD: 0 10E3/UL
IMM GRANULOCYTES NFR BLD: 0 %
LYMPHOCYTES # BLD AUTO: 3 10E3/UL (ref 0.8–5.3)
LYMPHOCYTES NFR BLD AUTO: 35 %
MCH RBC QN AUTO: 29.4 PG (ref 26.5–33)
MCHC RBC AUTO-ENTMCNC: 34.4 G/DL (ref 31.5–36.5)
MCV RBC AUTO: 86 FL (ref 78–100)
MONOCYTES # BLD AUTO: 0.5 10E3/UL (ref 0–1.3)
MONOCYTES NFR BLD AUTO: 6 %
NEUTROPHILS # BLD AUTO: 4.9 10E3/UL (ref 1.6–8.3)
NEUTROPHILS NFR BLD AUTO: 56 %
NRBC # BLD AUTO: 0 10E3/UL
NRBC BLD AUTO-RTO: 0 /100
PLATELET # BLD AUTO: 282 10E3/UL (ref 150–450)
POTASSIUM SERPL-SCNC: 3.7 MMOL/L (ref 3.4–5.3)
RBC # BLD AUTO: 4.93 10E6/UL (ref 3.8–5.2)
SODIUM SERPL-SCNC: 136 MMOL/L (ref 135–145)
WBC # BLD AUTO: 8.6 10E3/UL (ref 4–11)

## 2023-11-15 PROCEDURE — 93005 ELECTROCARDIOGRAM TRACING: CPT | Performed by: NURSE PRACTITIONER

## 2023-11-15 PROCEDURE — 81025 URINE PREGNANCY TEST: CPT | Mod: ZL | Performed by: NURSE PRACTITIONER

## 2023-11-15 PROCEDURE — 82310 ASSAY OF CALCIUM: CPT | Mod: ZL | Performed by: NURSE PRACTITIONER

## 2023-11-15 PROCEDURE — G0463 HOSPITAL OUTPT CLINIC VISIT: HCPCS | Performed by: NURSE PRACTITIONER

## 2023-11-15 PROCEDURE — 93010 ELECTROCARDIOGRAM REPORT: CPT | Mod: 77 | Performed by: INTERNAL MEDICINE

## 2023-11-15 PROCEDURE — 99213 OFFICE O/P EST LOW 20 MIN: CPT | Performed by: NURSE PRACTITIONER

## 2023-11-15 PROCEDURE — 85025 COMPLETE CBC W/AUTO DIFF WBC: CPT | Mod: ZL | Performed by: NURSE PRACTITIONER

## 2023-11-15 PROCEDURE — 36415 COLL VENOUS BLD VENIPUNCTURE: CPT | Mod: ZL | Performed by: NURSE PRACTITIONER

## 2023-11-15 RX ORDER — CITALOPRAM HYDROBROMIDE 40 MG/1
TABLET ORAL
COMMUNITY
Start: 2023-11-13 | End: 2024-06-10

## 2023-11-15 ASSESSMENT — PAIN SCALES - GENERAL: PAINLEVEL: NO PAIN (1)

## 2023-11-15 NOTE — PATIENT INSTRUCTIONS
Preparing for Your Surgery  Getting started  A nurse will call you to review your health history and instructions. They will give you an arrival time based on your scheduled surgery time. Please be ready to share:  Your doctor's clinic name and phone number  Your medical, surgical, and anesthesia history  A list of allergies and sensitivities  A list of medicines, including herbal treatments and over-the-counter drugs  Whether the patient has a legal guardian (ask how to send us the papers in advance)  Please tell us if you're pregnant--or if there's any chance you might be pregnant. Some surgeries may injure a fetus (unborn baby), so they require a pregnancy test. Surgeries that are safe for a fetus don't always need a test, and you can choose whether to have one.   If you have a child who's having surgery, please ask for a copy of Preparing for Your Child's Surgery.    Preparing for surgery  Within 10 to 30 days of surgery: Have a pre-op exam (sometimes called an H&P, or History and Physical). This can be done at a clinic or pre-operative center.  If you're having a , you may not need this exam. Talk to your care team.  At your pre-op exam, talk to your care team about all medicines you take. If you need to stop any medicines before surgery, ask when to start taking them again.  We do this for your safety. Many medicines can make you bleed too much during surgery. Some change how well surgery (anesthesia) drugs work.  Call your insurance company to let them know you're having surgery. (If you don't have insurance, call [phone].)  Call your clinic if there's any change in your health. This includes signs of a cold or flu (sore throat, runny nose, cough, rash, fever). It also includes a scrape or scratch near the surgery site.  If you have questions on the day of surgery, call your hospital or surgery center.  Eating and drinking guidelines  For your safety: Unless your surgeon tells you otherwise,  follow the guidelines below.  Eat and drink as usual until 8 hours before you arrive for surgery. After that, no food or milk.  Drink clear liquids until 2 hours before you arrive. These are liquids you can see through, like water, Gatorade, and Propel Water. They also include plain black coffee and tea (no cream or milk), candy, and breath mints. You can spit out gum when you arrive.  If you drink alcohol: Stop drinking it the night before surgery.  If your care team tells you to take medicine on the morning of surgery, it's okay to take it with a sip of water.  Preventing infection  Shower or bathe the night before and morning of your surgery. Follow the instructions your clinic gave you. (If no instructions, use regular soap.)  Don't shave or clip hair near your surgery site. We'll remove the hair if needed.  Don't smoke or vape the morning of surgery. You may chew nicotine gum up to 2 hours before surgery. A nicotine patch is okay.  Note: Some surgeries require you to completely quit smoking and nicotine. Check with your surgeon.  Your care team will make every effort to keep you safe from infection. We will:  Clean our hands often with soap and water (or an alcohol-based hand rub).  Clean the skin at your surgery site with a special soap that kills germs.  Give you a special gown to keep you warm. (Cold raises the risk of infection.)  Wear special hair covers, masks, gowns and gloves during surgery.  Give antibiotic medicine, if prescribed. Not all surgeries need antibiotics.  What to bring on the day of surgery  Photo ID and insurance card  Copy of your health care directive, if you have one  Glasses and hearing aids (bring cases)  You can't wear contacts during surgery  Inhaler and eye drops, if you use them (tell us about these when you arrive)  CPAP machine or breathing device, if you use them  A few personal items, if spending the night  If you have . . .  A pacemaker, ICD (cardiac defibrillator) or other  implant: Bring the ID card.  An implanted stimulator: Bring the remote control.  A legal guardian: Bring a copy of the certified (court-stamped) guardianship papers.  Please remove any jewelry, including body piercings. Leave jewelry and other valuables at home.  If you're going home the day of surgery  You must have a responsible adult drive you home. They should stay with you overnight as well.  If you don't have someone to stay with you, and you aren't safe to go home alone, we may keep you overnight. Insurance often won't pay for this.  After surgery  If it's hard to control your pain or you need more pain medicine, please call your surgeon's office.  Questions?   If you have any questions for your care team, list them here: _________________________________________________________________________________________________________________________________________________________________________ ____________________________________ ____________________________________ ____________________________________  For informational purposes only. Not to replace the advice of your health care provider. Copyright   2003, 2019 [place] [employer]. All rights reserved. Clinically reviewed by Brii Roberts MD. [employer] 557608 - REV 12/22.    How to Take Your Medication Before Surgery  - HOLD (do not take) concerta day of procedure

## 2023-11-16 ENCOUNTER — ANESTHESIA EVENT (OUTPATIENT)
Dept: SURGERY | Facility: HOSPITAL | Age: 43
End: 2023-11-16
Payer: COMMERCIAL

## 2023-11-16 NOTE — ANESTHESIA PREPROCEDURE EVALUATION
Anesthesia Pre-Procedure Evaluation    Patient: Cristiana Aguila   MRN: [id number] : 1980        Procedure : Procedure(s):  Hysteroscopy, Endometrial Ablation  Nexplanon Removal Left Arm          Past Medical History:   Diagnosis Date     Attention deficit disorder without mention of hyperactivity 2005     Dependent personality disorder (H) 2011     FH: breast cancer      DAFNE (generalised anxiety disorder)      Irritable bowel syndrome with both constipation and diarrhea 2017     MDD (major depressive disorder)      Migraine, unspecified, without mention of intractable migraine without mention of status migrainosus 2000     Tobacco abuse, in remission      Unspecified sinusitis (chronic) 2001      Past Surgical History:   Procedure Laterality Date     ENDOSCOPY UPPER, COLONOSCOPY, COMBINED N/A 2015    Procedure: COMBINED ENDOSCOPY UPPER, COLONOSCOPY;  Surgeon: Griffin Barrientos DO;  Location: [facility]     LAPAROSCOPIC CYSTECTOMY OVARIAN (BENIGN) Right 2016    Procedure: LAPAROSCOPIC CYSTECTOMY OVARIAN (BENIGN);  Surgeon: Kuldip España MD;  Location: [facility]     LAPAROSCOPIC SALPINGO-OOPHORECTOMY Left 2016    Procedure: LAPAROSCOPIC SALPINGO-OOPHORECTOMY;  Surgeon: Kuldip España MD;  Location: [facility]     LAPAROSCOPY DIAGNOSTIC (GYN) Left 2016    Procedure: LAPAROSCOPY DIAGNOSTIC (GYN);  Surgeon: Kuldip España MD;  Location: [facility]     TUBAL LIGATION  2006     wisdom teeth        Allergies   Allergen Reactions     Food Anaphylaxis     Artificial cinnamon   Artificial cinnamon      Bee Venom      Bee venom (honey bee)       Other Environmental Allergy      Sulfa Antibiotics      Sulfa (sulfonamide antibiotics)        Amoxicillin Diarrhea     Latex Rash      Social History     Tobacco Use     Smoking status: Former     Packs/day: 0.50     Years: 13.00     Additional pack years: 0.00     Total pack years: 6.50     Types: Cigarettes     Quit date: 2021     Years  since quittin.8     Passive exposure: Past     Smokeless tobacco: Never     Tobacco comments:     pt denies quit plan 2022   Substance Use Topics     Alcohol use: Yes     Alcohol/week: 0.0 standard drinks of alcohol     Comment: frequency: rarely      Wt Readings from Last 1 Encounters:   11/15/23 98.9 kg (218 lb)        Anesthesia Evaluation   Pt has had prior anesthetic. Type: MAC and General.        ROS/MED HX  ENT/Pulmonary: Comment: Nasal turbinate hypertrophy    Chronic cough    (+)     KATHY risk factors, snores loudly,          tobacco use (vapes; last yesterday at 1600 tobacco), Current use,   Intermittent, asthma Last exacerbation: 2 weeks ago last rescue inhaler,                  Neurologic: Comment: insomnia    (+)      migraines (last one 2 months ago),                          Cardiovascular:     (+)  - -   -  - -                                 Previous cardiac testing   Echo: Date: Results:    Stress Test:  Date: Results:    ECG Reviewed:  Date:  Results:  EKG: appears normal, NSR, normal axis, normal intervals, no acute ST/T changes c/w ischemia, no LVH by voltage criteria       Cath:  Date: Results:      METS/Exercise Tolerance: 4 - Raking leaves, gardening    Hematologic: Comments: Abnormal uterine bleeding      Musculoskeletal: Comment: Muscle weakness    Right ankle pain  (+)  arthritis,             GI/Hepatic: Comment: In ER with abdominal pain Sept and nausea.     (+) GERD (had gastric reflux this am; pt states it is resolved now),      Inflammatory bowel disease,             Renal/Genitourinary:  - neg Renal ROS     Endo:     (+)               Obesity,       Psychiatric/Substance Use: Comment: ADD      (+) psychiatric history depression, other (comment) and anxiety       Infectious Disease:  - neg infectious disease ROS     Malignancy:  - neg malignancy ROS     Other:            Physical Exam    Airway        Mallampati: II   TM distance: > 3 FB   Neck ROM: full   Mouth opening: > 3  cm    Respiratory Devices and Support         Dental       (+) Edentulous      Cardiovascular          Rhythm and rate: regular and normal     Pulmonary           breath sounds clear to auscultation         OUTSIDE LABS:  CBC:   Lab Results   Component Value Date    WBC 8.6 11/15/2023    WBC 7.9 10/11/2023    HGB 14.5 11/15/2023    HGB 12.7 10/11/2023    HCT 42.2 11/15/2023    HCT 37.0 10/11/2023     11/15/2023     10/11/2023     BMP:   Lab Results   Component Value Date     11/15/2023     09/30/2023    POTASSIUM 3.7 11/15/2023    POTASSIUM 3.5 09/30/2023    CHLORIDE 102 11/15/2023    CHLORIDE 102 09/30/2023    CO2 26 11/15/2023    CO2 22 09/30/2023    BUN 10.1 11/15/2023    BUN 7.5 09/30/2023    CR 0.75 11/15/2023    CR 0.68 09/30/2023     (H) 11/15/2023     (H) 09/30/2023     COAGS:   Lab Results   Component Value Date    PTT 24 03/21/2015    INR 1.05 03/21/2015     POC:   Lab Results   Component Value Date    HCG Negative 11/15/2023    HCGS Negative 01/20/2016     HEPATIC:   Lab Results   Component Value Date    ALBUMIN 4.1 09/30/2023    PROTTOTAL 7.1 09/30/2023    ALT 13 09/30/2023    AST 18 09/30/2023    ALKPHOS 114 (H) 09/30/2023    BILITOTAL 0.3 09/30/2023     OTHER:   Lab Results   Component Value Date    LACT 1.6 09/30/2023    A1C 5.0 02/27/2019    RYAN 9.0 11/15/2023    LIPASE 11 (L) 09/30/2023    TSH 1.48 04/13/2023    T4 1.31 04/13/2023    CRP 11.9 (H) 02/19/2021       Anesthesia Plan    ASA Status:  3    NPO Status:  NPO Appropriate (0730 presurgical carbohydrate drink; food last pm 2330)    Anesthesia Type: MAC.     - Reason for MAC: straight local not clinically adequate              Consents    Anesthesia Plan(s) and associated risks, benefits, and realistic alternatives discussed. Questions answered and patient/representative(s) expressed understanding.     - Discussed:     - Discussed with:  Patient      - Extended Intubation/Ventilatory Support Discussed: No.       - Patient is DNR/DNI Status: No     Use of blood products discussed: No .     Postoperative Care            Comments:    Other Comments: HCG ordered- neg 11/15  [place] 11/15/23    Reflux this am; states resolved now;  CRNA in room choice on anesthetic type.  MAC or general     Risks and benefits of MAC anesthetic discussed including dental damage, aspiration, loss of airway, conversion to general anesthetic, CV complications, MI, stroke, death. Pt wishes to proceed.               WINNIE Dunn CRNA

## 2023-11-18 LAB
ATRIAL RATE - MUSE: 74 BPM
DIASTOLIC BLOOD PRESSURE - MUSE: NORMAL MMHG
INTERPRETATION ECG - MUSE: NORMAL
P AXIS - MUSE: 33 DEGREES
PR INTERVAL - MUSE: 134 MS
QRS DURATION - MUSE: 76 MS
QT - MUSE: 382 MS
QTC - MUSE: 424 MS
R AXIS - MUSE: 51 DEGREES
SYSTOLIC BLOOD PRESSURE - MUSE: NORMAL MMHG
T AXIS - MUSE: 38 DEGREES
VENTRICULAR RATE- MUSE: 74 BPM

## 2023-11-21 NOTE — DISCHARGE INSTRUCTIONS
No driving today or while on pain medications.  Pelvic rest for 2 weeks  Call Dr. España [phone] as necessary if problems, no post op appt needed.  May remove gauze dressing and  shower  tomorrow and remove steri-strips in 5-7 days.     Post-Anesthesia Patient Instructions    IMMEDIATELY FOLLOWING SURGERY:  Do not drive or operate machinery for the first twenty four hours after surgery.  Do not make any important decisions for twenty four hours after surgery or while taking narcotic pain medications or sedatives.  If you develop intractable nausea and vomiting or a severe headache please notify your doctor immediately.    FOLLOW-UP:  Please make an appointment with your surgeon as instructed. You do not need to follow up with anesthesia unless specifically instructed to do so.    WOUND CARE INSTRUCTIONS (if applicable):  Keep a dry clean dressing on the anesthesia/puncture wound site if there is drainage.  Once the wound has quit draining you may leave it open to air.  Generally you should leave the bandage intact for twenty four hours unless there is drainage.  If the epidural site drains for more than 36-48 hours please call the anesthesia department.    QUESTIONS?:  Please feel free to call your physician or the hospital  if you have any questions, and they will be happy to assist you.       Endometrial Ablation: What to Expect at Home  Your Recovery  Endometrial ablation is a procedure to treat very heavy menstrual bleeding or other abnormal bleeding in the uterus. During ablation, your doctor used a device to destroy the lining of your uterus. The lining heals by scarring. The scarring reduces or prevents bleeding.  You may have cramps and vaginal bleeding or spotting for several days. You may also have watery vaginal discharge for around 1 to 2 weeks.  It may take a few days to 2 weeks to recover.  This care sheet gives you a general idea about how long it will take for you to recover. But  each person recovers at a different pace. Follow the steps below to feel better as quickly as possible.  How can you care for yourself at home?  Activity    Rest when you feel tired. Getting enough sleep will help you recover.     You probably can return to work on the day after the procedure.     You may shower and take baths as usual.     Ask your doctor when it is okay for you to have sex or use tampons. Do not douche.   Diet    You can eat your normal diet. If your stomach is upset, try bland, low-fat foods like plain rice, broiled chicken, toast, and yogurt.     You may notice that your bowel movements are not regular right after the procedure. This is common. Try to avoid constipation and straining with bowel movements. You may want to take a fiber supplement every day. If you have not had a bowel movement after a couple of days, ask your doctor about taking a mild laxative.   Medicines    Your doctor will tell you if and when you can restart your medicines. You will also get instructions about taking any new medicines.     If you stopped taking aspirin or some other blood thinner, your doctor will tell you when to start taking it again.     Take pain medicines exactly as directed.  If the doctor gave you a prescription medicine for pain, take it as prescribed.  If you are not taking a prescription pain medicine, ask your doctor if you can take an over-the-counter medicine.     If you think your pain medicine is making you sick to your stomach:  Take your medicine after meals (unless your doctor has told you not to).  Ask your doctor for a different pain medicine.     If your doctor prescribed antibiotics, take them as directed. Do not stop taking them just because you feel better. You need to take the full course of antibiotics.   Other instructions    You may have some light vaginal bleeding. Wear sanitary pads if needed.     You may want to use a heating pad on your belly to help with pain. Use a low heat  "setting.     Talk with your doctor about birth control. Endometrial ablation usually causes infertility, but pregnancy may still be possible. And the pregnancy could have severe problems.   Follow-up care is a key part of your treatment and safety. Be sure to make and go to all appointments, and call your doctor if you are having problems. It's also a good idea to know your test results and keep a list of the medicines you take.  When should you call for help?   Call 911 anytime you think you may need emergency care. For example, call if:    You passed out (lost consciousness).     You have chest pain, are short of breath, or cough up blood.   Call your doctor now or seek immediate medical care if:    You have pain that does not get better after you take pain medicine.     You cannot pass stools or gas.     You have vaginal discharge that has increased in amount or smells bad.     You are sick to your stomach or cannot drink fluids.     You have signs of infection, such as:  Increased pain, swelling, warmth, or redness.  A fever.     You have severe vaginal bleeding. This means that you are soaking through your usual pads or tampons every hour for 2 or more hours.     You have signs of a blood clot in your leg (called a deep vein thrombosis), such as:  Pain in your calf, back of the knee, thigh, or groin.  Redness and swelling in your leg.   Watch closely for any changes in your health, and be sure to contact your doctor if you have any problems.  Where can you learn more?  Go to https://www.[employer].net/patiented  Enter T631 in the search box to learn more about \"Endometrial Ablation: What to Expect at Home.\"  Current as of: April 19, 2023               Content Version: 13.8    7879-1044 [employer].   Care instructions adapted under license by your healthcare professional. If you have questions about a medical condition or this instruction, always ask your healthcare professional. [employer] disclaims any warranty or liability for your use of this information.    Hysteroscopy With Dilation and Curettage: What to Expect at Home  Your Recovery     For a hysteroscopy, your doctor guides a lighted tube through your cervix and into your uterus. This helps the doctor see inside your uterus.  For a dilation and curettage (D&C), your doctor uses a curved tool, called a curette, to gently scrape tissue from your uterus.  After these procedures, you are likely to have a backache or cramps similar to menstrual cramps. Expect to pass small clots of blood from your vagina for the first few days. You may have light vaginal bleeding for several weeks after the D&C.  If the doctor filled your uterus with air, your belly may feel full. You may also have shoulder pain right after the procedure.  You will probably be able to go back to most of your normal activities in 1 or 2 days.  This care sheet gives you a general idea about how long it will take for you to recover. But each person recovers at a different pace. Follow the steps below to get better as quickly as possible.  How can you care for yourself at home?  Activity    Rest when you feel tired.     You will probably be able to return to work the day after the procedure. But it depends on what was done and the type of work you do.     You may have some light vaginal bleeding. Use sanitary pads until you stop bleeding. Using pads makes it easier to monitor your bleeding. Do not rinse your vagina with fluid (douche).     Ask your doctor when it is okay for you to have sex.   Diet    You can eat your normal diet. If your stomach is upset, try bland, low-fat foods like plain rice, broiled chicken, toast, and yogurt.     Drink plenty of fluids (unless your doctor tells you not to).   Medicines    Your doctor will tell you if and when you can restart your medicines. You will also get instructions about taking any new medicines.     If you stopped taking  aspirin or some other blood thinner, your doctor will tell you when to start taking it again.     Be safe with medicines. Read and follow all instructions on the label.  If the doctor gave you a prescription medicine for pain, take it as prescribed.  If you are not taking a prescription pain medicine, ask your doctor if you can take an over-the-counter medicine.     If your doctor prescribed antibiotics, take them as directed. Do not stop taking them just because you feel better. You need to take the full course of antibiotics.   Follow-up care is a key part of your treatment and safety. Be sure to make and go to all appointments, and call your doctor if you are having problems. It's also a good idea to know your test results and keep a list of the medicines you take.  When should you call for help?   Call 911 anytime you think you may need emergency care. For example, call if:    You passed out (lost consciousness).     You have chest pain, are short of breath, or cough up blood.   Call your doctor now or seek immediate medical care if:    You have pain that does not get better after you take pain medicine.     You cannot pass stools or gas.     You have bright red vaginal bleeding that soaks one or more pads in an hour, or you have large clots.     You have a vaginal discharge that has increased or that smells bad.     You are sick to your stomach or cannot drink fluids.     You have symptoms of a blood clot in your leg (called a deep vein thrombosis), such as:  Pain in your calf, back of the knee, thigh, or groin.  Redness and swelling in your leg.     You have signs of infection, such as:  Increased pain, swelling, warmth, or redness.  A fever.   Watch closely for changes in your health, and be sure to contact your doctor if you have any problems.  Current as of: July 11, 2023               Content Version: 13.8    7827-2003 [employer].   Care instructions adapted under license by [facility] [employer]  professional. If you have questions about a medical condition or this instruction, always ask your healthcare professional. [employer] disclaims any warranty or liability for your use of this information.

## 2023-11-22 ENCOUNTER — HOSPITAL ENCOUNTER (OUTPATIENT)
Facility: HOSPITAL | Age: 43
Discharge: HOME | End: 2023-11-22
Attending: OBSTETRICS & GYNECOLOGY | Admitting: OBSTETRICS & GYNECOLOGY
Payer: COMMERCIAL

## 2023-11-22 ENCOUNTER — ANESTHESIA (OUTPATIENT)
Dept: SURGERY | Facility: HOSPITAL | Age: 43
End: 2023-11-22
Payer: COMMERCIAL

## 2023-11-22 ENCOUNTER — LAB (OUTPATIENT)
Dept: LAB | Facility: HOSPITAL | Age: 43
End: 2023-11-22
Attending: OBSTETRICS & GYNECOLOGY
Payer: COMMERCIAL

## 2023-11-22 VITALS
BODY MASS INDEX: 32.11 KG/M2 | SYSTOLIC BLOOD PRESSURE: 145 MMHG | WEIGHT: 216.8 LBS | OXYGEN SATURATION: 97 % | RESPIRATION RATE: 16 BRPM | TEMPERATURE: 97.1 F | DIASTOLIC BLOOD PRESSURE: 98 MMHG | HEART RATE: 72 BPM | HEIGHT: 69 IN

## 2023-11-22 DIAGNOSIS — Z98.890 POST-OPERATIVE STATE: Primary | ICD-10-CM

## 2023-11-22 LAB — HCG UR QL: NEGATIVE

## 2023-11-22 PROCEDURE — 58563 HYSTEROSCOPY ABLATION: CPT | Performed by: NURSE ANESTHETIST, CERTIFIED REGISTERED

## 2023-11-22 PROCEDURE — 250N000009 HC RX 250: Performed by: OBSTETRICS & GYNECOLOGY

## 2023-11-22 PROCEDURE — 999N000141 HC STATISTIC PRE-PROCEDURE NURSING ASSESSMENT: Performed by: OBSTETRICS & GYNECOLOGY

## 2023-11-22 PROCEDURE — 250N000013 HC RX MED GY IP 250 OP 250 PS 637: Performed by: OBSTETRICS & GYNECOLOGY

## 2023-11-22 PROCEDURE — 250N000011 HC RX IP 250 OP 636: Mod: JZ | Performed by: OBSTETRICS & GYNECOLOGY

## 2023-11-22 PROCEDURE — 250N000011 HC RX IP 250 OP 636: Performed by: NURSE ANESTHETIST, CERTIFIED REGISTERED

## 2023-11-22 PROCEDURE — 250N000011 HC RX IP 250 OP 636

## 2023-11-22 PROCEDURE — 258N000003 HC RX IP 258 OP 636: Performed by: NURSE ANESTHETIST, CERTIFIED REGISTERED

## 2023-11-22 PROCEDURE — 11982 REMOVE DRUG IMPLANT DEVICE: CPT | Performed by: OBSTETRICS & GYNECOLOGY

## 2023-11-22 PROCEDURE — 58563 HYSTEROSCOPY ABLATION: CPT | Performed by: OBSTETRICS & GYNECOLOGY

## 2023-11-22 PROCEDURE — 272N000001 HC OR GENERAL SUPPLY STERILE: Performed by: OBSTETRICS & GYNECOLOGY

## 2023-11-22 PROCEDURE — C1888 ENDOVAS NON-CARDIAC ABL CATH: HCPCS | Performed by: OBSTETRICS & GYNECOLOGY

## 2023-11-22 PROCEDURE — 81025 URINE PREGNANCY TEST: CPT | Performed by: NURSE ANESTHETIST, CERTIFIED REGISTERED

## 2023-11-22 PROCEDURE — 710N000012 HC RECOVERY PHASE 2, PER MINUTE: Performed by: OBSTETRICS & GYNECOLOGY

## 2023-11-22 PROCEDURE — 360N000076 HC SURGERY LEVEL 3, PER MIN: Performed by: OBSTETRICS & GYNECOLOGY

## 2023-11-22 PROCEDURE — 370N000017 HC ANESTHESIA TECHNICAL FEE, PER MIN: Performed by: OBSTETRICS & GYNECOLOGY

## 2023-11-22 RX ORDER — HYDROMORPHONE HYDROCHLORIDE 1 MG/ML
0.4 INJECTION, SOLUTION INTRAMUSCULAR; INTRAVENOUS; SUBCUTANEOUS EVERY 5 MIN PRN
Status: DISCONTINUED | OUTPATIENT
Start: 2023-11-22 | End: 2023-11-22 | Stop reason: HOSPADM

## 2023-11-22 RX ORDER — ONDANSETRON 4 MG/1
4 TABLET, ORALLY DISINTEGRATING ORAL EVERY 30 MIN PRN
Status: DISCONTINUED | OUTPATIENT
Start: 2023-11-22 | End: 2023-11-22 | Stop reason: HOSPADM

## 2023-11-22 RX ORDER — LIDOCAINE 40 MG/G
CREAM TOPICAL
Status: DISCONTINUED | OUTPATIENT
Start: 2023-11-22 | End: 2023-11-22 | Stop reason: HOSPADM

## 2023-11-22 RX ORDER — PROPOFOL 10 MG/ML
INJECTION, EMULSION INTRAVENOUS CONTINUOUS PRN
Status: DISCONTINUED | OUTPATIENT
Start: 2023-11-22 | End: 2023-11-22

## 2023-11-22 RX ORDER — LIDOCAINE HYDROCHLORIDE 10 MG/ML
INJECTION, SOLUTION EPIDURAL; INFILTRATION; INTRACAUDAL; PERINEURAL PRN
Status: DISCONTINUED | OUTPATIENT
Start: 2023-11-22 | End: 2023-11-22 | Stop reason: HOSPADM

## 2023-11-22 RX ORDER — ONDANSETRON 2 MG/ML
INJECTION INTRAMUSCULAR; INTRAVENOUS PRN
Status: DISCONTINUED | OUTPATIENT
Start: 2023-11-22 | End: 2023-11-22

## 2023-11-22 RX ORDER — HYDROCODONE BITARTRATE AND ACETAMINOPHEN 5; 325 MG/1; MG/1
1-2 TABLET ORAL EVERY 4 HOURS PRN
Qty: 12 TABLET | Refills: 0 | Status: SHIPPED | OUTPATIENT
Start: 2023-11-22 | End: 2023-12-07

## 2023-11-22 RX ORDER — BUPIVACAINE HYDROCHLORIDE 2.5 MG/ML
INJECTION, SOLUTION EPIDURAL; INFILTRATION; INTRACAUDAL
Status: DISCONTINUED
Start: 2023-11-22 | End: 2023-11-22 | Stop reason: WASHOUT

## 2023-11-22 RX ORDER — HYDROMORPHONE HYDROCHLORIDE 1 MG/ML
0.2 INJECTION, SOLUTION INTRAMUSCULAR; INTRAVENOUS; SUBCUTANEOUS EVERY 5 MIN PRN
Status: DISCONTINUED | OUTPATIENT
Start: 2023-11-22 | End: 2023-11-22 | Stop reason: HOSPADM

## 2023-11-22 RX ORDER — LABETALOL 20 MG/4 ML (5 MG/ML) INTRAVENOUS SYRINGE
10
Status: DISCONTINUED | OUTPATIENT
Start: 2023-11-22 | End: 2023-11-22 | Stop reason: HOSPADM

## 2023-11-22 RX ORDER — IBUPROFEN 600 MG/1
600 TABLET, FILM COATED ORAL EVERY 6 HOURS PRN
Qty: 30 TABLET | Refills: 0 | Status: SHIPPED | OUTPATIENT
Start: 2023-11-22

## 2023-11-22 RX ORDER — SODIUM CHLORIDE, SODIUM LACTATE, POTASSIUM CHLORIDE, CALCIUM CHLORIDE 600; 310; 30; 20 MG/100ML; MG/100ML; MG/100ML; MG/100ML
INJECTION, SOLUTION INTRAVENOUS CONTINUOUS
Status: DISCONTINUED | OUTPATIENT
Start: 2023-11-22 | End: 2023-11-22 | Stop reason: HOSPADM

## 2023-11-22 RX ORDER — FENTANYL CITRATE 50 UG/ML
50 INJECTION, SOLUTION INTRAMUSCULAR; INTRAVENOUS EVERY 5 MIN PRN
Status: DISCONTINUED | OUTPATIENT
Start: 2023-11-22 | End: 2023-11-22 | Stop reason: HOSPADM

## 2023-11-22 RX ORDER — PROPOFOL 10 MG/ML
INJECTION, EMULSION INTRAVENOUS PRN
Status: DISCONTINUED | OUTPATIENT
Start: 2023-11-22 | End: 2023-11-22

## 2023-11-22 RX ORDER — ONDANSETRON 2 MG/ML
4 INJECTION INTRAMUSCULAR; INTRAVENOUS EVERY 30 MIN PRN
Status: DISCONTINUED | OUTPATIENT
Start: 2023-11-22 | End: 2023-11-22 | Stop reason: HOSPADM

## 2023-11-22 RX ORDER — ACETAMINOPHEN 325 MG/1
975 TABLET ORAL ONCE
Status: COMPLETED | OUTPATIENT
Start: 2023-11-22 | End: 2023-11-22

## 2023-11-22 RX ORDER — ALBUTEROL SULFATE 0.83 MG/ML
2.5 SOLUTION RESPIRATORY (INHALATION) EVERY 4 HOURS PRN
Status: DISCONTINUED | OUTPATIENT
Start: 2023-11-22 | End: 2023-11-22 | Stop reason: HOSPADM

## 2023-11-22 RX ORDER — FENTANYL CITRATE 50 UG/ML
25 INJECTION, SOLUTION INTRAMUSCULAR; INTRAVENOUS EVERY 5 MIN PRN
Status: DISCONTINUED | OUTPATIENT
Start: 2023-11-22 | End: 2023-11-22 | Stop reason: HOSPADM

## 2023-11-22 RX ORDER — HYDRALAZINE HYDROCHLORIDE 20 MG/ML
2.5-5 INJECTION INTRAMUSCULAR; INTRAVENOUS EVERY 10 MIN PRN
Status: DISCONTINUED | OUTPATIENT
Start: 2023-11-22 | End: 2023-11-22 | Stop reason: HOSPADM

## 2023-11-22 RX ORDER — FENTANYL CITRATE 50 UG/ML
INJECTION, SOLUTION INTRAMUSCULAR; INTRAVENOUS PRN
Status: DISCONTINUED | OUTPATIENT
Start: 2023-11-22 | End: 2023-11-22

## 2023-11-22 RX ORDER — LIDOCAINE HYDROCHLORIDE 10 MG/ML
INJECTION, SOLUTION EPIDURAL; INFILTRATION; INTRACAUDAL; PERINEURAL
Status: DISCONTINUED
Start: 2023-11-22 | End: 2023-11-22 | Stop reason: HOSPADM

## 2023-11-22 RX ORDER — KETOROLAC TROMETHAMINE 30 MG/ML
30 INJECTION, SOLUTION INTRAMUSCULAR; INTRAVENOUS ONCE
Status: COMPLETED | OUTPATIENT
Start: 2023-11-22 | End: 2023-11-22

## 2023-11-22 RX ORDER — ONDANSETRON 4 MG/1
4 TABLET, ORALLY DISINTEGRATING ORAL
Status: CANCELLED | OUTPATIENT
Start: 2023-11-22

## 2023-11-22 RX ADMIN — PROPOFOL 30 MG: 10 INJECTION, EMULSION INTRAVENOUS at 12:49

## 2023-11-22 RX ADMIN — ACETAMINOPHEN 975 MG: 325 TABLET, FILM COATED ORAL at 11:22

## 2023-11-22 RX ADMIN — FENTANYL CITRATE 25 MCG: 50 INJECTION INTRAMUSCULAR; INTRAVENOUS at 12:50

## 2023-11-22 RX ADMIN — PROPOFOL 125 MCG/KG/MIN: 10 INJECTION, EMULSION INTRAVENOUS at 12:47

## 2023-11-22 RX ADMIN — FENTANYL CITRATE 50 MCG: 50 INJECTION INTRAMUSCULAR; INTRAVENOUS at 12:47

## 2023-11-22 RX ADMIN — ONDANSETRON 4 MG: 2 INJECTION INTRAMUSCULAR; INTRAVENOUS at 12:52

## 2023-11-22 RX ADMIN — PROPOFOL 140 MCG/KG/MIN: 10 INJECTION, EMULSION INTRAVENOUS at 13:06

## 2023-11-22 RX ADMIN — FENTANYL CITRATE 25 MCG: 50 INJECTION INTRAMUSCULAR; INTRAVENOUS at 12:55

## 2023-11-22 RX ADMIN — MIDAZOLAM 2 MG: 1 INJECTION INTRAMUSCULAR; INTRAVENOUS at 12:44

## 2023-11-22 RX ADMIN — KETOROLAC TROMETHAMINE 30 MG: 30 INJECTION, SOLUTION INTRAMUSCULAR; INTRAVENOUS at 11:27

## 2023-11-22 RX ADMIN — SODIUM CHLORIDE, POTASSIUM CHLORIDE, SODIUM LACTATE AND CALCIUM CHLORIDE: 600; 310; 30; 20 INJECTION, SOLUTION INTRAVENOUS at 11:28

## 2023-11-22 ASSESSMENT — ACTIVITIES OF DAILY LIVING (ADL)
ADLS_ACUITY_SCORE: 33
ADLS_ACUITY_SCORE: 35

## 2023-11-22 ASSESSMENT — LIFESTYLE VARIABLES: TOBACCO_USE: 1

## 2023-11-22 NOTE — OP NOTE
[facility]  Operative Note  SURGEON: Kuldip España MD   ASSISTANT: None.   PREOPERATIVE DIAGNOSIS: Menorrhagia.   POSTOPERATIVE DIAGNOSIS: Menorrhagia.   PROCEDURE: Hysteroscopy, endometrial ablation (NovaSure). Nexplanon implant removal left arm.   ANESTHESIA: Monitored anesthesia care, local and paracervical block.   SPECIMENS: None.   OPERATIVE FINDINGS: Normal endometrial cavity sounding to 9 cm. No evidence of submucosal fibroids or polyps and benign-appearing endometrium.   ESTIMATED BLOOD LOSS: Minimal.   SPECIMENS: None.   COMPLICATIONS: None.   OPERATIVE DICTATION: The patient was brought to the operating room and IV sedation was given. She was prepped and draped in the dorsal lithotomy position and her bladder drained. A weighted speculum was placed. The cervix was visualized and grasped anteriorly with a fine-toothed tenaculum.  A cervical/paracervical block was performed with 1% Lidocaine.   The cervix was then gently dilated with Hegar dilators. Hysteroscopy was performed using a 30-degree rigid hysteroscope with normal saline as distention media. Visualization was excellent. Findings were as described above. The hysteroscope was removed. Cavitary measurements were obtained and the NovaSure device was gently inserted to the uterine fundus and deployed in the usual fashion. The cervical collar was advanced and a test cycle completed successfully. The energy cycle was then begun and completed successfully after 57 seconds. The NovaSure was withdrawn. Hysteroscopy was reperformed showing well-ablated endometrial cavity with no evidence of perforation. The hysteroscope and instruments were removed. There was excellent hemostasis so the procedure was terminated.   Procedure:  Nexplanon removal  The patient was positioned on the exam table with the left arm extended.  The Nexplanon was palpated and marked. The site was then swabbed with betadine x 3.  5cc of 1% lidocaine was injected. Next  small stab incision was made with a scalpel.  The Nexplanon was grasped with a forceps and removed.  A small amount of bleeding was noted at the insertion site. Steri strips applied.   A gauze and pressure wrap was applied to the insertion site.  She tolerated the procedure well. The patient was transferred to the recovery room in excellent stable condition.   KAISER HOLLAND MD

## 2023-11-22 NOTE — OR NURSING
Patient and responsible adult given discharge instructions with no questions regarding instructions. Rosamaria score 19/20. Pain level 4/10.  Discharged from unit via wheelchair. Patient discharged to home.

## 2023-11-22 NOTE — ANESTHESIA POSTPROCEDURE EVALUATION
Patient: Cristiana Aguila    Procedure: Procedure(s):  Hysteroscopy, Endometrial Ablation  Nexplanon Removal Left Arm       Anesthesia Type:  MAC    Note:  Disposition: Outpatient   Postop Pain Control: Uneventful            Sign Out: Well controlled pain   PONV: No   Neuro/Psych: Uneventful            Sign Out: Acceptable/Baseline neuro status   Airway/Respiratory: Uneventful            Sign Out: Acceptable/Baseline resp. status   CV/Hemodynamics: Uneventful            Sign Out: Acceptable CV status; No obvious hypovolemia; No obvious fluid overload   Other NRE: NONE   DID A NON-ROUTINE EVENT OCCUR? No         Last vitals:  Vitals Value Taken Time   /107 11/22/23 1345   Temp 96.8  F (36  C) 11/22/23 1325   Pulse 69 11/22/23 1345   Resp 16 11/22/23 1345   SpO2 98 % 11/22/23 1348   Vitals shown include unfiled device data.    Electronically Signed By: WINNIE Pickett CRNA  November 22, 2023  1:50 PM

## 2023-11-22 NOTE — INTERVAL H&P NOTE
"I have reviewed the surgical (or preoperative) H&P that is linked to this encounter, and examined the patient. There are no significant changes    Clinical Conditions Present on Arrival:  Clinically Significant Risk Factors Present on Admission                  # Obesity: Estimated body mass index is 32.02 kg/m  as calculated from the following:    Height as of this encounter: 1.753 m (5' 9\").    Weight as of this encounter: 98.3 kg (216 lb 12.8 oz).       "

## 2023-11-22 NOTE — ANESTHESIA CARE TRANSFER NOTE
Patient: Cristiana Aguila    Procedure: Procedure(s):  Hysteroscopy, Endometrial Ablation  Nexplanon Removal Left Arm       Diagnosis: Menorrhagia [N92.0]  Abnormal uterine bleeding (AUB) [N93.9]  Diagnosis Additional Information: No value filed.    Anesthesia Type:   MAC     Note:    Oropharynx: spontaneously breathing  Level of Consciousness: awake  Oxygen Supplementation: room air    Independent Airway: airway patency satisfactory and stable  Dentition: dentition unchanged  Vital Signs Stable: post-procedure vital signs reviewed and stable  Report to RN Given: handoff report given  Patient transferred to: Phase II    Handoff Report: Identifed the Patient, Identified the Reponsible Provider, Reviewed the pertinent medical history, Discussed the surgical course, Reviewed Intra-OP anesthesia mangement and issues during anesthesia, Set expectations for post-procedure period and Allowed opportunity for questions and acknowledgement of understanding  Vitals:  Vitals Value Taken Time   BP     Temp     Pulse     Resp     SpO2         Electronically Signed By: WINNIE Eaton CRNA  November 22, 2023  1:21 PM

## 2023-11-30 ENCOUNTER — DOCUMENTATION ONLY (OUTPATIENT)
Dept: OTHER | Facility: CLINIC | Age: 43
End: 2023-11-30

## 2023-12-07 ENCOUNTER — OFFICE VISIT (OUTPATIENT)
Dept: FAMILY MEDICINE | Facility: OTHER | Age: 43
End: 2023-12-07
Attending: FAMILY MEDICINE
Payer: COMMERCIAL

## 2023-12-07 VITALS
OXYGEN SATURATION: 99 % | HEART RATE: 94 BPM | WEIGHT: 219.5 LBS | SYSTOLIC BLOOD PRESSURE: 112 MMHG | TEMPERATURE: 97.1 F | BODY MASS INDEX: 32.41 KG/M2 | DIASTOLIC BLOOD PRESSURE: 80 MMHG

## 2023-12-07 DIAGNOSIS — F33.42 MAJOR DEPRESSIVE DISORDER, RECURRENT EPISODE, IN FULL REMISSION (H): ICD-10-CM

## 2023-12-07 DIAGNOSIS — F41.1 GAD (GENERALIZED ANXIETY DISORDER): ICD-10-CM

## 2023-12-07 DIAGNOSIS — F98.8 ATTENTION DEFICIT DISORDER (ADD) WITHOUT HYPERACTIVITY: Primary | ICD-10-CM

## 2023-12-07 PROCEDURE — G0463 HOSPITAL OUTPT CLINIC VISIT: HCPCS

## 2023-12-07 PROCEDURE — 99214 OFFICE O/P EST MOD 30 MIN: CPT | Performed by: FAMILY MEDICINE

## 2023-12-07 ASSESSMENT — ANXIETY QUESTIONNAIRES
GAD7 TOTAL SCORE: 13
2. NOT BEING ABLE TO STOP OR CONTROL WORRYING: MORE THAN HALF THE DAYS
4. TROUBLE RELAXING: MORE THAN HALF THE DAYS
GAD7 TOTAL SCORE: 13
3. WORRYING TOO MUCH ABOUT DIFFERENT THINGS: MORE THAN HALF THE DAYS
IF YOU CHECKED OFF ANY PROBLEMS ON THIS QUESTIONNAIRE, HOW DIFFICULT HAVE THESE PROBLEMS MADE IT FOR YOU TO DO YOUR WORK, TAKE CARE OF THINGS AT HOME, OR GET ALONG WITH OTHER PEOPLE: VERY DIFFICULT
5. BEING SO RESTLESS THAT IT IS HARD TO SIT STILL: MORE THAN HALF THE DAYS
7. FEELING AFRAID AS IF SOMETHING AWFUL MIGHT HAPPEN: SEVERAL DAYS
1. FEELING NERVOUS, ANXIOUS, OR ON EDGE: MORE THAN HALF THE DAYS
6. BECOMING EASILY ANNOYED OR IRRITABLE: MORE THAN HALF THE DAYS

## 2023-12-07 ASSESSMENT — PATIENT HEALTH QUESTIONNAIRE - PHQ9
10. IF YOU CHECKED OFF ANY PROBLEMS, HOW DIFFICULT HAVE THESE PROBLEMS MADE IT FOR YOU TO DO YOUR WORK, TAKE CARE OF THINGS AT HOME, OR GET ALONG WITH OTHER PEOPLE: VERY DIFFICULT
SUM OF ALL RESPONSES TO PHQ QUESTIONS 1-9: 15
SUM OF ALL RESPONSES TO PHQ QUESTIONS 1-9: 15

## 2023-12-07 ASSESSMENT — ASTHMA QUESTIONNAIRES: ACT_TOTALSCORE: 21

## 2023-12-07 ASSESSMENT — PAIN SCALES - GENERAL: PAINLEVEL: NO PAIN (0)

## 2023-12-07 NOTE — PROGRESS NOTES
"  Assessment & Plan     Attention deficit disorder (ADD) without hyperactivity  Good control for years. No issues. Faithful in taking. No increase or decrease needed.  RF given/  follow-up in 6 months for minipx    DAFNE (generalized anxiety disorder)  Some worse due to below     Major depressive disorder, recurrent episode, in full remission (H24)  Discussed. Winter and holidays tough. Recent had bunch of health issues - less active due to it. Better now. Pt encourage behavioral changes / ecercise. Has new dog to walk. Can add meds such as cymbalta or wellbutrin -- both agree to hold on more meds.     Refuses all immunizations recommended          BMI:   Estimated body mass index is 32.41 kg/m  as calculated from the following:    Height as of 11/22/23: 1.753 m (5' 9\").    Weight as of this encounter: 99.6 kg (219 lb 8 oz).           No follow-ups on file.    Sudhakar Cuellar MD  [facility] - KEN Zendejas is a 43 year old, presenting for the following health issues:  Anxiety, Depression, and A.D.H.D        12/7/2023     9:41 AM   Additional Questions   Roomed by eSbas Maldonado   Accompanied by Self         12/7/2023     9:41 AM   Patient Reported Additional Medications   Patient reports taking the following new medications None       HPI     Depression and Anxiety Follow-Up  How are you doing with your depression since your last visit? Worsened - seasonal  How are you doing with your anxiety since your last visit?  No change  Are you having other symptoms that might be associated with depression or anxiety? Yes:  Irritable   Have you had a significant life event? Health Concerns and OTHER: Seasonal     Do you have any concerns with your use of alcohol or other drugs? No    Social History     Tobacco Use    Smoking status: Former     Packs/day: 0.50     Years: 13.00     Additional pack years: 0.00     Total pack years: 6.50     Types: Cigarettes     Quit date: 1/4/2021     Years since " quittin.9     Passive exposure: Past    Smokeless tobacco: Never    Tobacco comments:     pt denies quit plan 2022   Vaping Use    Vaping Use: Every day    Substances: Nicotine   Substance Use Topics    Alcohol use: Yes     Alcohol/week: 0.0 standard drinks of alcohol     Comment: frequency: rarely    Drug use: No         2023     9:35 AM 10/11/2023     9:15 AM 2023     9:35 AM   PHQ   PHQ-9 Total Score 11 10 15   Q9: Thoughts of better off dead/self-harm past 2 weeks Not at all Not at all Not at all         2023     9:36 AM 10/11/2023     9:16 AM 2023     9:36 AM   DAFNE-7 SCORE   Total Score 7 (mild anxiety) 10 (moderate anxiety) 13 (moderate anxiety)   Total Score 7 10 13         2023     9:35 AM   Last PHQ-9   1.  Little interest or pleasure in doing things 3   2.  Feeling down, depressed, or hopeless 2   3.  Trouble falling or staying asleep, or sleeping too much 2   4.  Feeling tired or having little energy 3   5.  Poor appetite or overeating 1   6.  Feeling bad about yourself 1   7.  Trouble concentrating 2   8.  Moving slowly or restless 1   Q9: Thoughts of better off dead/self-harm past 2 weeks 0   PHQ-9 Total Score 15         2023     9:36 AM   DAFNE-7    1. Feeling nervous, anxious, or on edge 2   2. Not being able to stop or control worrying 2   3. Worrying too much about different things 2   4. Trouble relaxing 2   5. Being so restless that it is hard to sit still 2   6. Becoming easily annoyed or irritable 2   7. Feeling afraid, as if something awful might happen 1   DAFNE-7 Total Score 13   If you checked any problems, how difficult have they made it for you to do your work, take care of things at home, or get along with other people? Very difficult       Suicide Assessment Five-step Evaluation and Treatment (SAFE-T)      ADHD  Onset: since childhood and was diagnosed when she was 25  Description:   Easily distracted: YES  Short attention span: YES  Trouble following  directions: No   Impulsive behavior: No   Trouble completing tasks: YES  Accompanying Signs & Symptoms:        Change in sleep pattern: None  Irritability at certain times of the day: YES- Evening is the worst  Socially withdrawn: No  Depression symptoms: YES  Anxiety symptoms: No nothing new   History:  Caffeine intake: Moderate  Loss of appetite: YES- comes and goes   Healthy diet: No  Did you have problems in school or with previous employment: No  Family history of ADHD: Unaware   Have you had an evaluation for ADHD in the past: YES  Do you use alcohol or drugs: No  Therapies tried: Adderall  with no  relief due to causing her to be itching. Is currently Concerta has been giving her relief         Review of Systems   Constitutional, HEENT, cardiovascular, pulmonary, gi and gu systems are negative, except as otherwise noted.      Objective    /80 (BP Location: Left arm, Patient Position: Sitting, Cuff Size: Adult Regular)   Pulse 94   Temp 97.1  F (36.2  C) (Tympanic)   Wt 99.6 kg (219 lb 8 oz)   LMP 11/16/2023   SpO2 99%   BMI 32.41 kg/m    Body mass index is 32.41 kg/m .  Physical Exam   GENERAL: healthy, alert and no distress  PSYCH: mentation appears normal, affect normal/bright                      .undefined[^^  Answers submitted by the patient for this visit:  Patient Health Questionnaire (Submitted on 12/7/2023)  If you checked off any problems, how difficult have these problems made it for you to do your work, take care of things at home, or get along with other people?: Very difficult  PHQ9 TOTAL SCORE: 15  DAFNE-7 (Submitted on 12/7/2023)  DAFNE 7 TOTAL SCORE: 13

## 2023-12-14 NOTE — TELEPHONE ENCOUNTER
Last refill pended below. LOV for ADHD on 12/21/20      Pt called out of med routing to covering provider. Thank you   Subjective:     Patient ID: John Story is a 38year old adult. HPI  Patient comes in for follow-up after he ended up in ER with complaint of chest pain workup in ER was all negative troponin EKG he says he is feeling fine now he has some chest discomfort on the left side. History/Other:   Review of Systems   Constitutional: Negative. HENT: Negative. Eyes: Negative. Respiratory: Negative. Cardiovascular: Negative. Gastrointestinal: Negative. Genitourinary: Negative. Musculoskeletal: Negative. Skin: Negative. Neurological: Negative. Psychiatric/Behavioral: Negative. Current Outpatient Medications   Medication Sig Dispense Refill    naproxen (NAPROSYN) 500 MG Oral Tab Take 1 tablet (500 mg total) by mouth 2 (two) times daily with meals. Take for 2 weeks as directed and then as needed. 60 tablet 0    albuterol 108 (90 Base) MCG/ACT Inhalation Aero Soln Inhale 2 puffs into the lungs every 6 (six) hours as needed for Wheezing. 1 each 0     Allergies: Allergies   Allergen Reactions    Ceclor ANAPHYLAXIS       No past medical history on file. Past Surgical History:   Procedure Laterality Date    FRACTURE SURGERY      HERNIA REPAIR        Family History   Problem Relation Age of Onset    High Blood Pressure Father       Social History:   Social History     Socioeconomic History    Marital status:    Tobacco Use    Smoking status: Never    Smokeless tobacco: Never   Vaping Use    Vaping Use: Never used   Substance and Sexual Activity    Alcohol use: Yes     Comment: SOCIALLY    Drug use: Never        Objective:   Physical Exam  Vitals and nursing note reviewed. Constitutional:       Appearance: John Story is well-developed. HENT:      Head: Normocephalic and atraumatic.       Right Ear: External ear normal.      Left Ear: External ear normal.      Nose: Nose normal.   Eyes:      Conjunctiva/sclera: Conjunctivae normal.      Pupils: Pupils are equal, round, and reactive to light. Cardiovascular:      Rate and Rhythm: Normal rate and regular rhythm. Heart sounds: Normal heart sounds. Pulmonary:      Effort: Pulmonary effort is normal.      Breath sounds: Normal breath sounds. Abdominal:      General: Bowel sounds are normal.      Palpations: Abdomen is soft. Genitourinary:     Vagina: Normal.   Musculoskeletal:         General: Normal range of motion. Cervical back: Normal range of motion and neck supple. Skin:     General: Skin is warm and dry. Neurological:      Mental Status: Taylor Calhoun is alert and oriented to person, place, and time. Deep Tendon Reflexes: Reflexes are normal and symmetric. Psychiatric:         Behavior: Behavior normal.         Thought Content: Thought content normal.         Judgment: Judgment normal.         Assessment & Plan:   1. Encounter for examination following treatment at hospital patient doing okay workup in the urine was negative   2. Chest discomfort -as above encourage patient to get cardiac calcium score to be sure       No orders of the defined types were placed in this encounter.       Meds This Visit:  Requested Prescriptions      No prescriptions requested or ordered in this encounter       Imaging & Referrals:  [facility]

## 2024-01-15 DIAGNOSIS — F98.8 ATTENTION DEFICIT DISORDER (ADD) WITHOUT HYPERACTIVITY: ICD-10-CM

## 2024-01-15 NOTE — TELEPHONE ENCOUNTER
Concerta  Last Written Prescription Date: 12/12/23  Last Fill Quantity: 60 # of Refills: 0  Last Office Visit: 12/7/23

## 2024-01-16 DIAGNOSIS — F51.04 PSYCHOPHYSIOLOGICAL INSOMNIA: ICD-10-CM

## 2024-01-16 RX ORDER — METHYLPHENIDATE HYDROCHLORIDE 36 MG/1
36 TABLET ORAL 2 TIMES DAILY
Qty: 60 TABLET | Refills: 0 | Status: SHIPPED | OUTPATIENT
Start: 2024-03-18 | End: 2024-06-10

## 2024-01-16 RX ORDER — METHYLPHENIDATE HYDROCHLORIDE 36 MG/1
36 TABLET ORAL 2 TIMES DAILY
Qty: 60 TABLET | Refills: 0 | Status: SHIPPED | OUTPATIENT
Start: 2024-01-16 | End: 2024-04-19

## 2024-01-16 RX ORDER — METHYLPHENIDATE HYDROCHLORIDE 36 MG/1
36 TABLET ORAL 2 TIMES DAILY
Qty: 60 TABLET | Refills: 0 | Status: SHIPPED | OUTPATIENT
Start: 2024-02-16 | End: 2024-04-19

## 2024-01-16 RX ORDER — METHYLPHENIDATE HYDROCHLORIDE 36 MG/1
36 TABLET, EXTENDED RELEASE ORAL 2 TIMES DAILY
Qty: 60 TABLET | Refills: 0 | OUTPATIENT
Start: 2024-01-16

## 2024-01-17 RX ORDER — TRAZODONE HYDROCHLORIDE 100 MG/1
TABLET ORAL
Qty: 90 TABLET | Refills: 0 | Status: SHIPPED | OUTPATIENT
Start: 2024-01-17 | End: 2024-02-19

## 2024-02-07 DIAGNOSIS — J30.1 SEASONAL ALLERGIC RHINITIS DUE TO POLLEN: ICD-10-CM

## 2024-02-07 DIAGNOSIS — J30.2 SEASONAL ALLERGIC RHINITIS, UNSPECIFIED TRIGGER: ICD-10-CM

## 2024-02-07 DIAGNOSIS — J30.89 PERENNIAL ALLERGIC RHINITIS: ICD-10-CM

## 2024-02-07 RX ORDER — FEXOFENADINE HCL 180 MG/1
TABLET ORAL
Qty: 30 TABLET | Refills: 4 | Status: SHIPPED | OUTPATIENT
Start: 2024-02-07 | End: 2024-09-26

## 2024-02-07 NOTE — TELEPHONE ENCOUNTER
Allegra       Last Written Prescription Date:  2/01/2023  Last Fill Quantity: 30,   # refills: 11  Last Office Visit: 12/07/2023  Future Office visit:

## 2024-02-16 DIAGNOSIS — F51.04 PSYCHOPHYSIOLOGICAL INSOMNIA: ICD-10-CM

## 2024-02-16 NOTE — TELEPHONE ENCOUNTER
Trazodone  Last Written Prescription Date: 1/17/24  Last Fill Quantity: 90 # of Refills: 0  Last Office Visit: 12/7/23

## 2024-02-19 RX ORDER — TRAZODONE HYDROCHLORIDE 100 MG/1
TABLET ORAL
Qty: 90 TABLET | Refills: 9 | Status: SHIPPED | OUTPATIENT
Start: 2024-02-19

## 2024-04-16 ENCOUNTER — MYC REFILL (OUTPATIENT)
Dept: FAMILY MEDICINE | Facility: OTHER | Age: 44
End: 2024-04-16

## 2024-04-16 DIAGNOSIS — F98.8 ATTENTION DEFICIT DISORDER (ADD) WITHOUT HYPERACTIVITY: ICD-10-CM

## 2024-04-17 RX ORDER — METHYLPHENIDATE HYDROCHLORIDE 36 MG/1
36 TABLET ORAL 2 TIMES DAILY
Qty: 60 TABLET | Refills: 0 | OUTPATIENT
Start: 2024-04-17

## 2024-04-17 NOTE — TELEPHONE ENCOUNTER
methylphenidate HCl ER, [facility], (CONCERTA) 36 MG CR tablet       Last Written Prescription Date:  3-18-24  Last Fill Quantity: 60,   # refills: 0  Last Office Visit: 12-7-23  Future Office visit:    Next 5 appointments (look out 90 days)      John 10, 2024  8:45 AM  (Arrive by 8:30 AM)  SHORT with Sudhakar Cuellar MD  [facility] ([facility] ) [address]  [place] [address]  [phone]             Routing refill request to provider for review/approval because:  Drug not on the [facility], [facility] or [facility] refill protocol or controlled substance

## 2024-04-18 DIAGNOSIS — K59.04 CHRONIC IDIOPATHIC CONSTIPATION: ICD-10-CM

## 2024-04-18 DIAGNOSIS — F98.8 ATTENTION DEFICIT DISORDER (ADD) WITHOUT HYPERACTIVITY: ICD-10-CM

## 2024-04-18 NOTE — TELEPHONE ENCOUNTER
Jl      Last Written Prescription Date:  4/13/23  Last Fill Quantity: 180,   # refills: 3  Last Office Visit: 12/7/23  Future Office visit:    Next 5 appointments (look out 90 days)      John 10, 2024  8:45 AM  (Arrive by 8:30 AM)  SHORT with Sudhakar Cuellar MD  [facility] [place] ([facility] [place] ) [address]  [place] [address]  [phone]             Routing refill request to provider for review/approval because:

## 2024-04-18 NOTE — TELEPHONE ENCOUNTER
Concerta      Last Written Prescription Date:  3/18/24  Last Fill Quantity: 60,   # refills: 0  Last Office Visit: 12/7/23  Future Office visit:    Next 5 appointments (look out 90 days)      John 10, 2024  8:45 AM  (Arrive by 8:30 AM)  SHORT with Sudhakar Cuellar MD  [facility] [place] ([facility] [place] ) [address]  [place] [address]  [phone]             Routing refill request to provider for review/approval because:

## 2024-04-19 RX ORDER — DOCUSATE SODIUM 100 MG/1
100 CAPSULE, LIQUID FILLED ORAL 2 TIMES DAILY
Qty: 60 CAPSULE | Refills: 0 | Status: SHIPPED | OUTPATIENT
Start: 2024-04-19 | End: 2024-08-15

## 2024-04-19 RX ORDER — METHYLPHENIDATE HYDROCHLORIDE 36 MG/1
36 TABLET ORAL 2 TIMES DAILY
Qty: 60 TABLET | Refills: 0 | OUTPATIENT
Start: 2024-04-19

## 2024-04-19 RX ORDER — METHYLPHENIDATE HYDROCHLORIDE 36 MG/1
36 TABLET ORAL 2 TIMES DAILY
Qty: 60 TABLET | Refills: 0 | Status: SHIPPED | OUTPATIENT
Start: 2024-05-19 | End: 2024-06-10

## 2024-04-19 RX ORDER — METHYLPHENIDATE HYDROCHLORIDE 36 MG/1
36 TABLET ORAL 2 TIMES DAILY
Qty: 60 TABLET | Refills: 0 | Status: SHIPPED | OUTPATIENT
Start: 2024-06-18

## 2024-04-19 RX ORDER — METHYLPHENIDATE HYDROCHLORIDE 36 MG/1
36 TABLET ORAL 2 TIMES DAILY
Qty: 60 TABLET | Refills: 0 | Status: SHIPPED | OUTPATIENT
Start: 2024-04-19 | End: 2024-06-10

## 2024-05-03 ENCOUNTER — HOSPITAL ENCOUNTER (EMERGENCY)
Facility: HOSPITAL | Age: 44
Discharge: HOME | End: 2024-05-03
Attending: PHYSICIAN ASSISTANT | Admitting: PHYSICIAN ASSISTANT
Payer: COMMERCIAL

## 2024-05-03 VITALS
HEART RATE: 80 BPM | RESPIRATION RATE: 18 BRPM | BODY MASS INDEX: 31.55 KG/M2 | HEIGHT: 69 IN | DIASTOLIC BLOOD PRESSURE: 94 MMHG | OXYGEN SATURATION: 97 % | WEIGHT: 213 LBS | SYSTOLIC BLOOD PRESSURE: 149 MMHG | TEMPERATURE: 98.8 F
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
## 2024-05-03 PROCEDURE — 90715 TDAP VACCINE 7 YRS/> IM: CPT | Performed by: PHYSICIAN ASSISTANT

## 2024-05-03 PROCEDURE — G0463 HOSPITAL OUTPT CLINIC VISIT: HCPCS | Mod: 25

## 2024-05-03 PROCEDURE — 250N000011 HC RX IP 250 OP 636: Performed by: PHYSICIAN ASSISTANT

## 2024-05-03 PROCEDURE — 90471 IMMUNIZATION ADMIN: CPT | Performed by: PHYSICIAN ASSISTANT

## 2024-05-03 PROCEDURE — 99213 OFFICE O/P EST LOW 20 MIN: CPT | Performed by: PHYSICIAN ASSISTANT

## 2024-05-03 RX ORDER — MECLIZINE HYDROCHLORIDE 25 MG/1
25-50 TABLET ORAL 3 TIMES DAILY PRN
Qty: 30 TABLET | Refills: 0 | Status: SHIPPED | OUTPATIENT
Start: 2024-05-03 | End: 2024-06-10

## 2024-05-03 RX ADMIN — CLOSTRIDIUM TETANI TOXOID ANTIGEN (FORMALDEHYDE INACTIVATED), CORYNEBACTERIUM DIPHTHERIAE TOXOID ANTIGEN (FORMALDEHYDE INACTIVATED), BORDETELLA PERTUSSIS TOXOID ANTIGEN (GLUTARALDEHYDE INACTIVATED), BORDETELLA PERTUSSIS FILAMENTOUS HEMAGGLUTININ ANTIGEN (FORMALDEHYDE INACTIVATED), BORDETELLA PERTUSSIS PERTACTIN ANTIGEN, AND BORDETELLA PERTUSSIS FIMBRIAE 2/3 ANTIGEN 0.5 ML: 5; 2; 2.5; 5; 3; 5 INJECTION, SUSPENSION INTRAMUSCULAR at 18:44

## 2024-05-03 ASSESSMENT — ENCOUNTER SYMPTOMS
RESPIRATORY NEGATIVE: 1
CARDIOVASCULAR NEGATIVE: 1
CONSTITUTIONAL NEGATIVE: 1
WOUND: 1
FEVER: 0

## 2024-05-03 ASSESSMENT — ACTIVITIES OF DAILY LIVING (ADL): ADLS_ACUITY_SCORE: 35

## 2024-05-03 NOTE — ED TRIAGE NOTES
Patient presents to urgent care with son for a cat bite/scratch. Patient works at [employer]. Patient has a bunch cats and she was a little scared. Patient states the pain isn't bad its the itching.

## 2024-05-03 NOTE — ED PROVIDER NOTES
History     Chief Complaint   Patient presents with    Cat Bite     HPI  Cristiana Aguila is a 43 year old female who presents with cat bite after intake of surrendered cat at shelter yesterday. Immediately after bite she washed and applied topical antibiotics. Areas surrounding bite have gotten significantly more red, itchy and throbbing since bite.     Allergies:  Allergies   Allergen Reactions    Food Anaphylaxis     Artificial cinnamon   Artificial cinnamon     Bee Venom      Bee venom (honey bee)      Sulfa Antibiotics      Sulfa (sulfonamide antibiotics)       Amoxicillin Diarrhea    Latex Rash    Other Environmental Allergy Itching and Rash     Pt is allergic to the cold.         Problem List:    Patient Active Problem List    Diagnosis Date Noted    Ankle instability, unspecified laterality 09/15/2022     Priority: Medium     Formatting of this note might be different from the original.  Added automatically from request for surgery [id number]      Ankle pain 07/30/2021     Priority: Medium     Formatting of this note might be different from the original.  Added automatically from request for surgery [id number]  Formatting of this note might be different from the original.  Added automatically from request for surgery [id number]      Knee stiffness, left 07/30/2021     Priority: Medium    Muscle weakness 07/30/2021     Priority: Medium    Encounter for initial prescription of implantable subdermal contraceptive 03/11/2021     Priority: Medium    Psychophysiological insomnia 12/21/2020     Priority: Medium    Mild intermittent reactive airway disease with wheezing without complication 12/21/2020     Priority: Medium    Encounter for insertion of intrauterine contraceptive device (IUD) 04/29/2020     Priority: Medium    Well woman exam with routine gynecological exam 02/14/2018     Priority: Medium    Irritable bowel syndrome with both constipation and diarrhea 08/02/2017     Priority: Medium    Recurrent major  depressive disorder, in partial remission (H24) 08/02/2017     Priority: Medium    ACP (advance care planning) 03/02/2016     Priority: Medium     Advance Care Planning 3/2/2016: Receipt of ACP document:  Received: Health Care Directive which was witnessed or notarized on 1-25-16.  Document previously scanned on 2-5-16.  Validation form completed and sent to be scanned.  Code Status needs to be updated to reflect choices in most recent ACP document.  Confirmed/documented designated decision maker(s).  Added by Rachel Barber RN Advance Care Planning Liaison with [employer]            FH: breast cancer      Priority: Medium    Generalized anxiety disorder      Priority: Medium     Diagnosis updated by automated process. Provider to review and confirm.      Attention deficit disorder 05/31/2005     Priority: Medium     Problem list name updated by automated process. 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    FH: breast cancer     DAFNE (generalised anxiety disorder)     Irritable bowel syndrome with both constipation and diarrhea 08/02/2017    MDD (major depressive disorder)     Migraine, unspecified, without mention of intractable migraine without mention of status migrainosus 03/13/2000    Tobacco abuse, in remission     Unspecified sinusitis (chronic) 03/08/2001       Past Surgical History:    Past Surgical History:   Procedure Laterality Date    DILATION AND CURETTAGE, HYSTEROSCOPY, ABLATE ENDOMETRIUM, COMBINED N/A 11/22/2023    Procedure: Hysteroscopy, Endometrial Ablation;  Surgeon: Kuldip España MD;  Location: [facility]    ENDOSCOPY UPPER, COLONOSCOPY, COMBINED N/A 9/4/2015    Procedure: COMBINED ENDOSCOPY UPPER, COLONOSCOPY;  Surgeon: Griffin Barrientos DO;  Location: [facility]    LAPAROSCOPIC CYSTECTOMY OVARIAN (BENIGN) Right 1/27/2016     Procedure: LAPAROSCOPIC CYSTECTOMY OVARIAN (BENIGN);  Surgeon: Kuldip España MD;  Location: [facility]    LAPAROSCOPIC SALPINGO-OOPHORECTOMY Left 1/27/2016    Procedure: LAPAROSCOPIC SALPINGO-OOPHORECTOMY;  Surgeon: Kuldip España MD;  Location: [facility]    LAPAROSCOPY DIAGNOSTIC (GYN) Left 1/27/2016    Procedure: LAPAROSCOPY DIAGNOSTIC (GYN);  Surgeon: Kuldip España MD;  Location: [facility]    REMOVE FOREIGN BODY UPPER EXTREMITY Left 11/22/2023    Procedure: Nexplanon Removal Left Arm;  Surgeon: Kuldip España MD;  Location: [facility]    TUBAL LIGATION  2006    wisdom teeth         Family History:    Family History   Problem Relation Age of Onset    Breast Cancer Maternal Grandmother     Hypertension Maternal Grandfather     Hyperlipidemia Maternal Grandfather     Diabetes Paternal Grandmother     Asthma No family hx of        Social History:  Marital Status:   [2]  Social History     Tobacco Use    Smoking status: Former     Current packs/day: 0.00     Average packs/day: 0.5 packs/day for 13.0 years (6.5 ttl pk-yrs)     Types: Cigarettes     Start date: 1/4/2008     Quit date: 1/4/2021     Years since quitting: 3.3     Passive exposure: Past    Smokeless tobacco: Never    Tobacco comments:     pt denies quit plan 6/28/2022   Vaping Use    Vaping status: Every Day    Substances: Nicotine   Substance Use Topics    Alcohol use: Yes     Alcohol/week: 0.0 standard drinks of alcohol     Comment: frequency: rarely    Drug use: No        Medications:    Acetaminophen (TYLENOL PO)  amoxicillin-clavulanate (AUGMENTIN) 875-125 MG tablet  citalopram (CELEXA) 40 MG tablet  CONCERTA 36 MG CR tablet  diclofenac (VOLTAREN) 1 % topical gel  docusate sodium (COLACE) 100 MG capsule  EPINEPHrine (ANY BX GENERIC EQUIV) 0.3 MG/0.3ML injection 2-pack  fexofenadine (ALLEGRA) 180 MG tablet  hydrOXYzine (ATARAX) 25 MG tablet  ibuprofen (ADVIL/MOTRIN) 600 MG tablet  meclizine (ANTIVERT) 25 MG tablet  traZODone (DESYREL) 100 MG tablet  VENTOLIN HFA  "108 (90 Base) MCG/ACT inhaler  [START ON 6/18/2024] methylphenidate HCl ER, [facility]SM, (CONCERTA) 36 MG CR tablet  [START ON 5/19/2024] methylphenidate HCl ER, OSM, (CONCERTA) 36 MG CR tablet  methylphenidate HCl ER, OSM, (CONCERTA) 36 MG CR tablet  methylphenidate HCl ER, OSM, (CONCERTA) 36 MG CR tablet  mometasone (NASONEX) 50 MCG/ACT nasal spray  omeprazole (PRILOSEC) 40 MG DR capsule          Review of Systems   Constitutional: Negative.  Negative for fever.   Respiratory: Negative.     Cardiovascular: Negative.    Skin:  Positive for wound.       Physical Exam   BP: 149/94  Pulse: 80  Temp: 98.8  F (37.1  C)  Resp: 18  Height: 175.3 cm (5' 9\")  Weight: 96.6 kg (213 lb)  SpO2: 97 %      Physical Exam  Vitals and nursing note reviewed.   Constitutional:       General: She is not in acute distress.     Appearance: She is not toxic-appearing.   Cardiovascular:      Rate and Rhythm: Normal rate.   Pulmonary:      Effort: Pulmonary effort is normal.   Musculoskeletal:        Arms:       Comments: Erythema, swelling surrounding several puncture wounds to LT FA. No drainage. Can flex/extend at elbow & wrist. No lymphangitis.    Skin:     General: Skin is warm and dry.   Neurological:      Mental Status: She is alert and oriented to person, place, and time.         ED Course       No results found for this or any previous visit (from the past 24 hour(s)).    Medications   Tdap (tetanus-diphtheria-acell pertussis) (ADACEL) injection 0.5 mL (0.5 mLs Intramuscular $Given 5/3/24 [id number])       Assessments & Plan (with Medical Decision Making)     I have reviewed the nursing notes.  I have reviewed the findings, diagnosis, plan and need for follow up with the patient.  Discharge Medication List as of 5/3/2024  6:22 PM        START taking these medications    Details   amoxicillin-clavulanate (AUGMENTIN) 875-125 MG tablet Take 1 tablet by mouth 2 times daily for 7 days, Disp-14 tablet, R-0, E-Prescribe      meclizine (ANTIVERT) 25 MG " tablet Take 1-2 tablets (25-50 mg) by mouth 3 times daily as needed for dizziness, Disp-30 tablet, R-0, E-Prescribe             Final diagnoses:   Cat bite, initial encounter   Pt prefers trial Augmentin (Hx diarrhea w/ Amoxicillin) over dual therapy. Updated tetanus. Will monitor area and recheck in 2-3 days with any concerns, sooner with severe pain/worsening/fevers. I also filled meclizine for vertigo as Rx at home . Pt agreeable to plan and given AVS/handout for reference.     5/3/2024   [facility]       Eddie Amado PA  24       Eddie Amado PA  24

## 2024-05-03 NOTE — DISCHARGE INSTRUCTIONS
Monitor the area - may stay the same for 1 day or so, but if it gets worse, have it rechecked  Augmentin for 7 days (if you are 100% AFTER 5 days, you can stop.)  Meclizine as prescribed for dizziness.

## 2024-05-03 NOTE — ED TRIAGE NOTES
JÚNIOR Rios CNP assessed patient in triage and determined patient Urgent Care appropriate. Will be seen in Urgent Care.

## 2024-06-10 ENCOUNTER — OFFICE VISIT (OUTPATIENT)
Dept: FAMILY MEDICINE | Facility: OTHER | Age: 44
End: 2024-06-10
Attending: FAMILY MEDICINE
Payer: COMMERCIAL

## 2024-06-10 ENCOUNTER — LAB (OUTPATIENT)
Dept: LAB | Facility: OTHER | Age: 44
End: 2024-06-10
Attending: FAMILY MEDICINE
Payer: COMMERCIAL

## 2024-06-10 VITALS
BODY MASS INDEX: 32.07 KG/M2 | DIASTOLIC BLOOD PRESSURE: 86 MMHG | WEIGHT: 217.2 LBS | SYSTOLIC BLOOD PRESSURE: 136 MMHG | TEMPERATURE: 98.1 F | HEART RATE: 93 BPM | OXYGEN SATURATION: 99 %

## 2024-06-10 DIAGNOSIS — Z12.31 ENCOUNTER FOR SCREENING MAMMOGRAM FOR BREAST CANCER: ICD-10-CM

## 2024-06-10 DIAGNOSIS — F98.8 ATTENTION DEFICIT DISORDER (ADD) WITHOUT HYPERACTIVITY: ICD-10-CM

## 2024-06-10 DIAGNOSIS — Z12.11 SPECIAL SCREENING FOR MALIGNANT NEOPLASMS, COLON: ICD-10-CM

## 2024-06-10 DIAGNOSIS — K21.9 GASTROESOPHAGEAL REFLUX DISEASE WITHOUT ESOPHAGITIS: ICD-10-CM

## 2024-06-10 DIAGNOSIS — F98.8 ATTENTION DEFICIT DISORDER (ADD) WITHOUT HYPERACTIVITY: Primary | ICD-10-CM

## 2024-06-10 DIAGNOSIS — F51.04 PSYCHOPHYSIOLOGICAL INSOMNIA: ICD-10-CM

## 2024-06-10 DIAGNOSIS — F41.1 GAD (GENERALIZED ANXIETY DISORDER): ICD-10-CM

## 2024-06-10 DIAGNOSIS — Z13.220 LIPID SCREENING: ICD-10-CM

## 2024-06-10 DIAGNOSIS — F33.42 MAJOR DEPRESSIVE DISORDER, RECURRENT EPISODE, IN FULL REMISSION (H): ICD-10-CM

## 2024-06-10 DIAGNOSIS — F41.1 GENERALIZED ANXIETY DISORDER: ICD-10-CM

## 2024-06-10 LAB
ALBUMIN SERPL BCG-MCNC: 3.8 G/DL (ref 3.5–5.2)
ALP SERPL-CCNC: 110 U/L (ref 40–150)
ALT SERPL W P-5'-P-CCNC: 12 U/L (ref 0–50)
ANION GAP SERPL CALCULATED.3IONS-SCNC: 9 MMOL/L (ref 7–15)
AST SERPL W P-5'-P-CCNC: 19 U/L (ref 0–45)
BASOPHILS # BLD AUTO: 0.1 10E3/UL (ref 0–0.2)
BASOPHILS NFR BLD AUTO: 1 %
BILIRUB SERPL-MCNC: <0.2 MG/DL
BUN SERPL-MCNC: 9.8 MG/DL (ref 6–20)
CALCIUM SERPL-MCNC: 8.9 MG/DL (ref 8.6–10)
CHLORIDE SERPL-SCNC: 104 MMOL/L (ref 98–107)
CHOLEST SERPL-MCNC: 155 MG/DL
CREAT SERPL-MCNC: 0.73 MG/DL (ref 0.51–0.95)
DEPRECATED HCO3 PLAS-SCNC: 24 MMOL/L (ref 22–29)
EGFRCR SERPLBLD CKD-EPI 2021: >90 ML/MIN/1.73M2
EOSINOPHIL # BLD AUTO: 0.1 10E3/UL (ref 0–0.7)
EOSINOPHIL NFR BLD AUTO: 1 %
ERYTHROCYTE [DISTWIDTH] IN BLOOD BY AUTOMATED COUNT: 13.2 % (ref 10–15)
FASTING STATUS PATIENT QL REPORTED: NO
FASTING STATUS PATIENT QL REPORTED: NO
GLUCOSE SERPL-MCNC: 136 MG/DL (ref 70–99)
HCT VFR BLD AUTO: 37.9 % (ref 35–47)
HDLC SERPL-MCNC: 39 MG/DL
HGB BLD-MCNC: 12.9 G/DL (ref 11.7–15.7)
IMM GRANULOCYTES # BLD: 0 10E3/UL
IMM GRANULOCYTES NFR BLD: 0 %
LDLC SERPL CALC-MCNC: 89 MG/DL
LYMPHOCYTES # BLD AUTO: 2.2 10E3/UL (ref 0.8–5.3)
LYMPHOCYTES NFR BLD AUTO: 29 %
MCH RBC QN AUTO: 28.7 PG (ref 26.5–33)
MCHC RBC AUTO-ENTMCNC: 34 G/DL (ref 31.5–36.5)
MCV RBC AUTO: 84 FL (ref 78–100)
MONOCYTES # BLD AUTO: 0.5 10E3/UL (ref 0–1.3)
MONOCYTES NFR BLD AUTO: 7 %
NEUTROPHILS # BLD AUTO: 4.7 10E3/UL (ref 1.6–8.3)
NEUTROPHILS NFR BLD AUTO: 62 %
NONHDLC SERPL-MCNC: 116 MG/DL
NRBC # BLD AUTO: 0 10E3/UL
NRBC BLD AUTO-RTO: 0 /100
PLATELET # BLD AUTO: 322 10E3/UL (ref 150–450)
POTASSIUM SERPL-SCNC: 3.8 MMOL/L (ref 3.4–5.3)
PROT SERPL-MCNC: 7.2 G/DL (ref 6.4–8.3)
RBC # BLD AUTO: 4.49 10E6/UL (ref 3.8–5.2)
SODIUM SERPL-SCNC: 137 MMOL/L (ref 135–145)
TRIGL SERPL-MCNC: 135 MG/DL
TSH SERPL DL<=0.005 MIU/L-ACNC: 1.1 UIU/ML (ref 0.3–4.2)
WBC # BLD AUTO: 7.5 10E3/UL (ref 4–11)

## 2024-06-10 PROCEDURE — 84443 ASSAY THYROID STIM HORMONE: CPT | Mod: ZL

## 2024-06-10 PROCEDURE — G2211 COMPLEX E/M VISIT ADD ON: HCPCS | Performed by: FAMILY MEDICINE

## 2024-06-10 PROCEDURE — 80061 LIPID PANEL: CPT | Mod: ZL

## 2024-06-10 PROCEDURE — G0463 HOSPITAL OUTPT CLINIC VISIT: HCPCS

## 2024-06-10 PROCEDURE — 85048 AUTOMATED LEUKOCYTE COUNT: CPT | Mod: ZL

## 2024-06-10 PROCEDURE — 36415 COLL VENOUS BLD VENIPUNCTURE: CPT | Mod: ZL

## 2024-06-10 PROCEDURE — 99214 OFFICE O/P EST MOD 30 MIN: CPT | Performed by: FAMILY MEDICINE

## 2024-06-10 PROCEDURE — 82040 ASSAY OF SERUM ALBUMIN: CPT | Mod: ZL

## 2024-06-10 RX ORDER — METHYLPHENIDATE HYDROCHLORIDE 36 MG/1
36 TABLET ORAL 2 TIMES DAILY
Qty: 60 TABLET | Refills: 0 | Status: SHIPPED | OUTPATIENT
Start: 2024-07-16 | End: 2024-08-15

## 2024-06-10 RX ORDER — OMEPRAZOLE 40 MG/1
40 CAPSULE, DELAYED RELEASE ORAL DAILY
Qty: 90 CAPSULE | Refills: 3 | Status: SHIPPED | OUTPATIENT
Start: 2024-06-10

## 2024-06-10 RX ORDER — METHYLPHENIDATE HYDROCHLORIDE 36 MG/1
36 TABLET ORAL 2 TIMES DAILY
Qty: 60 TABLET | Refills: 0 | Status: SHIPPED | OUTPATIENT
Start: 2024-09-16 | End: 2024-10-16

## 2024-06-10 RX ORDER — METHYLPHENIDATE HYDROCHLORIDE 36 MG/1
36 TABLET ORAL 2 TIMES DAILY
Qty: 60 TABLET | Refills: 0 | Status: SHIPPED | OUTPATIENT
Start: 2024-08-16 | End: 2024-09-15

## 2024-06-10 RX ORDER — HYDROXYZINE HYDROCHLORIDE 25 MG/1
TABLET, FILM COATED ORAL
Qty: 60 TABLET | Refills: 5 | Status: SHIPPED | OUTPATIENT
Start: 2024-06-10

## 2024-06-10 RX ORDER — CITALOPRAM HYDROBROMIDE 40 MG/1
40 TABLET ORAL DAILY
Qty: 90 TABLET | Refills: 3 | Status: SHIPPED | OUTPATIENT
Start: 2024-06-10

## 2024-06-10 ASSESSMENT — ANXIETY QUESTIONNAIRES
GAD7 TOTAL SCORE: 8
6. BECOMING EASILY ANNOYED OR IRRITABLE: SEVERAL DAYS
7. FEELING AFRAID AS IF SOMETHING AWFUL MIGHT HAPPEN: SEVERAL DAYS
4. TROUBLE RELAXING: SEVERAL DAYS
IF YOU CHECKED OFF ANY PROBLEMS ON THIS QUESTIONNAIRE, HOW DIFFICULT HAVE THESE PROBLEMS MADE IT FOR YOU TO DO YOUR WORK, TAKE CARE OF THINGS AT HOME, OR GET ALONG WITH OTHER PEOPLE: SOMEWHAT DIFFICULT
7. FEELING AFRAID AS IF SOMETHING AWFUL MIGHT HAPPEN: SEVERAL DAYS
8. IF YOU CHECKED OFF ANY PROBLEMS, HOW DIFFICULT HAVE THESE MADE IT FOR YOU TO DO YOUR WORK, TAKE CARE OF THINGS AT HOME, OR GET ALONG WITH OTHER PEOPLE?: SOMEWHAT DIFFICULT
GAD7 TOTAL SCORE: 8
5. BEING SO RESTLESS THAT IT IS HARD TO SIT STILL: SEVERAL DAYS
3. WORRYING TOO MUCH ABOUT DIFFERENT THINGS: SEVERAL DAYS
GAD7 TOTAL SCORE: 8
2. NOT BEING ABLE TO STOP OR CONTROL WORRYING: SEVERAL DAYS
1. FEELING NERVOUS, ANXIOUS, OR ON EDGE: MORE THAN HALF THE DAYS

## 2024-06-10 ASSESSMENT — PATIENT HEALTH QUESTIONNAIRE - PHQ9
SUM OF ALL RESPONSES TO PHQ QUESTIONS 1-9: 6
10. IF YOU CHECKED OFF ANY PROBLEMS, HOW DIFFICULT HAVE THESE PROBLEMS MADE IT FOR YOU TO DO YOUR WORK, TAKE CARE OF THINGS AT HOME, OR GET ALONG WITH OTHER PEOPLE: SOMEWHAT DIFFICULT
SUM OF ALL RESPONSES TO PHQ QUESTIONS 1-9: 6

## 2024-06-10 ASSESSMENT — ASTHMA QUESTIONNAIRES
QUESTION_5 LAST FOUR WEEKS HOW WOULD YOU RATE YOUR ASTHMA CONTROL: COMPLETELY CONTROLLED
ACT_TOTALSCORE: 21
QUESTION_4 LAST FOUR WEEKS HOW OFTEN HAVE YOU USED YOUR RESCUE INHALER OR NEBULIZER MEDICATION (SUCH AS ALBUTEROL): TWO OR THREE TIMES PER WEEK
QUESTION_2 LAST FOUR WEEKS HOW OFTEN HAVE YOU HAD SHORTNESS OF BREATH: ONCE OR TWICE A WEEK
QUESTION_1 LAST FOUR WEEKS HOW MUCH OF THE TIME DID YOUR ASTHMA KEEP YOU FROM GETTING AS MUCH DONE AT WORK, SCHOOL OR AT HOME: A LITTLE OF THE TIME
QUESTION_3 LAST FOUR WEEKS HOW OFTEN DID YOUR ASTHMA SYMPTOMS (WHEEZING, COUGHING, SHORTNESS OF BREATH, CHEST TIGHTNESS OR PAIN) WAKE YOU UP AT NIGHT OR EARLIER THAN USUAL IN THE MORNING: NOT AT ALL
ACT_TOTALSCORE: 21

## 2024-06-10 ASSESSMENT — PAIN SCALES - GENERAL: PAINLEVEL: NO PAIN (0)

## 2024-06-10 NOTE — PROGRESS NOTES
Assessment & Plan     Attention deficit disorder (ADD) without hyperactivity  Doing well. No issues. Follow-up in 9 months - Continue current medications and behavioral changes.   - Comprehensive metabolic panel; Future  - CBC with Platelets & Differential; Future  - Lipid Profile; Future  - TSH; Future  - methylphenidate HCl ER, OSM, (CONCERTA) 36 MG CR tablet; Take 1 tablet (36 mg) by mouth 2 times daily for 30 days  - methylphenidate HCl ER, OSM, (CONCERTA) 36 MG CR tablet; Take 1 tablet (36 mg) by mouth 2 times daily for 30 days  - methylphenidate HCl ER, OSM, (CONCERTA) 36 MG CR tablet; Take 1 tablet (36 mg) by mouth 2 times daily for 30 days    Psychophysiological insomnia  Stable on meds. Continue current medications and behavioral changes.     - Comprehensive metabolic panel; Future  - CBC with Platelets & Differential; Future  - Lipid Profile; Future  - TSH; Future    DAFNE (generalized anxiety disorder)  Stable onmeds. Continue current medications and behavioral changes.   - Comprehensive metabolic panel; Future  - CBC with Platelets & Differential; Future  - Lipid Profile; Future  - TSH; Future  - citalopram (CELEXA) 40 MG tablet; Take 1 tablet (40 mg) by mouth daily    Major depressive disorder, recurrent episode, in full remission (H24)  Stable - Continue current medications and behavioral changes.   - Comprehensive metabolic panel; Future  - CBC with Platelets & Differential; Future  - Lipid Profile; Future  - TSH; Future  - citalopram (CELEXA) 40 MG tablet; Take 1 tablet (40 mg) by mouth daily    Gastroesophageal reflux disease without esophagitis  Stable - needs long acting PPI  - Comprehensive metabolic panel; Future  - CBC with Platelets & Differential; Future  - Lipid Profile; Future  - TSH; Future  - omeprazole (PRILOSEC) 40 MG DR capsule; Take 1 capsule (40 mg) by mouth daily    Encounter for screening mammogram for breast cancer  Overdue. Ordered   - Comprehensive metabolic panel; Future  - CBC  "with Platelets & Differential; Future  - Lipid Profile; Future  - TSH; Future  - MA Screening Bilateral w/ Galileo; Future    Special screening for malignant neoplasms, colon  Due next year   - Comprehensive metabolic panel; Future  - CBC with Platelets & Differential; Future  - Lipid Profile; Future  - TSH; Future    Lipid screening  Reviewed. No meds needed   - Comprehensive metabolic panel; Future  - CBC with Platelets & Differential; Future  - Lipid Profile; Future  - TSH; Future    Gastroesophageal reflux disease without esophagitis  As above   - Comprehensive metabolic panel; Future  - CBC with Platelets & Differential; Future  - Lipid Profile; Future  - TSH; Future  - omeprazole (PRILOSEC) 40 MG DR capsule; Take 1 capsule (40 mg) by mouth daily    Generalized anxiety disorder  As above. Stable on meds. RF done   - hydrOXYzine HCl (ATARAX) 25 MG tablet; TAKE 1 TO 2 TABLETS BY MOUTH 3 TIMES A DAY AS NEEDED          Nicotine/Tobacco Cessation  She reports that she has been smoking cigarettes. She started smoking about 16 years ago. She has a 6.5 pack-year smoking history. She has been exposed to tobacco smoke. She has never used smokeless tobacco.  Nicotine/Tobacco Cessation Plan  Information offered: Patient not interested at this time      BMI  Estimated body mass index is 32.07 kg/m  as calculated from the following:    Height as of 5/3/24: 1.753 m (5' 9\").    Weight as of this encounter: 98.5 kg (217 lb 3.2 oz).   Weight management plan: Discussed healthy diet and exercise guidelines          No follow-ups on file.    Laith Zendejas is a 44 year old, presenting for the following health issues:  A.D.H.D, Anxiety, and Depression        6/10/2024     8:33 AM   Additional Questions   Roomed by Sebas Maldonado   Accompanied by          6/10/2024     8:33 AM   Patient Reported Additional Medications   Patient reports taking the following new medications none     HPI     Depression and Anxiety   How are you " doing with your depression since your last visit? No change  How are you doing with your anxiety since your last visit?  No change. States it is situational   Are you having other symptoms that might be associated with depression or anxiety? No  Have you had a significant life event? No   Do you have any concerns with your use of alcohol or other drugs? No    Social History     Tobacco Use    Smoking status: Every Day     Current packs/day: 0.00     Average packs/day: 0.5 packs/day for 13.0 years (6.5 ttl pk-yrs)     Types: Cigarettes     Start date: 1/4/2008     Last attempt to quit: 1/4/2021     Years since quitting: 3.4     Passive exposure: Past    Smokeless tobacco: Never    Tobacco comments:     pt denies quit plan 6/28/2022   Vaping Use    Vaping status: Every Day    Substances: Nicotine   Substance Use Topics    Alcohol use: Yes     Alcohol/week: 0.0 standard drinks of alcohol     Comment: frequency: rarely    Drug use: No         10/11/2023     9:15 AM 12/7/2023     9:35 AM 6/10/2024     8:22 AM   PHQ   PHQ-9 Total Score 10 15 6   Q9: Thoughts of better off dead/self-harm past 2 weeks Not at all Not at all Not at all         10/11/2023     9:16 AM 12/7/2023     9:36 AM 6/10/2024     8:23 AM   DAFNE-7 SCORE   Total Score 10 (moderate anxiety) 13 (moderate anxiety) 8 (mild anxiety)   Total Score 10 13 8         6/10/2024     8:22 AM   Last PHQ-9   1.  Little interest or pleasure in doing things 1   2.  Feeling down, depressed, or hopeless 1   3.  Trouble falling or staying asleep, or sleeping too much 1   4.  Feeling tired or having little energy 1   5.  Poor appetite or overeating 0   6.  Feeling bad about yourself 0   7.  Trouble concentrating 1   8.  Moving slowly or restless 1   Q9: Thoughts of better off dead/self-harm past 2 weeks 0   PHQ-9 Total Score 6         6/10/2024     8:23 AM   DAFNE-7    1. Feeling nervous, anxious, or on edge 2   2. Not being able to stop or control worrying 1   3. Worrying too  much about different things 1   4. Trouble relaxing 1   5. Being so restless that it is hard to sit still 1   6. Becoming easily annoyed or irritable 1   7. Feeling afraid, as if something awful might happen 1   DAFNE-7 Total Score 8   If you checked any problems, how difficult have they made it for you to do your work, take care of things at home, or get along with other people? Somewhat difficult       Suicide Assessment Five-step Evaluation and Treatment (SAFE-T)    ADHD Follow-Up    Date of last ADHD office visit: 12/7/2023  Status since last visit: Stable  Taking controlled (daily) medications as prescribed: Yes                       Parent/Patient Concerns with Medications: States that she takes the medication 7 AM and works 8-5. Around 2:30-3 the medication will wear off   ADHD Medication       Stimulants - Misc. Disp Start End     CONCERTA 36 MG CR tablet 60 tablet 11/12/2022 --    Sig - Route: Take 1 tablet (36 mg) by mouth 2 times daily - Oral    Class: E-Prescribe    Earliest Fill Date: 11/12/2022     methylphenidate HCl ER, OSM, (CONCERTA) 36 MG CR tablet 60 tablet 6/18/2024 --    Sig - Route: Take 1 tablet (36 mg) by mouth 2 times daily - Oral    Class: E-Prescribe    Earliest Fill Date: 6/18/2024    Notes to Pharmacy: Start 6/18/24    No prior authorization was found for this prescription.    Found prior authorization for another prescription for the same medication: Approved            Sleep: no problems  Home/Family Concerns: Stable  Peer Concerns: Stable    Co-Morbid Diagnosis: None    Currently in counseling: No        Medication Benefits:   Controlled symptoms: None  Uncontrolled Symptoms : None    Medication side effects:  Side effects noted: none  Denies : none                 Review of Systems  Constitutional, neuro, ENT, endocrine, pulmonary, cardiac, gastrointestinal, genitourinary, musculoskeletal, integument and psychiatric systems are negative, except as otherwise noted.      Objective    BP  136/86 (BP Location: Left arm, Patient Position: Sitting, Cuff Size: Adult Large)   Pulse 93   Temp 98.1  F (36.7  C) (Tympanic)   Wt 98.5 kg (217 lb 3.2 oz)   SpO2 99%   BMI 32.07 kg/m    Body mass index is 32.07 kg/m .  Physical Exam   GENERAL: alert and no distress  NECK: no adenopathy, no asymmetry, masses, or scars  RESP: lungs clear to auscultation - no rales, rhonchi or wheezes  CV: regular rate and rhythm, normal S1 S2, no S3 or S4, no murmur, click or rub, no peripheral edema  ABDOMEN: soft, nontender, no hepatosplenomegaly, no masses and bowel sounds normal  MS: no gross musculoskeletal defects noted, no edema    Results for orders placed or performed in visit on 06/10/24   Comprehensive metabolic panel     Status: Abnormal   Result Value Ref Range    Sodium 137 135 - 145 mmol/L    Potassium 3.8 3.4 - 5.3 mmol/L    Carbon Dioxide (CO2) 24 22 - 29 mmol/L    Anion Gap 9 7 - 15 mmol/L    Urea Nitrogen 9.8 6.0 - 20.0 mg/dL    Creatinine 0.73 0.51 - 0.95 mg/dL    GFR Estimate >90 >60 mL/min/1.73m2    Calcium 8.9 8.6 - 10.0 mg/dL    Chloride 104 98 - 107 mmol/L    Glucose 136 (H) 70 - 99 mg/dL    Alkaline Phosphatase 110 40 - 150 U/L    AST 19 0 - 45 U/L    ALT 12 0 - 50 U/L    Protein Total 7.2 6.4 - 8.3 g/dL    Albumin 3.8 3.5 - 5.2 g/dL    Bilirubin Total <0.2 <=1.2 mg/dL    Patient Fasting > 8hrs? No    Lipid Profile     Status: Abnormal   Result Value Ref Range    Cholesterol 155 <200 mg/dL    Triglycerides 135 <150 mg/dL    Direct Measure HDL 39 (L) >=50 mg/dL    LDL Cholesterol Calculated 89 <=100 mg/dL    Non HDL Cholesterol 116 <130 mg/dL    Patient Fasting > 8hrs? No     Narrative    Cholesterol  Desirable:  <200 mg/dL    Triglycerides  Normal:  Less than 150 mg/dL  Borderline High:  150-199 mg/dL  High:  200-499 mg/dL  Very High:  Greater than or equal to 500 mg/dL    Direct Measure HDL  Female:  Greater than or equal to 50 mg/dL   Male:  Greater than or equal to 40 mg/dL    LDL  Cholesterol  Desirable:  <100mg/dL  Above Desirable:  100-129 mg/dL   Borderline High:  130-159 mg/dL   High:  160-189 mg/dL   Very High:  >= 190 mg/dL    Non HDL Cholesterol  Desirable:  130 mg/dL  Above Desirable:  130-159 mg/dL  Borderline High:  160-189 mg/dL  High:  190-219 mg/dL  Very High:  Greater than or equal to 220 mg/dL   TSH     Status: Normal   Result Value Ref Range    TSH 1.10 0.30 - 4.20 uIU/mL   CBC with platelets and differential     Status: None   Result Value Ref Range    WBC Count 7.5 4.0 - 11.0 10e3/uL    RBC Count 4.49 3.80 - 5.20 10e6/uL    Hemoglobin 12.9 11.7 - 15.7 g/dL    Hematocrit 37.9 35.0 - 47.0 %    MCV 84 78 - 100 fL    MCH 28.7 26.5 - 33.0 pg    MCHC 34.0 31.5 - 36.5 g/dL    RDW 13.2 10.0 - 15.0 %    Platelet Count 322 150 - 450 10e3/uL    % Neutrophils 62 %    % Lymphocytes 29 %    % Monocytes 7 %    % Eosinophils 1 %    % Basophils 1 %    % Immature Granulocytes 0 %    NRBCs per 100 WBC 0 <1 /100    Absolute Neutrophils 4.7 1.6 - 8.3 10e3/uL    Absolute Lymphocytes 2.2 0.8 - 5.3 10e3/uL    Absolute Monocytes 0.5 0.0 - 1.3 10e3/uL    Absolute Eosinophils 0.1 0.0 - 0.7 10e3/uL    Absolute Basophils 0.1 0.0 - 0.2 10e3/uL    Absolute Immature Granulocytes 0.0 <=0.4 10e3/uL    Absolute NRBCs 0.0 10e3/uL   CBC with Platelets & Differential     Status: None    Narrative    The following orders were created for panel order CBC with Platelets & Differential.  Procedure                               Abnormality         Status                     ---------                               -----------         ------                     CBC with platelets and d...[[id number]]                      Final result                 Please view results for these tests on the individual orders.             Signed Electronically by: Sudhakar Cuellar MD    Answers submitted by the patient for this visit:  Patient Health Questionnaire (Submitted on 6/10/2024)  If you checked off any problems, how difficult  have these problems made it for you to do your work, take care of things at home, or get along with other people?: Somewhat difficult  PHQ9 TOTAL SCORE: 6  DAFNE-7 (Submitted on 6/10/2024)  DAFNE 7 TOTAL SCORE: 8

## 2024-06-19 ENCOUNTER — HOSPITAL ENCOUNTER (EMERGENCY)
Facility: HOSPITAL | Age: 44
Discharge: HOME | End: 2024-06-19
Attending: NURSE PRACTITIONER | Admitting: NURSE PRACTITIONER
Payer: COMMERCIAL

## 2024-06-19 VITALS
OXYGEN SATURATION: 99 % | DIASTOLIC BLOOD PRESSURE: 96 MMHG | TEMPERATURE: 97.6 F | HEIGHT: 69 IN | BODY MASS INDEX: 32.14 KG/M2 | HEART RATE: 80 BPM | WEIGHT: 217 LBS | SYSTOLIC BLOOD PRESSURE: 159 MMHG | RESPIRATION RATE: 16 BRPM
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
## 2024-06-19 PROCEDURE — 99213 OFFICE O/P EST LOW 20 MIN: CPT | Performed by: NURSE PRACTITIONER

## 2024-06-19 PROCEDURE — G0463 HOSPITAL OUTPT CLINIC VISIT: HCPCS | Mod: 25

## 2024-06-19 ASSESSMENT — ACTIVITIES OF DAILY LIVING (ADL): ADLS_ACUITY_SCORE: 35

## 2024-06-19 ASSESSMENT — ENCOUNTER SYMPTOMS
FEVER: 0
NAUSEA: 0
CHILLS: 0
WOUND: 1
VOMITING: 0
DIARRHEA: 0
PSYCHIATRIC NEGATIVE: 1
SHORTNESS OF BREATH: 0

## 2024-06-19 NOTE — Clinical Note
Cristiana Aguila was seen and treated in our emergency department on 6/19/2024.         Sincerely,     [facility]

## 2024-06-19 NOTE — ED TRIAGE NOTES
Pt presents with concerns of needle stick, while trying to microchip a cat at work, pt reports incident occurred around 1230

## 2024-06-19 NOTE — Clinical Note
Cristiana Frankeljesúsconnorskmachelle was seen and treated in our emergency department on 6/19/2024.    Keep wound to left hand clean and dry, no cleaning out litter boxes until 6/22/2024.  Thank you     Sincerely,     [facility]

## 2024-06-19 NOTE — DISCHARGE INSTRUCTIONS
Augmentin as ordered  - Take entire course of antibiotic even if you start to feel better.  - Antibiotics can cause stomach upset including nausea and diarrhea. Read your bottle or ask the pharmacist if antibiotic can be taken with food to help prevent nausea. If you have symptoms of diarrhea you can take an over-the-counter probiotic and/or increase foods with probiotics such as yogurt, [place], sauerkraut.    Monitor cat for 10 days, return for rabies vaccines as needed    Keep wound clean and dry    Follow-up with primary care provider or return to urgent care/ED with any worsening in condition or additional concerns.

## 2024-06-19 NOTE — ED PROVIDER NOTES
History     Chief Complaint   Patient presents with    Hand Pain     HPI  Cristiana Aguila is a 44 year old female who presents to urgent care today ambulatory with complaints of a needlestick to left index finger while trying to microchip a cat.  Occurred at 1230 pm today.  Needle had gone to do Prior to poking self with needle.  Full ROM of left finger.  Denies any fever, chills, nausea, vomiting, diarrhea, shortness of breath or chest pain.  No OTC meds.  Does not want to claim under Workmen's Comp.  Tdap last completed on 5/30/2024.  No other concerns.    Allergies:  Allergies   Allergen Reactions    Food Anaphylaxis     Artificial cinnamon   Artificial cinnamon     Bee Venom      Bee venom (honey bee)      Sulfa Antibiotics      Sulfa (sulfonamide antibiotics)       Amoxicillin Diarrhea    Latex Rash    Other Environmental Allergy Itching and Rash     Pt is allergic to the cold.         Problem List:    Patient Active Problem List    Diagnosis Date Noted    Ankle instability, unspecified laterality 09/15/2022     Priority: Medium     Formatting of this note might be different from the original.  Added automatically from request for surgery [id number]      Ankle pain 07/30/2021     Priority: Medium     Formatting of this note might be different from the original.  Added automatically from request for surgery [id number]  Formatting of this note might be different from the original.  Added automatically from request for surgery [id number]      Knee stiffness, left 07/30/2021     Priority: Medium    Muscle weakness 07/30/2021     Priority: Medium    Encounter for initial prescription of implantable subdermal contraceptive 03/11/2021     Priority: Medium    Psychophysiological insomnia 12/21/2020     Priority: Medium    Mild intermittent reactive airway disease with wheezing without complication 12/21/2020     Priority: Medium    Encounter for insertion of intrauterine contraceptive device (IUD) 04/29/2020     Priority:  Medium    Well woman exam with routine gynecological exam 02/14/2018     Priority: Medium    Irritable bowel syndrome with both constipation and diarrhea 08/02/2017     Priority: Medium    Recurrent major depressive disorder, in partial remission (H24) 08/02/2017     Priority: Medium    ACP (advance care planning) 03/02/2016     Priority: Medium     Advance Care Planning 3/2/2016: Receipt of ACP document:  Received: Health Care Directive which was witnessed or notarized on 1-25-16.  Document previously scanned on 2-5-16.  Validation form completed and sent to be scanned.  Code Status needs to be updated to reflect choices in most recent ACP document.  Confirmed/documented designated decision maker(s).  Added by Rachel Barber RN Advance Care Planning Liaison with [employer]            FH: breast cancer      Priority: Medium    Generalized anxiety disorder      Priority: Medium     Diagnosis updated by automated process. Provider to review and confirm.      Attention deficit disorder 05/31/2005     Priority: Medium     Problem list name updated by automated process. 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    FH: breast cancer     DAFNE (generalised anxiety disorder)     Irritable bowel syndrome with both constipation and diarrhea 08/02/2017    MDD (major depressive disorder)     Migraine, unspecified, without mention of intractable migraine without mention of status migrainosus 03/13/2000    Tobacco abuse, in remission     Unspecified sinusitis (chronic) 03/08/2001       Past Surgical History:    Past Surgical History:   Procedure Laterality Date    DILATION AND CURETTAGE, HYSTEROSCOPY, ABLATE ENDOMETRIUM, COMBINED N/A 11/22/2023    Procedure: Hysteroscopy, Endometrial Ablation;  Surgeon: Kuldip España MD;  Location: [facility]    ENDOSCOPY  UPPER, COLONOSCOPY, COMBINED N/A 9/4/2015    Procedure: COMBINED ENDOSCOPY UPPER, COLONOSCOPY;  Surgeon: Griffin Barrientos DO;  Location: [facility]    LAPAROSCOPIC CYSTECTOMY OVARIAN (BENIGN) Right 1/27/2016    Procedure: LAPAROSCOPIC CYSTECTOMY OVARIAN (BENIGN);  Surgeon: Kuldip España MD;  Location: [facility]    LAPAROSCOPIC SALPINGO-OOPHORECTOMY Left 1/27/2016    Procedure: LAPAROSCOPIC SALPINGO-OOPHORECTOMY;  Surgeon: Kuldip España MD;  Location: [facility]    LAPAROSCOPY DIAGNOSTIC (GYN) Left 1/27/2016    Procedure: LAPAROSCOPY DIAGNOSTIC (GYN);  Surgeon: Kuldip España MD;  Location: [facility]    REMOVE FOREIGN BODY UPPER EXTREMITY Left 11/22/2023    Procedure: Nexplanon Removal Left Arm;  Surgeon: Kuldip España MD;  Location: [facility]    TUBAL LIGATION  2006    wisdom teeth         Family History:    Family History   Problem Relation Age of Onset    Breast Cancer Maternal Grandmother     Hypertension Maternal Grandfather     Hyperlipidemia Maternal Grandfather     Diabetes Paternal Grandmother     Asthma No family hx of        Social History:  Marital Status:   [2]  Social History     Tobacco Use    Smoking status: Every Day     Current packs/day: 0.00     Average packs/day: 0.5 packs/day for 13.0 years (6.5 ttl pk-yrs)     Types: Cigarettes     Start date: 1/4/2008     Last attempt to quit: 1/4/2021     Years since quitting: 3.4     Passive exposure: Past    Smokeless tobacco: Never    Tobacco comments:     pt denies quit plan 6/28/2022   Vaping Use    Vaping status: Every Day    Substances: Nicotine   Substance Use Topics    Alcohol use: Yes     Alcohol/week: 0.0 standard drinks of alcohol     Comment: frequency: rarely    Drug use: No        Medications:    amoxicillin-clavulanate (AUGMENTIN) 875-125 MG tablet  Acetaminophen (TYLENOL PO)  citalopram (CELEXA) 40 MG tablet  CONCERTA 36 MG CR tablet  diclofenac (VOLTAREN) 1 % topical gel  docusate sodium (COLACE) 100 MG capsule  EPINEPHrine (ANY BX GENERIC EQUIV)  "0.3 MG/0.3ML injection 2-pack  fexofenadine (ALLEGRA) 180 MG tablet  hydrOXYzine HCl (ATARAX) 25 MG tablet  ibuprofen (ADVIL/MOTRIN) 600 MG tablet  [START ON 7/16/2024] methylphenidate HCl ER, OSM, (CONCERTA) 36 MG CR tablet  [START ON 8/16/2024] methylphenidate HCl ER, OSM, (CONCERTA) 36 MG CR tablet  [START ON 9/16/2024] methylphenidate HCl ER, OSM, (CONCERTA) 36 MG CR tablet  methylphenidate HCl ER, OSM, (CONCERTA) 36 MG CR tablet  mometasone (NASONEX) 50 MCG/ACT nasal spray  omeprazole (PRILOSEC) 40 MG DR capsule  traZODone (DESYREL) 100 MG tablet  VENTOLIN  (90 Base) MCG/ACT inhaler      Review of Systems   Constitutional:  Negative for chills and fever.   Respiratory:  Negative for shortness of breath.    Cardiovascular:  Negative for chest pain.   Gastrointestinal:  Negative for diarrhea, nausea and vomiting.   Musculoskeletal:  Negative for gait problem.   Skin:  Positive for wound (left index finger).   Psychiatric/Behavioral: Negative.       Physical Exam   BP: 159/96  Pulse: 80  Temp: 97.6  F (36.4  C)  Resp: 16  Height: 175.3 cm (5' 9\")  Weight: 98.4 kg (217 lb)  SpO2: 99 %    Physical Exam  Vitals and nursing note reviewed.   Constitutional:       General: She is not in acute distress.     Appearance: Normal appearance. She is not ill-appearing or toxic-appearing.   Cardiovascular:      Rate and Rhythm: Normal rate and regular rhythm.      Pulses: Normal pulses.      Heart sounds: Normal heart sounds.   Pulmonary:      Effort: Pulmonary effort is normal.      Breath sounds: Normal breath sounds.   Skin:     General: Skin is warm and dry.      Capillary Refill: Capillary refill takes less than 2 seconds.      Comments: Needlestick to left index finger while trying to microchip a cat, no drainage, full ROM.   Neurological:      Mental Status: She is alert.   Psychiatric:         Mood and Affect: Mood normal.       ED Course     No results found for this or any previous visit (from the past 24 " hour(s)).    Medications - No data to display    Assessments & Plan (with Medical Decision Making)     I have reviewed the nursing notes.    I have reviewed the findings, diagnosis, plan and need for follow up with the patient.  (W46.0XXA) Needle stick, hypodermic, accidental, initial encounter  (primary encounter diagnosis)  Comment: Cat  Plan:   Patient ambulatory with a nontoxic appearance.  Patient got an accidental needlestick to left index finger while micro chipping a cat, needle went into cat prior to sticking finger, no drainage, full ROM.  Denies any fever, chills, nausea, vomiting, diarrhea, shortness of breath or chest pain.  Wound cleansed with warm soapy water, flushed with normal saline, triple antibiotic and dressing placed.  Tdap up-to-date.  Monitor cat for the next 10 days.  Augmentin as ordered.  Keep wound clean and dry, work note given per request.  Follow-up with primary care provider or return to urgent care/ED with any worsening in condition or additional concerns.  Patient in agreement treatment plan.  Patient did not want to climb out of work,, no paperwork given.    Discharge Medication List as of 6/19/2024  4:38 PM        START taking these medications    Details   amoxicillin-clavulanate (AUGMENTIN) 875-125 MG tablet Take 1 tablet by mouth 2 times daily for 7 days, Disp-14 tablet, R-0, E-Prescribe           Final diagnoses:   Needle stick, hypodermic, accidental, initial encounter - Cat     6/19/2024   [facility]       Susan Rucker NP  06/19/24 1641       Susan Rucker NP  06/19/24 1642

## 2024-07-07 ENCOUNTER — HEALTH MAINTENANCE LETTER (OUTPATIENT)
Age: 44
End: 2024-07-07

## 2024-07-15 DIAGNOSIS — J45.20 MILD INTERMITTENT REACTIVE AIRWAY DISEASE WITH WHEEZING WITHOUT COMPLICATION: ICD-10-CM

## 2024-07-15 NOTE — TELEPHONE ENCOUNTER
Ventolin      Last Written Prescription Date:  6/7/23  Last Fill Quantity: 18g,   # refills: 3  Last Office Visit: 6/10/24  Future Office visit:       Routing refill request to provider for review/approval because:

## 2024-07-16 RX ORDER — ALBUTEROL SULFATE 90 UG/1
AEROSOL, METERED RESPIRATORY (INHALATION)
Qty: 18 G | Refills: 5 | Status: SHIPPED | OUTPATIENT
Start: 2024-07-16

## 2024-07-19 ENCOUNTER — HOSPITAL ENCOUNTER (EMERGENCY)
Facility: HOSPITAL | Age: 44
Discharge: HOME | End: 2024-07-19
Attending: NURSE PRACTITIONER | Admitting: NURSE PRACTITIONER
Payer: COMMERCIAL

## 2024-07-19 VITALS
DIASTOLIC BLOOD PRESSURE: 90 MMHG | RESPIRATION RATE: 18 BRPM | OXYGEN SATURATION: 100 % | SYSTOLIC BLOOD PRESSURE: 145 MMHG | HEART RATE: 71 BPM | TEMPERATURE: 97.3 F
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
## 2024-07-19 PROCEDURE — 99213 OFFICE O/P EST LOW 20 MIN: CPT | Performed by: NURSE PRACTITIONER

## 2024-07-19 PROCEDURE — G0463 HOSPITAL OUTPT CLINIC VISIT: HCPCS

## 2024-07-19 ASSESSMENT — ENCOUNTER SYMPTOMS: WOUND: 1

## 2024-07-19 ASSESSMENT — ACTIVITIES OF DAILY LIVING (ADL): ADLS_ACUITY_SCORE: 35

## 2024-07-19 NOTE — ED TRIAGE NOTES
Patient presents with concern of cat bite to left index finger. Pt reports No concerns for rabies.

## 2024-07-19 NOTE — DISCHARGE INSTRUCTIONS
Clean your wounds with mild soap and water. Dry thoroughly. Apply neosporin over the wounds.     Tylenol or ibuprofen as needed for pain.     Take antibiotic as prescribed.     Follow up with your doctor as needed.    Return to emergency department for worsening or concerning symptoms.

## 2024-07-19 NOTE — ED PROVIDER NOTES
History     Chief Complaint   Patient presents with    Cat Bite     HPI  Cristiana Aguila is a 44 year old female who presents to urgent care for evaluation following a cat bite.  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  Bleeding controlled upon arrival to this facility.  She did not clean the wound she notes that the last time she had cleaned it with peroxide and was advised against doing so.  Cat is not up-to-date on its shots.  Denies any concern for rabies as the cat has been with the shelter for a while.  Also reports that this is the same cat that bit her 2 months ago.   Her last Tdap was 5/3/2024.    Allergies:  Allergies   Allergen Reactions    Food Anaphylaxis     Artificial cinnamon   Artificial cinnamon     Bee Venom      Bee venom (honey bee)      Sulfa Antibiotics      Sulfa (sulfonamide antibiotics)       Amoxicillin Diarrhea    Latex Rash    Other Environmental Allergy Itching and Rash     Pt is allergic to the cold.         Problem List:    Patient Active Problem List    Diagnosis Date Noted    Ankle instability, unspecified laterality 09/15/2022     Priority: Medium     Formatting of this note might be different from the original.  Added automatically from request for surgery [id number]      Ankle pain 07/30/2021     Priority: Medium     Formatting of this note might be different from the original.  Added automatically from request for surgery [id number]  Formatting of this note might be different from the original.  Added automatically from request for surgery [id number]      Knee stiffness, left 07/30/2021     Priority: Medium    Muscle weakness 07/30/2021     Priority: Medium    Encounter for initial prescription of implantable subdermal contraceptive 03/11/2021     Priority: Medium    Psychophysiological insomnia 12/21/2020     Priority: Medium    Mild intermittent reactive airway disease with wheezing  without complication 12/21/2020     Priority: Medium    Encounter for insertion of intrauterine contraceptive device (IUD) 04/29/2020     Priority: Medium    Well woman exam with routine gynecological exam 02/14/2018     Priority: Medium    Irritable bowel syndrome with both constipation and diarrhea 08/02/2017     Priority: Medium    Recurrent major depressive disorder, in partial remission (H24) 08/02/2017     Priority: Medium    ACP (advance care planning) 03/02/2016     Priority: Medium     Advance Care Planning 3/2/2016: Receipt of ACP document:  Received: Health Care Directive which was witnessed or notarized on 1-25-16.  Document previously scanned on 2-5-16.  Validation form completed and sent to be scanned.  Code Status needs to be updated to reflect choices in most recent ACP document.  Confirmed/documented designated decision maker(s).  Added by Rachel Barber RN Advance Care Planning Liaison with [employer]            FH: breast cancer      Priority: Medium    Generalized anxiety disorder      Priority: Medium     Diagnosis updated by automated process. Provider to review and confirm.      Attention deficit disorder 05/31/2005     Priority: Medium     Problem list name updated by automated process. 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    FH: breast cancer     DAFNE (generalised anxiety disorder)     Irritable bowel syndrome with both constipation and diarrhea 08/02/2017    MDD (major depressive disorder)     Migraine, unspecified, without mention of intractable migraine without mention of status migrainosus 03/13/2000    Tobacco abuse, in remission     Unspecified sinusitis (chronic) 03/08/2001       Past Surgical History:    Past Surgical History:   Procedure Laterality Date    DILATION AND CURETTAGE, HYSTEROSCOPY, ABLATE  ENDOMETRIUM, COMBINED N/A 11/22/2023    Procedure: Hysteroscopy, Endometrial Ablation;  Surgeon: Kuldip España MD;  [facility]    ENDOSCOPY UPPER, COLONOSCOPY, COMBINED N/A 9/4/2015    Procedure: COMBINED ENDOSCOPY UPPER, COLONOSCOPY;  Surgeon: Griffin Barrientos DO;  [facility]    LAPAROSCOPIC CYSTECTOMY OVARIAN (BENIGN) Right 1/27/2016    Procedure: LAPAROSCOPIC CYSTECTOMY OVARIAN (BENIGN);  Surgeon: Kuldip España MD;  [facility]    LAPAROSCOPIC SALPINGO-OOPHORECTOMY Left 1/27/2016    Procedure: LAPAROSCOPIC SALPINGO-OOPHORECTOMY;  Surgeon: Kludip España MD;  [facility]    LAPAROSCOPY DIAGNOSTIC (GYN) Left 1/27/2016    Procedure: LAPAROSCOPY DIAGNOSTIC (GYN);  Surgeon: Kuldip España MD;  [facility]    REMOVE FOREIGN BODY UPPER EXTREMITY Left 11/22/2023    Procedure: Nexplanon Removal Left Arm;  Surgeon: Kuldip España MD;  [facility]    TUBAL LIGATION  2006    wisdom teeth         Family History:    Family History   Problem Relation Age of Onset    Breast Cancer Maternal Grandmother     Hypertension Maternal Grandfather     Hyperlipidemia Maternal Grandfather     Diabetes Paternal Grandmother     Asthma No family hx of        Social History:  Marital Status:   [2]  Social History     Tobacco Use    Smoking status: Every Day     Current packs/day: 0.00     Average packs/day: 0.5 packs/day for 13.0 years (6.5 ttl pk-yrs)     Types: Cigarettes     Start date: 1/4/2008     Last attempt to quit: 1/4/2021     Years since quitting: 3.5     Passive exposure: Past    Smokeless tobacco: Never    Tobacco comments:     pt denies quit plan 6/28/2022   Vaping Use    Vaping status: Every Day    Substances: Nicotine   Substance Use Topics    Alcohol use: Yes     Alcohol/week: 0.0 standard drinks of alcohol     Comment: frequency: rarely    Drug use: No        Medications:    amoxicillin-clavulanate (AUGMENTIN) 875-125 MG tablet  Acetaminophen (TYLENOL PO)  albuterol (VENTOLIN HFA) 108  (90 Base) MCG/ACT inhaler  citalopram (CELEXA) 40 MG tablet  CONCERTA 36 MG CR tablet  diclofenac (VOLTAREN) 1 % topical gel  docusate sodium (COLACE) 100 MG capsule  EPINEPHrine (ANY BX GENERIC EQUIV) 0.3 MG/0.3ML injection 2-pack  fexofenadine (ALLEGRA) 180 MG tablet  hydrOXYzine HCl (ATARAX) 25 MG tablet  ibuprofen (ADVIL/MOTRIN) 600 MG tablet  methylphenidate HCl ER, OSM, (CONCERTA) 36 MG CR tablet  [START ON 8/16/2024] methylphenidate HCl ER, OSM, (CONCERTA) 36 MG CR tablet  [START ON 9/16/2024] methylphenidate HCl ER, OSM, (CONCERTA) 36 MG CR tablet  methylphenidate HCl ER, OSM, (CONCERTA) 36 MG CR tablet  mometasone (NASONEX) 50 MCG/ACT nasal spray  omeprazole (PRILOSEC) 40 MG DR capsule  traZODone (DESYREL) 100 MG tablet          Review of Systems   Skin:  Positive for wound.   All other systems reviewed and are negative.      Physical Exam   BP: 145/90  Pulse: 71  Temp: 97.3  F (36.3  C)  Resp: 18  SpO2: 100 %      Physical Exam  Vitals and nursing note reviewed.   Constitutional:       Appearance: Normal appearance. She is not ill-appearing or toxic-appearing.   Cardiovascular:      Rate and Rhythm: Normal rate.   Pulmonary:      Effort: Pulmonary effort is normal. No respiratory distress.   Musculoskeletal:         General: Signs of injury present.      Cervical back: Neck supple.   Skin:     General: Skin is warm.      Capillary Refill: Capillary refill takes less than 2 seconds.      Findings: No erythema.      Comments: Had 4 puncture wounds to the volar aspect of left index finger.  Bleeding controlled.  Moving fingers with minimal difficulty.   Neurological:      Mental Status: She is alert and oriented to person, place, and time.         ED Course        Procedures         No results found for this or any previous visit (from the past 24 hour(s)).    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  Her Tdap is up-to-date.  Cat is not yet up-to-date on its shots but  wounds were cleaned during this visit.   Patient denies concern for rabies at this time. Augmentin as prescribed empirically.-Patient has an amoxicillin allergy but notes that she has been able to tolerate Augmentin.    She was advised to keep her wounds clean and dry.  Neosporin over the wounds as needed.  Tylenol or ibuprofen as needed for pain.  Take Augmentin as prescribed.  Follow-up with primary doctor as needed.  Return to urgent care or emergency department if any worsening or concerning symptoms.    I have reviewed the nursing notes.    I have reviewed the findings, diagnosis, plan and need for follow up with the patient.  This document was prepared using a combination of typing and voice generated software.  While every attempt was made for accuracy, spelling and grammatical errors may exist.         New Prescriptions    AMOXICILLIN-CLAVULANATE (AUGMENTIN) 875-125 MG TABLET    Take 1 tablet by mouth 2 times daily for 5 days       Final diagnoses:   Cat bite, initial encounter       7/19/2024   [facility]       Mpofu, Prudence, CNP  07/19/24 [id number]

## 2024-08-15 DIAGNOSIS — K59.04 CHRONIC IDIOPATHIC CONSTIPATION: ICD-10-CM

## 2024-08-15 RX ORDER — DOCUSATE SODIUM 100 MG/1
100 CAPSULE, LIQUID FILLED ORAL 2 TIMES DAILY
Qty: 60 CAPSULE | Refills: 4 | Status: SHIPPED | OUTPATIENT
Start: 2024-08-15

## 2024-08-15 NOTE — TELEPHONE ENCOUNTER
[facility] Protocol [id number]/15/2024 08:29 AM   Protocol Details Recent (12 mo) or future (90 days) visit within the authorizing provider's specialty

## 2024-08-15 NOTE — TELEPHONE ENCOUNTER
Jl       Last Written Prescription Date:  4/19/2024  Last Fill Quantity: 60,   # refills: 0  Last Office Visit: 6/10/2024  Future Office visit:

## 2024-09-13 ENCOUNTER — ANCILLARY PROCEDURE (OUTPATIENT)
Dept: MAMMOGRAPHY | Facility: OTHER | Age: 44
End: 2024-09-13
Attending: FAMILY MEDICINE
Payer: COMMERCIAL

## 2024-09-13 ENCOUNTER — TELEPHONE (OUTPATIENT)
Dept: MAMMOGRAPHY | Facility: HOSPITAL | Age: 44
End: 2024-09-13

## 2024-09-13 DIAGNOSIS — Z12.31 ENCOUNTER FOR SCREENING MAMMOGRAM FOR BREAST CANCER: ICD-10-CM

## 2024-09-13 PROCEDURE — 77063 BREAST TOMOSYNTHESIS BI: CPT | Mod: TC

## 2024-09-24 DIAGNOSIS — J30.1 SEASONAL ALLERGIC RHINITIS DUE TO POLLEN: ICD-10-CM

## 2024-09-24 DIAGNOSIS — J30.89 PERENNIAL ALLERGIC RHINITIS: ICD-10-CM

## 2024-09-24 DIAGNOSIS — J30.2 SEASONAL ALLERGIC RHINITIS, UNSPECIFIED TRIGGER: ICD-10-CM

## 2024-09-24 NOTE — TELEPHONE ENCOUNTER
fexofenadine (ALLEGRA) 180 MG tablet 30 tablet 4 2/7/2024     Last Office Visit: 6/10/24     Future Office visit:       Routing refill request to provider for review/approval because:

## 2024-09-26 RX ORDER — FEXOFENADINE HCL 180 MG/1
TABLET ORAL
Qty: 30 TABLET | Refills: 2 | Status: SHIPPED | OUTPATIENT
Start: 2024-09-26

## 2024-11-06 NOTE — TELEPHONE ENCOUNTER
Health Maintenance       HPV Vaccine (1 - Male 2-dose series)  Never done    Influenza Vaccine (1)  Overdue since 9/1/2024    COVID-19 Vaccine (3 - 2024-25 season)  Overdue since 9/1/2024    Depression Screening (Yearly)  Due since 10/23/2024    Annual Physical (ages 3 - 21) (Yearly)  Due since 10/23/2024           Following review of the above:  Pended orders  Patient wishes to discuss with clinician: COVID-19 and Influenza    Note: Refer to final orders and clinician documentation.       trazodone      Last Written Prescription Date:  4/12/18  Last Fill Quantity: 90,   # refills: 2  Last Office Visit: 8/1/18  Future Office visit:    Next 5 appointments (look out 90 days)     Oct 25, 2018  9:45 AM CDT   (Arrive by 9:30 AM)   SHORT with Sudhakar Cuellar MD   [facility] ([facility] [place] )    [address] [place] [address]   [phone]

## 2024-11-26 DIAGNOSIS — F98.8 ATTENTION DEFICIT DISORDER (ADD) WITHOUT HYPERACTIVITY: ICD-10-CM

## 2024-11-26 DIAGNOSIS — F98.8 ATTENTION DEFICIT DISORDER: Primary | ICD-10-CM

## 2024-11-26 RX ORDER — METHYLPHENIDATE HYDROCHLORIDE 36 MG/1
36 TABLET ORAL 2 TIMES DAILY
Qty: 60 TABLET | Refills: 0 | OUTPATIENT
Start: 2024-11-26

## 2024-11-26 RX ORDER — METHYLPHENIDATE HYDROCHLORIDE 36 MG/1
36 TABLET ORAL 2 TIMES DAILY
Qty: 60 TABLET | Refills: 0 | Status: SHIPPED | OUTPATIENT
Start: 2024-12-26 | End: 2025-01-25

## 2024-11-26 RX ORDER — METHYLPHENIDATE HYDROCHLORIDE 36 MG/1
36 TABLET ORAL 2 TIMES DAILY
Qty: 60 TABLET | Refills: 0 | Status: SHIPPED | OUTPATIENT
Start: 2025-01-25 | End: 2025-02-24

## 2024-11-26 RX ORDER — METHYLPHENIDATE HYDROCHLORIDE 36 MG/1
36 TABLET ORAL 2 TIMES DAILY
Qty: 60 TABLET | Refills: 0 | Status: SHIPPED | OUTPATIENT
Start: 2024-11-26 | End: 2024-12-26

## 2024-11-26 NOTE — TELEPHONE ENCOUNTER
methylphenidate HCl ER, OSM, (CONCERTA) 36 MG CR tablet       Last Written Prescription Date:  9/16/24  Last Fill Quantity: 60,   # refills: 0  Last Office Visit: 6/10/24  Future Office visit:       Routing refill request to provider for review/approval because:  Drug not on the [facility], P or [facility] refill protocol or controlled substance

## 2024-11-26 NOTE — TELEPHONE ENCOUNTER
methylphenidate HCl ER, OSM, (CONCERTA) 36 MG CR tablet       Last Written Prescription Date:  9/16/24  Last Fill Quantity: 60,   # refills: 0  Last Office Visit: 6/10/24  Future Office visit:       Routing refill request to provider for review/approval because:  Drug not on the [facility], P or [facility] refill protocol or controlled substance

## 2025-01-25 ENCOUNTER — HOSPITAL ENCOUNTER (EMERGENCY)
Facility: HOSPITAL | Age: 45
Discharge: HOME | End: 2025-01-25
Attending: PHYSICIAN ASSISTANT | Admitting: PHYSICIAN ASSISTANT
Payer: COMMERCIAL

## 2025-01-25 VITALS
HEART RATE: 93 BPM | TEMPERATURE: 97.9 F | RESPIRATION RATE: 18 BRPM | SYSTOLIC BLOOD PRESSURE: 129 MMHG | BODY MASS INDEX: 32.05 KG/M2 | DIASTOLIC BLOOD PRESSURE: 87 MMHG | OXYGEN SATURATION: 96 % | HEIGHT: 69 IN

## 2025-01-25 DIAGNOSIS — R05.9 COUGH, UNSPECIFIED TYPE: Primary | ICD-10-CM

## 2025-01-25 DIAGNOSIS — R05.9 COUGH: ICD-10-CM

## 2025-01-25 PROCEDURE — 99213 OFFICE O/P EST LOW 20 MIN: CPT | Performed by: PHYSICIAN ASSISTANT

## 2025-01-25 PROCEDURE — G0463 HOSPITAL OUTPT CLINIC VISIT: HCPCS

## 2025-01-25 RX ORDER — AZITHROMYCIN 250 MG/1
TABLET, FILM COATED ORAL
Qty: 6 TABLET | Refills: 0 | Status: SHIPPED | OUTPATIENT
Start: 2025-01-25 | End: 2025-01-30

## 2025-01-25 ASSESSMENT — ENCOUNTER SYMPTOMS
COUGH: 1
SHORTNESS OF BREATH: 1

## 2025-01-25 NOTE — ED PROVIDER NOTES
History     Chief Complaint   Patient presents with    Cold Symptoms     HPI  Cristiana Aguila is a 44 year old female who presents to urgent care for concerns of cough.  Patient states that she has had a cough for about 1 month.  She also states that she has had some ear fullness, mucus production with her cough with some mild shortness of breath.  She denies any sore throat, otalgia, nausea, vomiting, diarrhea, or any other associated symptoms.  Patient states that she does have a history of asthma and also has an on and off again smoker since age 17.    Allergies:  Allergies   Allergen Reactions    Food Anaphylaxis     Artificial cinnamon   Artificial cinnamon     Bee Venom      Bee venom (honey bee)      Sulfa Antibiotics      Sulfa (sulfonamide antibiotics)       Amoxicillin Diarrhea    Latex Rash    Other Environmental Allergy Itching and Rash     Pt is allergic to the cold.         Problem List:    Patient Active Problem List    Diagnosis Date Noted    Ankle instability, unspecified laterality 09/15/2022     Priority: Medium     Formatting of this note might be different from the original.  Added automatically from request for surgery [id number]      Ankle pain 07/30/2021     Priority: Medium     Formatting of this note might be different from the original.  Added automatically from request for surgery [id number]  Formatting of this note might be different from the original.  Added automatically from request for surgery [id number]      Knee stiffness, left 07/30/2021     Priority: Medium    Muscle weakness 07/30/2021     Priority: Medium    Encounter for initial prescription of implantable subdermal contraceptive 03/11/2021     Priority: Medium    Psychophysiological insomnia 12/21/2020     Priority: Medium    Mild intermittent reactive airway disease with wheezing without complication 12/21/2020     Priority: Medium    Encounter for insertion of intrauterine contraceptive device (IUD) 04/29/2020     Priority:  Medium    Well woman exam with routine gynecological exam 02/14/2018     Priority: Medium    Irritable bowel syndrome with both constipation and diarrhea 08/02/2017     Priority: Medium    Recurrent major depressive disorder, in partial remission 08/02/2017     Priority: Medium    ACP (advance care planning) 03/02/2016     Priority: Medium     Advance Care Planning 3/2/2016: Receipt of ACP document:  Received: Health Care Directive which was witnessed or notarized on 1-25-16.  Document previously scanned on 2-5-16.  Validation form completed and sent to be scanned.  Code Status needs to be updated to reflect choices in most recent ACP document.  Confirmed/documented designated decision maker(s).  Added by Rachel Barber RN Advance Care Planning Liaison with [employer]            FH: breast cancer      Priority: Medium    Generalized anxiety disorder      Priority: Medium     Diagnosis updated by automated process. Provider to review and confirm.      Attention deficit disorder 05/31/2005     Priority: Medium     Problem list name updated by automated process. 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    FH: breast cancer     DAFNE (generalised anxiety disorder)     Irritable bowel syndrome with both constipation and diarrhea 08/02/2017    MDD (major depressive disorder)     Migraine, unspecified, without mention of intractable migraine without mention of status migrainosus 03/13/2000    Tobacco abuse, in remission     Unspecified sinusitis (chronic) 03/08/2001       Past Surgical History:    Past Surgical History:   Procedure Laterality Date    DILATION AND CURETTAGE, HYSTEROSCOPY, ABLATE ENDOMETRIUM, COMBINED N/A 11/22/2023    Procedure: Hysteroscopy, Endometrial Ablation;  Surgeon: Kuldip España MD;  [facility]    ENDOSCOPY UPPER,  COLONOSCOPY, COMBINED N/A 2015    Procedure: COMBINED ENDOSCOPY UPPER, COLONOSCOPY;  Surgeon: Griffin Barrientos DO;  [facility]    LAPAROSCOPIC CYSTECTOMY OVARIAN (BENIGN) Right 2016    Procedure: LAPAROSCOPIC CYSTECTOMY OVARIAN (BENIGN);  Surgeon: Kuldip España MD;  [facility]    LAPAROSCOPIC SALPINGO-OOPHORECTOMY Left 2016    Procedure: LAPAROSCOPIC SALPINGO-OOPHORECTOMY;  Surgeon: Kuldip España MD;  [facility]    LAPAROSCOPY DIAGNOSTIC (GYN) Left 2016    Procedure: LAPAROSCOPY DIAGNOSTIC (GYN);  Surgeon: Kuldip España MD;  [facility]    REMOVE FOREIGN BODY UPPER EXTREMITY Left 2023    Procedure: Nexplanon Removal Left Arm;  Surgeon: Kuldip España MD;  [facility]    TUBAL LIGATION  2006    wisdom teeth         Family History:    Family History   Problem Relation Age of Onset    Breast Cancer Maternal Grandmother         1 breast, age unknown    Hypertension Maternal Grandfather     Hyperlipidemia Maternal Grandfather     Diabetes Paternal Grandmother     Breast Cancer Maternal Aunt         age unknown;  unknown if 1 or both breasts    Asthma No family hx of        Social History:  Marital Status:   [2]  Social History     Tobacco Use    Smoking status: Every Day     Current packs/day: 0.00     Average packs/day: 0.5 packs/day for 13.0 years (6.5 ttl pk-yrs)     Types: Cigarettes     Start date: 2008     Last attempt to quit: 2021     Years since quittin.0     Passive exposure: Past    Smokeless tobacco: Never    Tobacco comments:     pt denies quit plan 2022   Vaping Use    Vaping status: Every Day    Substances: Nicotine   Substance Use Topics    Alcohol use: Yes     Alcohol/week: 0.0 standard drinks of alcohol     Comment: frequency: rarely    Drug use: No        Medications:    Acetaminophen (TYLENOL PO)  albuterol (VENTOLIN HFA) 108 (90 Base) MCG/ACT inhaler  citalopram (CELEXA) 40 MG tablet  CONCERTA 36 MG CR tablet  diclofenac  "(VOLTAREN) 1 % topical gel  docusate sodium (COLACE) 100 MG capsule  EPINEPHrine (ANY BX GENERIC EQUIV) 0.3 MG/0.3ML injection 2-pack  fexofenadine (ALLEGRA) 180 MG tablet  hydrOXYzine HCl (ATARAX) 25 MG tablet  ibuprofen (ADVIL/MOTRIN) 600 MG tablet  methylphenidate HCl ER, OSM, (CONCERTA) 36 MG CR tablet  methylphenidate HCl ER, OSM, (CONCERTA) 36 MG CR tablet  methylphenidate HCl ER, OSM, (CONCERTA) 36 MG CR tablet  methylphenidate HCl ER, OSM, (CONCERTA) 36 MG CR tablet  methylphenidate HCl ER, OSM, (CONCERTA) 36 MG CR tablet  methylphenidate HCl ER, OSM, (CONCERTA) 36 MG CR tablet  methylphenidate HCl ER, OSM, (CONCERTA) 36 MG CR tablet  mometasone (NASONEX) 50 MCG/ACT nasal spray  omeprazole (PRILOSEC) 40 MG DR capsule  traZODone (DESYREL) 100 MG tablet          Review of Systems   HENT:  Positive for postnasal drip.    Respiratory:  Positive for cough and shortness of breath.    All other systems reviewed and are negative.      Physical Exam   BP: 129/87  Pulse: 93  Temp: 97.9  F (36.6  C)  Resp: 18  Height: 175.3 cm (5' 9\")  SpO2: 96 %      Physical Exam  Vitals and nursing note reviewed.   Constitutional:       General: She is not in acute distress.     Appearance: Normal appearance. She is not ill-appearing or toxic-appearing.   HENT:      Right Ear: Tympanic membrane normal.      Left Ear: Tympanic membrane normal.      Nose: Nose normal.      Mouth/Throat:      Mouth: Mucous membranes are moist.   Eyes:      Conjunctiva/sclera: Conjunctivae normal.      Pupils: Pupils are equal, round, and reactive to light.   Cardiovascular:      Rate and Rhythm: Regular rhythm.      Heart sounds: Normal heart sounds.   Pulmonary:      Effort: Pulmonary effort is normal. No respiratory distress.      Breath sounds: Normal breath sounds. No stridor. No wheezing, rhonchi or rales.   Neurological:      Mental Status: She is alert and oriented to person, place, and time.         ED Course        Procedures       "       Critical Care time:               No results found for this or any previous visit (from the past 24 hours).    Medications - No data to display    Assessments & Plan (with Medical Decision Making)   #1.  Cough    Discussed exam findings with patient.  Through shared decision making patient is discharged with prescription for Z-Gamal.  Patient was also offered prednisone however she declined stating that it makes her heart race.  Abortive cares discussed.  Patient has albuterol inhaler that she can take for shortness of breath.  Any increase shortness of breath or emergent symptoms she is to go to emergency department immediately.  Any additional concerns patient can return to urgent care or follow-up with primary care provider.  Patient verbalized understanding and agreement of plan.    I have reviewed the nursing notes.    I have reviewed the findings, diagnosis, plan and need for follow up with the patient.            New Prescriptions    No medications on file       Final diagnoses:   Cough       1/25/2025   [facility]       Jonathon Owens PA-C  01/25/25 1024

## 2025-01-25 NOTE — ED TRIAGE NOTES
Pt presents with concerns of right ear pain, increased virtigo, sinus pressure, cough, and tightness in chest. Pt reports symptom onset was approx. 1 month ago. Pt has hx of sinusitis and allergies.

## 2025-02-10 DIAGNOSIS — F51.04 PSYCHOPHYSIOLOGICAL INSOMNIA: ICD-10-CM

## 2025-02-10 NOTE — TELEPHONE ENCOUNTER
Trazodone  Last Written Prescription Date: 2/19/24  Last Fill Quantity: 90 # of Refills: 9  Last Office Visit: 6/10/24

## 2025-02-11 RX ORDER — TRAZODONE HYDROCHLORIDE 100 MG/1
TABLET ORAL
Qty: 90 TABLET | Refills: 3 | Status: SHIPPED | OUTPATIENT
Start: 2025-02-11

## 2025-02-12 DIAGNOSIS — F33.42 MAJOR DEPRESSIVE DISORDER, RECURRENT EPISODE, IN FULL REMISSION: ICD-10-CM

## 2025-02-12 DIAGNOSIS — F41.1 GAD (GENERALIZED ANXIETY DISORDER): ICD-10-CM

## 2025-02-12 RX ORDER — CITALOPRAM HYDROBROMIDE 40 MG/1
40 TABLET ORAL DAILY
Qty: 90 TABLET | Refills: 0 | Status: SHIPPED | OUTPATIENT
Start: 2025-02-12

## 2025-02-12 NOTE — TELEPHONE ENCOUNTER
Celexa      Last Written Prescription Date:  06/10/24  Last Fill Quantity: 90,   # refills: 3  Last Office Visit: 06/10/24  Future Office visit:    Next 5 appointments (look out 90 days)      Mar 10, 2025 11:00 AM  (Arrive by 10:45 AM)  Provider Visit with Tracy Garner MD  [facility] [place] ([facility] [place] ) [address]  [place] [address]  [phone]

## 2025-02-17 ENCOUNTER — MYC REFILL (OUTPATIENT)
Dept: FAMILY MEDICINE | Facility: OTHER | Age: 45
End: 2025-02-17

## 2025-02-17 ENCOUNTER — MYC REFILL (OUTPATIENT)
Dept: OBGYN | Facility: OTHER | Age: 45
End: 2025-02-17

## 2025-02-17 DIAGNOSIS — M26.609 TMJ (TEMPOROMANDIBULAR JOINT SYNDROME): ICD-10-CM

## 2025-02-17 DIAGNOSIS — J34.3 NASAL TURBINATE HYPERTROPHY: ICD-10-CM

## 2025-02-17 DIAGNOSIS — K59.04 CHRONIC IDIOPATHIC CONSTIPATION: ICD-10-CM

## 2025-02-17 DIAGNOSIS — F98.8 ATTENTION DEFICIT DISORDER: ICD-10-CM

## 2025-02-17 DIAGNOSIS — F41.1 GENERALIZED ANXIETY DISORDER: ICD-10-CM

## 2025-02-17 DIAGNOSIS — Z91.030 BEE STING ALLERGY: ICD-10-CM

## 2025-02-17 DIAGNOSIS — J45.20 MILD INTERMITTENT REACTIVE AIRWAY DISEASE WITH WHEEZING WITHOUT COMPLICATION: ICD-10-CM

## 2025-02-17 DIAGNOSIS — K21.9 GASTROESOPHAGEAL REFLUX DISEASE WITHOUT ESOPHAGITIS: ICD-10-CM

## 2025-02-17 RX ORDER — OMEPRAZOLE 40 MG/1
40 CAPSULE, DELAYED RELEASE ORAL DAILY
Qty: 90 CAPSULE | Refills: 3 | OUTPATIENT
Start: 2025-02-17

## 2025-02-17 RX ORDER — EPINEPHRINE 0.3 MG/.3ML
0.3 INJECTION SUBCUTANEOUS PRN
Qty: 2 EACH | Refills: 2 | OUTPATIENT
Start: 2025-02-17

## 2025-02-17 RX ORDER — MOMETASONE FUROATE MONOHYDRATE 50 UG/1
SPRAY, METERED NASAL
Qty: 17 G | Refills: 11 | OUTPATIENT
Start: 2025-02-17

## 2025-02-17 RX ORDER — METHYLPHENIDATE HYDROCHLORIDE 36 MG/1
36 TABLET ORAL 2 TIMES DAILY
Qty: 60 TABLET | Refills: 0 | OUTPATIENT
Start: 2025-02-17

## 2025-02-17 RX ORDER — HYDROXYZINE HYDROCHLORIDE 25 MG/1
TABLET, FILM COATED ORAL
Qty: 60 TABLET | Refills: 5 | OUTPATIENT
Start: 2025-02-17

## 2025-02-17 RX ORDER — ALBUTEROL SULFATE 90 UG/1
2 INHALANT RESPIRATORY (INHALATION) EVERY 4 HOURS PRN
Qty: 18 G | Refills: 5 | OUTPATIENT
Start: 2025-02-17

## 2025-02-17 NOTE — TELEPHONE ENCOUNTER
Jl      Last Written Prescription Date:  08/15/24  Last Fill Quantity: 60,   # refills: 4  Last Office Visit: 06/10/24  Future Office visit:    Next 5 appointments (look out 90 days)      Mar 10, 2025 11:00 AM  (Arrive by 10:45 AM)  Provider Visit with Tracy Garner MD  [facility] [place] ([facility] [place] ) [address]  [place] [address]  [phone]

## 2025-02-17 NOTE — TELEPHONE ENCOUNTER
diclofenac (VOLTAREN) 1 % topical gel       Last Written Prescription Date:  06/07/2023  Last Fill Quantity: 100 g,   # refills: 4  Last Office Visit: 0610/2024  Future Office visit:    Next 5 appointments (look out 90 days)      Mar 10, 2025 11:00 AM  (Arrive by 10:45 AM)  Provider Visit with Tracy Garner MD  [facility] [address]  [facility] [address]  [phone]             Routing refill request to provider for review/approval because:  Passed protocol, but needs new sig.      EPINEPHrine (ANY BX GENERIC EQUIV) 0.3 MG/0.3ML injection       Last Written Prescription Date:  07/07/2023  Last Fill Quantity: 2,   # refills: 2    Routing refill request to provider for review/approval because:  Anaphylaxis Kits Protocol Failed    Rerun Protocol (2/17/2025 8:55 AM)    Medication is active on med list and the sig matches. RN to manually verify dose and sig if red X/fail.    If the protocol passes (green check), you do not need to verify med dose and sig.    A prescription matches if they are the same clinical intention.     For Example: once daily and every morning are the same.     For all fails (red x), verify dose and sig.    If the refill does match what is on file, the RN can still proceed to approve the refill request.     If they do not match, route to the appropriate provider.       Medication incated for associated diagnosis    The medicaiton is prescribed for one or more of the following conditions:               Anaphylaxis              Allergy (grouper)              Angioedema              Mast cell disease                      Allergic reaction caused by insect bite and/or insect sting      mometasone (NASONEX) 50 MCG/ACT nasal spray       Last Written Prescription Date:  10/11/2023  Last Fill Quantity: 17 g,   # refills: 11    Routing refill request to provider for review/approval because:  Nasal Allergy Protocol Failed    Rerun Protocol  (2/17/2025 8:55 AM)    Medication indicated for associated diagnosis    Medication is associated with one or more of the following diagnoses:   Allergic conjunctivitis  Allergies  Nasal congestion  Nasal discharge  Rhinitis  Sinus congestion  Recurrent acute maxillary sinusitis  Environmental allergies              Acute sinusitis              Cystic Fibrosis  Bronchiectasis        omeprazole (PRILOSEC) 40 MG       Last Written Prescription Date:  06/10/2024  Last Fill Quantity: 90,   # refills: 3    Routing refill request to provider for review/approval because:  Passed protocol, needs new sig.      hydrOXYzine HCl (ATARAX) 25 MG       Last Written Prescription Date:  06/10/2024  Last Fill Quantity: 60,   # refills: 5      Routing refill request to provider for review/approval because:  Passed protocol, needs new sig.      albuterol (VENTOLIN HFA) 108 (90 Base) MCG/ACT inhaler       Last Written Prescription Date:  07/16/2024  Last Fill Quantity: 18 g,   # refills: 5      Routing refill request to provider for review/approval because:  Short-Acting Beta Agonist Inhalers Protocol  Failed    Rerun Protocol (2/17/2025 8:55 AM)    Asthma control assessment score within normal limits in last 6 months    Please review ACT score.     Medication is active on med list and the sig matches. RN to manually verify dose and sig if red X/fail.    If the protocol passes (green check), you do not need to verify med dose and sig.    A prescription matches if they are the same clinical intention.     For Example: once daily and every morning are the same.     For all fails (red x), verify dose and sig.    If the refill does match what is on file, the RN can still proceed to approve the refill request.     If they do not match, route to the appropriate provider.      methylphenidate HCl ER, OSM, (CONCERTA) 36 MG       Last Written Prescription Date:  01/25/2025  Last Fill Quantity: 60,   # refills: 0    Routing refill request to  provider for review/approval because:  Drug not on the [facility], [facility] or [facility] refill protocol or controlled substance.

## 2025-02-18 RX ORDER — DOCUSATE SODIUM 100 MG/1
100 CAPSULE, LIQUID FILLED ORAL 2 TIMES DAILY
Qty: 60 CAPSULE | Refills: 0 | Status: SHIPPED | OUTPATIENT
Start: 2025-02-18

## 2025-02-24 DIAGNOSIS — F98.8 ATTENTION DEFICIT DISORDER (ADD) WITHOUT HYPERACTIVITY: ICD-10-CM

## 2025-02-24 RX ORDER — METHYLPHENIDATE HYDROCHLORIDE 36 MG/1
36 TABLET ORAL 2 TIMES DAILY
Qty: 60 TABLET | Refills: 0 | Status: SHIPPED | OUTPATIENT
Start: 2025-02-24

## 2025-02-24 NOTE — TELEPHONE ENCOUNTER
Please pend a refill to me and I will sign so she can get prior to her visit on 310, needs to keep this appointment

## 2025-02-24 NOTE — TELEPHONE ENCOUNTER
Spoke with patient she stated that she doesn't know if she can go until March without her concerto medication. Stated she isn't able to function/ drive. Looks like there is another encounter in her chart and Dr. Mccarthy stated that she hasn't been seen since end of December and needs to wait until next appointment. Patient is confused as to why she can't get it refilled and doesn't think she can wait until March, if needing an appointment she is requesting a sooner one than 03/10. Please advise.

## 2025-02-24 NOTE — TELEPHONE ENCOUNTER
Pended partial Methylphenidate HCL ER (Concerta) 36  per message below.     Please review and sign if appropriate.

## 2025-03-10 ENCOUNTER — ANCILLARY PROCEDURE (OUTPATIENT)
Dept: GENERAL RADIOLOGY | Facility: OTHER | Age: 45
End: 2025-03-10
Attending: STUDENT IN AN ORGANIZED HEALTH CARE EDUCATION/TRAINING PROGRAM
Payer: COMMERCIAL

## 2025-03-10 ENCOUNTER — OFFICE VISIT (OUTPATIENT)
Dept: FAMILY MEDICINE | Facility: OTHER | Age: 45
End: 2025-03-10
Attending: STUDENT IN AN ORGANIZED HEALTH CARE EDUCATION/TRAINING PROGRAM
Payer: COMMERCIAL

## 2025-03-10 VITALS
OXYGEN SATURATION: 97 % | BODY MASS INDEX: 34.02 KG/M2 | SYSTOLIC BLOOD PRESSURE: 138 MMHG | DIASTOLIC BLOOD PRESSURE: 86 MMHG | WEIGHT: 229.7 LBS | HEIGHT: 69 IN | HEART RATE: 79 BPM | TEMPERATURE: 97.4 F | RESPIRATION RATE: 16 BRPM

## 2025-03-10 DIAGNOSIS — F33.42 MAJOR DEPRESSIVE DISORDER, RECURRENT EPISODE, IN FULL REMISSION: ICD-10-CM

## 2025-03-10 DIAGNOSIS — J30.2 SEASONAL ALLERGIC RHINITIS, UNSPECIFIED TRIGGER: ICD-10-CM

## 2025-03-10 DIAGNOSIS — K21.9 GASTROESOPHAGEAL REFLUX DISEASE WITHOUT ESOPHAGITIS: ICD-10-CM

## 2025-03-10 DIAGNOSIS — J30.1 SEASONAL ALLERGIC RHINITIS DUE TO POLLEN: ICD-10-CM

## 2025-03-10 DIAGNOSIS — F98.8 ATTENTION DEFICIT DISORDER (ADD) WITHOUT HYPERACTIVITY: Primary | ICD-10-CM

## 2025-03-10 DIAGNOSIS — K58.2 IRRITABLE BOWEL SYNDROME WITH BOTH CONSTIPATION AND DIARRHEA: Chronic | ICD-10-CM

## 2025-03-10 DIAGNOSIS — J45.20 MILD INTERMITTENT REACTIVE AIRWAY DISEASE WITH WHEEZING WITHOUT COMPLICATION: ICD-10-CM

## 2025-03-10 DIAGNOSIS — J34.3 NASAL TURBINATE HYPERTROPHY: ICD-10-CM

## 2025-03-10 DIAGNOSIS — F41.1 GENERALIZED ANXIETY DISORDER: ICD-10-CM

## 2025-03-10 DIAGNOSIS — J30.89 PERENNIAL ALLERGIC RHINITIS: ICD-10-CM

## 2025-03-10 PROBLEM — F51.04 PSYCHOPHYSIOLOGICAL INSOMNIA: Chronic | Status: ACTIVE | Noted: 2020-12-21

## 2025-03-10 PROBLEM — F33.41 RECURRENT MAJOR DEPRESSIVE DISORDER, IN PARTIAL REMISSION: Chronic | Status: ACTIVE | Noted: 2017-08-02

## 2025-03-10 PROCEDURE — 71046 X-RAY EXAM CHEST 2 VIEWS: CPT | Mod: TC

## 2025-03-10 PROCEDURE — G0463 HOSPITAL OUTPT CLINIC VISIT: HCPCS | Mod: 25

## 2025-03-10 RX ORDER — BUDESONIDE AND FORMOTEROL FUMARATE DIHYDRATE 80; 4.5 UG/1; UG/1
2 AEROSOL RESPIRATORY (INHALATION) 2 TIMES DAILY
Qty: 10.2 G | Refills: 1 | Status: SHIPPED | OUTPATIENT
Start: 2025-03-10

## 2025-03-10 RX ORDER — MOMETASONE FUROATE MONOHYDRATE 50 UG/1
SPRAY, METERED NASAL
Qty: 17 G | Refills: 3 | Status: SHIPPED | OUTPATIENT
Start: 2025-03-10

## 2025-03-10 RX ORDER — FEXOFENADINE HCL 180 MG/1
180 TABLET ORAL DAILY
Qty: 90 TABLET | Refills: 1 | Status: SHIPPED | OUTPATIENT
Start: 2025-03-10

## 2025-03-10 RX ORDER — LORATADINE 10 MG/1
10 TABLET ORAL DAILY
COMMUNITY

## 2025-03-10 RX ORDER — OMEPRAZOLE 40 MG/1
40 CAPSULE, DELAYED RELEASE ORAL DAILY
Qty: 90 CAPSULE | Refills: 1 | Status: SHIPPED | OUTPATIENT
Start: 2025-03-10

## 2025-03-10 RX ORDER — HYDROXYZINE HYDROCHLORIDE 25 MG/1
TABLET, FILM COATED ORAL
Qty: 60 TABLET | Refills: 2 | Status: SHIPPED | OUTPATIENT
Start: 2025-03-10

## 2025-03-10 RX ORDER — ALBUTEROL SULFATE 90 UG/1
1-2 INHALANT RESPIRATORY (INHALATION) EVERY 6 HOURS PRN
Qty: 18 G | Refills: 3 | Status: SHIPPED | OUTPATIENT
Start: 2025-03-10

## 2025-03-10 ASSESSMENT — ANXIETY QUESTIONNAIRES
GAD7 TOTAL SCORE: 7
8. IF YOU CHECKED OFF ANY PROBLEMS, HOW DIFFICULT HAVE THESE MADE IT FOR YOU TO DO YOUR WORK, TAKE CARE OF THINGS AT HOME, OR GET ALONG WITH OTHER PEOPLE?: SOMEWHAT DIFFICULT
IF YOU CHECKED OFF ANY PROBLEMS ON THIS QUESTIONNAIRE, HOW DIFFICULT HAVE THESE PROBLEMS MADE IT FOR YOU TO DO YOUR WORK, TAKE CARE OF THINGS AT HOME, OR GET ALONG WITH OTHER PEOPLE: SOMEWHAT DIFFICULT
7. FEELING AFRAID AS IF SOMETHING AWFUL MIGHT HAPPEN: SEVERAL DAYS
6. BECOMING EASILY ANNOYED OR IRRITABLE: SEVERAL DAYS
4. TROUBLE RELAXING: SEVERAL DAYS
2. NOT BEING ABLE TO STOP OR CONTROL WORRYING: SEVERAL DAYS
7. FEELING AFRAID AS IF SOMETHING AWFUL MIGHT HAPPEN: SEVERAL DAYS
3. WORRYING TOO MUCH ABOUT DIFFERENT THINGS: SEVERAL DAYS
1. FEELING NERVOUS, ANXIOUS, OR ON EDGE: SEVERAL DAYS
GAD7 TOTAL SCORE: 7
GAD7 TOTAL SCORE: 7
5. BEING SO RESTLESS THAT IT IS HARD TO SIT STILL: SEVERAL DAYS

## 2025-03-10 ASSESSMENT — ASTHMA QUESTIONNAIRES
QUESTION_2 LAST FOUR WEEKS HOW OFTEN HAVE YOU HAD SHORTNESS OF BREATH: MORE THAN ONCE A DAY
EMERGENCY_ROOM_LAST_YEAR_TOTAL: ONE
QUESTION_4 LAST FOUR WEEKS HOW OFTEN HAVE YOU USED YOUR RESCUE INHALER OR NEBULIZER MEDICATION (SUCH AS ALBUTEROL): ONE OR TWO TIMES PER DAY
QUESTION_1 LAST FOUR WEEKS HOW MUCH OF THE TIME DID YOUR ASTHMA KEEP YOU FROM GETTING AS MUCH DONE AT WORK, SCHOOL OR AT HOME: MOST OF THE TIME
ACT_TOTALSCORE: 13
QUESTION_5 LAST FOUR WEEKS HOW WOULD YOU RATE YOUR ASTHMA CONTROL: SOMEWHAT CONTROLLED
QUESTION_3 LAST FOUR WEEKS HOW OFTEN DID YOUR ASTHMA SYMPTOMS (WHEEZING, COUGHING, SHORTNESS OF BREATH, CHEST TIGHTNESS OR PAIN) WAKE YOU UP AT NIGHT OR EARLIER THAN USUAL IN THE MORNING: NOT AT ALL
ACT_TOTALSCORE: 13

## 2025-03-10 ASSESSMENT — ENCOUNTER SYMPTOMS: NERVOUS/ANXIOUS: 1

## 2025-03-10 ASSESSMENT — PAIN SCALES - GENERAL: PAINLEVEL_OUTOF10: NO PAIN (0)

## 2025-03-10 ASSESSMENT — PATIENT HEALTH QUESTIONNAIRE - PHQ9
10. IF YOU CHECKED OFF ANY PROBLEMS, HOW DIFFICULT HAVE THESE PROBLEMS MADE IT FOR YOU TO DO YOUR WORK, TAKE CARE OF THINGS AT HOME, OR GET ALONG WITH OTHER PEOPLE: SOMEWHAT DIFFICULT
SUM OF ALL RESPONSES TO PHQ QUESTIONS 1-9: 8
SUM OF ALL RESPONSES TO PHQ QUESTIONS 1-9: 8

## 2025-03-10 NOTE — PROGRESS NOTES
Assessment & Plan     Attention deficit disorder (ADD) without hyperactivity  Overall stable.  Continues on Concerta.  Borderline BP, will monitor.    Generalized anxiety disorder  Major depressive disorder, recurrent episode, in full remission  Continues on Celexa with as needed hydroxyzine.  Waxes and wanes, many situational factors.  Prefers to be a homebody.  A lot of past trauma.  May benefit from EMDR in the future.  Was recommended for counseling in the past by old PCP.  - hydrOXYzine HCl (ATARAX) 25 MG tablet; TAKE 1 TO 2 TABLETS BY MOUTH 3 TIMES A DAY AS NEEDED    Mild intermittent reactive airway disease with wheezing without complication  Not controlled.  ACT completed and indicates poor control.  Chest x-ray reassuring today and lungs do sound clear.  Does vape nicotine and also has tobacco history, could be contributing.  Recommend pulmonary function testing to evaluate COPD versus asthma.  Will also start Symbicort inhaler.  Change Zyrtec to Claritin.  Albuterol refilled.  Follow-up 6 weeks.  - XR CHEST 2 VW (Clinic Performed); Future  - General PFT Lab (Please always keep checked); Future  - Pulmonary Function Test; Future  - budesonide-formoterol (SYMBICORT/BREYNA) 80-4.5 MCG/ACT Inhaler; Inhale 2 puffs into the lungs 2 times daily.  - albuterol (VENTOLIN HFA) 108 (90 Base) MCG/ACT inhaler; Inhale 1-2 puffs into the lungs every 6 hours as needed for shortness of breath, wheezing or cough.    Irritable bowel syndrome with both constipation and diarrhea  Long history of this with prior evaluation including colonoscopy.  Predominantly constipation with 1 or 2 days of looser stools per week.  Does take fiber and use docusate.  Recommend starting with daily low-dose MiraLAX, can titrate as needed but start with one half capful.  Ideally will keep a stool log so we know better how to adjust.    Nasal turbinate hypertrophy  Refilled  - mometasone (NASONEX) 50 MCG/ACT nasal spray; PLACE 2 SPRAYS INTO EACH  NOSTRIL DAILY    Gastroesophageal reflux disease without esophagitis  Refilled  - omeprazole (PRILOSEC) 40 MG DR capsule; Take 1 capsule (40 mg) by mouth daily.    Seasonal allergic rhinitis, unspecified trigger  Seasonal allergic rhinitis due to pollen  Perennial allergic rhinitis  Refilled   - fexofenadine (ALLEGRA) 180 MG tablet; Take 1 tablet (180 mg) by mouth daily.        The longitudinal plan of care for the diagnosis(es)/condition(s) as documented were addressed during this visit. Due to the added complexity in care, I will continue to support Cristiana in the subsequent management and with ongoing continuity of care.    Subjective   Cristiana is a 44 year old, presenting for the following health issues:  Depression, Anxiety, and Attention Deficit Disorder        3/10/2025    10:50 AM   Additional Questions   Roomed by Leia philip   Accompanied by  Carlos         3/10/2025    10:50 AM   Patient Reported Additional Medications   Patient reports taking the following new medications None     History of Present Illness     Asthma:  She presents for follow up of asthma.  She has no cough, no wheezing, and some shortness of breath.  She is using a relief medication daily. She does not miss any doses of her controller medication throughout the week. Patient is aware of the following triggers: animal dander, cold air, emotions and upper respiratory infections. The patient has had a visit to the Emergency Room, Urgent Care or Hospital due to asthma since the last clinic visit. She has been to the Emergency Room or Urgent Care 1 time.She has had a Hospitalization 0 times.    Mental Health Follow-up:  Patient presents to follow-up on Depression & Anxiety.Patient's depression since last visit has been:  No change  The patient is not having other symptoms associated with depression.  Patient's anxiety since last visit has been:  No change  The patient is not having other symptoms associated with anxiety.  Any significant  life events: No  Patient is feeling anxious or having panic attacks.  Patient has no concerns about alcohol or drug use.    Reason for visit:  Seeing new doctor    She eats 2-3 servings of fruits and vegetables daily.She consumes 2 sweetened beverage(s) daily.She exercises with enough effort to increase her heart rate 10 to 19 minutes per day.  She exercises with enough effort to increase her heart rate 3 or less days per week.   She is taking medications regularly.      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   Very anxious/nervous today.   Has had all her life. Diagnosed at 25-25yo.   Has been on concerta for a long time.   Not in counseling. A lot of childhood trauma.     ADHD Medication       Stimulants - Misc. Disp Start End     CONCERTA 36 MG CR tablet 60 tablet 11/12/2022 --    Sig - Route: Take 1 tablet (36 mg) by mouth 2 times daily - Oral    Class: E-Prescribe    Earliest Fill Date: 11/12/2022                                                                                         Medication side effects:  Side effects noted: none      Depression and Anxiety   How are you doing with your depression since your last visit? No change  How are you doing with your anxiety since your last visit?  No change  Are you having other symptoms that might be associated with depression or anxiety? No  Have you had a significant life event? No   Do you have any concerns with your use of alcohol or other drugs? No    Social History     Tobacco Use    Smoking status: Some Days     Current packs/day: 0.25     Average packs/day: 0.5 packs/day for 16.2 years (7.3 ttl pk-yrs)     Types: Cigarettes     Start date: 1/4/2008     Last attempt to quit: 1/4/2021     Passive exposure:  Past    Smokeless tobacco: Never    Tobacco comments:     pt denies quit plan 6/28/2022   Vaping Use    Vaping status: Every Day    Substances: Nicotine   Substance Use Topics    Alcohol use: Yes     Alcohol/week: 0.0 standard drinks of alcohol     Comment: frequency: rarely    Drug use: No         12/7/2023     9:35 AM 6/10/2024     8:22 AM 3/10/2025    10:20 AM   PHQ   PHQ-9 Total Score 15 6 8    Q9: Thoughts of better off dead/self-harm past 2 weeks Not at all Not at all Not at all       Patient-reported         12/7/2023     9:36 AM 6/10/2024     8:23 AM 3/10/2025    10:21 AM   DAFNE-7 SCORE   Total Score 13 (moderate anxiety) 8 (mild anxiety) 7 (mild anxiety)   Total Score 13 8 7        Patient-reported         3/10/2025    10:20 AM   Last PHQ-9   1.  Little interest or pleasure in doing things 1   2.  Feeling down, depressed, or hopeless 1   3.  Trouble falling or staying asleep, or sleeping too much 1   4.  Feeling tired or having little energy 1   5.  Poor appetite or overeating 1   6.  Feeling bad about yourself 1   7.  Trouble concentrating 1   8.  Moving slowly or restless 1   Q9: Thoughts of better off dead/self-harm past 2 weeks 0   PHQ-9 Total Score 8        Patient-reported         3/10/2025    10:21 AM   DAFNE-7    1. Feeling nervous, anxious, or on edge 1   2. Not being able to stop or control worrying 1   3. Worrying too much about different things 1   4. Trouble relaxing 1   5. Being so restless that it is hard to sit still 1   6. Becoming easily annoyed or irritable 1   7. Feeling afraid, as if something awful might happen 1   DAFNE-7 Total Score 7    If you checked any problems, how difficult have they made it for you to do your work, take care of things at home, or get along with other people? Somewhat difficult       Patient-reported     On high dose trazodone 300mg  Still hard to fall asleep       Asthma Follow-Up    Was ACT completed today?  Yes        3/10/2025    10:26 AM   ACT Total Scores  "  ACT TOTAL SCORE (Goal Greater than or Equal to 20) 13    In the past 12 months, how many times did you visit the emergency room for your asthma without being admitted to the hospital? 1   In the past 12 months, how many times were you hospitalized overnight because of your asthma? 0       Patient-reported       How many days per week do you miss taking your asthma controller medication?  0  Please describe any recent triggers for your asthma: upper respiratory infections, animal dander, emotions, and cold air  Have you had any Emergency Room Visits, Urgent Care Visits, or Hospital Admissions since your last office visit?  Yes  Number of ER or Urgent Care visits for asthma: 1    Seen 1/25/25 in Emergency Department. Got azithromycin.   Forgets she has asthma. Using inhaler 2-3x/day. Out of breath easier. Wheezing at times. A little better than January. Cough some.   Bad allergies.   No history of daily controller medication.   E cig with nicotine.   Rare cigarettes.   Diagnosed with asthma at uncertain age     IBS. Fluctuates diarrhea vs constipation. Doing metamucil twice daily. Feels bloated. History of colonoscopy. Not doing miralax. Mostly constipated. One day/week loose stools.         Objective    /86 (BP Location: Right arm, Patient Position: Sitting, Cuff Size: Adult Large)   Pulse 79   Temp 97.4  F (36.3  C) (Tympanic)   Resp 16   Ht 1.753 m (5' 9\")   Wt 104.2 kg (229 lb 11.2 oz)   LMP 01/13/2025   SpO2 97%   BMI 33.92 kg/m    Body mass index is 33.92 kg/m .    Physical Exam  Constitutional:       General: She is not in acute distress.     Appearance: Normal appearance.   Cardiovascular:      Rate and Rhythm: Normal rate and regular rhythm.      Heart sounds: No murmur heard.  Pulmonary:      Effort: Pulmonary effort is normal.      Breath sounds: Normal breath sounds. No wheezing, rhonchi or rales.   Neurological:      General: No focal deficit present.      Mental Status: She is alert and " oriented to person, place, and time.   Psychiatric:         Mood and Affect: Mood normal.         Behavior: Behavior normal.                 Results for orders placed or performed in visit on 03/10/25   XR CHEST 2 VW (Clinic Performed)     Status: None    Narrative    PROCEDURE:  XR CHEST 2 VIEWS    HISTORY:  ongoing cough, dyspnea; Mild intermittent reactive airway  disease with wheezing without complication.     COMPARISON:  2019    FINDINGS:   The cardiac silhouette is normal in size. The pulmonary vasculature is  normal.  The lungs are clear. No pleural effusion or pneumothorax.      Impression    IMPRESSION:  No acute cardiopulmonary disease.      ASHLEY GILMORE MD         SYSTEM ID:  [id number]         Signed Electronically by: Tracy Garner MD

## 2025-03-10 NOTE — LETTER
My Asthma Action Plan    Name: Cristiana Aguila   YOB: 1980  Date: 3/10/2025   My doctor: Tracy Garner MD   [facility]        My Rescue Medicine:   Albuterol inhaler (Proair/Ventolin/Proventil HFA)  2-4 puffs EVERY 4 HOURS as needed. Use a spacer if recommended by your provider.   My Asthma Severity:   Intermittent / Exercise Induced  Know your asthma triggers: animal dander, emotions, and cold air  pollens  grass, dust          GREEN ZONE   Good Control  I feel good  No cough or wheeze  Can work, sleep and play without asthma symptoms       Take your asthma control medicine every day.     If exercise triggers your asthma, take your rescue medication  15 minutes before exercise or sports, and  During exercise if you have asthma symptoms  Spacer to use with inhaler: If you have a spacer, make sure to use it with your inhaler             YELLOW ZONE Getting Worse  I have ANY of these:  I do not feel good  Cough or wheeze  Chest feels tight  Wake up at night   Keep taking your Green Zone medications  Start taking your rescue medicine:  every 20 minutes for up to 1 hour. Then every 4 hours for 24-48 hours.  If you stay in the Yellow Zone for more than 12-24 hours, contact your doctor.  If you do not return to the Green Zone in 12-24 hours or you get worse, start taking your oral steroid medicine if prescribed by your provider.           RED ZONE Medical Alert - Get Help  I have ANY of these:  I feel awful  Medicine is not helping  Breathing getting harder  Trouble walking or talking  Nose opens wide to breathe       Take your rescue medicine NOW  If your provider has prescribed an oral steroid medicine, start taking it NOW  Call your doctor NOW  If you are still in the Red Zone after 20 minutes and you have not reached your doctor:  Take your rescue medicine again and  Call 911 or go to the emergency room right away    See your regular doctor within 2 weeks of an  Emergency Room or Urgent Care visit for follow-up treatment.          Annual Reminders:  Meet with Asthma Educator,  Flu Shot in the Fall, consider Pneumonia Vaccination for patients with asthma (aged 19 and older).    Pharmacy:    [facility] - [place], [place] - [address]  [facility] - [place], [place] - [address]     Electronically signed by Tracy Garner MD   Date: 03/10/25                    Asthma Triggers  How To Control Things That Make Your Asthma Worse    Triggers are things that make your asthma worse.  Look at the list below to help you find your triggers and   what you can do about them. You can help prevent asthma flare-ups by staying away from your triggers.      Trigger                                                          What you can do   Cigarette Smoke  Tobacco smoke can make asthma worse. Do not allow smoking in your home, car or around you.  Be sure no one smokes at a child s day care or school.  If you smoke, ask your health care provider for ways to help you quit.  Ask family members to quit too.  Ask your health care provider for a referral to Quit Plan to help you quit smoking, or call 9-948-279-PLAN.     Colds, Flu, Bronchitis  These are common triggers of asthma. Wash your hands often.  Don t touch your eyes, nose or mouth.  Get a flu shot every year.     Dust Mites  These are tiny bugs that live in cloth or carpet. They are too small to see. Wash sheets and blankets in hot water every week.   Encase pillows and mattress in dust mite proof covers.  Avoid having carpet if you can. If you have carpet, vacuum weekly.   Use a dust mask and HEPA vacuum.   Pollen and Outdoor Mold  Some people are allergic to trees, grass, or weed pollen, or molds. Try to keep your windows closed.  Limit time out doors when pollen count is high.   Ask you health care provider about taking medicine during allergy season.     Animal Dander  Some people are allergic to skin flakes,  urine or saliva from pets with fur or feathers. Keep pets with fur or feathers out of your home.    If you can t keep the pet outdoors, then keep the pet out of your bedroom.  Keep the bedroom door closed.  Keep pets off cloth furniture and away from stuffed toys.     Mice, Rats, and Cockroaches  Some people are allergic to the waste from these pests.   Cover food and garbage.  Clean up spills and food crumbs.  Store grease in the refrigerator.   Keep food out of the bedroom.   Indoor Mold  This can be a trigger if your home has high moisture. Fix leaking faucets, pipes, or other sources of water.   Clean moldy surfaces.  Dehumidify basement if it is damp and smelly.   Smoke, Strong Odors, and Sprays  These can reduce air quality. Stay away from strong odors and sprays, such as perfume, powder, hair spray, paints, smoke incense, paint, cleaning products, candles and new carpet.   Exercise or Sports  Some people with asthma have this trigger. Be active!  Ask your doctor about taking medicine before sports or exercise to prevent symptoms.    Warm up for 5-10 minutes before and after sports or exercise.     Other Triggers of Asthma  Cold air:  Cover your nose and mouth with a scarf.  Sometimes laughing or crying can be a trigger.  Some medicines and food can trigger asthma.

## 2025-03-10 NOTE — PATIENT INSTRUCTIONS
Change allergy medicine to claritin or loratidine   Lung testing ordered. They will call.   Try 600-800mg magnesium glycinate 1-2 hours before bed   New inhaler ordered     Start with 1/2 capful Miralax in 6-8oz liquid daily.  You should drink this quickly.  Mix the miralax in something such as juice, tea, or water, NOT milk. It s very important to mix the medicine with the full amount of liquid suggested.   You can increase or decrease the dose or frequency as needed to achieve soft daily and consistent stools (ex 1/2 capful to 2 caps).  If 1 capful daily is giving you too soft or liquid bowel movements, reduce to half capful daily.  It can take 6 to 12 months for stools to regulate after being constipated.  Should continue MiraLAX for at least that duration.  Can consider transitioning to Metamucil, or fiber, after that.  Be sure to hydrate, should get at least 60 ounces of water daily.  Physical activity also helps keep stools moving.      Thank you for choosing [facility].   I have office hours 8:00 am to 4:30 pm on Mondays, Tuesdays, Thursdays and Fridays. I am out of the office most Wednesdays, although I do occasionally work one Wednesday per month.   Following your visit, if you had labs and diagnostic testing, once they have returned, I will review them and you will be contacted by myself or my nurse if there are concerns. You can also view the labs on Friend[employer].   For refills, notify your pharmacy regarding what you need and the pharmacy will generate a refill request. Please plan ahead and allow 3-5 days for refill requests.  If you have an urgent/same day appointment need, please request an urgent visit when you call and our support staff will send me an Overbook request to try to accommodate you for the urgent visit. I like to fit in and see my own patients and hopefully save you time too!   You can also now schedule appointments on the Friend[employer] larissa.   In the event that you need to be seen for  urgent concerns and I am out of office, please see one of my colleagues for acute concerns or you may also present to Urgent Care or the ER.  I appreciate the opportunity to serve you and look forward to supporting your healthcare needs in the future.

## 2025-03-27 DIAGNOSIS — K59.04 CHRONIC IDIOPATHIC CONSTIPATION: ICD-10-CM

## 2025-03-27 DIAGNOSIS — F98.8 ATTENTION DEFICIT DISORDER (ADD) WITHOUT HYPERACTIVITY: ICD-10-CM

## 2025-03-27 RX ORDER — DOCUSATE SODIUM 100 MG/1
100 CAPSULE, LIQUID FILLED ORAL 2 TIMES DAILY
Qty: 60 CAPSULE | Refills: 11 | Status: SHIPPED | OUTPATIENT
Start: 2025-03-27

## 2025-03-27 RX ORDER — METHYLPHENIDATE HYDROCHLORIDE 36 MG/1
36 TABLET ORAL 2 TIMES DAILY
Qty: 60 TABLET | Refills: 0 | Status: SHIPPED | OUTPATIENT
Start: 2025-03-27

## 2025-03-27 NOTE — TELEPHONE ENCOUNTER
methylphenidate HCl ER, OSM, (CONCERTA) 36 MG CR       Last Written Prescription Date:  02/24/2025  Last Fill Quantity: 60,   # refills: 0  Last Office Visit: 03/10/2025  Future Office visit:    Next 5 appointments (look out 90 days)      Apr 25, 2025 11:00 AM  (Arrive by 10:45 AM)  Provider Visit with Tracy Garner MD  [facility] ([facility] ) [address] [place] [address]  [place] [address]  [phone]             Routing refill request to provider for review/approval because:  Rx Protocol Controlled Substance Failed    Rerun Protocol (3/27/2025 9:21 AM)    Urine drug screeen results on file in past 12 months    [unfilled]     Pain Agreement on file in last 12 months    Please review last Controlled Substance Pain agreement document.   CSA -- Encounter Level:    CSA: None found at the encounter level.      CSA -- Patient Level:    CSA: None found at the patient level.          Auto Fail - Please forward to Provider

## 2025-03-27 NOTE — TELEPHONE ENCOUNTER
Docusate Sodium (Colace) 100 MG capsule     Last Written Prescription Date:  02/18/2025  Last Fill Quantity: 60,   # refills: 0  Last Office Visit: 03/10/2025  Future Office visit:    Next 5 appointments (look out 90 days)      Apr 25, 2025 11:00 AM  (Arrive by 10:45 AM)  Provider Visit with Tracy Garner MD  [facility] [place] ([facility] [place] ) [address]  [place] [address]  [phone]             Routing refill request to provider for review/approval because:

## 2025-04-08 ENCOUNTER — HOSPITAL ENCOUNTER (OUTPATIENT)
Dept: RESPIRATORY THERAPY | Facility: HOSPITAL | Age: 45
Discharge: HOME | End: 2025-04-08
Attending: STUDENT IN AN ORGANIZED HEALTH CARE EDUCATION/TRAINING PROGRAM
Payer: COMMERCIAL

## 2025-04-08 DIAGNOSIS — J45.20 MILD INTERMITTENT REACTIVE AIRWAY DISEASE WITH WHEEZING WITHOUT COMPLICATION: Primary | ICD-10-CM

## 2025-04-08 LAB
DLCOCOR-%PRED-PRE: 74 %
DLCOCOR-PRE: 17.48 ML/MIN/MMHG
DLCOUNC-%PRED-PRE: 74 %
DLCOUNC-PRE: 17.58 ML/MIN/MMHG
DLCOUNC-PRED: 23.6 ML/MIN/MMHG
ERV-%PRED-PRE: 63 %
ERV-PRE: 0.89 L
ERV-PRED: 1.4 L
EXPTIME-PRE: 8.33 SEC
FEF2575-%PRED-PRE: 79 %
FEF2575-PRE: 2.41 L/SEC
FEF2575-PRED: 3.03 L/SEC
FEFMAX-%PRED-PRE: 80 %
FEFMAX-PRE: 6.17 L/SEC
FEFMAX-PRED: 7.63 L/SEC
FEV1-%PRED-PRE: 98 %
FEV1-PRE: 3.05 L
FEV1FEV6-PRE: 75 %
FEV1FEV6-PRED: 83 %
FEV1FVC-PRE: 75 %
FEV1FVC-PRED: 81 %
FEV1SVC-PRE: 81 %
FEV1SVC-PRED: 79 %
FIFMAX-PRE: 2.66 L/SEC
FRCPLETH-%PRED-PRE: 100 %
FRCPLETH-PRE: 3.3 L
FRCPLETH-PRED: 3.27 L
FVC-%PRED-PRE: 105 %
FVC-PRE: 4.07 L
FVC-PRED: 3.85 L
GAW-%PRED-PRE: 82 %
GAW-PRE: 0.85 L/S/CMH2O
GAW-PRED: 1.03 L/S/CMH2O
HGB BLD-MCNC: 13.6 G/DL (ref 11.7–15.7)
IC-%PRED-PRE: 102 %
IC-PRE: 2.87 L
IC-PRED: 2.79 L
RVPLETH-%PRED-PRE: 125 %
RVPLETH-PRE: 2.41 L
RVPLETH-PRED: 1.92 L
SGAW-%PRED-PRE: 233 %
SGAW-PRE: 0.24 1/CMH2O*S
SGAW-PRED: 0.1 1/CMH2O*S
SRAW-%PRED-PRE: 88 %
SRAW-PRE: 4.2 CMH2O*S
SRAW-PRED: 4.76 CMH2O*S
TLCPLETH-%PRED-PRE: 106 %
TLCPLETH-PRE: 6.17 L
TLCPLETH-PRED: 5.78 L
VA-%PRED-PRE: 77 %
VA-PRE: 4.42 L
VC-%PRED-PRE: 95 %
VC-PRE: 3.76 L
VC-PRED: 3.96 L

## 2025-04-08 PROCEDURE — 94726 PLETHYSMOGRAPHY LUNG VOLUMES: CPT

## 2025-04-08 PROCEDURE — 94010 BREATHING CAPACITY TEST: CPT

## 2025-04-08 PROCEDURE — 94729 DIFFUSING CAPACITY: CPT

## 2025-04-08 PROCEDURE — 85018 HEMOGLOBIN: CPT | Performed by: STUDENT IN AN ORGANIZED HEALTH CARE EDUCATION/TRAINING PROGRAM

## 2025-04-08 PROCEDURE — 36415 COLL VENOUS BLD VENIPUNCTURE: CPT | Performed by: STUDENT IN AN ORGANIZED HEALTH CARE EDUCATION/TRAINING PROGRAM

## 2025-04-19 LAB
DLCOCOR-%PRED-PRE: 74 %
DLCOCOR-PRE: 17.48 ML/MIN/MMHG
DLCOUNC-%PRED-PRE: 74 %
DLCOUNC-PRE: 17.58 ML/MIN/MMHG
DLCOUNC-PRED: 23.6 ML/MIN/MMHG
ERV-%PRED-PRE: 63 %
ERV-PRE: 0.89 L
ERV-PRED: 1.4 L
EXPTIME-PRE: 8.33 SEC
FEF2575-%PRED-PRE: 79 %
FEF2575-PRE: 2.41 L/SEC
FEF2575-PRED: 3.03 L/SEC
FEFMAX-%PRED-PRE: 80 %
FEFMAX-PRE: 6.17 L/SEC
FEFMAX-PRED: 7.63 L/SEC
FEV1-%PRED-PRE: 98 %
FEV1-PRE: 3.05 L
FEV1FEV6-PRE: 75 %
FEV1FEV6-PRED: 83 %
FEV1FVC-PRE: 75 %
FEV1FVC-PRED: 81 %
FEV1SVC-PRE: 81 %
FEV1SVC-PRED: 79 %
FIFMAX-PRE: 2.66 L/SEC
FRCPLETH-%PRED-PRE: 100 %
FRCPLETH-PRE: 3.3 L
FRCPLETH-PRED: 3.27 L
FVC-%PRED-PRE: 105 %
FVC-PRE: 4.07 L
FVC-PRED: 3.85 L
GAW-%PRED-PRE: 82 %
GAW-PRE: 0.85 L/S/CMH2O
GAW-PRED: 1.03 L/S/CMH2O
IC-%PRED-PRE: 102 %
IC-PRE: 2.87 L
IC-PRED: 2.79 L
RVPLETH-%PRED-PRE: 125 %
RVPLETH-PRE: 2.41 L
RVPLETH-PRED: 1.92 L
SGAW-%PRED-PRE: 233 %
SGAW-PRE: 0.24 1/CMH2O*S
SGAW-PRED: 0.1 1/CMH2O*S
SRAW-%PRED-PRE: 88 %
SRAW-PRE: 4.2 CMH2O*S
SRAW-PRED: 4.76 CMH2O*S
TLCPLETH-%PRED-PRE: 106 %
TLCPLETH-PRE: 6.17 L
TLCPLETH-PRED: 5.78 L
VA-%PRED-PRE: 77 %
VA-PRE: 4.42 L
VC-%PRED-PRE: 95 %
VC-PRE: 3.76 L
VC-PRED: 3.96 L

## 2025-05-05 DIAGNOSIS — F98.8 ATTENTION DEFICIT DISORDER (ADD) WITHOUT HYPERACTIVITY: ICD-10-CM

## 2025-05-06 RX ORDER — METHYLPHENIDATE HYDROCHLORIDE 36 MG/1
36 TABLET ORAL 2 TIMES DAILY
Qty: 60 TABLET | Refills: 0 | Status: SHIPPED | OUTPATIENT
Start: 2025-05-06

## 2025-05-06 NOTE — TELEPHONE ENCOUNTER
methylphenidate HCl ER, OSM, (CONCERTA) 36 MG CR tablet       Last Written Prescription Date:  3/27/25  Last Fill Quantity: 60,   # refills: 0  Last Office Visit: 4/25/25  Future Office visit:    Next 5 appointments (look out 90 days)      Jun 06, 2025 9:30 AM  (Arrive by 9:15 AM)  Provider Visit with Tracy Garner MD  [facility] [place] ([facility] [place] ) [address] [place] [address] AVE  [place] [address]  [phone]             Routing refill request to provider for review/approval because:  Drug not on the [facility], P or [facility] refill protocol or controlled substance

## 2025-05-12 DIAGNOSIS — F33.42 MAJOR DEPRESSIVE DISORDER, RECURRENT EPISODE, IN FULL REMISSION: ICD-10-CM

## 2025-05-12 DIAGNOSIS — F41.1 GAD (GENERALIZED ANXIETY DISORDER): ICD-10-CM

## 2025-05-12 RX ORDER — CITALOPRAM HYDROBROMIDE 40 MG/1
40 TABLET ORAL DAILY
Qty: 90 TABLET | Refills: 0 | Status: SHIPPED | OUTPATIENT
Start: 2025-05-12

## 2025-05-12 NOTE — TELEPHONE ENCOUNTER
Celexa  Last Written Prescription Date: 2/12/25  Last Fill Quantity: 90 # of Refills: 0  Last Office Visit: 4/25/25

## 2025-05-12 NOTE — TELEPHONE ENCOUNTER
Future Office visit:    Next 5 appointments (look out 90 days)      Jun 06, 2025 9:30 AM  (Arrive by 9:15 AM)  Provider Visit with Tracy Garner MD  [facility] [place] ([facility] [place] ) [address]  [place] [address]  [phone]             Routing refill request to provider for review/approval because:  SSRIs Protocol Failed    Rerun Protocol (5/12/2025 10:14 AM)    PHQ-9 score less than 5 in past 6 months    Please review last PHQ-9 score.        12/7/2023     9:35 AM 6/10/2024     8:22 AM 3/10/2025    10:20 AM   PHQ-9 SCORE   PHQ-9 Total Score MyChart 15 (Moderately severe depression) 6 (Mild depression) 8 (Mild depression)   PHQ-9 Total Score 15 6 8        Patient-reported       DAFNE-7 score of less than 5 in past 6 months.    Please review last DAFNE-7 score.        6/10/2024     8:23 AM 3/10/2025    10:21 AM 4/25/2025    10:44 AM   DAFNE-7 SCORE   Total Score 8 (mild anxiety) 7 (mild anxiety) 7 (mild anxiety)   Total Score 8 7  7        Patient-reported

## 2025-05-18 ENCOUNTER — MYC REFILL (OUTPATIENT)
Dept: FAMILY MEDICINE | Facility: OTHER | Age: 45
End: 2025-05-18

## 2025-05-18 DIAGNOSIS — K21.9 GASTROESOPHAGEAL REFLUX DISEASE WITHOUT ESOPHAGITIS: ICD-10-CM

## 2025-05-19 RX ORDER — OMEPRAZOLE 40 MG/1
40 CAPSULE, DELAYED RELEASE ORAL DAILY
Qty: 90 CAPSULE | Refills: 1 | Status: SHIPPED | OUTPATIENT
Start: 2025-05-19

## 2025-05-19 NOTE — TELEPHONE ENCOUNTER
Prilosec 40 mg       Last Written Prescription Date:  03/10/2005  Last Fill Quantity: 90,   # refills: 1  Last Office Visit: 04/25/2025  Future Office visit:    Next 5 appointments (look out 90 days)      Jun 06, 2025 9:30 AM  (Arrive by 9:15 AM)  Provider Visit with Tracy Garner MD  [facility] [place] ([facility] [place] ) [address]  [place] [address]  [phone]

## 2025-06-02 DIAGNOSIS — F98.8 ATTENTION DEFICIT DISORDER (ADD) WITHOUT HYPERACTIVITY: ICD-10-CM

## 2025-06-03 RX ORDER — METHYLPHENIDATE HYDROCHLORIDE 36 MG/1
36 TABLET ORAL 2 TIMES DAILY
Qty: 60 TABLET | Refills: 0 | Status: SHIPPED | OUTPATIENT
Start: 2025-06-03

## 2025-06-03 NOTE — TELEPHONE ENCOUNTER
Concerta      Last Written Prescription Date:  5/6/25  Last Fill Quantity: 60,   # refills: 0  Last Office Visit: 4/25/25  Future Office visit:    Next 5 appointments (look out 90 days)      Jun 06, 2025 9:30 AM  (Arrive by 9:15 AM)  Provider Visit with Tracy Garner MD  [facility] [place] ([facility] [place] ) [address]  [place] [address]  [phone]             Routing refill request to provider for review/approval because:

## 2025-06-03 NOTE — NURSING NOTE
"Chief Complaint   Patient presents with     Gyn Exam       Initial /68 (BP Location: Right arm, Patient Position: Sitting, Cuff Size: Adult Large)   Pulse 87   Ht 1.753 m (5' 9\")   Wt 112.9 kg (249 lb)   SpO2 99%   BMI 36.77 kg/m   Estimated body mass index is 36.77 kg/m  as calculated from the following:    Height as of this encounter: 1.753 m (5' 9\").    Weight as of this encounter: 112.9 kg (249 lb).  Medication Reconciliation: complete     Franchesca Bishop LPN    "
no

## 2025-06-12 ENCOUNTER — RESULTS FOLLOW-UP (OUTPATIENT)
Dept: FAMILY MEDICINE | Facility: OTHER | Age: 45
End: 2025-06-12

## 2025-06-12 ENCOUNTER — HOSPITAL ENCOUNTER (OUTPATIENT)
Dept: CT IMAGING | Facility: HOSPITAL | Age: 45
End: 2025-06-12
Attending: STUDENT IN AN ORGANIZED HEALTH CARE EDUCATION/TRAINING PROGRAM
Payer: COMMERCIAL

## 2025-06-12 DIAGNOSIS — R51.9 SINUS HEADACHE: ICD-10-CM

## 2025-06-12 DIAGNOSIS — J34.89 SINUS PRESSURE: ICD-10-CM

## 2025-06-12 PROCEDURE — 70486 CT MAXILLOFACIAL W/O DYE: CPT

## 2025-06-12 PROCEDURE — 70486 CT MAXILLOFACIAL W/O DYE: CPT | Mod: 26 | Performed by: RADIOLOGY

## 2025-06-13 ENCOUNTER — THERAPY VISIT (OUTPATIENT)
Dept: PHYSICAL THERAPY | Facility: HOSPITAL | Age: 45
End: 2025-06-13
Attending: STUDENT IN AN ORGANIZED HEALTH CARE EDUCATION/TRAINING PROGRAM
Payer: COMMERCIAL

## 2025-06-13 DIAGNOSIS — R42 DIZZINESS: ICD-10-CM

## 2025-06-13 PROCEDURE — 97530 THERAPEUTIC ACTIVITIES: CPT | Mod: GP

## 2025-06-13 PROCEDURE — 999N000104 HC STATISTIC NO CHARGE

## 2025-06-13 PROCEDURE — 97162 PT EVAL MOD COMPLEX 30 MIN: CPT | Mod: GP

## 2025-06-13 NOTE — PROGRESS NOTES
"PHYSICAL THERAPY EVALUATION  Type of Visit: Evaluation       Fall Risk Screen:  Have you fallen 2 or more times in the past year?: No  Have you fallen and had an injury in the past year?: No    Subjective         Presenting condition or subjective complaint: dizziness/vertigo      Pt presents today for vestibular evaluation with her son.  Pt saw PCP earlier this month and PCP placed a referral to ENT.    Possible history of BPPV for which she saw PT for.  Per PCP note from earlier this month, history of head trauma, dizziness is worse when sinus sxs are increased, worse with movement and pressure in her face, and can last for several days.Has not had an eye exam in 5 to 6 years - she was prescribed galsses that made the floor behind her look like it was sloping. History of concussions.  At times patient is too afraid to drive.  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 Pt notes she spends the majority of her day in her room. Dizziness and vertigo have been there since she was in her 20s when she had her first child.  Went to PT a few years ago, which she thinks helped. Pt's son says pt has been \"needy-er\" since this new medication. Pt reports double vision and HA that is behind her eyes or along the R side of her face/ear.     Pt also stated she has not fallen, however later told a story about a fall the last couple of days - has gotten bruised. Son says mom has fallen 5x/ week.     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    FH: breast cancer     DAFNE (generalised anxiety disorder)     Irritable bowel syndrome with both constipation and diarrhea 08/02/2017    MDD (major depressive disorder)     Migraine, unspecified, without mention of intractable migraine without mention of status migrainosus 03/13/2000    Tobacco abuse, in remission     Unspecified sinusitis (chronic) 03/08/2001         Prior diagnostic imaging/testing results:       Prior therapy history for the same diagnosis, illness or injury: Yes several years ago    Prior Level of Function  Transfers: Independent  Ambulation: Independent  ADL: Independent    Living Environment  Social support: With a significant other or spouse   Type of home: House   Stairs to enter the home: Yes 5 Is there a railing: Yes     Ramp: No   Stairs inside the home: Yes 3 Is there a railing: No     Help at home: None  Equipment owned: [employer]     Employment: No    Hobbies/Interests:      Patient goals for therapy: daily life    Pain assessment: Pain present     Objective      Cognitive Status Examination  Orientation: Oriented to person, place and time   Level of Consciousness: Alert  Follows Commands and Answers Questions: 75% of the time  Personal Safety and Judgement: Intact  Memory: Pt said she was 44yo at one point during today's exam. Pt also stated she has not fallen, however later told a story about a fall the last couple of days - has gotten bruised. Son says mom has fallen 5x/ week.     OBSERVATION: Pt sitting on plinth for most of exam. Has son open her bottle of pop for her several times throughout appt.  INTEGUMENTARY: Intact  POSTURE: Sitting Posture: Rounded shoulders, Forward head  PALPATION: No pain reported today with palpation  RANGE OF MOTION: Decreased cervcial  STRENGTH: No noted deficits with ambulation and sit<>stand    BED MOBILITY: Independent    TRANSFERS: SBA    WHEELCHAIR MOBILITY: NT    GAIT:   Level of [place]:  "SBA  Assistive Device(s): None  Gait Deviations: Sandrita decreased  Gait Distance: 50'x2  Stairs: NT    BALANCE: Decreased    Vestibular/Ocular Motor Test:     Not Tested Headache Dizziness Nausea Fogginess Comments   Baseline N/A 0 1 0 2    Smooth Pursuits  0 1 1 0 Undershooting and overshooting noted   Saccades-Horizontal  0 0 0 0 No concerns   Saccades-Vertical  0 2 0 0 Corrective saccades noted superiorly   Convergence (Near Point)  0 0 2 2 (Near [place] in CM)  Measure 1: 8 inches. Got blurry. Pt physically pulled away. Says she does not like anything getting close to her face for the last 1.5 years.     VOR Horizontal  0 0 0 3 Very slow movements   VOR Vertical  0 3 0 0 Stakes it feels \"dizzy\" in the back of her head.   Visual Motion Sensitivity Test N/A              Drinks \"1/4 of a quart of flavored water a day\" (8oz).  Otherwise it is \"just soda\" per son.  Drinks 1 cup of coffee. 4 to 6 cans of \"cola\" a day per pt.      Assessment & Plan   CLINICAL IMPRESSIONS  Medical Diagnosis: Dizziness    Treatment Diagnosis: Dizziness, vestibular impairment, oculomotor impairment, HA, decreased balance, fall risk   Impression/Assessment: Patient is a 45 year old female with dizziness and vertigo complaints.  The following significant findings have been identified: Pain, Decreased ROM/flexibility, Impaired balance, Decreased proprioception, Impaired gait, Impaired muscle performance, Decreased activity tolerance, Impaired posture, Dizziness, Disequilibrium , and Impaired vision. These impairments interfere with their ability to perform self care tasks, work tasks, recreational activities, household chores, driving , household mobility, and community mobility as compared to previous level of function.     Clinical Decision Making (Complexity):  Clinical Presentation: Evolving/Changing  Clinical Presentation Rationale: based on medical and personal factors listed in PT evaluation  Clinical Decision Making (Complexity): " Moderate complexity    PLAN OF CARE  Treatment Interventions:  Modalities: Cryotherapy, Hot Pack  Interventions: Gait Training, Manual Therapy, Neuromuscular Re-education, Therapeutic Activity, Therapeutic Exercise, Self-Care/Home Management, Aquatic Therapy, Canalith Repositioning    Long Term Goals     PT Goal 1  Goal Identifier: Short Term 1  Goal Description: Pt will be indep in HEP for safe ability to progress limitations outside of PT to improve functional mobility and decreased risk for additional injury.  Target Date: 07/11/25  PT Goal 2  Goal Identifier: Long Term 1  Goal Description: Pt will increase water intake to at least 48oz per day to help decrease dizziness  Target Date: 08/08/25  PT Goal 3  Goal Identifier: Long term 2  Goal Description: Pt will be able to perform habituation exercises without reproduction of sxs greater than 2/10 in order to safely perform daily activities such as getting up from a chair or couch, ambulating in home or community, and grocery shopping.  Target Date: 09/05/25      Frequency of Treatment: 1-2x/week tapering to d/c  Duration of Treatment: 12 weeks    Recommended Referrals to Other Professionals:   Education Assessment:   Learner/Method: Patient;Family;Listening;Reading;No Barriers to Learning    Risks and benefits of evaluation/treatment have been explained.   Patient/Family/caregiver agrees with Plan of Care.     Evaluation Time:     PT Eval, Moderate Complexity Minutes (96386): 40       Signing Clinician: Andrea Edwards, PT        [facility]                                                                                   OUTPATIENT PHYSICAL THERAPY      PLAN OF TREATMENT FOR OUTPATIENT REHABILITATION   Patient's Last Name, First Name, Cristiana Brand YOB: 1980   Provider's Name   [facility]   Medical Record No.  [id number]     Onset Date: 06/09/25  Start of Care Date: 06/13/25      Medical Diagnosis:  Dizziness      PT Treatment Diagnosis:  Dizziness, vestibular impairment, oculomotor impairment, HA, decreased balance, fall risk Plan of Treatment  Frequency/Duration: 1-2x/week tapering to d/c/ 12 weeks    Certification date from 06/13/25 to 09/05/25         See note for plan of treatment details and functional goals     Andrea Edwards, PT                         I CERTIFY THE NEED FOR THESE SERVICES FURNISHED UNDER        THIS PLAN OF TREATMENT AND WHILE UNDER MY CARE     (Physician attestation of this document indicates review and certification of the therapy plan).              Referring Provider:  Tracy Garner    Initial Assessment  See Epic Evaluation- Start of Care Date: 06/13/25

## 2025-06-23 ENCOUNTER — HOSPITAL ENCOUNTER (EMERGENCY)
Facility: HOSPITAL | Age: 45
Discharge: HOME | End: 2025-06-23
Payer: COMMERCIAL

## 2025-06-23 ENCOUNTER — TELEPHONE (OUTPATIENT)
Dept: FAMILY MEDICINE | Facility: OTHER | Age: 45
End: 2025-06-23

## 2025-06-23 VITALS
RESPIRATION RATE: 18 BRPM | DIASTOLIC BLOOD PRESSURE: 90 MMHG | HEART RATE: 89 BPM | SYSTOLIC BLOOD PRESSURE: 156 MMHG | TEMPERATURE: 97.3 F | OXYGEN SATURATION: 97 %

## 2025-06-23 DIAGNOSIS — J02.9 VIRAL PHARYNGITIS: ICD-10-CM

## 2025-06-23 DIAGNOSIS — J06.9 VIRAL URI WITH COUGH: ICD-10-CM

## 2025-06-23 LAB — S PYO DNA THROAT QL NAA+PROBE: NOT DETECTED

## 2025-06-23 PROCEDURE — 87651 STREP A DNA AMP PROBE: CPT

## 2025-06-23 PROCEDURE — 99213 OFFICE O/P EST LOW 20 MIN: CPT

## 2025-06-23 PROCEDURE — G0463 HOSPITAL OUTPT CLINIC VISIT: HCPCS

## 2025-06-23 RX ORDER — CLOTRIMAZOLE 10 MG/1
10 LOZENGE ORAL
Qty: 25 LOZENGE | Refills: 0 | Status: SHIPPED | OUTPATIENT
Start: 2025-06-23 | End: 2025-06-28

## 2025-06-23 ASSESSMENT — ENCOUNTER SYMPTOMS
WHEEZING: 0
FEVER: 0
CHILLS: 0
RHINORRHEA: 0
ABDOMINAL DISTENTION: 0
EYES NEGATIVE: 1
VOICE CHANGE: 0
FATIGUE: 0
SHORTNESS OF BREATH: 0
SINUS PAIN: 0
SINUS PRESSURE: 0
DIFFICULTY URINATING: 0
ACTIVITY CHANGE: 0
SORE THROAT: 1
ABDOMINAL PAIN: 0
COUGH: 0

## 2025-06-23 ASSESSMENT — COLUMBIA-SUICIDE SEVERITY RATING SCALE - C-SSRS
1. IN THE PAST MONTH, HAVE YOU WISHED YOU WERE DEAD OR WISHED YOU COULD GO TO SLEEP AND NOT WAKE UP?: NO
2. HAVE YOU ACTUALLY HAD ANY THOUGHTS OF KILLING YOURSELF IN THE PAST MONTH?: NO
6. HAVE YOU EVER DONE ANYTHING, STARTED TO DO ANYTHING, OR PREPARED TO DO ANYTHING TO END YOUR LIFE?: NO

## 2025-06-23 ASSESSMENT — ACTIVITIES OF DAILY LIVING (ADL): ADLS_ACUITY_SCORE: 41

## 2025-06-23 NOTE — TELEPHONE ENCOUNTER
"6/23/2025 10:46 AM  Writer called patient back. Pt reports oral thrush. Pt reports white throat and tongue. Pt reports sx started on 6/22/25. Pt reports sore throat. Pt denies any other sx or concerns. Writer offered to send back overbook request to PCP. Pt stated, \"No, I will just go to Urgent Care\".   Mikala Cisneros RN    "

## 2025-06-23 NOTE — ED PROVIDER NOTES
"  History     Chief Complaint   Patient presents with    Pharyngitis     The history is provided by the patient.     Cristiana Aguila is a 45 year old female who presents for pharyngitis x 3 days.  She utilizes dentures, and reports that yesterday she noted some \"red spots\" along her bilateral anterior gumline which have self resolved today.  She has reported consistent pharyngitis, which is not improving.  Has not tried OTC methods.  Denies fevers, chills, body aches, cough, chest pain, abdominal pain, bowel bladder pattern changes, and abnormal skin areas. She is concerned for thrush    Allergies:  Allergies   Allergen Reactions    Food Anaphylaxis     Artificial cinnamon     Bee Venom      Bee venom (honey bee)      Sulfa Antibiotics      Sulfa (sulfonamide antibiotics)       Amoxicillin Diarrhea    Latex Rash    Other Environmental Allergy Itching and Rash     Pt is allergic to the cold.         Problem List:    Patient Active Problem List    Diagnosis Date Noted    Ankle instability, unspecified laterality 09/15/2022     Priority: Medium     Formatting of this note might be different from the original.  Added automatically from request for surgery [id number]      Ankle pain 07/30/2021     Priority: Medium     Formatting of this note might be different from the original.  Added automatically from request for surgery [id number]  Formatting of this note might be different from the original.  Added automatically from request for surgery [id number]      Knee stiffness, left 07/30/2021     Priority: Medium    Muscle weakness 07/30/2021     Priority: Medium    Encounter for initial prescription of implantable subdermal contraceptive 03/11/2021     Priority: Medium    Psychophysiological insomnia 12/21/2020     Priority: Medium    Mild intermittent reactive airway disease with wheezing without complication 12/21/2020     Priority: Medium    Irritable bowel syndrome with both constipation and diarrhea 08/02/2017     Priority: " Medium    Recurrent major depressive disorder, in partial remission 08/02/2017     Priority: Medium    FH: breast cancer      Priority: Medium    Generalized anxiety disorder      Priority: Medium     Diagnosis updated by automated process. Provider to review and confirm.      Attention deficit disorder 05/31/2005     Priority: Medium     Problem list name updated by automated process. 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    FH: breast cancer     DAFNE (generalised anxiety disorder)     Irritable bowel syndrome with both constipation and diarrhea 08/02/2017    MDD (major depressive disorder)     Migraine, unspecified, without mention of intractable migraine without mention of status migrainosus 03/13/2000    Tobacco abuse, in remission     Unspecified sinusitis (chronic) 03/08/2001       Past Surgical History:    Past Surgical History:   Procedure Laterality Date    DILATION AND CURETTAGE, HYSTEROSCOPY, ABLATE ENDOMETRIUM, COMBINED N/A 11/22/2023    Procedure: Hysteroscopy, Endometrial Ablation;  Surgeon: Kuldip España MD;  Location: [facility]    ENDOSCOPY UPPER, COLONOSCOPY, COMBINED N/A 9/4/2015    Procedure: COMBINED ENDOSCOPY UPPER, COLONOSCOPY;  Surgeon: Griffin Barrientos DO;  Location: [facility]    LAPAROSCOPIC CYSTECTOMY OVARIAN (BENIGN) Right 1/27/2016    Procedure: LAPAROSCOPIC CYSTECTOMY OVARIAN (BENIGN);  Surgeon: Kuldip España MD;  Location: [facility]    LAPAROSCOPIC SALPINGO-OOPHORECTOMY Left 1/27/2016    Procedure: LAPAROSCOPIC SALPINGO-OOPHORECTOMY;  Surgeon: Kuldip España MD;  Location: [facility]    LAPAROSCOPY DIAGNOSTIC (GYN) Left 1/27/2016    Procedure: LAPAROSCOPY DIAGNOSTIC (GYN);  Surgeon: Kuldip España MD;  Location: [facility]    REMOVE FOREIGN BODY UPPER EXTREMITY Left 11/22/2023    Procedure: Nexplanon Removal Left Arm;  Surgeon:  Kuldip España MD;  Location: [facility]    TUBAL LIGATION  2006    wisdom teeth         Family History:    Family History   Problem Relation Age of Onset    Breast Cancer Maternal Grandmother         1 breast, age unknown    Hypertension Maternal Grandfather     Hyperlipidemia Maternal Grandfather     Diabetes Paternal Grandmother     Breast Cancer Maternal Aunt         age unknown;  unknown if 1 or both breasts    Asthma No family hx of        Social History:  Marital Status:   [2]  Social History     Tobacco Use    Smoking status: Former     Current packs/day: 0.00     Average packs/day: 0.4 packs/day for 16.3 years (7.3 ttl pk-yrs)     Types: Cigarettes     Start date: 2008     Quit date: 2025     Years since quittin.1     Passive exposure: Past    Smokeless tobacco: Never    Tobacco comments:     pt denies quit plan 2022   Vaping Use    Vaping status: Former    Substances: Nicotine   Substance Use Topics    Alcohol use: Yes     Alcohol/week: 0.0 standard drinks of alcohol     Comment: frequency: rarely    Drug use: No        Medications:    clotrimazole (MYCELEX) 10 MG lozenge  Acetaminophen (TYLENOL PO)  albuterol (VENTOLIN HFA) 108 (90 Base) MCG/ACT inhaler  budesonide-formoterol (SYMBICORT/BREYNA) 160-4.5 MCG/ACT Inhaler  citalopram (CELEXA) 40 MG tablet  diclofenac (VOLTAREN) 1 % topical gel  docusate sodium (COLACE) 100 MG capsule  EPINEPHrine (ANY BX GENERIC EQUIV) 0.3 MG/0.3ML injection 2-pack  hydrOXYzine HCl (ATARAX) 25 MG tablet  ibuprofen (ADVIL/MOTRIN) 600 MG tablet  loratadine (CLARITIN) 10 MG tablet  methylphenidate HCl ER, OSM, (CONCERTA) 36 MG CR tablet  mometasone (NASONEX) 50 MCG/ACT nasal spray  montelukast (SINGULAIR) 10 MG tablet  mupirocin (BACTROBAN) 2 % external ointment  nicotine (COMMIT) 2 MG lozenge  nicotine (NICODERM CQ) 7 MG/24HR 24 hr patch  omeprazole (PRILOSEC) 40 MG DR capsule  traZODone (DESYREL) 100 MG tablet          Review of Systems   Constitutional:   Negative for activity change, chills, fatigue and fever.   HENT:  Positive for mouth sores and sore throat. Negative for congestion, ear discharge, ear pain, postnasal drip, rhinorrhea, sinus pressure, sinus pain, tinnitus and voice change.    Eyes: Negative.    Respiratory:  Negative for cough, shortness of breath and wheezing.    Cardiovascular:  Negative for chest pain and leg swelling.   Gastrointestinal:  Negative for abdominal distention and abdominal pain.   Genitourinary:  Negative for difficulty urinating.   All other systems reviewed and are negative.      Physical Exam   BP: (!) 156/90  Pulse: 89  Temp: 97.3  F (36.3  C)  Resp: 18  SpO2: 97 %      Physical Exam  Vitals reviewed.   Constitutional:       General: She is not in acute distress.     Appearance: She is not ill-appearing, toxic-appearing or diaphoretic.   HENT:      Head: Normocephalic and atraumatic.      Nose: No congestion or rhinorrhea.      Mouth/Throat:      Mouth: Mucous membranes are moist. No oral lesions.      Pharynx: Oropharynx is clear. No pharyngeal swelling, oropharyngeal exudate, posterior oropharyngeal erythema or uvula swelling.      Tonsils: No tonsillar exudate or tonsillar abscesses. 2+ on the right. 2+ on the left.   Eyes:      Conjunctiva/sclera: Conjunctivae normal.   Cardiovascular:      Rate and Rhythm: Normal rate and regular rhythm.   Pulmonary:      Effort: Pulmonary effort is normal. No respiratory distress.      Breath sounds: Normal breath sounds. No stridor. No wheezing, rhonchi or rales.   Chest:      Chest wall: No tenderness.   Skin:     Capillary Refill: Capillary refill takes less than 2 seconds.   Neurological:      General: No focal deficit present.      Mental Status: She is alert and oriented to person, place, and time.         ED Course          Results for orders placed or performed during the hospital encounter of 06/23/25 (from the past 24 hours)   Group A Streptococcus PCR Throat Swab    Specimen:  Throat; Swab   Result Value Ref Range    Group A strep by PCR Not Detected Not Detected    Narrative    The Xpert Xpress Strep A test, performed on the [employer], is a rapid, qualitative in vitro diagnostic test for the detection of Streptococcus pyogenes (Group A ß-hemolytic Streptococcus, Strep A) in throat swab specimens from patients with signs and symptoms of pharyngitis. The Xpert Xpress Strep A test can be used as an aid in the diagnosis of Group A Streptococcal pharyngitis. The assay is not intended to monitor treatment for Group A Streptococcus infections. The Xpert Xpress Strep A test utilizes an automated real-time polymerase chain reaction (PCR) to detect Streptococcus pyogenes DNA.       Medications - No data to display    Assessments & Plan (with Medical Decision Making)     I have reviewed the nursing notes.    I have reviewed the findings, diagnosis, plan and need for follow up with the patient.    Medical Decision Making    (J06.9) Viral URI with cough  (J02.9) Viral pharyngitis  Comment: strep negative.   Plan:   -Clotrimazole lozenge prescription sent for concern of thrush  - Symptomatic treatments recommended.   - Monitor for signs of secondary bacterial infection such as new onset of fever, chills, rigors, shortness of breath, fast heart rate, or increased work of breathing. These symptoms may indicate post-viral bacterial infections including (but not limited to) ear infections and pneumonia. This would require re-evaluation for treatment.   - Insure you are staying hydrated by drinking plenty of fluids or eating foods such as popsicles, jello or pudding.  - Get plenty of rest  - Warm salt water gurgles can help soothe sore throat  - Saline nasal spray and frequent suctioning/nose blowing can help with congestion.  - Monitor for fevers at home. If you have been approved by your primary care provider/specialists, treat with OTC Tylenol or Ibuprofen per package instruction.  Make sure you eat when you take ibuprofen to avoid stomach upset. Ensure at least four hours in-between tylenol-tylenol and ibuprofen-ibuprofen doses. Can alternate both medications. For example, Tylenol at 11 AM, ibuprofen at 1 PM, tylenol at 3 PM, ibuprofen at 5 PM and so on and so forth.   - Humidifier can help with congestion and help keep mucus membranes such as throat and nose from drying out (add bacteriostatic solution to humidifier - can be found at [employer])  - Honey can be soothing for a sore throat (this cannot be administered if patient is under 1 year of age)  - Sleeping slightly propped up can help with congestion and postnasal drainage that can worsen cough at bedtime        Discharge Medication List as of 6/23/2025 12:23 PM          Final diagnoses:   Viral URI with cough   Viral pharyngitis       6/23/2025   [facility]       Stacia Paris, WINNIE CNP  06/23/25 1614

## 2025-06-23 NOTE — TELEPHONE ENCOUNTER
Symptom or reason needing to speak to RN: Hard to swallow because of steroid inhaler and is difficult to talk. Patient was told to call when this happens. Patient reporting having Thrush in her mouth and throat.     Best number to return call: [phone]      Best time to return call: anytime

## 2025-06-23 NOTE — DISCHARGE INSTRUCTIONS
-  Your strep was negative.   - Symptomatic treatments recommended.   - Monitor for signs of secondary bacterial infection such as new onset of fever, chills, rigors, shortness of breath, fast heart rate, or increased work of breathing. These symptoms may indicate post-viral bacterial infections including (but not limited to) ear infections and pneumonia. This would require re-evaluation for treatment.   - Insure you are staying hydrated by drinking plenty of fluids or eating foods such as popsicles, jello or pudding.  - Get plenty of rest  - Warm salt water gurgles can help soothe sore throat  - Saline nasal spray and frequent suctioning/nose blowing can help with congestion.  - Monitor for fevers at home. If you have been approved by your primary care provider/specialists, treat with OTC Tylenol or Ibuprofen per package instruction. Make sure you eat when you take ibuprofen to avoid stomach upset. Ensure at least four hours in-between tylenol-tylenol and ibuprofen-ibuprofen doses. Can alternate both medications. For example, Tylenol at 11 AM, ibuprofen at 1 PM, tylenol at 3 PM, ibuprofen at 5 PM and so on and so forth.   - Humidifier can help with congestion and help keep mucus membranes such as throat and nose from drying out (add bacteriostatic solution to humidifier - can be found at [employer])  - Honey can be soothing for a sore throat (this cannot be administered if patient is under 1 year of age)  - Sleeping slightly propped up can help with congestion and postnasal drainage that can worsen cough at bedtime  - OTC cough medications per package instructions to help with cough, check to see if the cough.cold medication already has acetaminophen (Tylenol) in it. If it does, avoid taking extra Tylenol doses.

## 2025-06-25 ENCOUNTER — DOCUMENTATION ONLY (OUTPATIENT)
Dept: OTHER | Facility: CLINIC | Age: 45
End: 2025-06-25

## 2025-07-03 ENCOUNTER — THERAPY VISIT (OUTPATIENT)
Dept: PHYSICAL THERAPY | Facility: HOSPITAL | Age: 45
End: 2025-07-03
Attending: STUDENT IN AN ORGANIZED HEALTH CARE EDUCATION/TRAINING PROGRAM
Payer: COMMERCIAL

## 2025-07-03 DIAGNOSIS — R42 DIZZINESS: Primary | ICD-10-CM

## 2025-07-03 PROCEDURE — 97112 NEUROMUSCULAR REEDUCATION: CPT | Mod: GP

## 2025-07-08 ENCOUNTER — MYC REFILL (OUTPATIENT)
Dept: FAMILY MEDICINE | Facility: OTHER | Age: 45
End: 2025-07-08

## 2025-07-08 DIAGNOSIS — F17.200 NICOTINE DEPENDENCE, UNCOMPLICATED, UNSPECIFIED NICOTINE PRODUCT TYPE: ICD-10-CM

## 2025-07-08 DIAGNOSIS — K59.04 CHRONIC IDIOPATHIC CONSTIPATION: ICD-10-CM

## 2025-07-08 DIAGNOSIS — F98.8 ATTENTION DEFICIT DISORDER (ADD) WITHOUT HYPERACTIVITY: ICD-10-CM

## 2025-07-08 DIAGNOSIS — F51.04 PSYCHOPHYSIOLOGICAL INSOMNIA: ICD-10-CM

## 2025-07-08 RX ORDER — METHYLPHENIDATE HYDROCHLORIDE 36 MG/1
36 TABLET ORAL 2 TIMES DAILY
Qty: 60 TABLET | Refills: 0 | OUTPATIENT
Start: 2025-07-08

## 2025-07-08 RX ORDER — TRAZODONE HYDROCHLORIDE 100 MG/1
TABLET ORAL
Qty: 90 TABLET | Refills: 3 | Status: SHIPPED | OUTPATIENT
Start: 2025-07-08

## 2025-07-08 RX ORDER — DOCUSATE SODIUM 100 MG/1
100 CAPSULE, LIQUID FILLED ORAL 2 TIMES DAILY
Qty: 60 CAPSULE | Refills: 11 | Status: SHIPPED | OUTPATIENT
Start: 2025-07-08

## 2025-07-08 RX ORDER — METHYLPHENIDATE HYDROCHLORIDE 36 MG/1
36 TABLET ORAL 2 TIMES DAILY
Qty: 60 TABLET | Refills: 0 | Status: SHIPPED | OUTPATIENT
Start: 2025-07-08

## 2025-07-08 NOTE — TELEPHONE ENCOUNTER
Concerta 36 mg       Last Written Prescription Date:  06/03/2025  Last Fill Quantity: 60,   # refills: 0  Last Office Visit: 06/06/2025  Future Office visit:

## 2025-07-08 NOTE — TELEPHONE ENCOUNTER
methylphenidate HCl ER, OSM, (CONCERTA) 36 MG CR tablet       Last Written Prescription Date:  06/03/2025  Last Fill Quantity: 60,   # refills: 0  Last Office Visit: 06/06/2025  Future Office visit:       Routing refill request to provider for review/approval because:  Rx Protocol Controlled Substance [id number]/08/2025 12:23 PM   Protocol Details Urine drug screeen results on file in past 12 months    Controlled Substance Agreement on file in last 12 months    Auto Fail - Please forward to Provider       nicotine (NICODERM CQ) 7 MG/24HR 24 hr patch       Last Written Prescription Date:  04/25/2025  Last Fill Quantity: 108,   # refills: 5  Last Office Visit: 06/06/2025  Future Office visit:       Routing refill request to provider for review/approval because:    Partial Cholinergic Nicotinic Agonist Agents [id number]/08/2025 12:23 PM   Protocol Details Most recent blood pressure under 140/90 in past 12 months    Medication is active on med list and the sig matches. RN to manually verify dose and sig if red X/fail.          Kimberly Boecker, RN

## 2025-07-10 ENCOUNTER — THERAPY VISIT (OUTPATIENT)
Dept: PHYSICAL THERAPY | Facility: HOSPITAL | Age: 45
End: 2025-07-10
Attending: STUDENT IN AN ORGANIZED HEALTH CARE EDUCATION/TRAINING PROGRAM
Payer: COMMERCIAL

## 2025-07-10 DIAGNOSIS — R42 DIZZINESS: Primary | ICD-10-CM

## 2025-07-10 PROCEDURE — 97112 NEUROMUSCULAR REEDUCATION: CPT | Mod: GP

## 2025-07-10 NOTE — PROGRESS NOTES
07/10/25 0500   Appointment Info   Signing clinician's name / credentials Andrea Edwards DPT   Total/Authorized Visits 365   Visits Used 4   Medical Diagnosis Dizziness   PT Tx Diagnosis Dizziness, vestibular impairment, oculomotor impairment, HA, decreased balance, fall risk   Quick Adds Certification   Progress Note/Certification   Start of Care Date 06/13/25   Onset of illness/injury or Date of Surgery 06/09/25   Therapy Frequency 1-2x/week tapering to d/c   Predicted Duration 9 weeks   Certification date from 07/10/25   Certification date to 09/05/25   Progress Note Completed Date 07/10/25   GOALS   PT Goals 2;3;4   PT Goal 1   Goal Identifier Short Term 1   Goal Description Pt will be indep in HEP for safe ability to progress limitations outside of PT to improve functional mobility and decreased risk for additional injury.   Goal Progress MET   Target Date 07/11/25   Date Met 07/10/25   PT Goal 2   Goal Identifier Long Term 1   Goal Description Pt will increase water intake to at least 48oz per day to help decrease dizziness   Goal Progress MET   Target Date 08/08/25   Date Met 07/10/25   PT Goal 3   Goal Identifier Long term 2   Goal Description Pt will be able to perform habituation exercises without reproduction of sxs greater than 2/10 in order to safely perform daily activities such as getting up from a chair or couch, ambulating in home or community, and grocery shopping.   Goal Progress Progressing   Target Date 09/05/25   PT Goal 4   Goal Identifier Long Term 3   Goal Description Patient will demonstrate age appropriate oculomotor functions including smooth pursuits and saccades, as well as age appropriate [place] Convergence and Near Point Accommodation or to pt s baseline.   Goal Progress New goal   Target Date 09/05/25   Subjective Report   Subjective Report Had several good days in a row! She is noticing she is able to tolerate more movement around her house, and has been able to spend more time  "in the living room - this used to be too overstimulating for her. She also stated today that she \"is craving water more than soda\"!   Treatment Interventions (PT)   Interventions Therapeutic Activity;Neuromuscular Re-education   Neuromuscular Re-education   Neuromuscular re-ed of mvmt, balance, coord, kinesthetic sense, posture, proprioception minutes ([id number]) 39   Neuromuscular Re-education Neuro Re-ed 2;Neuro Re-ed 3;Neuro Re-ed 4;Neuro Re-ed 5   Neuro Re-ed 1 Habituation   Neuro Re-ed 1 - Details No sxs today   Neuro Re-ed 2 Motion Sensitivity   Neuro Re-ed 2 - Details 360 spins - to R x 1 without sxs; x2 with sxs lasting 5 sec. 360 spins to L x1 without sxs; x2 with sxs lasting 10 sec.  [place] squats - seated: x1 rep and x2 reps without sxs CW and CCW today! x3 reps CCW where sxs for 6 seconds. x3 reps CW where not sxs. x4 reps CW had mild sxs lasting for 21 seconds   Neuro Re-ed 3 Smooth pursuits   Neuro Re-ed 3 - Details Mildly symptomatic but significantly less deviations noted today   Neuro Re-ed 4 Saccades   Neuro Re-ed 4 - Details Horizontally - no sxs. Vertically - no sxs   Neuro Re-ed 5 VORx1   Neuro Re-ed 5 - Details Horiz - x10 sec with increase in sxs by 1/10 points. x15 sec increased sxs by 1/10 again. x20 sec increased sxs by 2/10. Education on doing these until sxs increase by 2/10 and then stopping   Education   Learner/Method Patient;Family;Listening;Reading;No Barriers to Learning   Plan   Home program Access Code: [email or web address]  URL: [email or web address]  Date: 07/10/2025  Prepared by: Minnie Edwards    Exercises  - Turning in Corner 360  - 3 x daily - 7 x weekly - 2-3 reps  - [place] Squats  - 3 x daily - 7 x weekly - 4 reps  - Seated Gaze Stabilization with Head Rotation  - 4-5 x daily - 7 x weekly - 20 seconds hold   Updates to plan of care Has ENT near end of July. Cog?   Plan for next session Progress motion sensitivity (standing [place] squats), oculomotor (VORx1 vert)   Total " Session Time   Timed Code Treatment Minutes 39   Total Treatment Time (sum of timed and untimed services) 39         [facility]                                                                                   OUTPATIENT PHYSICAL THERAPY    PLAN OF TREATMENT FOR OUTPATIENT REHABILITATION   Patient's Last Name, First Name, Cristiana Brand YOB: 1980   Provider's Name   [facility]   Medical Record No.  [id number]     Onset Date: 06/09/25  Start of Care Date: 06/13/25     Medical Diagnosis:  Dizziness      PT Treatment Diagnosis:  Dizziness, vestibular impairment, oculomotor impairment, HA, decreased balance, fall risk Plan of Treatment  Frequency/Duration: 1-2x/week tapering to d/c/ 9 weeks    Certification date from 07/10/25 to 09/05/25         See note for plan of treatment details and functional goals     Andrea Edwards, PT                         I CERTIFY THE NEED FOR THESE SERVICES FURNISHED UNDER        THIS PLAN OF TREATMENT AND WHILE UNDER MY CARE     (Physician attestation of this document indicates review and certification of the therapy plan).              Referring Provider:  Tracy Garner    Initial Assessment  See Epic Evaluation- Start of Care Date: 06/13/25

## 2025-07-13 ENCOUNTER — HEALTH MAINTENANCE LETTER (OUTPATIENT)
Age: 45
End: 2025-07-13

## 2025-07-15 NOTE — PROGRESS NOTES
CC:  Consult from Dr. Alisa SHUKLA    HPI:  Cristiana Aguila is a 43 year old female P3 (vag). No LMP recorded. Patient has had an implant..  Menses are Regular and Irregular with variable duration and heavy flow.  Her menstrual flow is limiting her clothing choices and interferes with lifestyle/activites.   Pt has Nexplanon placed for menorrhagia/UAB.  She has had a prior BTO/LSO.  She has tried Mirena IUD in past which caused migraines.  She had recent nl pelvic US and CBC.      Clots: Yes  Intermenstrual bleeding: Yes  Contraception: BTO  Abnormal Pap: No  Dysmenorrhea: No  Pelvic pain:Yes, Chronic left sided      Past GYN history:        Last PAP smear:  Normal    Patients records are available and reviewed at today's visit.    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    FH: breast cancer     DAFNE (generalised anxiety disorder)     Irritable bowel syndrome with both constipation and diarrhea 08/02/2017    MDD (major depressive disorder)     Migraine, unspecified, without mention of intractable migraine without mention of status migrainosus 03/13/2000    Tobacco abuse, in remission     Unspecified sinusitis (chronic) 03/08/2001       Past Surgical History:   Procedure Laterality Date    ENDOSCOPY UPPER, COLONOSCOPY, COMBINED N/A 9/4/2015    Procedure: COMBINED ENDOSCOPY UPPER, COLONOSCOPY;  Surgeon: Griffin Barrientos DO;  [facility]    LAPAROSCOPIC CYSTECTOMY OVARIAN (BENIGN) Right 1/27/2016    Procedure: LAPAROSCOPIC CYSTECTOMY OVARIAN (BENIGN);  Surgeon: Kuldip España MD;  [facility]    LAPAROSCOPIC SALPINGO-OOPHORECTOMY Left 1/27/2016    Procedure: LAPAROSCOPIC SALPINGO-OOPHORECTOMY;  Surgeon: Kuldip España MD;  [facility]    LAPAROSCOPY DIAGNOSTIC (GYN) Left 1/27/2016    Procedure: LAPAROSCOPY DIAGNOSTIC (GYN);  Surgeon: Kuldip España MD;  [facility]    TUBAL  LIGATION  2006    wisdom teeth         Family History   Problem Relation Age of Onset    Breast Cancer Maternal Grandmother     Hypertension Maternal Grandfather     Hyperlipidemia Maternal Grandfather     Diabetes Paternal Grandmother     Asthma No family hx of        Current Outpatient Medications   Medication Sig Dispense Refill    CONCERTA 36 MG CR tablet Take 1 tablet (36 mg) by mouth 2 times daily 60 tablet 0    diclofenac (VOLTAREN) 1 % topical gel APPLY 2G TOPICALLY 3 TIMES DAILY AS NEEDED FOR PAIN. APPLY TO AFFECTED JOINT AND RUB INTO SKIN GENTLY. 100 g 4    docusate sodium (COLACE) 100 MG capsule Take 1 capsule (100 mg) by mouth 2 times daily 180 capsule 3    EPINEPHrine (ANY BX GENERIC EQUIV) 0.3 MG/0.3ML injection 2-pack INJECT 1 SYRINGE INTO THE MUSCLE AS NEEDED FOR ANAPHYLAXIS 2 each 2    etonogestrel (NEXPLANON) 68 MG IMPL 1 each by Subdermal route once Placed in 3/11/2021      fexofenadine (ALLEGRA) 180 MG tablet TAKE 1 TABLET BY MOUTH DAILY 30 tablet 11    hydrOXYzine (ATARAX) 25 MG tablet TAKE 1 TO 2 TABLETS BY MOUTH 3 TIMES A DAY AS NEEDED 60 tablet 5    Methylphenidate HCl ER, non-OSM, 36 MG 24H tablet       methylphenidate HCl ER, OSM, (CONCERTA) 36 MG CR tablet Take 1 tablet (36 mg) by mouth 2 times daily for 30 days 60 tablet 0    [START ON 11/11/2023] methylphenidate HCl ER, OSM, (CONCERTA) 36 MG CR tablet Take 1 tablet (36 mg) by mouth 2 times daily for 30 days 60 tablet 0    [START ON 12/12/2023] methylphenidate HCl ER, OSM, (CONCERTA) 36 MG CR tablet Take 1 tablet (36 mg) by mouth 2 times daily for 30 days 60 tablet 0    mometasone (NASONEX) 50 MCG/ACT nasal spray PLACE 2 SPRAYS INTO EACH NOSTRIL DAILY 17 g 11    omeprazole (PRILOSEC) 40 MG DR capsule TAKE 1 CAPSULE BY MOUTH DAILY, TAKE 30 TO 60 MINUTES PRIOR TO A MEAL 90 capsule 3    traZODone (DESYREL) 100 MG tablet TAKE 2-3 TABLETS BY MOUTH DAILY AT BEDTIME 90 tablet 5    VENTOLIN  (90 Base) MCG/ACT inhaler INHALE 2 PUFFS INTO  "LUNGS EVERY 4 HOURS AS NEEDED FOR SHORTNESS OF BREATH 18 g 3    Acetaminophen (TYLENOL PO) Take by mouth every 6 hours as needed  (Patient not taking: Reported on 10/20/2023)         Allergies: Food, Bee venom, Other environmental allergy, Sulfa antibiotics, Amoxicillin, and Latex    ROS:  CONSTITUTIONAL: NEGATIVE for fever, chills, change in weight  RESP: NEGATIVE for significant cough or SOB  CV: NEGATIVE for chest pain, palpitations or peripheral edema  GI: NEGATIVE for nausea, abdominal pain, heartburn, or change in bowel habits  : NEGATIVE for frequency, dysuria, hematuria, vaginal discharge  PSYCHIATRIC: NEGATIVE for changes in mood or affect    EXAM:  Blood pressure 138/80, pulse 99, height 1.753 m (5' 9\"), weight 100.2 kg (221 lb), SpO2 99%, not currently breastfeeding.   BMI= Body mass index is 32.64 kg/m .  General - pleasant female in no acute distress.  Abdomen - soft, nontender, nondistended, no hepatosplenomegaly.  Pelvic - EG: normal adult female, BUS: within normal limits, Vagina: well rugated, no discharge, Cervix: no lesions or CMT, Uterus: firm,  normal sized and nontender, Adnexae: no masses or tenderness.  Rectovaginal - deferred.  Musculoskeletal - no gross deformities or edema  Neurological - normal mental status.    Procedure:  Endometrial biopsy  Indication: AUB  Discussed risk of bleeding, infection, uterine perforation, cramping pain.  Pt agreed to proceed with procedure after all questions answered.    Speculum placed and cervix visualized.  Cervix cleansed with betadine x 3.  Tenaculum placed on anterior lip of the cervix.  Endometrial biopsy pipelle passed through cervix and uterus sounded to 8 cm.  Biopsy specimen collected with one pass with return of moderate amount of pink tissue.  Specimen placed in a labeled container and set aside to be sent to pathology.  Tenaculum removed from the cervix and sites hemostatic.  No bleeding noted from cervical os.   Patient tolerated the " procedure well.  There were no apparent complications and bleeding was minimal.    ASSESSMENT/PLAN:  (N93.9) Abnormal uterine bleeding  (primary encounter diagnosis)  Comment: Menometrorrhagia, unsuccsful medical management  Plan: Surgical Pathology Exam        Endometrial biopsy pending. If nl, Discussed expectant,  medical, IUD and and surgical options(endometrial ablation/hysterectomy). Patient would like to proceed with Hysteroscopy/endometrial ablation with Nexplanon removal L arm.  Reviewed goals, risks, alternatives for planned procedure;  Including risk of bleeding, transfusion, infection, damage to nerves, blood vessels, bowel and bladder, failure rates or possible need for hysterectomy in the future..   Discussed recovery period and expected discomfort.. All questions were answered.  Preoperative instructions discussed.  Patient desires to proceed and will be scheduled accordingly.                  done

## 2025-07-24 ENCOUNTER — THERAPY VISIT (OUTPATIENT)
Dept: PHYSICAL THERAPY | Facility: HOSPITAL | Age: 45
End: 2025-07-24
Attending: STUDENT IN AN ORGANIZED HEALTH CARE EDUCATION/TRAINING PROGRAM
Payer: COMMERCIAL

## 2025-07-24 DIAGNOSIS — R42 DIZZINESS: Primary | ICD-10-CM

## 2025-07-24 PROCEDURE — 97112 NEUROMUSCULAR REEDUCATION: CPT | Mod: GP

## 2025-07-25 ENCOUNTER — OFFICE VISIT (OUTPATIENT)
Dept: OTOLARYNGOLOGY | Facility: OTHER | Age: 45
End: 2025-07-25
Attending: NURSE PRACTITIONER
Payer: COMMERCIAL

## 2025-07-25 VITALS
OXYGEN SATURATION: 98 % | TEMPERATURE: 97.7 F | SYSTOLIC BLOOD PRESSURE: 136 MMHG | RESPIRATION RATE: 18 BRPM | HEIGHT: 69 IN | HEART RATE: 78 BPM | BODY MASS INDEX: 35.92 KG/M2 | DIASTOLIC BLOOD PRESSURE: 99 MMHG | WEIGHT: 242.51 LBS

## 2025-07-25 DIAGNOSIS — H92.03 OTALGIA, BILATERAL: ICD-10-CM

## 2025-07-25 DIAGNOSIS — R51.9 SINUS HEADACHE: ICD-10-CM

## 2025-07-25 DIAGNOSIS — J34.89 SINUS PRESSURE: ICD-10-CM

## 2025-07-25 DIAGNOSIS — R42 DIZZINESS: Primary | ICD-10-CM

## 2025-07-25 DIAGNOSIS — G43.809 VESTIBULAR MIGRAINE: ICD-10-CM

## 2025-07-25 DIAGNOSIS — N95.1 PERIMENOPAUSE: ICD-10-CM

## 2025-07-25 PROCEDURE — G0463 HOSPITAL OUTPT CLINIC VISIT: HCPCS

## 2025-07-25 PROCEDURE — 99214 OFFICE O/P EST MOD 30 MIN: CPT | Performed by: NURSE PRACTITIONER

## 2025-07-25 ASSESSMENT — PAIN SCALES - GENERAL: PAINLEVEL_OUTOF10: SEVERE PAIN (8)

## 2025-07-25 NOTE — PATIENT INSTRUCTIONS
Discuss PT with Andrea Edwards - 3 PD?  Mri brain with and without   Consider starting nortriptyline or similar for migraine prevention  OB referral for perimenopause symptoms  TMJ exercises given   Follow up in 2-3 months       Thank you for allowing Billie HICKMAN and our ENT team to participate in your care.  If your medications are too expensive, please call my nurse at the number listed below.  We can possibly change this medication.    If you have a scheduling or an appointment question please contact our Health Unit Coordinator at their direct line [phone] ext [phone]  ALL nursing questions or concerns can be directed to my Nurse Luiza [phone].

## 2025-07-25 NOTE — LETTER
7/25/2025      Cristiana Aguila  [address] [place] [address]      Dear Colleague,    Thank you for referring your patient, Cristiana Aguila, to the [facility]. Please see a copy of my visit note below.    Otolaryngology Note         Chief Complaint:     Patient presents with:  Sinus Problem: Pressure and headache            History of Present Illness:     Cristiana Aguila is a 45 year old female seen today for ***    Trouble breathing through her nose for years  She reports sinus pressure, dizziness,     Dizziness - sometimes room spinning, light headed, nausea, off kilter  Going on the gravitron over and over again  Gets dizzy looking back and forth   It is there all the time but 1-2 times per month it gets worse - lasts for several days when it happens  She feels like she has symptoms more often than not  Ears are terrible - cannot tolerate noise, can feel a pressure shift in the house.    Symptoms are very debilitating   At times cannot drive   Several months   She guards her neck due to avoiding being dizzy  Grocery shopping gets hazy, guards how she walks   Doesn't think she could tolerate cart as she is moving.   Previous diagnosis of migraines - not currently on any migraine  Right sided pressure in ear   She is feeling much worse after PT - exhausted after PT  Left [facility] due to issues  Depressed after leaving  Son with autism - has been helping out around the house  Diagnosed with migraine - optical migraine - stroke like symptoms  Foliculitis on itchy picking at skin  Symptoms seem to be working around [facility]    Currently taking singulair - has been on for several months - has noted improvement    Claritin and zyrtec alteranating  Symbicort  Currently on 200-300 mg at night    Magnesium  PT is improving - feeling more stable, etc until recent flair up  Recently quit smoking and tolerating  She has been smoke free for several months.   Recent weight  gain        Symptoms include ***  Symptoms have been present for *** and are ***.  Treatments have included: ***.     Sleeping - ***  Snoring - ***% of the time  Apnea - ***  Struggle breathing - ***  Mouth breather- ***  Wakes - ***  Daytime sleepiness - ***  Behaviors - ***  Easily distracted - ***  Patient ***  hearing screening  Patient has *** history of ear surgeries or procedures  Chronic Rhinorrhea - ***  Recurrent tonsillitis - ***  Missed school/ ***    *** chronic cough  *** shortness of breath      *** history of sinus injury/trauma/surgery  *** dysphagia  Rash/sensitive skin - ***  *** history of seasonal allergies  *** history of previous allergy testing  *** asthma  *** family history of allergies, tonsillitis      Resides in house with  basement. Age of home ***.  There is no water or mold.  There is carpet in the bedroom.    Heat source in home: forced air  Pets: cats and dog - 2 of each    MQT-   Dilution #6- None  Dilution #5-Thistle, Grass, birch, elm, oak, mold, dust  Dilution #2- ragweed, pigweed, maple, pine, molds, dog     No MRI IAC on file.          Medications:     Current Outpatient Rx   Medication Sig Dispense Refill     Acetaminophen (TYLENOL PO) Take by mouth every 6 hours as needed       albuterol (VENTOLIN HFA) 108 (90 Base) MCG/ACT inhaler Inhale 1-2 puffs into the lungs every 6 hours as needed for shortness of breath, wheezing or cough. 18 g 3     budesonide-formoterol (SYMBICORT/BREYNA) 160-4.5 MCG/ACT Inhaler Inhale 2 puffs into the lungs 2 times daily. 10.2 g 3     citalopram (CELEXA) 40 MG tablet TAKE 1 TABLET BY MOUTH DAILY 90 tablet 0     diclofenac (VOLTAREN) 1 % topical gel APPLY 2G TOPICALLY 3 TIMES DAILY AS NEEDED FOR PAIN. APPLY TO AFFECTED JOINT AND RUB INTO SKIN GENTLY. 100 g 0     docusate sodium (COLACE) 100 MG capsule Take 1 capsule (100 mg) by mouth 2 times daily. 60 capsule 11     EPINEPHrine (ANY BX GENERIC EQUIV) 0.3 MG/0.3ML injection 2-pack  Inject 0.3 mLs (0.3 mg) into the muscle as needed for anaphylaxis. May repeat one time in 5-15 minutes if response to initial dose is inadequate. 2 each 2     hydrOXYzine HCl (ATARAX) 25 MG tablet TAKE 1 TO 2 TABLETS BY MOUTH 3 TIMES A DAY AS NEEDED 60 tablet 2     ibuprofen (ADVIL/MOTRIN) 600 MG tablet Take 1 tablet (600 mg) by mouth every 6 hours as needed for other (mild and/or inflammatory pain) 30 tablet 0     loratadine (CLARITIN) 10 MG tablet Take 10 mg by mouth daily.       methylphenidate HCl ER, OSM, (CONCERTA) 36 MG CR tablet Take 1 tablet (36 mg) by mouth 2 times daily. 60 tablet 0     mometasone (NASONEX) 50 MCG/ACT nasal spray PLACE 2 SPRAYS INTO EACH NOSTRIL DAILY 17 g 3     montelukast (SINGULAIR) 10 MG tablet Take 1 tablet (10 mg) by mouth at bedtime. 90 tablet 1     mupirocin (BACTROBAN) 2 % external ointment Apply topically 3 times daily. Apply three times daily when active/new lesions. 30 g 1     nicotine (COMMIT) 2 MG lozenge How to take it: When the urge to smoke occurs, suck (do not chew) on 1 lozenge to release nicotine. Do not eat or drink while the lozenge is in your mouth. Follow this schedule: Weeks 1 to 6: One lozenge every 1 to 2 hours. Use at least 9 lozenges a day, but no more than 20. Weeks 7 to 9: One lozenge every 2 to 4 hours. Weeks 10 to 12: One lozenge every 4 to 8 hours 108 lozenge 5     nicotine (NICODERM CQ) 7 MG/24HR 24 hr patch Place 1 patch over 24 hours onto the skin every 24 hours. 14 patch 0     omeprazole (PRILOSEC) 40 MG DR capsule Take 1 capsule (40 mg) by mouth daily. 90 capsule 1     traZODone (DESYREL) 100 MG tablet TAKE 2-3 TABLETS BY MOUTH DAILY AT BEDTIME 90 tablet 3            Allergies:     Allergies: Food, Bee venom, Sulfa antibiotics, Amoxicillin, Latex, and Other environmental allergy          Past Medical History:     Past Medical History:   Diagnosis Date     Attention deficit disorder without mention of hyperactivity 05/31/2005     Dependent personality  disorder (H) 2011     FH: breast cancer      DAFNE (generalised anxiety disorder)      Irritable bowel syndrome with both constipation and diarrhea 2017     MDD (major depressive disorder)      Migraine, unspecified, without mention of intractable migraine without mention of status migrainosus 2000     Tobacco abuse, in remission      Unspecified sinusitis (chronic) 2001            Past Surgical History:     Past Surgical History:   Procedure Laterality Date     DILATION AND CURETTAGE, HYSTEROSCOPY, ABLATE ENDOMETRIUM, COMBINED N/A 2023    Procedure: Hysteroscopy, Endometrial Ablation;  Surgeon: Kuldip España MD;  [facility]     ENDOSCOPY UPPER, COLONOSCOPY, COMBINED N/A 2015    Procedure: COMBINED ENDOSCOPY UPPER, COLONOSCOPY;  Surgeon: Griffin Barrientos DO;  [facility]     LAPAROSCOPIC CYSTECTOMY OVARIAN (BENIGN) Right 2016    Procedure: LAPAROSCOPIC CYSTECTOMY OVARIAN (BENIGN);  Surgeon: Kuldip España MD;  [facility]     LAPAROSCOPIC SALPINGO-OOPHORECTOMY Left 2016    Procedure: LAPAROSCOPIC SALPINGO-OOPHORECTOMY;  Surgeon: Kuldip España MD;  [facility]     LAPAROSCOPY DIAGNOSTIC (GYN) Left 2016    Procedure: LAPAROSCOPY DIAGNOSTIC (GYN);  Surgeon: Kuldip España MD;  [facility]     REMOVE FOREIGN BODY UPPER EXTREMITY Left 2023    Procedure: Nexplanon Removal Left Arm;  Surgeon: Kuldip España MD;  [facility]     TUBAL LIGATION  2006     wisdom teeth         ENT family history reviewed         Social History:     Social History     Tobacco Use     Smoking status: Former     Current packs/day: 0.00     Average packs/day: 0.4 packs/day for 16.3 years (7.3 ttl pk-yrs)     Types: Cigarettes     Start date: 2008     Quit date: 2025     Years since quittin.2     Passive exposure: Past     Smokeless tobacco: Never     Tobacco comments:     pt denies quit plan 2022   Vaping Use     Vaping status: Former      Substances: Nicotine   Substance Use Topics     Alcohol use: Yes     Alcohol/week: 0.0 standard drinks of alcohol     Comment: frequency: rarely     Drug use: No            Review of Systems:     ROS: See HPI         Physical Exam:     ***  General - The patient is well nourished and well developed, and appears to have good nutritional status.  Cooperates with exam ***  Head and Face - Normocephalic and atraumatic, with no gross asymmetry noted.  The facial nerve is intact, with strong symmetric movements.  Voice and Breathing - The patient was breathing comfortably without the use of accessory muscles. There was no wheezing, stridor, or stertor.  The patients voice was  *** clear and strong, and had appropriate pitch and quality.  Ears -The external auditory canals are patent, the tympanic membranes are intact without effusion, retraction or mass.  Bony landmarks are intact.  Eyes - Extraocular movements intact, sclera were not icteric or injected, conjunctiva were pink and moist.  Mouth - Examination of the oral cavity showed pink, healthy oral mucosa. No lesions or ulcerations noted.  The tongue was mobile and midline, and the dentition were in *** condition.    Throat - The walls of the oropharynx were smooth, pink, moist, symmetric, and had no lesions or ulcerations.  The tonsillar pillars and soft palate were symmetric.  The uvula was midline on elevation.  Tonsils grade ***  Neck - *** worrisome lymphadenopathy.   Palpation of the thyroid was soft and smooth, with no nodules or goiter appreciated.  The trachea was mobile and midline.  Nose - External contour is symmetric, no gross deflection or scars.  The nasal passages are examined with nasal speculum.   Nasal mucosa is pink and moist with no abnormal mucus.  The septum was intact and the turbinates are examined with nasal speculum, *** polyps, masses, or purulence noted on examination of anterior nasal cavity.   Mirror visualization of the adenoids  ***    To evaluate the nose and sinuses, I performed rigid nasal endoscopy.  I sprayed both nares with 2 sprays lidocaine and neosynephrine.     I began with the RIGHT side using a 0 degree rigid nasal endoscope, and then similarly examined the LEFT side     Findings:  ***  Inferior turbinates:  ***  Middle turbinate and middle meatus:  ***  Superior meatus is examined and unremarkable  *** Sphenoethmoidal purulence  Mucosa is ***,  no polyps nor polypoid degeneration  Nasopharynx - Adenoid ***  The patient tolerated the procedure well            Assessment and Plan:       ICD-10-CM    1. Sinus pressure  J34.89 Adult ENT  Referral      2. Sinus headache  R51.9 Adult ENT  Referral          Indications for allergy testing include:    1) Confirm suspicion of allergic rhinitis due to inhalant allergies  2) Identify the offending allergen to determine specific mode of treatment  3) In the case of chronic rhinosinusitis: when symptoms are not controlled by avoidance and pharmacotherapy  4) In the Asthma patient when exacerbations may be due to perennial allergen exposure  5) Suspect food allergy  6) Otitis Media, chronic rhinitis, atopic dermatitis, Meniere disease, headache, pharyngitis or eye symptoms      Modified quantitative testing (MQT) will be performed.  Signed consent was obtained, and the risks of immunotherapy were discussed, including the potential for anaphylaxis.     If immunotherapy (IT) is recommended, there is continued risk of anaphylaxis.   Anaphylaxis can cause death. The patient will need to be monitored for 30 minutes post injection.  They must present their epinephrine pen prior to injection.  Subcutaneous as well as sublingual immunotherapy (SLIT) were discussed as potential treatment options.  The patient was told SLIT is not approved by the FDA and is cash pay.  The general time frame of immunotherapy was discussed (generally 3-5 years, sometimes longer), and the basic  immunology behind IT was discussed.      Billie Covington NP-C  [facility] ENT        Sino-Nasal Outcome Test (SNOT - 22)    1. Need to Blow Nose: (Patient-Rptd) (P) Moderate  2. Nasal Blockage: (Patient-Rptd) (P) Severe  3. Sneezing: (Patient-Rptd) (P) Moderate  4. Runny Nose: (Patient-Rptd) (P) Moderate  5. Cough: (Patient-Rptd) (P) Moderate  6. Post-nasal discharge: (Patient-Rptd) (P) Moderate  7. Thick nasal discharge: (Patient-Rptd) (P) Moderate  8. Ear fullness: (Patient-Rptd) (P) Severe  9. Dizziness: (Patient-Rptd) (P) Severe  10. Ear Pain: (Patient-Rptd) (P) Moderate  11. Facial pain/pressure: (Patient-Rptd) (P) Severe  12. Decreased Sense of Smell/Taste: (Patient-Rptd) (P) Severe  13. Difficulty falling asleep: (Patient-Rptd) (P) Moderate  14. Wake up at night: (Patient-Rptd) (P) Moderate  15. Lack of a good night's sleep: (Patient-Rptd) (P) Severe  16. Wake up tired: (Patient-Rptd) (P) Severe  17. Fatigue: (Patient-Rptd) (P) Severe  18. Reduced Productivity: (Patient-Rptd) (P) Severe  19. Reduced Concentration: (Patient-Rptd) (P) Bad as it can be  20. Frustrated/restless/irritable: (Patient-Rptd) (P) Severe  21. Sad: (Patient-Rptd) (P) Moderate  22. Embarrassed: (Patient-Rptd) (P) Mild or slight;Moderate    Total Score: (Patient-Rptd) (P) 77    COPYRIGHT 1996. [facility] [place]    Again, thank you for allowing me to participate in the care of your patient.        Sincerely,        Billie Covington NP    Electronically signed

## 2025-07-25 NOTE — Clinical Note
Devon Molina I suspect that Cristiana has persistent postural perceptual dizziness and migraine variant.  What do you think about starting her on amitriptyline or nortriptyline?  I will also talk to Andrea Edwards in PT to work on this.   She was concerned about perimenopausal symptoms so I referred her to PT.    MRI Brain pending.   Thoughts?  Thanks! Teresa

## 2025-07-25 NOTE — PROGRESS NOTES
Sino-Nasal Outcome Test (SNOT - 22)    1. Need to Blow Nose: (Patient-Rptd) (P) Moderate  2. Nasal Blockage: (Patient-Rptd) (P) Severe  3. Sneezing: (Patient-Rptd) (P) Moderate  4. Runny Nose: (Patient-Rptd) (P) Moderate  5. Cough: (Patient-Rptd) (P) Moderate  6. Post-nasal discharge: (Patient-Rptd) (P) Moderate  7. Thick nasal discharge: (Patient-Rptd) (P) Moderate  8. Ear fullness: (Patient-Rptd) (P) Severe  9. Dizziness: (Patient-Rptd) (P) Severe  10. Ear Pain: (Patient-Rptd) (P) Moderate  11. Facial pain/pressure: (Patient-Rptd) (P) Severe  12. Decreased Sense of Smell/Taste: (Patient-Rptd) (P) Severe  13. Difficulty falling asleep: (Patient-Rptd) (P) Moderate  14. Wake up at night: (Patient-Rptd) (P) Moderate  15. Lack of a good night's sleep: (Patient-Rptd) (P) Severe  16. Wake up tired: (Patient-Rptd) (P) Severe  17. Fatigue: (Patient-Rptd) (P) Severe  18. Reduced Productivity: (Patient-Rptd) (P) Severe  19. Reduced Concentration: (Patient-Rptd) (P) Bad as it can be  20. Frustrated/restless/irritable: (Patient-Rptd) (P) Severe  21. Sad: (Patient-Rptd) (P) Moderate  22. Embarrassed: (Patient-Rptd) (P) Mild or slight;Moderate    Total Score: (Patient-Rptd) (P) 77    COPYRIGHT 1996. [facility] [place]

## 2025-07-25 NOTE — PROGRESS NOTES
Otolaryngology Note         Chief Complaint:     Patient presents with:  Sinus Problem: Pressure and headache            History of Present Illness:     Cristiana Aguila is a 45 year old female seen today for ***    Trouble breathing through her nose for years  She reports sinus pressure, dizziness,     Dizziness - sometimes room spinning, light headed, nausea, off kilter  Going on the gravitron over and over again  Gets dizzy looking back and forth   It is there all the time but 1-2 times per month it gets worse - lasts for several days when it happens  She feels like she has symptoms more often than not  Ears are terrible - cannot tolerate noise, can feel a pressure shift in the house.    Symptoms are very debilitating   At times cannot drive   Several months   She guards her neck due to avoiding being dizzy  Grocery shopping gets hazy, guards how she walks   Doesn't think she could tolerate cart as she is moving.   Previous diagnosis of migraines - not currently on any migraine  Right sided pressure in ear   She is feeling much worse after PT - exhausted after PT  Left [facility] due to issues  Depressed after leaving  Son with autism - has been helping out around the house  Diagnosed with migraine - optical migraine - stroke like symptoms  Foliculitis on itchy picking at skin  Symptoms seem to be working around mesnes    Currently taking singulair - has been on for several months - has noted improvement    Claritin and zyrtec alteranating  Symbicort  Currently on 200-300 mg at night    Magnesium  PT is improving - feeling more stable, etc until recent flair up  Recently quit smoking and tolerating  She has been smoke free for several months.   Recent weight gain        Symptoms include ***  Symptoms have been present for *** and are ***.  Treatments have included: ***.     Sleeping - ***  Snoring - ***% of the time  Apnea - ***  Struggle breathing - ***  Mouth breather- ***  Wakes - ***  Daytime sleepiness -  ***  Behaviors - ***  Easily distracted - ***  Patient ***  hearing screening  Patient has *** history of ear surgeries or procedures  Chronic Rhinorrhea - ***  Recurrent tonsillitis - ***  Missed school/ ***    *** chronic cough  *** shortness of breath      *** history of sinus injury/trauma/surgery  *** dysphagia  Rash/sensitive skin - ***  *** history of seasonal allergies  *** history of previous allergy testing  *** asthma  *** family history of allergies, tonsillitis      Resides in house with  basement. Age of home ***.  There is no water or mold.  There is carpet in the bedroom.    Heat source in home: forced air  Pets: cats and dog - 2 of each    MQT-   Dilution #6- None  Dilution #5-Thistle, Grass, birch, elm, oak, mold, dust  Dilution #2- ragweed, pigweed, maple, pine, molds, dog     No MRI IAC on file.          Medications:     Current Outpatient Rx   Medication Sig Dispense Refill    Acetaminophen (TYLENOL PO) Take by mouth every 6 hours as needed      albuterol (VENTOLIN HFA) 108 (90 Base) MCG/ACT inhaler Inhale 1-2 puffs into the lungs every 6 hours as needed for shortness of breath, wheezing or cough. 18 g 3    budesonide-formoterol (SYMBICORT/BREYNA) 160-4.5 MCG/ACT Inhaler Inhale 2 puffs into the lungs 2 times daily. 10.2 g 3    citalopram (CELEXA) 40 MG tablet TAKE 1 TABLET BY MOUTH DAILY 90 tablet 0    diclofenac (VOLTAREN) 1 % topical gel APPLY 2G TOPICALLY 3 TIMES DAILY AS NEEDED FOR PAIN. APPLY TO AFFECTED JOINT AND RUB INTO SKIN GENTLY. 100 g 0    docusate sodium (COLACE) 100 MG capsule Take 1 capsule (100 mg) by mouth 2 times daily. 60 capsule 11    EPINEPHrine (ANY BX GENERIC EQUIV) 0.3 MG/0.3ML injection 2-pack Inject 0.3 mLs (0.3 mg) into the muscle as needed for anaphylaxis. May repeat one time in 5-15 minutes if response to initial dose is inadequate. 2 each 2    hydrOXYzine HCl (ATARAX) 25 MG tablet TAKE 1 TO 2 TABLETS BY MOUTH 3 TIMES A DAY AS NEEDED 60 tablet 2     ibuprofen (ADVIL/MOTRIN) 600 MG tablet Take 1 tablet (600 mg) by mouth every 6 hours as needed for other (mild and/or inflammatory pain) 30 tablet 0    loratadine (CLARITIN) 10 MG tablet Take 10 mg by mouth daily.      methylphenidate HCl ER, OSM, (CONCERTA) 36 MG CR tablet Take 1 tablet (36 mg) by mouth 2 times daily. 60 tablet 0    mometasone (NASONEX) 50 MCG/ACT nasal spray PLACE 2 SPRAYS INTO EACH NOSTRIL DAILY 17 g 3    montelukast (SINGULAIR) 10 MG tablet Take 1 tablet (10 mg) by mouth at bedtime. 90 tablet 1    mupirocin (BACTROBAN) 2 % external ointment Apply topically 3 times daily. Apply three times daily when active/new lesions. 30 g 1    nicotine (COMMIT) 2 MG lozenge How to take it: When the urge to smoke occurs, suck (do not chew) on 1 lozenge to release nicotine. Do not eat or drink while the lozenge is in your mouth. Follow this schedule: Weeks 1 to 6: One lozenge every 1 to 2 hours. Use at least 9 lozenges a day, but no more than 20. Weeks 7 to 9: One lozenge every 2 to 4 hours. Weeks 10 to 12: One lozenge every 4 to 8 hours 108 lozenge 5    nicotine (NICODERM CQ) 7 MG/24HR 24 hr patch Place 1 patch over 24 hours onto the skin every 24 hours. 14 patch 0    omeprazole (PRILOSEC) 40 MG DR capsule Take 1 capsule (40 mg) by mouth daily. 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    FH: breast cancer     DAFNE (generalised anxiety disorder)     Irritable bowel syndrome with both constipation and diarrhea 08/02/2017    MDD (major depressive disorder)     Migraine, unspecified, without mention of intractable migraine without  mention of status migrainosus 2000    Tobacco abuse, in remission     Unspecified sinusitis (chronic) 2001            Past Surgical History:     Past Surgical History:   Procedure Laterality Date    DILATION AND CURETTAGE, HYSTEROSCOPY, ABLATE ENDOMETRIUM, COMBINED N/A 2023    Procedure: Hysteroscopy, Endometrial Ablation;  Surgeon: Kuldip España MD;  Location: [facility]    ENDOSCOPY UPPER, COLONOSCOPY, COMBINED N/A 2015    Procedure: COMBINED ENDOSCOPY UPPER, COLONOSCOPY;  Surgeon: Griffin Barrientos DO;  Location: [facility]    LAPAROSCOPIC CYSTECTOMY OVARIAN (BENIGN) Right 2016    Procedure: LAPAROSCOPIC CYSTECTOMY OVARIAN (BENIGN);  Surgeon: Kuldip España MD;  Location: [facility]    LAPAROSCOPIC SALPINGO-OOPHORECTOMY Left 2016    Procedure: LAPAROSCOPIC SALPINGO-OOPHORECTOMY;  Surgeon: Kuldip España MD;  Location: [facility]    LAPAROSCOPY DIAGNOSTIC (GYN) Left 2016    Procedure: LAPAROSCOPY DIAGNOSTIC (GYN);  Surgeon: Kuldip España MD;  Location: [facility]    REMOVE FOREIGN BODY UPPER EXTREMITY Left 2023    Procedure: Nexplanon Removal Left Arm;  Surgeon: Kuldip España MD;  Location: [facility]    TUBAL LIGATION  2006    wisdom teeth         ENT family history reviewed         Social History:     Social History     Tobacco Use    Smoking status: Former     Current packs/day: 0.00     Average packs/day: 0.4 packs/day for 16.3 years (7.3 ttl pk-yrs)     Types: Cigarettes     Start date: 2008     Quit date: 2025     Years since quittin.2     Passive exposure: Past    Smokeless tobacco: Never    Tobacco comments:     pt denies quit plan 2022   Vaping Use    Vaping status: Former    Substances: Nicotine   Substance Use Topics    Alcohol use: Yes     Alcohol/week: 0.0 standard drinks of alcohol     Comment: frequency: rarely    Drug use: No            Review of Systems:     ROS: See HPI         Physical Exam:     ***  General - The patient is well nourished and well developed,  and appears to have good nutritional status.  Cooperates with exam ***  Head and Face - Normocephalic and atraumatic, with no gross asymmetry noted.  The facial nerve is intact, with strong symmetric movements.  Voice and Breathing - The patient was breathing comfortably without the use of accessory muscles. There was no wheezing, stridor, or stertor.  The patients voice was  *** clear and strong, and had appropriate pitch and quality.  Ears -The external auditory canals are patent, the tympanic membranes are intact without effusion, retraction or mass.  Bony landmarks are intact.  Eyes - Extraocular movements intact, sclera were not icteric or injected, conjunctiva were pink and moist.  Mouth - Examination of the oral cavity showed pink, healthy oral mucosa. No lesions or ulcerations noted.  The tongue was mobile and midline, and the dentition were in *** condition.    Throat - The walls of the oropharynx were smooth, pink, moist, symmetric, and had no lesions or ulcerations.  The tonsillar pillars and soft palate were symmetric.  The uvula was midline on elevation.  Tonsils grade ***  Neck - *** worrisome lymphadenopathy.   Palpation of the thyroid was soft and smooth, with no nodules or goiter appreciated.  The trachea was mobile and midline.  Nose - External contour is symmetric, no gross deflection or scars.  The nasal passages are examined with nasal speculum.   Nasal mucosa is pink and moist with no abnormal mucus.  The septum was intact and the turbinates are examined with nasal speculum, *** polyps, masses, or purulence noted on examination of anterior nasal cavity.   Mirror visualization of the adenoids ***    To evaluate the nose and sinuses, I performed rigid nasal endoscopy.  I sprayed both nares with 2 sprays lidocaine and neosynephrine.     I began with the RIGHT side using a 0 degree rigid nasal endoscope, and then similarly examined the LEFT side     Findings:  ***  Inferior turbinates:  ***  Middle  turbinate and middle meatus:  ***  Superior meatus is examined and unremarkable  *** Sphenoethmoidal purulence  Mucosa is ***,  no polyps nor polypoid degeneration  Nasopharynx - Adenoid ***  The patient tolerated the procedure well            Assessment and Plan:       ICD-10-CM    1. Sinus pressure  J34.89 Adult ENT  Referral      2. Sinus headache  R51.9 Adult ENT  Referral          Indications for allergy testing include:    1) Confirm suspicion of allergic rhinitis due to inhalant allergies  2) Identify the offending allergen to determine specific mode of treatment  3) In the case of chronic rhinosinusitis: when symptoms are not controlled by avoidance and pharmacotherapy  4) In the Asthma patient when exacerbations may be due to perennial allergen exposure  5) Suspect food allergy  6) Otitis Media, chronic rhinitis, atopic dermatitis, Meniere disease, headache, pharyngitis or eye symptoms      Modified quantitative testing (MQT) will be performed.  Signed consent was obtained, and the risks of immunotherapy were discussed, including the potential for anaphylaxis.     If immunotherapy (IT) is recommended, there is continued risk of anaphylaxis.   Anaphylaxis can cause death. The patient will need to be monitored for 30 minutes post injection.  They must present their epinephrine pen prior to injection.  Subcutaneous as well as sublingual immunotherapy (SLIT) were discussed as potential treatment options.  The patient was told SLIT is not approved by the FDA and is cash pay.  The general time frame of immunotherapy was discussed (generally 3-5 years, sometimes longer), and the basic immunology behind IT was discussed.      Billie HICKMAN  [facility]

## 2025-07-29 ENCOUNTER — MYC MEDICAL ADVICE (OUTPATIENT)
Dept: FAMILY MEDICINE | Facility: OTHER | Age: 45
End: 2025-07-29

## 2025-07-29 ASSESSMENT — ASTHMA QUESTIONNAIRES
QUESTION_1 LAST FOUR WEEKS HOW MUCH OF THE TIME DID YOUR ASTHMA KEEP YOU FROM GETTING AS MUCH DONE AT WORK, SCHOOL OR AT HOME: SOME OF THE TIME
ACT_TOTALSCORE: 19
QUESTION_5 LAST FOUR WEEKS HOW WOULD YOU RATE YOUR ASTHMA CONTROL: WELL CONTROLLED
QUESTION_3 LAST FOUR WEEKS HOW OFTEN DID YOUR ASTHMA SYMPTOMS (WHEEZING, COUGHING, SHORTNESS OF BREATH, CHEST TIGHTNESS OR PAIN) WAKE YOU UP AT NIGHT OR EARLIER THAN USUAL IN THE MORNING: NOT AT ALL
QUESTION_2 LAST FOUR WEEKS HOW OFTEN HAVE YOU HAD SHORTNESS OF BREATH: THREE TO SIX TIMES A WEEK
QUESTION_4 LAST FOUR WEEKS HOW OFTEN HAVE YOU USED YOUR RESCUE INHALER OR NEBULIZER MEDICATION (SUCH AS ALBUTEROL): ONCE A WEEK OR LESS

## 2025-07-30 NOTE — TELEPHONE ENCOUNTER
Patient completed MyChart ACT on 7/29/2025 per [facility] quality measure patient outreach. This writer notes that patient reports asthma is  well controlled  presently with total ACT score is 19, which is technically under the optimal asthma control goal of >= 20 per quality measure. Based on patient's PRO ACT questionnaire responses, continue with current plan of care.         4/25/2025    10:44 AM 6/6/2025     9:24 AM 7/29/2025     4:39 PM   ACT Total Scores   ACT TOTAL SCORE (Goal Greater than or Equal to 20) 14  18  19    In the past 12 months, how many times did you visit the emergency room for your asthma without being admitted to the hospital? 0 0 0   In the past 12 months, how many times were you hospitalized overnight because of your asthma? 0 0 0       Patient-reported      Appointments in Next Year      Aug 07, 2025 7:45 AM  PT Vestibular Treatment with Andrea Edwards PT  [facility] ([facility] ) [phone]     Aug 13, 2025 7:45 AM  PT Vestibular Treatment with Andrea Edwards PT  [facility] ([facility] ) [phone]     Aug 21, 2025 7:45 AM  PT Vestibular Treatment with Andrea Edwards, PT  [facility] ([facility] ) [phone]     Sep 22, 2025 9:00 AM  (Arrive by 8:45 AM)  Return Visit with Billie Covington NP  [facility] ([facility] ) [phone]     Dec 09, 2025 9:00 AM  (Arrive by 8:45 AM)  Provider Visit with Tracy Garner MD  [facility] ([facility] ) [phone]          Patsy Singleton RN

## 2025-08-07 ENCOUNTER — THERAPY VISIT (OUTPATIENT)
Dept: PHYSICAL THERAPY | Facility: HOSPITAL | Age: 45
End: 2025-08-07
Attending: STUDENT IN AN ORGANIZED HEALTH CARE EDUCATION/TRAINING PROGRAM
Payer: COMMERCIAL

## 2025-08-07 DIAGNOSIS — R42 DIZZINESS: Primary | ICD-10-CM

## 2025-08-07 PROCEDURE — 97530 THERAPEUTIC ACTIVITIES: CPT | Mod: GP

## 2025-08-11 DIAGNOSIS — F33.42 MAJOR DEPRESSIVE DISORDER, RECURRENT EPISODE, IN FULL REMISSION: ICD-10-CM

## 2025-08-11 DIAGNOSIS — F41.1 GAD (GENERALIZED ANXIETY DISORDER): ICD-10-CM

## 2025-08-11 DIAGNOSIS — F98.8 ATTENTION DEFICIT DISORDER (ADD) WITHOUT HYPERACTIVITY: ICD-10-CM

## 2025-08-11 RX ORDER — METHYLPHENIDATE HYDROCHLORIDE 36 MG/1
36 TABLET ORAL 2 TIMES DAILY
Qty: 60 TABLET | Refills: 0 | Status: SHIPPED | OUTPATIENT
Start: 2025-08-11

## 2025-08-11 RX ORDER — CITALOPRAM HYDROBROMIDE 40 MG/1
40 TABLET ORAL DAILY
Qty: 90 TABLET | Refills: 0 | Status: SHIPPED | OUTPATIENT
Start: 2025-08-11

## 2025-08-13 DIAGNOSIS — Z71.1 CONCERN ABOUT MEMORY: Primary | ICD-10-CM

## 2025-08-14 ENCOUNTER — PATIENT OUTREACH (OUTPATIENT)
Dept: CARE COORDINATION | Facility: CLINIC | Age: 45
End: 2025-08-14

## 2025-08-18 ENCOUNTER — THERAPY VISIT (OUTPATIENT)
Dept: OCCUPATIONAL THERAPY | Facility: HOSPITAL | Age: 45
End: 2025-08-18
Attending: STUDENT IN AN ORGANIZED HEALTH CARE EDUCATION/TRAINING PROGRAM
Payer: COMMERCIAL

## 2025-08-18 DIAGNOSIS — Z71.1 CONCERN ABOUT MEMORY: ICD-10-CM

## 2025-08-18 PROCEDURE — 97166 OT EVAL MOD COMPLEX 45 MIN: CPT | Mod: GO

## 2025-08-21 ENCOUNTER — THERAPY VISIT (OUTPATIENT)
Dept: PHYSICAL THERAPY | Facility: HOSPITAL | Age: 45
End: 2025-08-21
Attending: STUDENT IN AN ORGANIZED HEALTH CARE EDUCATION/TRAINING PROGRAM
Payer: COMMERCIAL

## 2025-08-21 ENCOUNTER — TELEPHONE (OUTPATIENT)
Dept: FAMILY MEDICINE | Facility: OTHER | Age: 45
End: 2025-08-21

## 2025-08-21 DIAGNOSIS — H81.11 BENIGN PAROXYSMAL POSITIONAL VERTIGO, RIGHT: Primary | ICD-10-CM

## 2025-08-21 DIAGNOSIS — H81.11 BENIGN PAROXYSMAL POSITIONAL VERTIGO, RIGHT: ICD-10-CM

## 2025-08-22 ENCOUNTER — HOSPITAL ENCOUNTER (OUTPATIENT)
Dept: MRI IMAGING | Facility: HOSPITAL | Age: 45
Discharge: HOME | End: 2025-08-22
Attending: NURSE PRACTITIONER | Admitting: NURSE PRACTITIONER
Payer: COMMERCIAL

## 2025-08-22 DIAGNOSIS — H92.03 OTALGIA, BILATERAL: ICD-10-CM

## 2025-08-22 DIAGNOSIS — R51.9 SINUS HEADACHE: ICD-10-CM

## 2025-08-22 DIAGNOSIS — R42 DIZZINESS: ICD-10-CM

## 2025-08-22 DIAGNOSIS — G43.809 VESTIBULAR MIGRAINE: ICD-10-CM

## 2025-08-22 PROCEDURE — 70553 MRI BRAIN STEM W/O & W/DYE: CPT

## 2025-08-22 PROCEDURE — A9585 GADOBUTROL INJECTION: HCPCS | Performed by: RADIOLOGY

## 2025-08-22 PROCEDURE — 255N000002 HC RX 255 OP 636: Performed by: RADIOLOGY

## 2025-08-22 RX ORDER — GADOBUTROL 604.72 MG/ML
10 INJECTION INTRAVENOUS ONCE
Status: COMPLETED | OUTPATIENT
Start: 2025-08-22 | End: 2025-08-22

## 2025-08-22 RX ADMIN — GADOBUTROL 10 ML: 604.72 INJECTION INTRAVENOUS at 07:32

## 2025-08-25 ENCOUNTER — THERAPY VISIT (OUTPATIENT)
Dept: OCCUPATIONAL THERAPY | Facility: HOSPITAL | Age: 45
End: 2025-08-25
Attending: STUDENT IN AN ORGANIZED HEALTH CARE EDUCATION/TRAINING PROGRAM
Payer: COMMERCIAL

## 2025-08-25 ENCOUNTER — OFFICE VISIT (OUTPATIENT)
Dept: OBGYN | Facility: OTHER | Age: 45
End: 2025-08-25
Attending: OBSTETRICS & GYNECOLOGY
Payer: COMMERCIAL

## 2025-08-25 VITALS
OXYGEN SATURATION: 98 % | WEIGHT: 259.2 LBS | SYSTOLIC BLOOD PRESSURE: 132 MMHG | HEART RATE: 84 BPM | DIASTOLIC BLOOD PRESSURE: 84 MMHG | BODY MASS INDEX: 38.26 KG/M2

## 2025-08-25 DIAGNOSIS — N92.0 MENORRHAGIA WITH REGULAR CYCLE: ICD-10-CM

## 2025-08-25 DIAGNOSIS — N95.1 PERIMENOPAUSE: ICD-10-CM

## 2025-08-25 DIAGNOSIS — R42 DIZZINESS: Primary | ICD-10-CM

## 2025-08-25 DIAGNOSIS — Z71.1 CONCERN ABOUT MEMORY: Primary | ICD-10-CM

## 2025-08-25 DIAGNOSIS — G43.809 VESTIBULAR MIGRAINE: ICD-10-CM

## 2025-08-25 LAB
ESTRADIOL SERPL-MCNC: 91 PG/ML
FSH SERPL IRP2-ACNC: 13.6 MIU/ML

## 2025-08-25 PROCEDURE — 97129 THER IVNTJ 1ST 15 MIN: CPT | Mod: GO

## 2025-08-25 PROCEDURE — 97530 THERAPEUTIC ACTIVITIES: CPT | Mod: GO

## 2025-08-25 PROCEDURE — 83001 ASSAY OF GONADOTROPIN (FSH): CPT | Mod: ZL | Performed by: OBSTETRICS & GYNECOLOGY

## 2025-08-25 PROCEDURE — G0463 HOSPITAL OUTPT CLINIC VISIT: HCPCS

## 2025-08-25 PROCEDURE — 97130 THER IVNTJ EA ADDL 15 MIN: CPT | Mod: GO

## 2025-08-25 PROCEDURE — 82670 ASSAY OF TOTAL ESTRADIOL: CPT | Mod: ZL | Performed by: OBSTETRICS & GYNECOLOGY

## 2025-08-25 PROCEDURE — 36415 COLL VENOUS BLD VENIPUNCTURE: CPT | Mod: ZL | Performed by: OBSTETRICS & GYNECOLOGY

## 2025-08-27 ENCOUNTER — HOSPITAL ENCOUNTER (OUTPATIENT)
Dept: ULTRASOUND IMAGING | Facility: HOSPITAL | Age: 45
Discharge: HOME | End: 2025-08-27
Attending: OBSTETRICS & GYNECOLOGY
Payer: COMMERCIAL

## 2025-08-27 DIAGNOSIS — N92.0 MENORRHAGIA WITH REGULAR CYCLE: ICD-10-CM

## 2025-08-27 PROCEDURE — 76856 US EXAM PELVIC COMPLETE: CPT

## 2025-08-27 PROCEDURE — 76856 US EXAM PELVIC COMPLETE: CPT | Mod: 26 | Performed by: RADIOLOGY

## 2025-08-27 PROCEDURE — 76830 TRANSVAGINAL US NON-OB: CPT | Mod: 26 | Performed by: RADIOLOGY

## 2025-08-28 ENCOUNTER — PATIENT OUTREACH (OUTPATIENT)
Dept: CARE COORDINATION | Facility: CLINIC | Age: 45
End: 2025-08-28

## 2025-08-28 ENCOUNTER — THERAPY VISIT (OUTPATIENT)
Dept: PHYSICAL THERAPY | Facility: HOSPITAL | Age: 45
End: 2025-08-28
Attending: STUDENT IN AN ORGANIZED HEALTH CARE EDUCATION/TRAINING PROGRAM
Payer: COMMERCIAL

## 2025-08-28 DIAGNOSIS — R42 DIZZINESS: ICD-10-CM

## 2025-08-28 DIAGNOSIS — H81.11 BENIGN PAROXYSMAL POSITIONAL VERTIGO, RIGHT: Primary | ICD-10-CM

## 2025-08-28 PROCEDURE — 97112 NEUROMUSCULAR REEDUCATION: CPT | Mod: GP

## 2025-09-02 ENCOUNTER — THERAPY VISIT (OUTPATIENT)
Dept: OCCUPATIONAL THERAPY | Facility: HOSPITAL | Age: 45
End: 2025-09-02
Attending: STUDENT IN AN ORGANIZED HEALTH CARE EDUCATION/TRAINING PROGRAM
Payer: COMMERCIAL

## 2025-09-02 DIAGNOSIS — Z71.1 CONCERN ABOUT MEMORY: Primary | ICD-10-CM

## 2025-09-02 PROCEDURE — 97129 THER IVNTJ 1ST 15 MIN: CPT | Mod: GO

## 2025-09-02 PROCEDURE — 97130 THER IVNTJ EA ADDL 15 MIN: CPT | Mod: GO

## 2025-09-02 PROCEDURE — 97530 THERAPEUTIC ACTIVITIES: CPT | Mod: GO

## (undated) DEVICE — SPONGE RAY-TEC 4X4" 7317

## (undated) DEVICE — BIN-UROLOGY / CYSTO BN47

## (undated) DEVICE — SOL NACL 0.9% IRRIG 3000ML BAG 2B7477

## (undated) DEVICE — Device

## (undated) DEVICE — PRESSURE INFUSOR DISP. 3000CC 233000

## (undated) DEVICE — LABEL STERILE PREPRINTED FOR OR FRRH01-2M

## (undated) DEVICE — PREP POVIDONE IODINE SOLUTION 10% 4OZ BOTTLE 29906-004

## (undated) DEVICE — CANISTER SUCTION MEDI-VAC GUARDIAN 2000ML 90D 65651-220

## (undated) DEVICE — CATH SELF CATH MALE 14FR 414

## (undated) DEVICE — DRSG NON ADHERING 3 X 8 TELFA 1238

## (undated) DEVICE — TUBING SUCTION 20FT N620A

## (undated) DEVICE — TRAY PREP DRY SKIN SCRUB 067

## (undated) DEVICE — COVER LT HANDLE 2/PK 5160-2FG

## (undated) DEVICE — PREP CHLORAPREP 26ML TINTED HI-LITE ORANGE 930815

## (undated) DEVICE — PACK GYN CYSTO CUSTOM SMA32GCMBF

## (undated) DEVICE — PACK BASIN SET UP SUTCNBSBBA

## (undated) DEVICE — TUBING INFLOW HYSTEROPUMP 4170.223

## (undated) DEVICE — SET TUBING OUTFLOW FLUID MANAGER STRL DISP 4170.901

## (undated) DEVICE — DRAPE STERI TOWEL LG 1010

## (undated) DEVICE — SOL NACL 0.9% INJ 1000ML BAG 2B1324X

## (undated) DEVICE — BLANKET BAIR HUGGER UPPER BODY 42268

## (undated) DEVICE — ENDO SEAL SCOPE SELF SEAL 1.2MM 26153EAU

## (undated) DEVICE — SPONGE LAP 18X18" X8435

## (undated) DEVICE — KIT UTERINE NOVASURE V5 NSV5US-003

## (undated) DEVICE — ESU GROUND PAD ADULT W/CORD E7507

## (undated) DEVICE — SOL WATER IRRIG 1000ML BOTTLE 2F7114

## (undated) RX ORDER — FENTANYL CITRATE 50 UG/ML
INJECTION, SOLUTION INTRAMUSCULAR; INTRAVENOUS
Status: DISPENSED
Start: 2023-11-22